# Patient Record
Sex: FEMALE | Race: WHITE | Employment: OTHER | URBAN - METROPOLITAN AREA
[De-identification: names, ages, dates, MRNs, and addresses within clinical notes are randomized per-mention and may not be internally consistent; named-entity substitution may affect disease eponyms.]

---

## 2021-12-06 ENCOUNTER — OFFICE VISIT (OUTPATIENT)
Dept: PRIMARY CARE CLINIC | Age: 61
End: 2021-12-06
Payer: MEDICARE

## 2021-12-06 VITALS
DIASTOLIC BLOOD PRESSURE: 60 MMHG | HEART RATE: 70 BPM | SYSTOLIC BLOOD PRESSURE: 110 MMHG | TEMPERATURE: 96.6 F | WEIGHT: 176.4 LBS | OXYGEN SATURATION: 97 % | RESPIRATION RATE: 18 BRPM | BODY MASS INDEX: 30.11 KG/M2 | HEIGHT: 64 IN

## 2021-12-06 DIAGNOSIS — R05.9 COUGH: ICD-10-CM

## 2021-12-06 DIAGNOSIS — J02.9 PHARYNGITIS, UNSPECIFIED ETIOLOGY: Primary | ICD-10-CM

## 2021-12-06 LAB
Lab: NORMAL
PERFORMING INSTRUMENT: NORMAL
QC PASS/FAIL: NORMAL
SARS-COV-2, POC: NORMAL

## 2021-12-06 PROCEDURE — 99213 OFFICE O/P EST LOW 20 MIN: CPT | Performed by: NURSE PRACTITIONER

## 2021-12-06 PROCEDURE — G8417 CALC BMI ABV UP PARAM F/U: HCPCS | Performed by: NURSE PRACTITIONER

## 2021-12-06 PROCEDURE — 3017F COLORECTAL CA SCREEN DOC REV: CPT | Performed by: NURSE PRACTITIONER

## 2021-12-06 PROCEDURE — G8484 FLU IMMUNIZE NO ADMIN: HCPCS | Performed by: NURSE PRACTITIONER

## 2021-12-06 PROCEDURE — G8427 DOCREV CUR MEDS BY ELIG CLIN: HCPCS | Performed by: NURSE PRACTITIONER

## 2021-12-06 PROCEDURE — 4004F PT TOBACCO SCREEN RCVD TLK: CPT | Performed by: NURSE PRACTITIONER

## 2021-12-06 PROCEDURE — 87426 SARSCOV CORONAVIRUS AG IA: CPT | Performed by: NURSE PRACTITIONER

## 2021-12-06 RX ORDER — METOPROLOL TARTRATE 50 MG/1
50 TABLET, FILM COATED ORAL DAILY
COMMUNITY
Start: 2021-11-03

## 2021-12-06 RX ORDER — ESTRADIOL 0.1 MG/G
1 CREAM VAGINAL
COMMUNITY
Start: 2021-11-11 | End: 2022-08-15

## 2021-12-06 RX ORDER — LIDOCAINE AND PRILOCAINE 25; 25 MG/G; MG/G
1 CREAM TOPICAL 2 TIMES DAILY
COMMUNITY
Start: 2021-11-03 | End: 2022-03-24 | Stop reason: ALTCHOICE

## 2021-12-06 RX ORDER — LISINOPRIL 10 MG/1
10 TABLET ORAL DAILY
COMMUNITY
Start: 2021-10-27

## 2021-12-06 RX ORDER — HYDROCODONE BITARTRATE AND ACETAMINOPHEN 5; 325 MG/1; MG/1
1 TABLET ORAL 3 TIMES DAILY PRN
COMMUNITY
Start: 2021-12-01

## 2021-12-06 RX ORDER — TRAZODONE HYDROCHLORIDE 50 MG/1
50 TABLET ORAL DAILY
COMMUNITY
Start: 2021-10-25 | End: 2022-10-04 | Stop reason: SDUPTHER

## 2021-12-06 RX ORDER — LIDOCAINE 50 MG/G
1 OINTMENT TOPICAL 3 TIMES DAILY
COMMUNITY
Start: 2021-10-28 | End: 2022-03-24 | Stop reason: ALTCHOICE

## 2021-12-06 RX ORDER — AMOXICILLIN 875 MG/1
875 TABLET, COATED ORAL 2 TIMES DAILY
Qty: 20 TABLET | Refills: 0 | Status: SHIPPED | OUTPATIENT
Start: 2021-12-06 | End: 2021-12-16

## 2021-12-06 RX ORDER — FUROSEMIDE 40 MG/1
40 TABLET ORAL DAILY
COMMUNITY
Start: 2021-11-23 | End: 2022-03-24 | Stop reason: SDUPTHER

## 2021-12-06 NOTE — PROGRESS NOTES
Chief Complaint   Other (c/o sinus drg yellow,headache,sore and swollen throat,cough,ears hurt) and Other (x 3 days)      History of Present Illness   Source of history provided by:  patient. Robert Floyd is a 57 y.o. old female who presents to the walk in clinic with complaints of Headache, Pharyngitis, Rhinorrhea, Nasal Congestion and non-productive Cough x 3 days. States symptoms have stayed the same since onset. Has been taking Acetaminophen, NSAID without symptomatic relief. Denies any Fever, Shortness of breath, Nausea, Vomiting, Chest Pain, LE Edema, Abdominal Pain, Rash, Lethargy or Close contact with a lab confirmed COVID-19 patient within 14 days of symptom onset . Denies any hx of asthma, COPD, emphysema or pneumonia. 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 ROS   Pertinent positives and negatives are stated within HPI, all other systems reviewed and are negative. Past Medical History:  has no past medical history on file. Past Surgical History:  has no past surgical history on file. Social History:  reports that she has been smoking. She has been smoking about 1.00 pack per day. She has never used smokeless tobacco. She reports current alcohol use. She reports that she does not use drugs. Family History: family history is not on file. Allergies: Sulfa antibiotics    Physical Exam   Vital Signs:  /60   Pulse 70   Temp 96.6 °F (35.9 °C)   Resp 18   Ht 5' 4\" (1.626 m)   Wt 176 lb 6.4 oz (80 kg)   SpO2 97%   BMI 30.28 kg/m²    Oxygen Saturation Interpretation: Normal.    Constitutional:  Alert, development consistent with age. NAD. Head:  NC/NT. Airway patent. Ears: Right TM scarred from past infections and \"blowing out of ear drum\". Left TM unremarkable. Canals without exudate or swelling bilaterally. She has a hearing aid to left ear  Mouth: Posterior pharynx with moderate erythema and clear postnasal drip.  No tonsillar hypertrophy or exudate. Neck:  Normal ROM. Supple. No anterior cervical adenopathy noted. Lungs: CTAB without wheezes, rales, or rhonchi. CV:  Regular rate and rhythm, normal heart sounds, without pathological murmurs, ectopy, gallops, or rubs. Skin:  Normal turgor. Warm, dry, without visible rash. Lymphatic: No lymphangitis or adenopathy noted. Neurological:  Oriented. Motor functions intact. Lab / Imaging Results   (All laboratory and radiology results have been personally reviewed by myself)  Labs:  Results for orders placed or performed in visit on 12/06/21   POCT COVID-19, Antigen   Result Value Ref Range    SARS-COV-2, POC Not-Detected Not Detected    Lot Number [id number]     QC Pass/Fail pass     Performing Instrument BD Veritor        Imaging: All Radiology results interpreted by Radiologist unless otherwise noted. No results found. Medical Decision Making   Pt non-toxic, in no apparent distress and stable at time of discharge. Assessment/Plan   Liat Solares was seen today for other and other. Diagnoses and all orders for this visit:    Pharyngitis, unspecified etiology  -     amoxicillin (AMOXIL) 875 MG tablet; Take 1 tablet by mouth 2 times daily for 10 days    Cough  -     POCT COVID-19, Antigen        Rapid COVID-19 swab obtained and noted to be negative. Increase fluids and rest. Symptomatic relief discussed including Tylenol prn pain/fever. Schedule f/u with PCP in 7-10 days if symptoms persist. ED sooner if symptoms worsen or change. ED immediately with high or refractory fever, progressive SOB, dyspnea, CP, calf pain/swelling, shaking chills, vomiting, abdominal pain, lethargy, flank pain, or decreased urinary output. Pt verbalizes understanding and is in agreement with plan of care. All questions answered. LUZ Sloan NP    This visit was provided as a focused evaluation during the [place] -19 pandemic/national emergency.   A comprehensive review of all previous patient history and testing was not conducted. Pertinent findings were elicited during the visit. *NOTE: This report was transcribed using voice recognition software. Every effort was made to ensure accuracy; however, inadvertent computerized transcription errors may be present.

## 2022-01-28 LAB — VITAMIN B-12: NORMAL

## 2022-02-22 ENCOUNTER — OFFICE VISIT (OUTPATIENT)
Dept: PRIMARY CARE CLINIC | Age: 62
End: 2022-02-22
Payer: MEDICARE

## 2022-02-22 VITALS
DIASTOLIC BLOOD PRESSURE: 74 MMHG | OXYGEN SATURATION: 98 % | HEART RATE: 67 BPM | WEIGHT: 174.2 LBS | BODY MASS INDEX: 29.9 KG/M2 | SYSTOLIC BLOOD PRESSURE: 110 MMHG | TEMPERATURE: 96.9 F

## 2022-02-22 DIAGNOSIS — B37.9 YEAST INFECTION: ICD-10-CM

## 2022-02-22 DIAGNOSIS — H10.31 ACUTE BACTERIAL CONJUNCTIVITIS OF RIGHT EYE: Primary | ICD-10-CM

## 2022-02-22 PROCEDURE — 3017F COLORECTAL CA SCREEN DOC REV: CPT | Performed by: NURSE PRACTITIONER

## 2022-02-22 PROCEDURE — 99213 OFFICE O/P EST LOW 20 MIN: CPT | Performed by: NURSE PRACTITIONER

## 2022-02-22 PROCEDURE — G8427 DOCREV CUR MEDS BY ELIG CLIN: HCPCS | Performed by: NURSE PRACTITIONER

## 2022-02-22 PROCEDURE — G8484 FLU IMMUNIZE NO ADMIN: HCPCS | Performed by: NURSE PRACTITIONER

## 2022-02-22 PROCEDURE — G8417 CALC BMI ABV UP PARAM F/U: HCPCS | Performed by: NURSE PRACTITIONER

## 2022-02-22 PROCEDURE — 4004F PT TOBACCO SCREEN RCVD TLK: CPT | Performed by: NURSE PRACTITIONER

## 2022-02-22 RX ORDER — NYSTATIN 100000 [USP'U]/G
POWDER TOPICAL
COMMUNITY

## 2022-02-22 RX ORDER — NITROGLYCERIN 0.3 MG/1
TABLET SUBLINGUAL
COMMUNITY

## 2022-02-22 RX ORDER — OFLOXACIN 3 MG/ML
2 SOLUTION/ DROPS OPHTHALMIC 4 TIMES DAILY
Qty: 1 EACH | Refills: 0 | Status: SHIPPED | OUTPATIENT
Start: 2022-02-22 | End: 2022-03-04

## 2022-02-22 RX ORDER — ASPIRIN 81 MG/1
TABLET ORAL
COMMUNITY

## 2022-02-22 RX ORDER — OFLOXACIN 3 MG/ML
SOLUTION AURICULAR (OTIC)
COMMUNITY

## 2022-02-22 RX ORDER — FLUCONAZOLE 100 MG/1
100 TABLET ORAL DAILY
Qty: 7 TABLET | Refills: 0 | Status: SHIPPED | OUTPATIENT
Start: 2022-02-22 | End: 2022-03-01

## 2022-02-22 ASSESSMENT — PATIENT HEALTH QUESTIONNAIRE - PHQ9
SUM OF ALL RESPONSES TO PHQ QUESTIONS 1-9: 0
2. FEELING DOWN, DEPRESSED OR HOPELESS: 0
SUM OF ALL RESPONSES TO PHQ QUESTIONS 1-9: 0

## 2022-02-22 NOTE — PROGRESS NOTES
Chief Complaint:   Eye Problem (right eye,   itching, discharge x 2 weeks, but getting worse this last week )      History of Present Illness   Source of history provided by:  patient. Sherry Vigil is a 60 y.o. old female presenting to express care with redness, itching, mild irritation, and abnormal drainage to the right eye for the past 2 weeks. She reports that the drainage is milky. States there was no obvious FB exposure. She has had a recent URI within the past week. Denies any visual changes, visual loss, HA, ear pain, fever, chills, N/V, or lethargy. She also reports that she had been treated for a yeast infection and does not believe that it was completely treated, as she is still having symptoms. Circumstances:    []  Contact Lens Use     [x]  Recent URI Sx's     []  Spontaneous Onset     []  Close Contact w/similar Sx's     []  Work Related     History of:     []   Glaucoma     []   Recent Eye Surgery     Review of Systems    Unless otherwise stated in this report or unable to obtain because of the patient's clinical or mental status as evidenced by the medical record, this patients's positive and negative responses for Review of Systems, constitutional, psych, eyes, ENT, cardiovascular, respiratory, gastrointestinal, neurological, genitourinary, musculoskeletal, integument systems and systems related to the presenting problem are either stated in the preceding or were not pertinent or were negative for the symptoms and/or complaints related to the medical problem. Past Medical History:  has no past medical history on file. Past Surgical History:  has no past surgical history on file. Social History:  reports that she has been smoking. She started smoking about 48 years ago. She has a 48.00 pack-year smoking history. She has never used smokeless tobacco. She reports current alcohol use. She reports that she does not use drugs.   Family History: family history is not on file.  Allergies: Sulfa antibiotics and Celecoxib    Physical Exam   Vital Signs:  /74   Pulse 67   Temp 96.9 °F (36.1 °C) (Temporal)   Wt 174 lb 3.2 oz (79 kg)   SpO2 98%   BMI 29.90 kg/m²    Oxygen Saturation Interpretation: Normal.    Constitutional:  Alert, development consistent with age. HENT:  NC/NT. Airway patent. Eyes:         Pupils: PERRL bilaterally. Eyelids:  Edema to the right upper and lower lids. Conjunctiva: Moderate erythema noted to the right conjunctiva. Sclera: Clear bilaterally. No obvious FB, rust ring, or hyphema. Cornea: No obvious corneal abrasion or ulceration bilaterally. EOM:  Intact bilaterally. Visual Acuity:  Grossly intact. Lungs: CTAB without wheezing, rales, or rhonchi  Heart: RRR, no murmurs, rubs, or gallops. Skin: Moist and warm without rashes or lesions. Lymphatics: No lymphangitis or adenopathy noted. Neurological:  Alert and oriented. Motor functions intact. Test Results Section   (All laboratory and radiology results have been personally reviewed by myself)  Labs:  No results found for this visit on 02/22/22. Imaging: All Radiology results interpreted by Radiologist unless otherwise noted. No results found. Assessment / Plan   Impression(s):  Atilio Garcia was seen today for eye problem. Diagnoses and all orders for this visit:    Acute bacterial conjunctivitis of right eye  -     ofloxacin (OCUFLOX) 0.3 % solution; Place 2 drops into the right eye 4 times daily for 10 days    Yeast infection  -     fluconazole (DIFLUCAN) 100 MG tablet; Take 1 tablet by mouth daily for 7 days        Script written for ofloxacin ophthalmic drops, side effects and application directions discussed. Advise good handwashing, avoiding rubbing eyes, and washing towels and pillowcase in hot water to prevent spread. F/u with ophthalmology in 48 hrs if not improved. ED sooner if symptoms worsen or change.  ED immediately with the development of fever, visual changes, ocular pain/swelling, body aches, or shaking chills. Patient verbalized understanding and is in agreement with this care plan. All questions answered. Return if symptoms worsen or fail to improve.     LUZ Serrano - NP

## 2022-03-24 ENCOUNTER — OFFICE VISIT (OUTPATIENT)
Dept: PRIMARY CARE CLINIC | Age: 62
End: 2022-03-24
Payer: MEDICARE

## 2022-03-24 VITALS
SYSTOLIC BLOOD PRESSURE: 116 MMHG | HEART RATE: 72 BPM | HEIGHT: 64 IN | BODY MASS INDEX: 29.3 KG/M2 | TEMPERATURE: 98.6 F | DIASTOLIC BLOOD PRESSURE: 70 MMHG | WEIGHT: 171.6 LBS | OXYGEN SATURATION: 96 %

## 2022-03-24 DIAGNOSIS — I10 ESSENTIAL HYPERTENSION: ICD-10-CM

## 2022-03-24 DIAGNOSIS — E11.42 TYPE 2 DIABETES MELLITUS WITH DIABETIC POLYNEUROPATHY, WITHOUT LONG-TERM CURRENT USE OF INSULIN (HCC): ICD-10-CM

## 2022-03-24 DIAGNOSIS — F17.200 TOBACCO USE DISORDER: ICD-10-CM

## 2022-03-24 DIAGNOSIS — Z11.59 NEED FOR HEPATITIS C SCREENING TEST: ICD-10-CM

## 2022-03-24 DIAGNOSIS — Z12.39 ENCOUNTER FOR BREAST CANCER SCREENING USING NON-MAMMOGRAM MODALITY: ICD-10-CM

## 2022-03-24 DIAGNOSIS — Z87.891 PERSONAL HISTORY OF TOBACCO USE: ICD-10-CM

## 2022-03-24 DIAGNOSIS — Z11.4 SCREENING FOR HIV (HUMAN IMMUNODEFICIENCY VIRUS): ICD-10-CM

## 2022-03-24 DIAGNOSIS — I25.10 ARTERIOSCLEROSIS OF CORONARY ARTERY: ICD-10-CM

## 2022-03-24 DIAGNOSIS — M15.9 PRIMARY OSTEOARTHRITIS INVOLVING MULTIPLE JOINTS: ICD-10-CM

## 2022-03-24 DIAGNOSIS — H66.91 RECURRENT OTITIS MEDIA, RIGHT: ICD-10-CM

## 2022-03-24 DIAGNOSIS — Z76.89 ENCOUNTER TO ESTABLISH CARE WITH NEW DOCTOR: Primary | ICD-10-CM

## 2022-03-24 DIAGNOSIS — M70.61 GREATER TROCHANTERIC BURSITIS OF RIGHT HIP: ICD-10-CM

## 2022-03-24 DIAGNOSIS — M62.838 MUSCLE SPASTICITY: ICD-10-CM

## 2022-03-24 DIAGNOSIS — A60.04: ICD-10-CM

## 2022-03-24 DIAGNOSIS — Z12.2 SCREENING FOR LUNG CANCER: ICD-10-CM

## 2022-03-24 PROBLEM — E11.9 DIABETES MELLITUS (HCC): Status: ACTIVE | Noted: 2022-03-24

## 2022-03-24 PROBLEM — H80.93 OTOSCLEROSIS OF BOTH EARS: Status: ACTIVE | Noted: 2022-03-24

## 2022-03-24 PROBLEM — M15.0 PRIMARY OSTEOARTHRITIS INVOLVING MULTIPLE JOINTS: Status: ACTIVE | Noted: 2022-03-24

## 2022-03-24 PROBLEM — N90.3 DYSPLASIA OF VULVA: Status: RESOLVED | Noted: 2022-03-24 | Resolved: 2022-03-24

## 2022-03-24 PROBLEM — N90.3 DYSPLASIA OF VULVA: Status: ACTIVE | Noted: 2022-03-24

## 2022-03-24 PROBLEM — E53.8 B12 DEFICIENCY: Status: ACTIVE | Noted: 2022-03-24

## 2022-03-24 LAB
ALBUMIN SERPL-MCNC: 4.6 G/DL (ref 3.5–5.2)
ALP BLD-CCNC: 87 U/L (ref 35–104)
ALT SERPL-CCNC: 25 U/L (ref 0–32)
ANION GAP SERPL CALCULATED.3IONS-SCNC: 14 MMOL/L (ref 7–16)
AST SERPL-CCNC: 25 U/L (ref 0–31)
BASOPHILS ABSOLUTE: 0.05 E9/L (ref 0–0.2)
BASOPHILS RELATIVE PERCENT: 0.5 % (ref 0–2)
BILIRUB SERPL-MCNC: 0.4 MG/DL (ref 0–1.2)
BUN BLDV-MCNC: 9 MG/DL (ref 6–23)
CALCIUM SERPL-MCNC: 9.2 MG/DL (ref 8.6–10.2)
CHLORIDE BLD-SCNC: 101 MMOL/L (ref 98–107)
CHOLESTEROL, TOTAL: 236 MG/DL (ref 0–199)
CO2: 25 MMOL/L (ref 22–29)
CREAT SERPL-MCNC: 0.7 MG/DL (ref 0.5–1)
CREATININE URINE: 19 MG/DL (ref 29–226)
EOSINOPHILS ABSOLUTE: 0.11 E9/L (ref 0.05–0.5)
EOSINOPHILS RELATIVE PERCENT: 1.1 % (ref 0–6)
GFR AFRICAN AMERICAN: >60
GFR NON-AFRICAN AMERICAN: >60 ML/MIN/1.73
GLUCOSE BLD-MCNC: 83 MG/DL (ref 74–99)
HBA1C MFR BLD: 5.6 % (ref 4–5.6)
HCT VFR BLD CALC: 48.1 % (ref 34–48)
HDLC SERPL-MCNC: 48 MG/DL
HEMOGLOBIN: 16.4 G/DL (ref 11.5–15.5)
IMMATURE GRANULOCYTES #: 0.02 E9/L
IMMATURE GRANULOCYTES %: 0.2 % (ref 0–5)
LDL CHOLESTEROL CALCULATED: 137 MG/DL (ref 0–99)
LYMPHOCYTES ABSOLUTE: 3.63 E9/L (ref 1.5–4)
LYMPHOCYTES RELATIVE PERCENT: 36.9 % (ref 20–42)
MCH RBC QN AUTO: 34.1 PG (ref 26–35)
MCHC RBC AUTO-ENTMCNC: 34.1 % (ref 32–34.5)
MCV RBC AUTO: 100 FL (ref 80–99.9)
MICROALBUMIN UR-MCNC: 18.4 MG/L
MICROALBUMIN/CREAT UR-RTO: 96.8 (ref 0–30)
MONOCYTES ABSOLUTE: 0.79 E9/L (ref 0.1–0.95)
MONOCYTES RELATIVE PERCENT: 8 % (ref 2–12)
NEUTROPHILS ABSOLUTE: 5.23 E9/L (ref 1.8–7.3)
NEUTROPHILS RELATIVE PERCENT: 53.3 % (ref 43–80)
PDW BLD-RTO: 13.1 FL (ref 11.5–15)
PLATELET # BLD: 308 E9/L (ref 130–450)
PMV BLD AUTO: 9.2 FL (ref 7–12)
POTASSIUM SERPL-SCNC: 3.9 MMOL/L (ref 3.5–5)
RBC # BLD: 4.81 E12/L (ref 3.5–5.5)
SODIUM BLD-SCNC: 140 MMOL/L (ref 132–146)
TOTAL PROTEIN: 7.3 G/DL (ref 6.4–8.3)
TRIGL SERPL-MCNC: 256 MG/DL (ref 0–149)
VLDLC SERPL CALC-MCNC: 51 MG/DL
WBC # BLD: 9.8 E9/L (ref 4.5–11.5)

## 2022-03-24 PROCEDURE — G8417 CALC BMI ABV UP PARAM F/U: HCPCS | Performed by: STUDENT IN AN ORGANIZED HEALTH CARE EDUCATION/TRAINING PROGRAM

## 2022-03-24 PROCEDURE — 99215 OFFICE O/P EST HI 40 MIN: CPT | Performed by: STUDENT IN AN ORGANIZED HEALTH CARE EDUCATION/TRAINING PROGRAM

## 2022-03-24 PROCEDURE — G8484 FLU IMMUNIZE NO ADMIN: HCPCS | Performed by: STUDENT IN AN ORGANIZED HEALTH CARE EDUCATION/TRAINING PROGRAM

## 2022-03-24 PROCEDURE — 3017F COLORECTAL CA SCREEN DOC REV: CPT | Performed by: STUDENT IN AN ORGANIZED HEALTH CARE EDUCATION/TRAINING PROGRAM

## 2022-03-24 PROCEDURE — 4004F PT TOBACCO SCREEN RCVD TLK: CPT | Performed by: STUDENT IN AN ORGANIZED HEALTH CARE EDUCATION/TRAINING PROGRAM

## 2022-03-24 PROCEDURE — 3046F HEMOGLOBIN A1C LEVEL >9.0%: CPT | Performed by: STUDENT IN AN ORGANIZED HEALTH CARE EDUCATION/TRAINING PROGRAM

## 2022-03-24 PROCEDURE — 2022F DILAT RTA XM EVC RTNOPTHY: CPT | Performed by: STUDENT IN AN ORGANIZED HEALTH CARE EDUCATION/TRAINING PROGRAM

## 2022-03-24 PROCEDURE — G0296 VISIT TO DETERM LDCT ELIG: HCPCS | Performed by: STUDENT IN AN ORGANIZED HEALTH CARE EDUCATION/TRAINING PROGRAM

## 2022-03-24 PROCEDURE — G8427 DOCREV CUR MEDS BY ELIG CLIN: HCPCS | Performed by: STUDENT IN AN ORGANIZED HEALTH CARE EDUCATION/TRAINING PROGRAM

## 2022-03-24 RX ORDER — FUROSEMIDE 40 MG/1
40 TABLET ORAL DAILY
Qty: 90 TABLET | Refills: 1 | Status: SHIPPED
Start: 2022-03-24 | End: 2022-09-23

## 2022-03-24 RX ORDER — ACYCLOVIR 400 MG/1
TABLET ORAL
COMMUNITY
Start: 2022-03-14 | End: 2022-05-17 | Stop reason: ALTCHOICE

## 2022-03-24 RX ORDER — ACETAMINOPHEN 500 MG
1000 TABLET ORAL
COMMUNITY
Start: 2021-07-28

## 2022-03-24 RX ORDER — FLUCONAZOLE 150 MG/1
TABLET ORAL
COMMUNITY
Start: 2022-03-09 | End: 2022-07-05 | Stop reason: ALTCHOICE

## 2022-03-24 SDOH — ECONOMIC STABILITY: FOOD INSECURITY: WITHIN THE PAST 12 MONTHS, THE FOOD YOU BOUGHT JUST DIDN'T LAST AND YOU DIDN'T HAVE MONEY TO GET MORE.: SOMETIMES TRUE

## 2022-03-24 SDOH — ECONOMIC STABILITY: FOOD INSECURITY: WITHIN THE PAST 12 MONTHS, YOU WORRIED THAT YOUR FOOD WOULD RUN OUT BEFORE YOU GOT MONEY TO BUY MORE.: SOMETIMES TRUE

## 2022-03-24 ASSESSMENT — SOCIAL DETERMINANTS OF HEALTH (SDOH): HOW HARD IS IT FOR YOU TO PAY FOR THE VERY BASICS LIKE FOOD, HOUSING, MEDICAL CARE, AND HEATING?: NOT VERY HARD

## 2022-03-24 ASSESSMENT — ENCOUNTER SYMPTOMS: SHORTNESS OF BREATH: 0

## 2022-03-24 NOTE — PROGRESS NOTES
Pain mngmt, Cardio, [address], and Neuro/Dr. James Calle records obtained and has been given to nurse to abstract.

## 2022-03-24 NOTE — PROGRESS NOTES
NEW PRIMARY CARE VISIT    3/24/22  Name: Enma Huddleston   : 1960   Age: 60 y.o.   Sex: female        Assessment & Plan:     Problem List Items Addressed This Visit        Circulatory    Arteriosclerosis of coronary artery     History MI with stents   Unable to tolerate statin  Continue aspirin, metoprolol, lisinopril  Follows with cardiology         Relevant Medications    furosemide (LASIX) 40 MG tablet    Other Relevant Orders    Comprehensive Metabolic Panel    LIPID PANEL    MICROALBUMIN / CREATININE URINE RATIO    CBC with Auto Differential    Essential hypertension     Controlled  Continue metoprolol, lisinopril         Relevant Medications    furosemide (LASIX) 40 MG tablet    Other Relevant Orders    Comprehensive Metabolic Panel    MICROALBUMIN / CREATININE URINE RATIO       Endocrine    Type 2 diabetes mellitus with diabetic polyneuropathy, without long-term current use of insulin (HCC)     Continue Metformin 500 mg once daily  A1c and annual labs ordered         Relevant Orders    Hemoglobin A1C    Comprehensive Metabolic Panel    LIPID PANEL    MICROALBUMIN / CREATININE URINE RATIO       Nervous and Auditory    Recurrent otitis media, right     Assessed today, no signs of infection  Advised to follow-up with ENT as needed         Relevant Medications    acyclovir (ZOVIRAX) 400 MG tablet    fluconazole (DIFLUCAN) 150 MG tablet       Musculoskeletal and Integument    Primary osteoarthritis involving multiple joints     Following with Ortho and pain management         Relevant Medications    acetaminophen (TYLENOL) 500 MG tablet    Greater trochanteric bursitis of right hip     Following with Ortho and pain management            Genitourinary    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)     GYN treating with acyclovir            Other    Muscle spasticity     Abnormal intermittent lower extremity spasticity  Following with neuro         Tobacco use disorder Low Dose CT (LDCT) Lung Screening criteria met:  · Age 50-77(Medicare) or 50-80 ([facility])  · Pack year smoking >20  · Still smoking or less than 15 year since quit  · No sign or symptoms of lung cancer  · > 11 months since last LDCT   Risks and benefits of lung cancer screening with LDCT scans discussed             Other Visit Diagnoses     Encounter to establish care with new doctor    -  Primary    History reviewed and updated    Screening for HIV (human immunodeficiency virus)        Relevant Orders    HIV Screen    Need for hepatitis C screening test        Relevant Orders    Hepatitis C Antibody    Encounter for breast cancer screening using non-mammogram modality        Patient unable to tolerate mammograms due to required positioning and chronic shoulder pain  Bilateral breast ultrasound ordered for screening    Relevant Orders    US BREAST COMPLETE LEFT    US BREAST COMPLETE RIGHT    Personal history of tobacco use        Relevant Orders    [place] VISIT TO DISCUSS LUNG CA SCREEN W LDCT (Completed)    CT Lung Screen (Annual)    Screening for lung cancer        Relevant Orders    [place] VISIT TO DISCUSS LUNG CA SCREEN W LDCT (Completed)    CT Lung Screen (Annual)          Counseled patient regarding above diagnosis, including possible risks and complications, especially if left uncontrolled. Counseled patient as appropriate and relevant regarding any possible side effects, risks, and alternatives to treatment; the patient verbalizes understanding, and is in agreement with the plan as detailed above. All educational materials and instructions were discussed and included on the After Visit Summary. All questions answered to the patient's satisfaction. The patient was advised to call for any concerns prior to next appointment. Return in about 6 months (around 9/24/2022) for medicare wellness. 69 minutes was spent precharting, face-to-face with patient, and documenting on day of encounter.     This provider and patient were wearing surgical masks and practiced social distancing when appropriate during visit due to COVID-19 pandemic. Subjective:     Chief Complaint   Patient presents with    New Patient       Patient needs new PCP. Patient reports chronic ear infections in right ear since child and ruptured right TM 2009. Reports recent purulent drainage from right ear 5 days ago. Has otosclerosis bilateral inner ears, diagnosed age 23, caused her hearing loss. Was diagnosed with HSV, prescribed acyclovir by GYN, may start prophylaxis. Has been having intermittent irritation since vulvar surgery for dysplasia. Started on vaginal estradiol which has been helpful. Follows with cardiology for history of MI and stents. Takes aspirin but cannot take statins. Follows with neurology for history of neuropathy, fibromyalgia, spasticity lower extremities. Gets results at visit later today. Follows with ortho and pain management for arthritis is knees, hips, and back. Review of Systems   Constitutional: Positive for fatigue and fever (subjective). Negative for chills. HENT: Positive for ear discharge, ear pain and hearing loss (chronic). Eyes: Negative for visual disturbance. Respiratory: Negative for shortness of breath. Cardiovascular: Negative for chest pain. Neurological: Positive for numbness (neuropathy). Medical History:   History reviewed and updated as needed.     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)    Essential hypertension    Primary osteoarthritis involving multiple joints    Greater trochanteric bursitis of right hip    Muscle spasticity    Tobacco use disorder        Past Medical History: Diagnosis Date    Dysplasia of vulva 2021    Gastric ulcer     MI [facility]) [facility] [address] 2007    Otosclerosis of both ears     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) 3/24/2022    Uterine fibroid 2003       Past Surgical History:   Procedure Laterality Date    CORONARY ANGIOPLASTY WITH STENT PLACEMENT  2007    x4 stents    HYSTERECTOMY, TOTAL ABDOMINAL  2003    for fibroids    TUBAL LIGATION  1981    VULVA SURGERY Right 2021    for dysplasia       Family History   Problem Relation Age of Onset    Stroke Mother 36    Heart Attack Father 61    Schizophrenia Father     Lung Cancer Sister     Cervical Cancer Sister     Hypothyroidism Half-Sister     Schizophrenia Son     Other Daughter         issues following meningitis    Endometriosis Daughter     Arthritis Daughter        Medications:     Current Outpatient Medications:     acyclovir (ZOVIRAX) 400 MG tablet, TAKE 1 TABLET BY MOUTH EVERY 8 HOURS, Disp: , Rfl:     acetaminophen (TYLENOL) 500 MG tablet, Take 1,000 mg by mouth, Disp: , Rfl:     fluconazole (DIFLUCAN) 150 MG tablet, TAKE 1 TABLET BY MOUTH 1 TIME, Disp: , Rfl:     furosemide (LASIX) 40 MG tablet, Take 1 tablet by mouth daily, Disp: 90 tablet, Rfl: 1    ofloxacin (FLOXIN) 0.3 % otic solution, ofloxacin 0.3 % ear drops, Disp: , Rfl:     nitroGLYCERIN (NITROSTAT) 0.3 MG SL tablet, Nitrostat 0.3 mg sublingual tablet  place 1 tablet by sublingual route at the first sign of an attack; no more than 3 tabs are recommended within a 15 minute period. , Disp: , Rfl:     aspirin 81 MG EC tablet, Aspir-81 mg tablet,delayed release  take 1 tablet by oral route  every day, Disp: , Rfl:     metoprolol tartrate (LOPRESSOR) 50 MG tablet, Take 50 mg by mouth daily, Disp: , Rfl:     traZODone (DESYREL) 50 MG tablet, Take 50 mg by mouth daily, Disp: , Rfl:     metFORMIN (GLUCOPHAGE) 500 MG tablet, Take 500 mg by mouth daily, Disp: , Rfl:     lisinopril (PRINIVIL;ZESTRIL) 10 MG tablet, Take 10 mg by mouth daily, Disp: , Rfl:     HYDROcodone-acetaminophen (NORCO) 5-325 MG per tablet, Take 1 tablet by mouth 3 times daily as needed. , Disp: , Rfl:     estradiol (ESTRACE) 0.1 MG/GM vaginal cream, Place 1 g vaginally three times a week, Disp: , Rfl:     nystatin (NYSTATIN) 612993 UNIT/GM powder, Nystop 100,000 unit/gram topical powder  APPLY TO THE AFFECTED AREA EVERY DAY (Patient not taking: Reported on 3/24/2022), Disp: , Rfl:     Allergies: Allergies   Allergen Reactions    Niacin Hives    Sulfa Antibiotics Hives    Sulfur Hives    Celecoxib     Statins      myalgias       Social History:     Social History     Socioeconomic History    Marital status:      Spouse name: Not on file    Number of children: Not on file    Years of education: Not on file    Highest education level: Not on file   Occupational History    Not on file   Tobacco Use    Smoking status: Current Every Day Smoker     Packs/day: 1.00     Years: 48.00     Pack years: 48.00     Start date: 1974    Smokeless tobacco: Never Used   Substance and Sexual Activity    Alcohol use: Not Currently     Comment: twice yearly    Drug use: Never    Sexual activity: Not Currently   Other Topics Concern    Not on file   Social History Narrative    Not on file     Social Determinants of Health     Financial Resource Strain: Low Risk     Difficulty of Paying Living Expenses: Not very hard   Food Insecurity: Food Insecurity Present    Worried About Running Out of Food in the Last Year: Sometimes true    Lin of Food in the Last Year: Sometimes true   Transportation Needs:     Lack of Transportation (Medical): Not on file    Lack of Transportation (Non-Medical):  Not on file   Physical Activity:     Days of Exercise per Week: Not on file    Minutes of Exercise per Session: Not on file   Stress:     Feeling of Stress : Not on file   Social Connections:     Frequency of Communication with Friends and Family: Not on file    Frequency of Social Gatherings with Friends and Family: Not on file    Attends Quaker Services: Not on file    Active Member of Clubs or Organizations: Not on file    Attends Club or Organization Meetings: Not on file    Marital Status: Not on file   Intimate Partner Violence:     Fear of Current or Ex-Partner: Not on file    Emotionally Abused: Not on file    Physically Abused: Not on file    Sexually Abused: Not on file   Housing Stability:     Unable to Pay for Housing in the Last Year: Not on file    Number of [place] in the Last Year: Not on file    Unstable Housing in the Last Year: Not on file       Physical Exam:     Vitals:    03/24/22 0929   BP: 116/70   Site: Left Upper Arm   Position: Sitting   Pulse: 72   Temp: 98.6 °F (37 °C)   SpO2: 96%   Weight: 171 lb 9.6 oz (77.8 kg)   Height: 5' 4\" (1.626 m)       BP Readings from Last 3 Encounters:   03/24/22 116/70   02/22/22 110/74   12/06/21 110/60       Wt Readings from Last 3 Encounters:   03/24/22 171 lb 9.6 oz (77.8 kg)   02/22/22 174 lb 3.2 oz (79 kg)   12/06/21 176 lb 6.4 oz (80 kg)        Physical Exam  Vitals and nursing note reviewed. Constitutional:       General: She is not in acute distress. Appearance: Normal appearance. She is not ill-appearing or diaphoretic. HENT:      Right Ear: Ear canal and external ear normal. There is no impacted cerumen. Left Ear: Tympanic membrane, ear canal and external ear normal.  No middle ear effusion. There is no impacted cerumen. Tympanic membrane is not erythematous. Ears:      Comments: Right tympanic membrane not present. Cardiovascular:      Rate and Rhythm: Normal rate and regular rhythm. Heart sounds: Normal heart sounds. Pulmonary:      Effort: Pulmonary effort is normal. No respiratory distress. Breath sounds: Normal breath sounds. Musculoskeletal:      Right lower leg: No edema.       Left lower leg: No edema. Skin:     General: Skin is warm and dry. Neurological:      Mental Status: She is alert and oriented to person, place, and time. Psychiatric:         Mood and Affect: Mood normal.         Behavior: Behavior normal.         Testing:     Orders Placed This Encounter   Procedures    CT Lung Screen (Annual)     Age: Patient is 60 y.o. Smoking History: Social History    Tobacco Use      Smoking status: Current Every Day Smoker        Packs/day: 1.00        Years: 48.00        Pack years: 50        Start date: 1974      Smokeless tobacco: Never Used    Alcohol use: Not Currently      Comment: twice yearly    Drug use: Never   Pack years: 50    Date of last lung cancer screening: No previous lung cancer screening exam     Standing Status:   Future     Standing Expiration Date:   9/24/2023     Order Specific Question:   Is there documentation of shared decision making? Answer:   Yes     Order Specific Question:   Is this a low dose CT or a routine CT? Answer:   Low Dose CT [1]     Order Specific Question:   Is this the first (baseline) CT or an annual exam?     Answer:   Baseline [1]     Order Specific Question:   Does the patient show any signs or symptoms of lung cancer? Answer:   No     Order Specific Question:   Smoking Status? Answer:   Current Every Day Smoker [1]     Order Specific Question:   Smoking packs per day? Answer:   1     Order Specific Question:   Years smoking?      Answer:   50    Hemoglobin A1C     Standing Status:   Future     Standing Expiration Date:   3/24/2023    Comprehensive Metabolic Panel     Standing Status:   Future     Standing Expiration Date:   3/24/2023    LIPID PANEL     Standing Status:   Future     Standing Expiration Date:   3/24/2023     Order Specific Question:   Is Patient Fasting?/# of Hours     Answer:   No    HIV Screen     Standing Status:   Future     Standing Expiration Date:   3/24/2023    Hepatitis C Antibody     Standing Status: Future     Standing Expiration Date:   3/24/2023    MICROALBUMIN / CREATININE URINE RATIO     Standing Status:   Future     Standing Expiration Date:   3/24/2023    CBC with Auto Differential     Standing Status:   Future     Standing Expiration Date:   3/24/2023    [place] VISIT TO DISCUSS LUNG CA SCREEN W LDCT        No results found for this or any previous visit (from the past 25 hour(s)).

## 2022-03-24 NOTE — Clinical Note
[facility] Dr. Estela Mayberry notes and labs/pathology  [facility] Dr. Myron Ridley surgery and pathology  [facility] Dr. Alexia Munoz PCP records  [facility] Dr. Nathanael Adler PCP records  [facility] Dr. Jamison Florian pain records  [facility] Dr. Leatha Engel ENT records

## 2022-03-24 NOTE — ASSESSMENT & PLAN NOTE
History MI with stents 2007  Unable to tolerate statin  Continue aspirin, metoprolol, lisinopril  Follows with cardiology

## 2022-03-24 NOTE — ASSESSMENT & PLAN NOTE
Low Dose CT (LDCT) Lung Screening criteria met:  · Age 50-77(Medicare) or 50-80 ([facility])  · Pack year smoking >20  · Still smoking or less than 15 year since quit  · No sign or symptoms of lung cancer  · > 11 months since last LDCT   Risks and benefits of lung cancer screening with LDCT scans discussed

## 2022-03-25 LAB
HEPATITIS C ANTIBODY INTERPRETATION: NORMAL
HIV-1 AND HIV-2 ANTIBODIES: NORMAL

## 2022-03-29 ENCOUNTER — OFFICE VISIT (OUTPATIENT)
Dept: PRIMARY CARE CLINIC | Age: 62
End: 2022-03-29
Payer: MEDICARE

## 2022-03-29 VITALS
HEART RATE: 69 BPM | OXYGEN SATURATION: 97 % | DIASTOLIC BLOOD PRESSURE: 60 MMHG | SYSTOLIC BLOOD PRESSURE: 100 MMHG | WEIGHT: 170.2 LBS | TEMPERATURE: 98 F | BODY MASS INDEX: 29.21 KG/M2

## 2022-03-29 DIAGNOSIS — R39.9 URINARY TRACT INFECTION SYMPTOMS: ICD-10-CM

## 2022-03-29 DIAGNOSIS — H91.91 DEAFNESS IN RIGHT EAR: ICD-10-CM

## 2022-03-29 DIAGNOSIS — J01.00 ACUTE NON-RECURRENT MAXILLARY SINUSITIS: Primary | ICD-10-CM

## 2022-03-29 DIAGNOSIS — H91.92 HEARING LOSS OF LEFT EAR, UNSPECIFIED HEARING LOSS TYPE: ICD-10-CM

## 2022-03-29 DIAGNOSIS — H80.93 OTOSCLEROSIS OF BOTH EARS: ICD-10-CM

## 2022-03-29 LAB
BILIRUBIN, POC: NORMAL
BLOOD URINE, POC: NORMAL
CLARITY, POC: NORMAL
COLOR, POC: YELLOW
GLUCOSE URINE, POC: NORMAL
KETONES, POC: NORMAL
LEUKOCYTE EST, POC: NORMAL
NITRITE, POC: NORMAL
PH, POC: 5.5
PROTEIN, POC: NORMAL
SPECIFIC GRAVITY, POC: 1.01
UROBILINOGEN, POC: 0.2

## 2022-03-29 PROCEDURE — G8484 FLU IMMUNIZE NO ADMIN: HCPCS | Performed by: NURSE PRACTITIONER

## 2022-03-29 PROCEDURE — 99213 OFFICE O/P EST LOW 20 MIN: CPT | Performed by: NURSE PRACTITIONER

## 2022-03-29 PROCEDURE — 3017F COLORECTAL CA SCREEN DOC REV: CPT | Performed by: NURSE PRACTITIONER

## 2022-03-29 PROCEDURE — 81002 URINALYSIS NONAUTO W/O SCOPE: CPT | Performed by: NURSE PRACTITIONER

## 2022-03-29 PROCEDURE — G8427 DOCREV CUR MEDS BY ELIG CLIN: HCPCS | Performed by: NURSE PRACTITIONER

## 2022-03-29 PROCEDURE — G8417 CALC BMI ABV UP PARAM F/U: HCPCS | Performed by: NURSE PRACTITIONER

## 2022-03-29 PROCEDURE — 4004F PT TOBACCO SCREEN RCVD TLK: CPT | Performed by: NURSE PRACTITIONER

## 2022-03-29 RX ORDER — AMOXICILLIN 875 MG/1
875 TABLET, COATED ORAL 2 TIMES DAILY
Qty: 20 TABLET | Refills: 0 | Status: SHIPPED | OUTPATIENT
Start: 2022-03-29 | End: 2022-04-08

## 2022-03-29 NOTE — PROGRESS NOTES
Chief Complaint   Urinary Tract Infection (low back pain, urinary hesitancy, was unable to go then had incontinence   ) and Sinus Problem (cough, sneezing, bilateral ear pain, fever off and on, slight ha x 5 days  )      History of Present Illness   Source of history provided by:  patient. Herminia Reaves is a 60 y.o. old female who presents to the walk in clinic with complaints of Generalized Body Aches, Headache, Pharyngitis, Rhinorrhea, Nasal Congestion, Post nasal drip, non-productive Cough, Loss of Appetite, Nausea, Fatigue and low back ache with urinary incontinence x 5 days. States symptoms have stayed the same since onset. Has been taking Mucinex, without symptomatic relief. Denies any Fever, Shortness of breath, Vomiting, Chest Pain, LE Edema, Abdominal Pain, Rash or Close contact with a lab confirmed COVID-19 patient within 14 days of symptom onset . Denies any hx of asthma, COPD, emphysema or pneumonia. States that she recently started gabapentin and acyclovir. ROS   Pertinent positives and negatives are stated within HPI, all other systems reviewed and are negative. 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 Past Surgical History:  has a past surgical history that includes Vulva surgery (Right, 2021); Coronary angioplasty with stent (2007); Tubal ligation (1981); and Hysterectomy, total abdominal (2003). Social History:  reports that she has been smoking. She started smoking about 48 years ago. She has a 48.00 pack-year smoking history. She has never used smokeless tobacco. She reports previous alcohol use. She reports that she does not use drugs. Family History: family history includes Arthritis in her daughter; Cervical Cancer in her sister;  Endometriosis in her daughter; Heart Attack (age of onset: 61) in her father; Hypothyroidism in her half-sister; Lung Cancer in her sister; Other in her daughter; Schizophrenia in her father and son; Stroke (age of onset: 36) in her mother. Allergies: Niacin, Sulfa antibiotics, Sulfur, Celecoxib, and Statins    Physical Exam   Vital Signs:  /60 (Site: Left Upper Arm, Position: Sitting, Cuff Size: Large Adult)   Pulse 69   Temp 98 °F (36.7 °C) (Temporal)   Wt 170 lb 3.2 oz (77.2 kg)   SpO2 97%   BMI 29.21 kg/m²    Oxygen Saturation Interpretation: Normal.    Constitutional:  Alert, development consistent with age. NAD. Moderate amount of swelling noted to bilateral nares. Head:  NC/NT. Airway patent. Ears: Right TM scarred from past infections and \"blowing out of ear drum\". Left TM unremarkable. Canals without exudate or swelling bilaterally. She has a hearing aid to left ear  Mouth: Posterior pharynx with mild erythema and clear postnasal drip. No tonsillar hypertrophy or exudate. Neck:  Normal ROM. Supple. No anterior cervical adenopathy noted. Lungs: CTAB without wheezes, rales, or rhonchi. CV:  Regular rate and rhythm, normal heart sounds, without pathological murmurs, ectopy, gallops, or rubs. Skin:  Normal turgor. Warm, dry, without visible rash. Lymphatic: No lymphangitis or adenopathy noted. Neurological:  Oriented. Motor functions intact. Lab / Imaging Results   (All laboratory and radiology results have been personally reviewed by myself)  Labs:  Results for orders placed or performed in visit on 03/29/22   POCT Urinalysis no Micro   Result Value Ref Range    Color, UA yellow     Clarity, UA cloudy     Glucose, UA POC neg     Bilirubin, UA neg     Ketones, UA neg     Spec Grav, UA 1.015     Blood, UA POC neg     pH, UA 5.5     Protein, UA POC neg     Urobilinogen, UA 0.2     Leukocytes, UA neg     Nitrite, UA neg        Imaging: All Radiology results interpreted by Radiologist unless otherwise noted.   No results found.    Medical Decision Making   Pt non-toxic, in no apparent distress and stable at time of discharge. Assessment/Plan   Kassy Grey was seen today for urinary tract infection and sinus problem. Diagnoses and all orders for this visit:    Acute non-recurrent maxillary sinusitis  -     amoxicillin (AMOXIL) 875 MG tablet; Take 1 tablet by mouth 2 times daily for 10 days    Otosclerosis of both ears    Deafness in right ear    Hearing loss of left ear, unspecified hearing loss type    Urinary tract infection symptoms  -     POCT Urinalysis no Micro        Increase fluids and rest. Symptomatic relief discussed including Tylenol prn pain/fever. Schedule f/u with PCP in 7-10 days if symptoms persist. ED sooner if symptoms worsen or change. ED immediately with high or refractory fever, progressive SOB, dyspnea, CP, calf pain/swelling, shaking chills, vomiting, abdominal pain, lethargy, flank pain, or decreased urinary output. Pt verbalizes understanding and is in agreement with plan of care. All questions answered. LUZ Gonzalez - NP    This visit was provided as a focused evaluation during the [place] -19 pandemic/national emergency. A comprehensive review of all previous patient history and testing was not conducted. Pertinent findings were elicited during the visit. *NOTE: This report was transcribed using voice recognition software. Every effort was made to ensure accuracy; however, inadvertent computerized transcription errors may be present.

## 2022-04-02 DIAGNOSIS — R80.9 GRADE A2 ALBUMINURIA: ICD-10-CM

## 2022-04-02 DIAGNOSIS — E78.2 MIXED HYPERLIPIDEMIA: Primary | ICD-10-CM

## 2022-04-02 DIAGNOSIS — D75.1 POLYCYTHEMIA: ICD-10-CM

## 2022-04-05 NOTE — PROGRESS NOTES
Per Dr. Prabhakar Lindsay (Dr. Bryan Contreras) confirmed she would send records from her office at her earliest convenience, no office staff to do so.

## 2022-04-05 NOTE — PROGRESS NOTES
Left voicemail at Dr. Renee Stanley office to check status on medical records DAMASO faxed on 3/24, will follow up on records.

## 2022-04-08 DIAGNOSIS — E78.2 MIXED HYPERLIPIDEMIA: ICD-10-CM

## 2022-04-08 LAB
CHOLESTEROL, FASTING: 213 MG/DL (ref 0–199)
HDLC SERPL-MCNC: 37 MG/DL
LDL CHOLESTEROL CALCULATED: 144 MG/DL (ref 0–99)
TRIGLYCERIDE, FASTING: 159 MG/DL (ref 0–149)
VLDLC SERPL CALC-MCNC: 32 MG/DL

## 2022-04-11 PROBLEM — G56.00 CARPAL TUNNEL SYNDROME: Status: ACTIVE | Noted: 2022-03-24

## 2022-05-16 LAB
BASOPHILS ABSOLUTE: NORMAL
BASOPHILS RELATIVE PERCENT: NORMAL
BUN BLDV-MCNC: 10 MG/DL
CALCIUM SERPL-MCNC: 9.1 MG/DL
CHLORIDE BLD-SCNC: 104 MMOL/L
CO2: 27 MMOL/L
CREAT SERPL-MCNC: 0.8 MG/DL
EOSINOPHILS ABSOLUTE: NORMAL
EOSINOPHILS RELATIVE PERCENT: NORMAL
GFR CALCULATED: NORMAL
GLUCOSE BLD-MCNC: 71 MG/DL
HCT VFR BLD CALC: NORMAL %
HEMOGLOBIN: NORMAL
LYMPHOCYTES ABSOLUTE: NORMAL
LYMPHOCYTES RELATIVE PERCENT: NORMAL
MCH RBC QN AUTO: NORMAL PG
MCHC RBC AUTO-ENTMCNC: NORMAL G/DL
MCV RBC AUTO: NORMAL FL
MONOCYTES ABSOLUTE: NORMAL
MONOCYTES RELATIVE PERCENT: NORMAL
NEUTROPHILS ABSOLUTE: NORMAL
NEUTROPHILS RELATIVE PERCENT: NORMAL
PDW BLD-RTO: NORMAL %
PLATELET # BLD: NORMAL 10*3/UL
PMV BLD AUTO: NORMAL FL
POTASSIUM SERPL-SCNC: 3.6 MMOL/L
RBC # BLD: NORMAL 10*6/UL
SODIUM BLD-SCNC: 138 MMOL/L
WBC # BLD: NORMAL 10*3/UL

## 2022-05-17 ENCOUNTER — OFFICE VISIT (OUTPATIENT)
Dept: PRIMARY CARE CLINIC | Age: 62
End: 2022-05-17
Payer: MEDICARE

## 2022-05-17 VITALS
OXYGEN SATURATION: 96 % | WEIGHT: 173.4 LBS | HEART RATE: 64 BPM | DIASTOLIC BLOOD PRESSURE: 64 MMHG | TEMPERATURE: 96.5 F | HEIGHT: 64 IN | BODY MASS INDEX: 29.6 KG/M2 | SYSTOLIC BLOOD PRESSURE: 116 MMHG

## 2022-05-17 DIAGNOSIS — N76.0 ACUTE VAGINITIS: ICD-10-CM

## 2022-05-17 DIAGNOSIS — E78.2 MIXED HYPERLIPIDEMIA: ICD-10-CM

## 2022-05-17 DIAGNOSIS — I25.10 ARTERIOSCLEROSIS OF CORONARY ARTERY: Primary | ICD-10-CM

## 2022-05-17 DIAGNOSIS — F17.200 TOBACCO USE DISORDER: ICD-10-CM

## 2022-05-17 PROCEDURE — G8417 CALC BMI ABV UP PARAM F/U: HCPCS | Performed by: STUDENT IN AN ORGANIZED HEALTH CARE EDUCATION/TRAINING PROGRAM

## 2022-05-17 PROCEDURE — 3017F COLORECTAL CA SCREEN DOC REV: CPT | Performed by: STUDENT IN AN ORGANIZED HEALTH CARE EDUCATION/TRAINING PROGRAM

## 2022-05-17 PROCEDURE — G8427 DOCREV CUR MEDS BY ELIG CLIN: HCPCS | Performed by: STUDENT IN AN ORGANIZED HEALTH CARE EDUCATION/TRAINING PROGRAM

## 2022-05-17 PROCEDURE — 99214 OFFICE O/P EST MOD 30 MIN: CPT | Performed by: STUDENT IN AN ORGANIZED HEALTH CARE EDUCATION/TRAINING PROGRAM

## 2022-05-17 PROCEDURE — 4004F PT TOBACCO SCREEN RCVD TLK: CPT | Performed by: STUDENT IN AN ORGANIZED HEALTH CARE EDUCATION/TRAINING PROGRAM

## 2022-05-17 RX ORDER — EZETIMIBE 10 MG/1
10 TABLET ORAL DAILY
Qty: 90 TABLET | Refills: 1 | Status: SHIPPED
Start: 2022-05-17 | End: 2022-08-15

## 2022-05-17 RX ORDER — NICOTINE 21 MG/24HR
1 PATCH, TRANSDERMAL 24 HOURS TRANSDERMAL DAILY
Qty: 84 PATCH | Refills: 1 | Status: SHIPPED
Start: 2022-05-17 | End: 2022-08-15

## 2022-05-17 ASSESSMENT — ENCOUNTER SYMPTOMS: SHORTNESS OF BREATH: 0

## 2022-05-17 NOTE — PROGRESS NOTES
ESTABLISHED PRIMARY CARE VISIT    22  Name: Piper Acosta   : 1960   Age: 60 y.o. Sex: female        Assessment & Plan:     Problem List Items Addressed This Visit        Circulatory    Arteriosclerosis of coronary artery - Primary     History MI with stents   Unable to tolerate statin  Start Zetia  Consider PCSK9I if unable to tolerate  Continue aspirin, metoprolol, lisinopril  Follows with cardiology  Repeat stress pending prior to right knee arthroscopy         Relevant Medications    ezetimibe (ZETIA) 10 MG tablet    nicotine (NICODERM CQ) 21 MG/24HR       Other    Tobacco use disorder     LDCT Lung Screening pending  Currently smoking 1ppd, planning stages of change, interested in cessation  Trial 21mg nicotine patch daily  Discussed risks of smoking while using patch         Relevant Medications    nicotine (NICODERM CQ) 21 MG/24HR    Mixed hyperlipidemia     History of CAD  Unable to tolerate statin  Start Zetia  Consider PCSK9I if unable to tolerate         Relevant Medications    ezetimibe (ZETIA) 10 MG tablet      Other Visit Diagnoses     Acute vaginitis        Previous unremarkable exam by GYN per patient  Check genital culture  Consider restarting etrace for atrophy if cx neg  Repeat GYN exam if sx persist    Relevant Orders    Culture, Genital (Completed)          Counseled patient regarding above diagnosis, including possible risks and complications, especially if left uncontrolled. Counseled patient as appropriate and relevant regarding any possible side effects, risks, and alternatives to treatment; the patient verbalizes understanding, and is in agreement with the plan as detailed above. All educational materials and instructions were discussed and included on the After Visit Summary. All questions answered to the patient's satisfaction. The patient was advised to call for any concerns prior to next appointment. Return in about 4 months (around 2022).     35 minutes was spent precharting, face-to-face with patient, and documenting on day of encounter. This provider and patient were wearing surgical masks and practiced social distancing when appropriate during visit due to COVID-19 pandemic. Subjective:     Chief Complaint   Patient presents with    Follow-up     discuss medications, pts having out patient surgery next week on right knee       Patient returns for follow-up of hyperlipidemia and heart disease  Has been unable to tolerate statins in the past, tried 4-5 different ones, caused severe upset stomach, last rosuvastatin 2-3 years ago  Niacin caused rash  Has not tried Zetia that she can remember    Having right knee arthroscopy next week  Stress test scheduled before this    Wants to quit smoking  Used patch in past  Quit for 3 months then  Wants to try again    Vaginal itching recently  Seen by GYN, had pelvic exam, was unremarkable  Was not treated for infection at that time  Itching has continued  Stopped Estrace which was initially for suture issues after vulvar dysplasia procedure      Review of Systems   Respiratory: Negative for shortness of breath. Cardiovascular: Negative for chest pain. Genitourinary: Positive for vaginal pain (itching). Negative for vaginal bleeding and vaginal discharge. Musculoskeletal: Positive for arthralgias.        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)    Essential hypertension    Primary osteoarthritis involving multiple joints    Greater trochanteric bursitis of right hip    Muscle spasticity    Tobacco use disorder    B12 deficiency    Grade A2 albuminuria    Polycythemia    Mixed hyperlipidemia    Carpal tunnel syndrome        Past Medical History:   Diagnosis Date    Dysplasia of vulva 2021    Gastric ulcer     MI (myocardial infarction) ([facility] [address] 2007    Otosclerosis of both ears     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) 3/24/2022    Uterine fibroid 2003       Past Surgical History:   Procedure Laterality Date    CORONARY ANGIOPLASTY WITH STENT PLACEMENT  2007    x4 stents    HYSTERECTOMY, TOTAL ABDOMINAL  2003    for fibroids    TUBAL LIGATION  1981    VULVA SURGERY Right 2021    for dysplasia       Family History   Problem Relation Age of Onset    Stroke Mother 36    Heart Attack Father 61    Schizophrenia Father     Lung Cancer Sister     Cervical Cancer Sister     Hypothyroidism Half-Sister     Schizophrenia Son     Other Daughter         issues following meningitis    Endometriosis Daughter     Arthritis Daughter        Medications:     Current Outpatient Medications:     acyclovir (ZOVIRAX) 400 MG tablet, TAKE 1 TABLET BY MOUTH EVERY 8 HOURS (Patient not taking: Reported on 3/29/2022), Disp: , Rfl:     acetaminophen (TYLENOL) 500 MG tablet, Take 1,000 mg by mouth, Disp: , Rfl:     fluconazole (DIFLUCAN) 150 MG tablet, TAKE 1 TABLET BY MOUTH 1 TIME (Patient not taking: Reported on 3/29/2022), Disp: , Rfl:     furosemide (LASIX) 40 MG tablet, Take 1 tablet by mouth daily, Disp: 90 tablet, Rfl: 1    ofloxacin (FLOXIN) 0.3 % otic solution, ofloxacin 0.3 % ear drops, Disp: , Rfl:     nystatin (NYSTATIN) 901659 UNIT/GM powder, Nystop 100,000 unit/gram topical powder  APPLY TO THE AFFECTED AREA EVERY DAY (Patient not taking: Reported on 3/24/2022), Disp: , Rfl:     nitroGLYCERIN (NITROSTAT) 0.3 MG SL tablet, Nitrostat 0.3 mg sublingual tablet  place 1 tablet by sublingual route at the first sign of an attack; no more than 3 tabs are recommended within a 15 minute period. , Disp: , Rfl:     aspirin 81 MG EC tablet, Aspir-81 mg tablet,delayed release  take 1 tablet by oral route  every day, Disp: , Rfl:     metoprolol tartrate (LOPRESSOR) 50 MG tablet, Take 50 mg by mouth daily, Disp: , Rfl:     traZODone (DESYREL) 50 MG tablet, Take 50 mg by mouth daily, Disp: , Rfl:     metFORMIN (GLUCOPHAGE) 500 MG tablet, Take 500 mg by mouth daily, Disp: , Rfl:     lisinopril (PRINIVIL;ZESTRIL) 10 MG tablet, Take 10 mg by mouth daily, Disp: , Rfl:     HYDROcodone-acetaminophen (NORCO) 5-325 MG per tablet, Take 1 tablet by mouth 3 times daily as needed. (Patient not taking: Reported on 3/29/2022), Disp: , Rfl:     estradiol (ESTRACE) 0.1 MG/GM vaginal cream, Place 1 g vaginally three times a week, Disp: , Rfl:     Allergies: Allergies   Allergen Reactions    Niacin Hives    Sulfa Antibiotics Hives    Sulfur Hives    Celecoxib     Statins      myalgias       Social History:     Social History     Socioeconomic History    Marital status:      Spouse name: Not on file    Number of children: Not on file    Years of education: Not on file    Highest education level: Not on file   Occupational History    Not on file   Tobacco Use    Smoking status: Current Every Day Smoker     Packs/day: 1.00     Years: 48.00     Pack years: 48.00     Start date: 1974    Smokeless tobacco: Never Used   Substance and Sexual Activity    Alcohol use: Not Currently     Comment: twice yearly    Drug use: Never    Sexual activity: Not Currently   Other Topics Concern    Not on file   Social History Narrative    Not on file     Social Determinants of Health     Financial Resource Strain: Low Risk     Difficulty of Paying Living Expenses: Not very hard   Food Insecurity: Food Insecurity Present    Worried About Running Out of Food in the Last Year: Sometimes true    Lin of Food in the Last Year: Sometimes true   Transportation Needs:     Lack of Transportation (Medical): Not on file    Lack of Transportation (Non-Medical):  Not on file   Physical Activity:     Days of Exercise per Week: Not on file    Minutes of Exercise per Session: Not on file   Stress:     Feeling of Stress : Not on file   Social Connections:     Frequency of Communication with Friends and Family: Not on file    Frequency of Social Gatherings with Friends and Family: Not on file    Attends Mosque Services: Not on file    Active Member of Clubs or Organizations: Not on file    Attends Club or Organization Meetings: Not on file    Marital Status: Not on file   Intimate Partner Violence:     Fear of Current or Ex-Partner: Not on file    Emotionally Abused: Not on file    Physically Abused: Not on file    Sexually Abused: Not on file   Housing Stability:     Unable to Pay for Housing in the Last Year: Not on file    Number of [place] in the Last Year: Not on file    Unstable Housing in the Last Year: Not on file       Physical Exam:   There were no vitals filed for this visit. BP Readings from Last 3 Encounters:   03/29/22 100/60   03/24/22 116/70   02/22/22 110/74       Wt Readings from Last 3 Encounters:   03/29/22 170 lb 3.2 oz (77.2 kg)   03/24/22 171 lb 9.6 oz (77.8 kg)   02/22/22 174 lb 3.2 oz (79 kg)       Physical Exam  Vitals and nursing note reviewed. Constitutional:       General: She is not in acute distress. Appearance: Normal appearance. She is not ill-appearing or diaphoretic. Cardiovascular:      Rate and Rhythm: Normal rate and regular rhythm. Heart sounds: Normal heart sounds. Pulmonary:      Effort: Pulmonary effort is normal. No respiratory distress. Breath sounds: Normal breath sounds. Genitourinary:     Comments: Declined. Prefers self swab today given recent exam with GYN. Musculoskeletal:      Right lower leg: No edema. Left lower leg: No edema. Skin:     General: Skin is warm and dry. Neurological:      Mental Status: She is alert and oriented to person, place, and time.    Psychiatric:         Mood and Affect: Mood normal.         Behavior: Behavior normal.         Testing:     Orders Placed This Encounter   Procedures    Culture, Genital     Standing Status:   Future     Number of Occurrences:   1     Standing Expiration Date:   5/17/2023        No results found for this or any previous visit (from the past 24 hour(s)).

## 2022-05-18 NOTE — PROGRESS NOTES
Labs and Ekg/Pre-Admin testing records received from [facility] as requested by provider.

## 2022-05-21 LAB
GENITAL CULTURE, ROUTINE: ABNORMAL
GENITAL CULTURE, ROUTINE: ABNORMAL
ORGANISM: ABNORMAL

## 2022-05-24 DIAGNOSIS — N76.0 ACUTE VAGINITIS: ICD-10-CM

## 2022-05-24 DIAGNOSIS — A49.1 GROUP B STREPTOCOCCAL INFECTION: Primary | ICD-10-CM

## 2022-05-24 RX ORDER — AMOXICILLIN 500 MG/1
500 CAPSULE ORAL 3 TIMES DAILY
Qty: 21 CAPSULE | Refills: 0 | Status: SHIPPED | OUTPATIENT
Start: 2022-05-24 | End: 2022-05-31

## 2022-06-19 NOTE — ASSESSMENT & PLAN NOTE
History MI with stents 2007  Unable to tolerate statin  Start Zetia  Consider PCSK9I if unable to tolerate  Continue aspirin, metoprolol, lisinopril  Follows with cardiology  Repeat stress pending prior to right knee arthroscopy

## 2022-06-19 NOTE — ASSESSMENT & PLAN NOTE
LDCT Lung Screening pending  Currently smoking 1ppd, planning stages of change, interested in cessation  Trial 21mg nicotine patch daily  Discussed risks of smoking while using patch

## 2022-06-21 ENCOUNTER — TELEPHONE (OUTPATIENT)
Dept: PRIMARY CARE CLINIC | Age: 62
End: 2022-06-21

## 2022-06-21 NOTE — TELEPHONE ENCOUNTER
Left voicemail for patient to call back to [facility], patient to have medicare and follow up per Dr. Dee Erm request. Azra Guerrero call back.

## 2022-07-01 ENCOUNTER — TELEPHONE (OUTPATIENT)
Dept: PRIMARY CARE CLINIC | Age: 62
End: 2022-07-01

## 2022-07-01 NOTE — TELEPHONE ENCOUNTER
Pt calling asking if you can prescribe something for anxiety. Pt states she is familiar with ativan but whatever you think would be best. Pts daughter is scheduled for surgery in abt a week and pt is stressing out. Pt informed she would most likely need to make an appointment. Please advise where I could put her or if you would just prescribe it.

## 2022-07-01 NOTE — TELEPHONE ENCOUNTER
We have not discussed anxiety before therefore cannot prescribe anything for anxiety and definitely cannot prescribe controlled substances over the phone. Patient needs appointment. Can double book on Tuesday 7/5 at [phone], [phone] or [phone].

## 2022-07-05 ENCOUNTER — OFFICE VISIT (OUTPATIENT)
Dept: PRIMARY CARE CLINIC | Age: 62
End: 2022-07-05
Payer: MEDICARE

## 2022-07-05 VITALS
TEMPERATURE: 97.5 F | HEART RATE: 72 BPM | SYSTOLIC BLOOD PRESSURE: 126 MMHG | WEIGHT: 174.6 LBS | DIASTOLIC BLOOD PRESSURE: 70 MMHG | OXYGEN SATURATION: 98 % | BODY MASS INDEX: 29.81 KG/M2 | HEIGHT: 64 IN

## 2022-07-05 DIAGNOSIS — N76.1 CHRONIC VAGINITIS: ICD-10-CM

## 2022-07-05 DIAGNOSIS — F41.1 GENERALIZED ANXIETY DISORDER WITH PANIC ATTACKS: Primary | ICD-10-CM

## 2022-07-05 DIAGNOSIS — R80.9 GRADE A2 ALBUMINURIA: ICD-10-CM

## 2022-07-05 DIAGNOSIS — F41.0 GENERALIZED ANXIETY DISORDER WITH PANIC ATTACKS: Primary | ICD-10-CM

## 2022-07-05 LAB
CREATININE URINE: 12 MG/DL (ref 29–226)
MICROALBUMIN UR-MCNC: <12 MG/L
MICROALBUMIN/CREAT UR-RTO: ABNORMAL (ref 0–30)

## 2022-07-05 PROCEDURE — G8427 DOCREV CUR MEDS BY ELIG CLIN: HCPCS | Performed by: STUDENT IN AN ORGANIZED HEALTH CARE EDUCATION/TRAINING PROGRAM

## 2022-07-05 PROCEDURE — G8417 CALC BMI ABV UP PARAM F/U: HCPCS | Performed by: STUDENT IN AN ORGANIZED HEALTH CARE EDUCATION/TRAINING PROGRAM

## 2022-07-05 PROCEDURE — 99214 OFFICE O/P EST MOD 30 MIN: CPT | Performed by: STUDENT IN AN ORGANIZED HEALTH CARE EDUCATION/TRAINING PROGRAM

## 2022-07-05 PROCEDURE — 3017F COLORECTAL CA SCREEN DOC REV: CPT | Performed by: STUDENT IN AN ORGANIZED HEALTH CARE EDUCATION/TRAINING PROGRAM

## 2022-07-05 PROCEDURE — 4004F PT TOBACCO SCREEN RCVD TLK: CPT | Performed by: STUDENT IN AN ORGANIZED HEALTH CARE EDUCATION/TRAINING PROGRAM

## 2022-07-05 RX ORDER — BUSPIRONE HYDROCHLORIDE 5 MG/1
5 TABLET ORAL 2 TIMES DAILY
Qty: 60 TABLET | Refills: 1 | Status: SHIPPED
Start: 2022-07-05 | End: 2022-08-15

## 2022-07-05 NOTE — PROGRESS NOTES
ESTABLISHED PRIMARY CARE VISIT    22  Name: Emmie Cullen   : 1960   Age: 60 y.o. Sex: female        Assessment & Plan:     Problem List Items Addressed This Visit        Other    Generalized anxiety disorder with panic attacks - Primary     Previously well controlled with coping skills  Now with acute stressor with daughter's upcoming surgery  Recommend to avoid Ativan given use of Norco and age  Patient declines SSRI/SNRI given side effects in the past  Trial BuSpar 5 mg twice daily  BuSpar not contraindicated in cardiovascular disease however did discuss potential risks, advised to stop if any side effects         Relevant Medications    busPIRone (BUSPAR) 5 MG tablet    Grade A2 albuminuria     Recheck         Relevant Orders    MICROALBUMIN / CREATININE URINE RATIO      Other Visit Diagnoses     Chronic vaginitis        Suspect atrophy given unremarkable culture, only mild improvement with GBS tx  Restart topical estrace          Counseled patient regarding above diagnosis, including possible risks and complications, especially if left uncontrolled. Counseled patient as appropriate and relevant regarding any possible side effects, risks, and alternatives to treatment; the patient verbalizes understanding, and is in agreement with the plan as detailed above. All educational materials and instructions were discussed and included on the After Visit Summary. All questions answered to the patient's satisfaction. The patient was advised to call for any concerns prior to next appointment. Return in about 3 months (around 10/5/2022) for as scheduled. This provider and patient were wearing surgical masks and practiced social distancing when appropriate during visit due to COVID-19 pandemic.     Subjective:     Chief Complaint   Patient presents with    Anxiety     would like a medication, has been under alot of stress        Patient returns for evaluation and treatment of anxiety  Reports increased stress recently  Notes daughter having spine fusion and hardware placed in 1 week  Daughter is her support person since her  , she is very worried  Has history of anxiety  Usually able to use her coping skills but has been more difficulty recently  Feels like it's over the edge and wants back on treatment  Has been Ativan in the past  Was on SSRIs in the past, had side effects with these    Notes her cardiologist also found she had evidence of a new MI on her stress test    Itching improved with treatment last visit but still has at times  Planning to restart estrace cream      Review of Systems   Genitourinary: Negative for vaginal bleeding, vaginal discharge and vaginal pain. Psychiatric/Behavioral: Positive for sleep disturbance. Negative for dysphoric mood and suicidal ideas. The patient is nervous/anxious.         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) 3/24/2022    Uterine fibroid        Past Surgical History:   Procedure Laterality Date    CORONARY ANGIOPLASTY WITH STENT PLACEMENT  2007    x4 stents    HYSTERECTOMY, TOTAL ABDOMINAL (CERVIX REMOVED)   for fibroids    TUBAL LIGATION  1981    VULVA SURGERY Right 2021    for dysplasia       Family History   Problem Relation Age of Onset    Stroke Mother 36    Heart Attack Father 61    Schizophrenia Father     Lung Cancer Sister     Cervical Cancer Sister     Hypothyroidism Half-Sister     Schizophrenia Son     Other Daughter         issues following meningitis    Endometriosis Daughter     Arthritis Daughter        Medications:     Current Outpatient Medications:     ezetimibe (ZETIA) 10 MG tablet, Take 1 tablet by mouth daily, Disp: 90 tablet, Rfl: 1    acetaminophen (TYLENOL) 500 MG tablet, Take 1,000 mg by mouth, Disp: , Rfl:     furosemide (LASIX) 40 MG tablet, Take 1 tablet by mouth daily, Disp: 90 tablet, Rfl: 1    ofloxacin (FLOXIN) 0.3 % otic solution, ofloxacin 0.3 % ear drops, Disp: , Rfl:     nystatin (NYSTATIN) 090860 UNIT/GM powder, Nystop 100,000 unit/gram topical powder  APPLY TO THE AFFECTED AREA EVERY DAY, Disp: , Rfl:     nitroGLYCERIN (NITROSTAT) 0.3 MG SL tablet, Nitrostat 0.3 mg sublingual tablet  place 1 tablet by sublingual route at the first sign of an attack; no more than 3 tabs are recommended within a 15 minute period. , Disp: , Rfl:     aspirin 81 MG EC tablet, Aspir-81 mg tablet,delayed release  take 1 tablet by oral route  every day, Disp: , Rfl:     metoprolol tartrate (LOPRESSOR) 50 MG tablet, Take 50 mg by mouth daily, Disp: , Rfl:     traZODone (DESYREL) 50 MG tablet, Take 50 mg by mouth daily, Disp: , Rfl:     metFORMIN (GLUCOPHAGE) 500 MG tablet, Take 500 mg by mouth daily, Disp: , Rfl:     lisinopril (PRINIVIL;ZESTRIL) 10 MG tablet, Take 10 mg by mouth daily, Disp: , Rfl:     HYDROcodone-acetaminophen (NORCO) 5-325 MG per tablet, Take 1 tablet by mouth 3 times daily as needed.  , Disp: , Rfl:     nicotine (NICODERM CQ) 21 MG/24HR, Place 1 patch onto the skin daily (Patient not taking: Reported on 7/5/2022), Disp: 84 patch, Rfl: 1    fluconazole (DIFLUCAN) 150 MG tablet, TAKE 1 TABLET BY MOUTH 1 TIME (Patient not taking: Reported on 3/29/2022), Disp: , Rfl:     estradiol (ESTRACE) 0.1 MG/GM vaginal cream, Place 1 g vaginally three times a week (Patient not taking: Reported on 5/17/2022), Disp: , Rfl:     Allergies: Allergies   Allergen Reactions    Niacin Hives    Sulfa Antibiotics Hives    Sulfur Hives    Celecoxib     Statins      myalgias       Social History:     Social History     Socioeconomic History    Marital status:      Spouse name: Not on file    Number of children: Not on file    Years of education: Not on file    Highest education level: Not on file   Occupational History    Not on file   Tobacco Use    Smoking status: Current Every Day Smoker     Packs/day: 1.00     Years: 48.00     Pack years: 48.00     Start date: 1974    Smokeless tobacco: Never Used   Substance and Sexual Activity    Alcohol use: Not Currently     Comment: twice yearly    Drug use: Never    Sexual activity: Not Currently   Other Topics Concern    Not on file   Social History Narrative    Not on file     Social Determinants of Health     Financial Resource Strain: Low Risk     Difficulty of Paying Living Expenses: Not very hard   Food Insecurity: Food Insecurity Present    Worried About Running Out of Food in the Last Year: Sometimes true    Lin of Food in the Last Year: Sometimes true   Transportation Needs:     Lack of Transportation (Medical): Not on file    Lack of Transportation (Non-Medical):  Not on file   Physical Activity:     Days of Exercise per Week: Not on file    Minutes of Exercise per Session: Not on file   Stress:     Feeling of Stress : Not on file   Social Connections:     Frequency of Communication with Friends and Family: Not on file    Frequency of Social Gatherings with Friends and Family: Not on file    Attends Druze Services: Not on file    Active Member of Clubs or Organizations: Not on file   Lei Attends Club or Organization Meetings: Not on file    Marital Status: Not on file   Intimate Partner Violence:     Fear of Current or Ex-Partner: Not on file    Emotionally Abused: Not on file    Physically Abused: Not on file    Sexually Abused: Not on file   Housing Stability:     Unable to Pay for Housing in the Last Year: Not on file    Number of [place] in the Last Year: Not on file    Unstable Housing in the Last Year: Not on file       Physical Exam:     Vitals:    07/05/22 1042   BP: 126/70   Site: Left Upper Arm   Position: Sitting   Pulse: 72   Temp: 97.5 °F (36.4 °C)   SpO2: 98%   Weight: 174 lb 9.6 oz (79.2 kg)   Height: 5' 4\" (1.626 m)       BP Readings from Last 3 Encounters:   07/05/22 126/70   05/17/22 116/64   03/29/22 100/60       Wt Readings from Last 3 Encounters:   07/05/22 174 lb 9.6 oz (79.2 kg)   05/17/22 173 lb 6.4 oz (78.7 kg)   03/29/22 170 lb 3.2 oz (77.2 kg)       Physical Exam  Vitals and nursing note reviewed. Constitutional:       General: She is not in acute distress. Appearance: Normal appearance. She is overweight. She is not ill-appearing or diaphoretic. Eyes:      Extraocular Movements: Extraocular movements intact. Conjunctiva/sclera: Conjunctivae normal.   Cardiovascular:      Rate and Rhythm: Normal rate and regular rhythm. Heart sounds: Normal heart sounds. Pulmonary:      Effort: Pulmonary effort is normal. No respiratory distress. Breath sounds: Normal breath sounds. Musculoskeletal:      Right lower leg: No edema. Left lower leg: No edema. Skin:     General: Skin is warm and dry. Neurological:      Mental Status: She is alert and oriented to person, place, and time. Psychiatric:         Attention and Perception: Attention and perception normal.         Mood and Affect: Mood is anxious. Affect is tearful. Speech: Speech normal.         Behavior: Behavior normal. Behavior is cooperative.          Thought Content: Thought content normal.         Testing:     Orders Placed This Encounter   Procedures    MICROALBUMIN / CREATININE URINE RATIO     Standing Status:   Future     Standing Expiration Date:   7/5/2023        No results found for this or any previous visit (from the past 24 hour(s)).

## 2022-07-05 NOTE — ASSESSMENT & PLAN NOTE
Previously well controlled with coping skills  Now with acute stressor with daughter's upcoming surgery  Recommend to avoid Ativan given use of Norco and age  Patient declines SSRI/SNRI given side effects in the past  Trial BuSpar 5 mg twice daily  BuSpar not contraindicated in cardiovascular disease however did discuss potential risks, advised to stop if any side effects

## 2022-07-08 ENCOUNTER — TELEPHONE (OUTPATIENT)
Dept: PRIMARY CARE CLINIC | Age: 62
End: 2022-07-08

## 2022-07-08 DIAGNOSIS — F41.0 GENERALIZED ANXIETY DISORDER WITH PANIC ATTACKS: Primary | ICD-10-CM

## 2022-07-08 DIAGNOSIS — F41.1 GENERALIZED ANXIETY DISORDER WITH PANIC ATTACKS: Primary | ICD-10-CM

## 2022-07-08 RX ORDER — LORAZEPAM 0.5 MG/1
0.5 TABLET ORAL 3 TIMES DAILY PRN
Qty: 21 TABLET | Refills: 0 | Status: SHIPPED
Start: 2022-07-08 | End: 2022-10-04 | Stop reason: SDUPTHER

## 2022-08-15 ENCOUNTER — OFFICE VISIT (OUTPATIENT)
Dept: PRIMARY CARE CLINIC | Age: 62
End: 2022-08-15
Payer: MEDICARE

## 2022-08-15 VITALS
TEMPERATURE: 97.3 F | HEIGHT: 64 IN | HEART RATE: 63 BPM | DIASTOLIC BLOOD PRESSURE: 74 MMHG | SYSTOLIC BLOOD PRESSURE: 126 MMHG | BODY MASS INDEX: 28.95 KG/M2 | OXYGEN SATURATION: 99 % | WEIGHT: 169.6 LBS

## 2022-08-15 DIAGNOSIS — R30.9 PAINFUL URINATION: ICD-10-CM

## 2022-08-15 DIAGNOSIS — R35.0 URINARY FREQUENCY: ICD-10-CM

## 2022-08-15 DIAGNOSIS — N39.0 URINARY TRACT INFECTION WITHOUT HEMATURIA, SITE UNSPECIFIED: Primary | ICD-10-CM

## 2022-08-15 LAB
BILIRUBIN, POC: NEGATIVE
BLOOD URINE, POC: NEGATIVE
CLARITY, POC: CLEAR
COLOR, POC: NORMAL
GLUCOSE URINE, POC: NEGATIVE
KETONES, POC: NEGATIVE
LEUKOCYTE EST, POC: NEGATIVE
NITRITE, POC: NEGATIVE
PH, POC: 5.5
PROTEIN, POC: NEGATIVE
SPECIFIC GRAVITY, POC: 1.01
UROBILINOGEN, POC: 0.2

## 2022-08-15 PROCEDURE — G8417 CALC BMI ABV UP PARAM F/U: HCPCS | Performed by: NURSE PRACTITIONER

## 2022-08-15 PROCEDURE — 81002 URINALYSIS NONAUTO W/O SCOPE: CPT | Performed by: NURSE PRACTITIONER

## 2022-08-15 PROCEDURE — G8427 DOCREV CUR MEDS BY ELIG CLIN: HCPCS | Performed by: NURSE PRACTITIONER

## 2022-08-15 PROCEDURE — 99213 OFFICE O/P EST LOW 20 MIN: CPT | Performed by: NURSE PRACTITIONER

## 2022-08-15 PROCEDURE — 4004F PT TOBACCO SCREEN RCVD TLK: CPT | Performed by: NURSE PRACTITIONER

## 2022-08-15 PROCEDURE — 3017F COLORECTAL CA SCREEN DOC REV: CPT | Performed by: NURSE PRACTITIONER

## 2022-08-15 RX ORDER — TOBRAMYCIN 3 MG/ML
SOLUTION/ DROPS OPHTHALMIC
COMMUNITY
Start: 2022-07-11 | End: 2022-08-15

## 2022-08-15 RX ORDER — NITROFURANTOIN 25; 75 MG/1; MG/1
100 CAPSULE ORAL 2 TIMES DAILY
Qty: 20 CAPSULE | Refills: 0 | Status: SHIPPED | OUTPATIENT
Start: 2022-08-15 | End: 2022-08-25

## 2022-08-15 RX ORDER — PREDNISOLONE ACETATE 10 MG/ML
SUSPENSION/ DROPS OPHTHALMIC
COMMUNITY
Start: 2022-07-11

## 2022-08-15 ASSESSMENT — ENCOUNTER SYMPTOMS
SHORTNESS OF BREATH: 0
EYE DISCHARGE: 0
BACK PAIN: 0
FACIAL SWELLING: 0
RHINORRHEA: 0
COUGH: 0
NAUSEA: 0
APNEA: 0
SORE THROAT: 0
VOMITING: 0
ANAL BLEEDING: 0
WHEEZING: 0
STRIDOR: 0
SINUS PAIN: 0
DIARRHEA: 0
RECTAL PAIN: 0
SINUS PRESSURE: 0
COLOR CHANGE: 0
VOICE CHANGE: 0
EYE PAIN: 0
PHOTOPHOBIA: 0
EYE REDNESS: 0
BLOOD IN STOOL: 0
ABDOMINAL DISTENTION: 0
ABDOMINAL PAIN: 1
EYE ITCHING: 0
CHEST TIGHTNESS: 0
TROUBLE SWALLOWING: 0
CONSTIPATION: 0
CHOKING: 0

## 2022-08-15 NOTE — PROGRESS NOTES
8/15/22  Farida Lozano : 1960 Sex: female  Age: 60 y.o. Chief Complaint   Patient presents with    Urinary Frequency    Other     Painful urination       Back Pain       Increasing urinary frequency and painful urination and flank pain over the last 1 1/2 weeks. Has put it off because she has been taking care of her daughter who had surgery. She has felt feverish and had lower abdominal pain. There is burning with urination. Urine got dark at times and odor is strong. Review of Systems   Constitutional:  Positive for fever. Negative for activity change, appetite change, chills, diaphoresis, fatigue and unexpected weight change. HENT:  Negative for congestion, dental problem, drooling, ear discharge, ear pain, facial swelling, hearing loss, mouth sores, nosebleeds, postnasal drip, rhinorrhea, sinus pressure, sinus pain, sneezing, sore throat, tinnitus, trouble swallowing and voice change. Eyes:  Negative for photophobia, pain, discharge, redness, itching and visual disturbance. Respiratory:  Negative for apnea, cough, choking, chest tightness, shortness of breath, wheezing and stridor. Cardiovascular:  Negative for chest pain, palpitations and leg swelling. Gastrointestinal:  Positive for abdominal pain. Negative for abdominal distention, anal bleeding, blood in stool, constipation, diarrhea, nausea, rectal pain and vomiting. Endocrine: Negative for cold intolerance, heat intolerance, polydipsia, polyphagia and polyuria. Genitourinary:  Positive for difficulty urinating, dysuria, flank pain, frequency and hematuria. Negative for decreased urine volume, enuresis, genital sores and urgency. Musculoskeletal:  Negative for arthralgias, back pain, gait problem, joint swelling, myalgias, neck pain and neck stiffness. Skin:  Negative for color change, pallor, rash and wound. Allergic/Immunologic: Negative for environmental allergies, food allergies and immunocompromised state. Neurological:  Negative for dizziness, tremors, seizures, syncope, facial asymmetry, speech difficulty, weakness, light-headedness, numbness and headaches. Hematological:  Negative for adenopathy. Does not bruise/bleed easily. Psychiatric/Behavioral:  Negative for agitation, behavioral problems, confusion, decreased concentration, hallucinations, self-injury, sleep disturbance and suicidal ideas. The patient is not nervous/anxious and is not hyperactive. Current Outpatient Medications:     prednisoLONE acetate (PRED FORTE) 1 % ophthalmic suspension, , Disp: , Rfl:     nitrofurantoin, macrocrystal-monohydrate, (MACROBID) 100 MG capsule, Take 1 capsule by mouth in the morning and 1 capsule before bedtime. Do all this for 10 days. , Disp: 20 capsule, Rfl: 0    acetaminophen (TYLENOL) 500 MG tablet, Take 1,000 mg by mouth, Disp: , Rfl:     furosemide (LASIX) 40 MG tablet, Take 1 tablet by mouth daily, Disp: 90 tablet, Rfl: 1    ofloxacin (FLOXIN) 0.3 % otic solution, ofloxacin 0.3 % ear drops, Disp: , Rfl:     nystatin (MYCOSTATIN) 942188 UNIT/GM powder, Nystop 100,000 unit/gram topical powder  APPLY TO THE AFFECTED AREA EVERY DAY, Disp: , Rfl:     nitroGLYCERIN (NITROSTAT) 0.3 MG SL tablet, Nitrostat 0.3 mg sublingual tablet  place 1 tablet by sublingual route at the first sign of an attack; no more than 3 tabs are recommended within a 15 minute period. , Disp: , Rfl:     aspirin 81 MG EC tablet, Aspir-81 mg tablet,delayed release  take 1 tablet by oral route  every day, Disp: , Rfl:     metoprolol tartrate (LOPRESSOR) 50 MG tablet, Take 50 mg by mouth daily, Disp: , Rfl:     traZODone (DESYREL) 50 MG tablet, Take 50 mg by mouth daily, Disp: , Rfl:     metFORMIN (GLUCOPHAGE) 500 MG tablet, Take 500 mg by mouth daily, Disp: , Rfl:     lisinopril (PRINIVIL;ZESTRIL) 10 MG tablet, Take 10 mg by mouth daily, Disp: , Rfl:     HYDROcodone-acetaminophen (NORCO) 5-325 MG per tablet, Take 1 tablet by mouth 3 times daily as needed. , Disp: , Rfl:   Allergies   Allergen Reactions    Niacin Hives    Sulfa Antibiotics Hives    Sulfur Hives    Celecoxib     Tobramycin     Statins      myalgias       Past Medical History:   Diagnosis Date    Dysplasia of vulva 2021    Gastric ulcer     MI ([facility]ocardial infarction) ([facility] [address] 2007    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) 03/24/2022    Uterine fibroid 2003     Past Surgical History:   Procedure Laterality Date    CORONARY ANGIOPLASTY WITH STENT PLACEMENT  2007    x4 stents    HYSTERECTOMY, TOTAL ABDOMINAL (CERVIX REMOVED)  2003    for fibroids    KNEE ARTHROSCOPY Right 06/2022    meniscal repair    TUBAL LIGATION  1981    VULVA SURGERY Right 2021    for dysplasia     Family History   Problem Relation Age of Onset    Stroke Mother 36    Heart Attack Father 61    Schizophrenia Father     Lung Cancer Sister     Cervical Cancer Sister     Hypothyroidism Half-Sister     Schizophrenia Son     Other Daughter         issues following meningitis    Endometriosis Daughter     Arthritis Daughter      Social History     Socioeconomic History    Marital status:       Spouse name: Not on file    Number of children: Not on file    Years of education: Not on file    Highest education level: Not on file   Occupational History    Not on file   Tobacco Use    Smoking status: Every Day     Packs/day: 1.00     Years: 48.00     Pack years: 48.00     Types: Cigarettes     Start date: 64    Smokeless tobacco: Never   Substance and Sexual Activity    Alcohol use: Not Currently     Comment: twice yearly    Drug use: Never    Sexual activity: Not Currently   Other Topics Concern    Not on file   Social History Narrative    Not on file     Social Determinants of Health     Financial Resource Strain: Low Risk     Difficulty of Paying Living Expenses: Not very hard   Food Insecurity: Food Insecurity Present    Worried About Running Out of Food in the Last Year: Sometimes true    Ran Out of Food in the Last Year: Sometimes true   Transportation Needs: Not on file   Physical Activity: Not on file   Stress: Not on file   Social Connections: Not on file   Intimate Partner Violence: Not on file   Housing Stability: Not on file     Patient Active Problem List   Diagnosis    Arteriosclerosis of coronary artery    Type 2 diabetes mellitus with diabetic polyneuropathy, without long-term current use of insulin (HCC)    Otosclerosis of both ears    Recurrent otitis media, right    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 Constitutional:       General: She is not in acute distress. Appearance: Normal appearance. She is normal weight. She is not ill-appearing, toxic-appearing or diaphoretic. HENT:      Head: Normocephalic and atraumatic. Right Ear: Tympanic membrane, ear canal and external ear normal. There is no impacted cerumen. Left Ear: Tympanic membrane, ear canal and external ear normal. There is no impacted cerumen. Nose: Nose normal. No congestion or rhinorrhea. Mouth/Throat:      Mouth: Mucous membranes are moist.      Pharynx: Oropharynx is clear. No oropharyngeal exudate or posterior oropharyngeal erythema. Eyes:      General: No scleral icterus. Right eye: No discharge. Left eye: No discharge. Extraocular Movements: Extraocular movements intact. Conjunctiva/sclera: Conjunctivae normal.      Pupils: Pupils are equal, round, and reactive to light. Neck:      Vascular: No carotid bruit. Cardiovascular:      Rate and Rhythm: Normal rate and regular rhythm. Pulses: Normal pulses. Heart sounds: Normal heart sounds. No murmur heard. No friction rub. No gallop. Pulmonary:      Effort: Pulmonary effort is normal. No respiratory distress. Breath sounds: No stridor. No wheezing, rhonchi or rales. Chest:      Chest wall: No tenderness. Abdominal:      General: Bowel sounds are normal. There is no distension. Palpations: Abdomen is soft. There is no mass. Tenderness: There is abdominal tenderness (lower quadrant and over bladder). There is right CVA tenderness. There is no left CVA tenderness, guarding or rebound. Hernia: No hernia is present. Musculoskeletal:         General: No swelling, tenderness, deformity or signs of injury. Normal range of motion. Cervical back: Normal range of motion and neck supple. No rigidity. No muscular tenderness. Right lower leg: No edema. Left lower leg: No edema. Lymphadenopathy:      Cervical: No cervical adenopathy. Skin:     General: Skin is warm and dry. Capillary Refill: Capillary refill takes less than 2 seconds. Coloration: Skin is not jaundiced or pale. Findings: No bruising, erythema, lesion or rash. Neurological:      General: No focal deficit present. Mental Status: She is alert and oriented to person, place, and time. Mental status is at baseline. Cranial Nerves: No cranial nerve deficit. Sensory: No sensory deficit. Motor: No weakness.       Coordination: Coordination normal.      Gait: Gait normal.      Deep Tendon Reflexes: Reflexes normal.   Psychiatric:         Mood and Affect: Mood normal.         Behavior: Behavior normal.

## 2022-08-18 LAB — URINE CULTURE, ROUTINE: NORMAL

## 2022-09-22 DIAGNOSIS — I10 ESSENTIAL HYPERTENSION: ICD-10-CM

## 2022-09-23 RX ORDER — FUROSEMIDE 40 MG/1
40 TABLET ORAL DAILY
Qty: 90 TABLET | Refills: 1 | Status: SHIPPED | OUTPATIENT
Start: 2022-09-23

## 2022-10-03 SDOH — HEALTH STABILITY: PHYSICAL HEALTH: ON AVERAGE, HOW MANY DAYS PER WEEK DO YOU ENGAGE IN MODERATE TO STRENUOUS EXERCISE (LIKE A BRISK WALK)?: 3 DAYS

## 2022-10-03 ASSESSMENT — PATIENT HEALTH QUESTIONNAIRE - PHQ9
SUM OF ALL RESPONSES TO PHQ QUESTIONS 1-9: 0
1. LITTLE INTEREST OR PLEASURE IN DOING THINGS: 0
2. FEELING DOWN, DEPRESSED OR HOPELESS: 0
SUM OF ALL RESPONSES TO PHQ QUESTIONS 1-9: 0
SUM OF ALL RESPONSES TO PHQ9 QUESTIONS 1 & 2: 0

## 2022-10-03 ASSESSMENT — LIFESTYLE VARIABLES
HOW MANY STANDARD DRINKS CONTAINING ALCOHOL DO YOU HAVE ON A TYPICAL DAY: 0
HOW OFTEN DO YOU HAVE A DRINK CONTAINING ALCOHOL: MONTHLY OR LESS
HOW OFTEN DO YOU HAVE A DRINK CONTAINING ALCOHOL: 2
HOW MANY STANDARD DRINKS CONTAINING ALCOHOL DO YOU HAVE ON A TYPICAL DAY: PATIENT DOES NOT DRINK
HOW OFTEN DO YOU HAVE SIX OR MORE DRINKS ON ONE OCCASION: 1

## 2022-10-04 ENCOUNTER — OFFICE VISIT (OUTPATIENT)
Dept: PRIMARY CARE CLINIC | Age: 62
End: 2022-10-04
Payer: MEDICARE

## 2022-10-04 VITALS
HEIGHT: 65 IN | WEIGHT: 167 LBS | DIASTOLIC BLOOD PRESSURE: 76 MMHG | BODY MASS INDEX: 27.82 KG/M2 | TEMPERATURE: 97.7 F | SYSTOLIC BLOOD PRESSURE: 134 MMHG | OXYGEN SATURATION: 97 % | HEART RATE: 68 BPM

## 2022-10-04 VITALS
HEIGHT: 65 IN | WEIGHT: 167.2 LBS | DIASTOLIC BLOOD PRESSURE: 76 MMHG | TEMPERATURE: 97.7 F | SYSTOLIC BLOOD PRESSURE: 134 MMHG | HEART RATE: 68 BPM | BODY MASS INDEX: 27.86 KG/M2 | OXYGEN SATURATION: 97 %

## 2022-10-04 DIAGNOSIS — F51.04 PSYCHOPHYSIOLOGICAL INSOMNIA: ICD-10-CM

## 2022-10-04 DIAGNOSIS — F41.1 GENERALIZED ANXIETY DISORDER WITH PANIC ATTACKS: ICD-10-CM

## 2022-10-04 DIAGNOSIS — R80.9 GRADE A2 ALBUMINURIA: ICD-10-CM

## 2022-10-04 DIAGNOSIS — Z71.89 COUNSELING REGARDING ADVANCED CARE PLANNING AND GOALS OF CARE: ICD-10-CM

## 2022-10-04 DIAGNOSIS — Z51.81 MEDICATION MONITORING ENCOUNTER: ICD-10-CM

## 2022-10-04 DIAGNOSIS — F41.1 GENERALIZED ANXIETY DISORDER WITH PANIC ATTACKS: Primary | ICD-10-CM

## 2022-10-04 DIAGNOSIS — F41.0 GENERALIZED ANXIETY DISORDER WITH PANIC ATTACKS: ICD-10-CM

## 2022-10-04 DIAGNOSIS — F41.0 GENERALIZED ANXIETY DISORDER WITH PANIC ATTACKS: Primary | ICD-10-CM

## 2022-10-04 DIAGNOSIS — Z00.00 MEDICARE ANNUAL WELLNESS VISIT, SUBSEQUENT: Primary | ICD-10-CM

## 2022-10-04 PROBLEM — M54.17 RADICULOPATHY, LUMBOSACRAL REGION: Status: ACTIVE | Noted: 2022-09-22

## 2022-10-04 LAB
CREATININE URINE: 50 MG/DL (ref 29–226)
MICROALBUMIN UR-MCNC: <12 MG/L
MICROALBUMIN/CREAT UR-RTO: ABNORMAL (ref 0–30)

## 2022-10-04 PROCEDURE — 3017F COLORECTAL CA SCREEN DOC REV: CPT | Performed by: STUDENT IN AN ORGANIZED HEALTH CARE EDUCATION/TRAINING PROGRAM

## 2022-10-04 PROCEDURE — 3074F SYST BP LT 130 MM HG: CPT | Performed by: STUDENT IN AN ORGANIZED HEALTH CARE EDUCATION/TRAINING PROGRAM

## 2022-10-04 PROCEDURE — 3078F DIAST BP <80 MM HG: CPT | Performed by: STUDENT IN AN ORGANIZED HEALTH CARE EDUCATION/TRAINING PROGRAM

## 2022-10-04 PROCEDURE — 4004F PT TOBACCO SCREEN RCVD TLK: CPT | Performed by: STUDENT IN AN ORGANIZED HEALTH CARE EDUCATION/TRAINING PROGRAM

## 2022-10-04 PROCEDURE — G8417 CALC BMI ABV UP PARAM F/U: HCPCS | Performed by: STUDENT IN AN ORGANIZED HEALTH CARE EDUCATION/TRAINING PROGRAM

## 2022-10-04 PROCEDURE — 99214 OFFICE O/P EST MOD 30 MIN: CPT | Performed by: STUDENT IN AN ORGANIZED HEALTH CARE EDUCATION/TRAINING PROGRAM

## 2022-10-04 PROCEDURE — G0439 PPPS, SUBSEQ VISIT: HCPCS | Performed by: STUDENT IN AN ORGANIZED HEALTH CARE EDUCATION/TRAINING PROGRAM

## 2022-10-04 PROCEDURE — G8484 FLU IMMUNIZE NO ADMIN: HCPCS | Performed by: STUDENT IN AN ORGANIZED HEALTH CARE EDUCATION/TRAINING PROGRAM

## 2022-10-04 PROCEDURE — G8427 DOCREV CUR MEDS BY ELIG CLIN: HCPCS | Performed by: STUDENT IN AN ORGANIZED HEALTH CARE EDUCATION/TRAINING PROGRAM

## 2022-10-04 RX ORDER — LORAZEPAM 0.5 MG/1
0.25 TABLET ORAL 2 TIMES DAILY PRN
Qty: 30 TABLET | Refills: 0 | Status: SHIPPED | OUTPATIENT
Start: 2022-10-04 | End: 2022-11-03

## 2022-10-04 RX ORDER — TRAZODONE HYDROCHLORIDE 50 MG/1
50 TABLET ORAL DAILY
Qty: 30 TABLET | Refills: 5 | Status: SHIPPED | OUTPATIENT
Start: 2022-10-04

## 2022-10-04 ASSESSMENT — PATIENT HEALTH QUESTIONNAIRE - PHQ9
SUM OF ALL RESPONSES TO PHQ QUESTIONS 1-9: 2
SUM OF ALL RESPONSES TO PHQ QUESTIONS 1-9: 2
SUM OF ALL RESPONSES TO PHQ9 QUESTIONS 1 & 2: 2
SUM OF ALL RESPONSES TO PHQ QUESTIONS 1-9: 2
SUM OF ALL RESPONSES TO PHQ QUESTIONS 1-9: 2
1. LITTLE INTEREST OR PLEASURE IN DOING THINGS: 1
2. FEELING DOWN, DEPRESSED OR HOPELESS: 1

## 2022-10-04 NOTE — PATIENT INSTRUCTIONS
Personalized Preventive Plan for Devin Biggs - 10/4/2022  Medicare offers a range of preventive health benefits. Some of the tests and screenings are paid in full while other may be subject to a deductible, co-insurance, and/or copay. Some of these benefits include a comprehensive review of your medical history including lifestyle, illnesses that may run in your family, and various assessments and screenings as appropriate. After reviewing your medical record and screening and assessments performed today your provider may have ordered immunizations, labs, imaging, and/or referrals for you. A list of these orders (if applicable) as well as your Preventive Care list are included within your After Visit Summary for your review. Other Preventive Recommendations:    A preventive eye exam performed by an eye specialist is recommended every 1-2 years to screen for glaucoma; cataracts, macular degeneration, and other eye disorders. A preventive dental visit is recommended every 6 months. Try to get at least 150 minutes of exercise per week or 10,000 steps per day on a pedometer . Order or download the FREE \"Exercise & Physical Activity: Your Everyday Guide\" from [employer]. Call 7-638.876.7331 or search [employer] online. You need 1769-0063 mg of calcium and 7466-9344 IU of vitamin D per day. It is possible to meet your calcium requirement with diet alone, but a vitamin D supplement is usually necessary to meet this goal.  When exposed to the sun, use a sunscreen that protects against both UVA and UVB radiation with an SPF of 30 or greater. Reapply every 2 to 3 hours or after sweating, drying off with a towel, or swimming. Always wear a seat belt when traveling in a car. Always wear a helmet when riding a bicycle or motorcycle.

## 2022-10-04 NOTE — PROGRESS NOTES
Medicare Annual Wellness Visit    Clara Albright is here for Medicare AWV    Assessment & Plan   Medicare annual wellness visit, subsequent  -     Full code  Counseling regarding advanced care planning and goals of care  -     Full code    Recommendations for Preventive Services Due: see orders and patient instructions/AVS.  Recommended screening schedule for the next 5-10 years is provided to the patient in written form: see Patient Instructions/AVS.     Return for Medicare Annual Wellness Visit in 1 year. Subjective   Patient returns for Medicare wellness visit. See other visit for follow-up. Patient's complete Health Risk Assessment and screening values have been reviewed and are found in Flowsheets. The following problems were reviewed today and where indicated follow up appointments were made and/or referrals ordered.     Positive Risk Factor Screenings with Interventions:       Tobacco Use:  Tobacco Use: High Risk    Smoking Tobacco Use: Every Day    Smokeless Tobacco Use: Never     E-cigarette/Vaping       Questions Responses    E-cigarette/Vaping Use     Start Date     Passive Exposure     Quit Date     Counseling Given     Comments           Substance Use - Tobacco Interventions:  Patient is not ready to work toward tobacco cessation at this time         General Health and ACP:  General  In general, how would you say your health is?: Fair  In the past 7 days, have you experienced any of the following: New or Increased Pain, New or Increased Fatigue, Loneliness, Social Isolation, Stress or Anger?: (!) Yes  Select all that apply: (!) Stress, New or Increased Fatigue  Do you get the social and emotional support that you need?: Yes  Do you have a Living Will?: (!) No    Advance Directives       Power of  Living Will ACP-Advance Directive ACP-Power of     Not on File Not on File Not on File Not on File          General Health Risk Interventions:  Fatigue: see other visit  Stress: see other visit  No Living Will: Advance Care Planning addressed with patient today     Hearing/Vision:  Do you or your family notice any trouble with your hearing that hasn't been managed with hearing aids?: (!) Yes  Do you have difficulty driving, watching TV, or doing any of your daily activities because of your eyesight?: No  Have you had an eye exam within the past year?: Appointment is scheduled  No results found. Hearing/Vision Interventions:  Hearing concerns:  patient declines any further evaluation/treatment for hearing issues            Objective   Vitals:    10/04/22 1030   BP: 134/76   Site: Left Upper Arm   Position: Sitting   Cuff Size: Large Adult   Pulse: 68   Temp: 97.7 °F (36.5 °C)   TempSrc: Temporal   SpO2: 97%   Weight: 167 lb (75.8 kg)   Height: 5' 5\" (1.651 m)      Body mass index is 27.79 kg/m². Allergies   Allergen Reactions    Niacin Hives    Sulfa Antibiotics Hives    Sulfur Hives    Celecoxib     Tobramycin     Statins      myalgias     Prior to Visit Medications    Medication Sig Taking? Authorizing Provider   furosemide (LASIX) 40 MG tablet TAKE 1 TABLET BY MOUTH DAILY Yes Lisa Cedeño MD   prednisoLONE acetate (PRED FORTE) 1 % ophthalmic suspension  Yes Historical Provider, MD   acetaminophen (TYLENOL) 500 MG tablet Take 1,000 mg by mouth Yes Historical Provider, MD   ofloxacin (FLOXIN) 0.3 % otic solution ofloxacin 0.3 % ear drops Yes Historical Provider, MD   nystatin (MYCOSTATIN) 952879 UNIT/GM powder Nystop 100,000 unit/gram topical powder   APPLY TO THE AFFECTED AREA EVERY DAY Yes Historical Provider, MD   nitroGLYCERIN (NITROSTAT) 0.3 MG SL tablet Nitrostat 0.3 mg sublingual tablet   place 1 tablet by sublingual route at the first sign of an attack; no more than 3 tabs are recommended within a 15 minute period.  Yes Historical Provider, MD   aspirin 81 MG EC tablet Aspir-81 mg tablet,delayed release   take 1 tablet by oral route  every day Yes Historical Provider,

## 2022-10-04 NOTE — LETTER
CONTROLLED SUBSTANCE MEDICATION AGREEMENT     Patient Name: Devin Biggs  Patient YOB: 1960   I understand, that controlled substance medications may be used to help better manage my symptoms and to improve my ability to function at home, work and in social settings. However, I also understand that these medications do have risks, which have been discussed with me, including possible development of physical or psychological dependence. I understand that successful treatment requires mutual trust and honesty between me and my provider. I understand and agree that following this Medication Agreement is necessary in continuing my provider-patient relationship and the success of my treatment plan. Explanation from my Provider: Benefits and Goals of Controlled Substance Medications: There are two potential goals for your treatment: (1) decreased pain and suffering (2) improved daily life functions. There are many possible treatments for your chronic condition(s). Alternatives such as physical therapy, yoga, massage, home daily exercise, meditation, relaxation techniques, injections, chiropractic manipulations, surgery, cognitive therapy, hypnosis and many medications that are not habit-forming may be used. Use of controlled substance medications may be helpful, but they are unlikely to resolve all symptoms or restore all function. Explanation from my Provider: Risks of Controlled Substance Medications:  Opioid pain medications: These medications can lead to problems such as addiction/dependence, sedation, lightheadedness/dizziness, memory issues, falls, constipation, nausea, or vomiting. They may also impair the ability to drive or operate machinery. Additionally, these medications may lower testosterone levels, leading to loss of bone strength, stamina and sex drive.   They may cause problems with breathing, sleep apnea and reduced coughing, which is especially dangerous for patients with lung disease. Overdose or dangerous interactions with alcohol and other medications may occur, leading to death. Hyperalgesia may develop, which means patients receiving opioids for the treatment of pain may become more sensitive to certain painful stimuli, and in some cases, experience pain from ordinarily non-painful stimuli. Women between the ages of 14-53 who could become pregnant should carefully weigh the risks and benefits of opioids with their physicians, as these medications increase the risk of pregnancy complications, including miscarriage,  delivery and stillbirth. It is also possible for babies to be born addicted to opioids. Opioid dependence withdrawal symptoms may include; feelings of uneasiness, increased pain, irritability, belly pain, diarrhea, sweats and goose-flesh. Benzodiazepines and non-benzodiazepine sleep medications: These medications can lead to problems such as addiction/dependence, sedation, fatigue, lightheadedness, dizziness, incoordination, falls, depression, hallucinations, and impaired judgment, memory and concentration. The ability to drive and operate machinery may also be affected. Abnormal sleep-related behaviors have been reported, including sleepwalking, driving, making telephone calls, eating, or having sex while not fully awake. These medications can suppress breathing and worsen sleep apnea, particularly when combined with alcohol or other sedating medications, potentially leading to death. Dependence withdrawal symptoms may include tremors, anxiety, hallucinations and seizures. Stimulants:  Common adverse effects include addiction/dependence, increased blood  pressure and heart rate, decreased appetite, nausea, involuntary weight loss, insomnia,                                                                                                                     Initials:_______   irritability, and headaches.   These risks may increase when these medications are combined with other stimulants, such as caffeine pills or energy drinks, certain weight loss supplements and oral decongestants. Dependence withdrawal symptoms may include depressed mood, loss of interest, suicidal thoughts, anxiety, fatigue, appetite changes and agitation. Testosterone replacement therapy:  Potential side effects include increased risk of stroke and heart attack, blood clots, increased blood pressure, increased cholesterol, enlarged prostate, sleep apnea, irritability/aggression and other mood disorders, and decreased fertility. I agree and understand that I and my prescriber have the following rights and responsibilities regarding my treatment plan:     1. MY RIGHTS:  To be informed of my treatment and medication plan. To be an active participant in my health and wellbeing. 2. MY RESPONSIBILITY AND UNDERSTANDING FOR USE OF MEDICATIONS   I will take medications at the dose and frequency as directed. For my safety, I will not increase or change how I take my medications without the recommendation of my healthcare provider.  I will actively participate in any program recommended by my provider which may improve function, including social, physical, psychological programs.  I will not take my medications with alcohol or other drugs not prescribed to me. I understand that drinking alcohol with my medications increases the chances of side effects, including reduced breathing rate and could lead to personal injury when operating machinery.  I understand that if I have a history of substance use disorders, including alcohol or other illicit drugs, that I may be at increased risk of addiction to my medications.  I agree to notify my provider immediately if I should become pregnant so that my treatment plan can be adjusted.    I agree and understand that I shall only receive controlled substance medications from the prescriber that signed this agreement unless there is written agreement among other prescribers of controlled substances outlining the responsibility of the medications being prescribed.  I understand that the if the controlled medication is not helping to achieve goals, the dosage may be tapered and no longer prescribed. 3. MY RESPONSIBILITY FOR COMMUNICATION / PRESCRIPTION RENEWALS   I agree that all controlled substance medications that I take will be prescribed only by my provider. If another healthcare provider prescribes me medication in an emergency, I will notify my provider within seventy-two (72) hours.  I will arrange for refills at the prescribed interval ONLY during regular office hours. I will not ask for refills earlier than agreed, after-hours, on holidays or weekends. Refills may take up to 72 hours for processing and prescriptions to reach the pharmacy.  I will inform my other health care providers that I am taking these medications and of the existence of this [id number]. In the event of an emergency, I will provide the same information to the emergency department prescribers.  I will keep my provider updated on the pharmacy I am using for controlled medication prescription filling. Initials:_______  4. MY RESPONSIBILITY FOR PROTECTING MEDICATIONS   I will protect my prescriptions and medications. I understand that lost or misplaced prescriptions will not be replaced.  I will keep medications only for my own use and will not share them with others. I will keep all medications away from children.  I agree that if my medications are adjusted or discontinued, I will properly dispose of any remaining medications. I understand that I will be required to dispose of any remaining controlled medications as, directed by my prescriber, prior to being provided with any prescriptions for other controlled medications.   Medication drop box locations can be found at: [email or web address]    5. MY RESPONSIBILITY WITH ILLEGAL DRUGS    I will not use illegal or street drugs or another person's prescription medications not prescribed to me.  If there are identified addiction type symptoms, then referral to a program may be provided by my provider and I agree to follow through with this recommendation. 6. MY RESPONSIBILITY FOR COOPERATION WITH INVESTIGATIONS   I understand that my provider will comply with any applicable law and may discuss my use and/or possible misuse/abuse of controlled substances and alcohol, as appropriate, with any health care provider involved in my care, pharmacist, or legal authority.  I authorize my provider and pharmacy to cooperate fully with law enforcement agencies (as permitted by law) in the investigation of any possible misuse, sale, or other diversion of my controlled substances.  I agree to waive any applicable privilege or right of privacy or confidentiality with respect to these authorizations. 7. PROVIDERS RIGHT TO MONITOR FOR SAFETY: PRESCRIPTION MONITORING / DRUG TESTING   I consent to drug/toxicology screening and will submit to a drug screen upon my providers request to assure I am only taking the prescribed drugs for my safety monitoring. I understand that a drug screen is a laboratory test in which a sample of my urine, blood or saliva is checked to see what drugs I have been taking. This may entail an observed urine specimen, which means that a nurse or other health care provider may watch me provide urine, and I will cooperate if I am asked to provide an observed specimen.  I understand that my provider will check a copy of my State Prescription Monitoring Program () Report in order to safely prescribe medications.  Pill Counts: I consent to pill counts when requested.   I may be asked to bring all my prescribed controlled substance medications, in their original bottles, to all of my scheduled appointments. In addition, my provider may ask me to come to the practice at any time for a random pill count. 8. TERMINATION OF THIS AGREEMENT  For my safety, my prescriber has the right to stop prescribing controlled substance medications and may end this agreement. Initials:_______   Conditions that may result in termination of this agreement:  a. I do not show any improvement in pain, or my activity has not improved. b. I develop rapid tolerance or loss of improvement, as described in my treatment plan.  c. I develop significant side effects from the medication. d. My behavior is not consistent with the responsibilities outlined above, thereby causing safety concerns to continue prescribing controlled substance medications. e. I fail to follow the terms of this agreement. f. Other:____________________________       UNDERSTANDING THIS MEDICATION AGREEMENT:    I have read the above and have had all my questions answered. For chronic disease management, I know that my symptoms can be managed with many types of treatments. A chronic medication trial may be part of my treatment, but I must be an active participant in my care. Medication therapy is only one part of my symptom management plan. In some cases, there may be limited scientific evidence to support the chronic use of certain medications to improve symptoms and daily function. Furthermore, in certain circumstances, there may be scientific information that suggests that the use of chronic controlled substances may worsen my symptoms and increase my risk of unintentional death directly related to this medication therapy. I know that if my provider feels my risk from controlled medications is greater than my benefit, I will have my controlled substance medication(s) compassionately lowered or removed altogether.      I further agree to allow this office to contact my HIPAA contact if there are concerns about my safety and use of the controlled medications. I have agreed to use the prescribed controlled substance medications to me as instructed by my provider and as stated in this Medication Agreement. My initial on each page and my signature below shows that I have read each page and I have had the opportunity to ask questions with answers provided by my provider.     Patient Name (Printed): _____________________________________  Patient Signature:  ______________________   Date: _____________    Prescriber Name (Printed): ___________________________________  Prescriber Signature: _____________________  Date: _____________

## 2022-10-04 NOTE — PROGRESS NOTES
ESTABLISHED PRIMARY CARE VISIT    10/4/22  Name: Devin Biggs   : 1960   Age: 60 y.o. Sex: female        Assessment & Plan:     Problem List Items Addressed This Visit          Other    Generalized anxiety disorder with panic attacks - Primary     Previously well controlled with coping skills  Now with acute stressors  Patient declines SSRI/SNRI given side effects in the past  Failed BuSpar  Reports side effects with Ativan 0.5 mg  Reduced to 0.25 mg as needed  Patient counseled again regarding risks of use of benzos with opioids         Relevant Medications    traZODone (DESYREL) 50 MG tablet    LORazepam (ATIVAN) 0.5 MG tablet    Other Relevant Orders    PAIN MANAGEMENT PROFILE 1 W/ CONFIRMATION, URINE (Completed)    Psychophysiological insomnia     Continue trazodone 50 mg nightly         Relevant Medications    traZODone (DESYREL) 50 MG tablet     Other Visit Diagnoses       Medication monitoring encounter        Relevant Orders    PAIN MANAGEMENT PROFILE 1 W/ CONFIRMATION, URINE (Completed)    Grade A2 albuminuria        Recheck to confirm resolution  Abnormal 1 out of 2 prior samples            PDMP Monitoring:  Last PDMP [place] as Reviewed:  Review User Review Instant Review Result   Galol Zapata 10/4/2022 11:07 AM Reviewed PDMP [1]       Counseled patient regarding above diagnosis, including possible risks and complications, especially if left uncontrolled. Counseled patient as appropriate and relevant regarding any possible side effects, risks, and alternatives to treatment; the patient verbalizes understanding, and is in agreement with the plan as detailed above. All educational materials and instructions were discussed and included on the After Visit Summary. All questions answered to the patient's satisfaction. The patient was advised to call or send [facility] message for any concerns prior to next appointment.     Return in about 10 weeks (around 2022) for follow-up. Subjective:     Chief Complaint   Patient presents with    [facility] kidney stones    wants a pelvic exam today due to  having cancer tissue removed near vagina         Patient returns for follow-up anxiety, vaginitis    Daughter had neck surgery, then had early stroke due to carotid artery stenosis and found incidental thyroid nodule which is thought to be cancer, has biopsy scheduled  Feels lorazepam helps but too strong, makes her sleepy    Needs recheck for vulvar dysplasia  Will schedule with GYN    Scheduled for cystoscopy with Dr. Jarad Long urology  Had kidney stones in urine      Review of Systems   Genitourinary:  Positive for difficulty urinating, dysuria and hematuria. Negative for frequency, vaginal bleeding, vaginal discharge and vaginal pain. Skin:  Negative for rash and wound. Psychiatric/Behavioral:  Positive for sleep disturbance. Negative for dysphoric mood and suicidal ideas. The patient is nervous/anxious.         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)    Essential hypertension    Primary osteoarthritis involving multiple joints    Greater trochanteric bursitis of right hip    Muscle spasticity    Tobacco use disorder    B12 deficiency    Grade A2 albuminuria    Polycythemia    Mixed hyperlipidemia    Carpal tunnel syndrome    Generalized anxiety disorder with panic attacks        Past Medical History:   Diagnosis Date    Dysplasia of vulva 2021    Gastric ulcer     MI (myocardial infarction) ([address] 2007    MI (myocardial infarction) ([address] 05/2022    seen on stress test    Otosclerosis of both ears     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) 03/24/2022    Uterine fibroid 2003       Past Surgical History:   Procedure Laterality Date    CORONARY ANGIOPLASTY WITH STENT PLACEMENT  2007    x4 stents    HYSTERECTOMY, TOTAL ABDOMINAL (CERVIX REMOVED)  2003    for fibroids    KNEE ARTHROSCOPY Right 06/2022    meniscal repair    TUBAL LIGATION  1981    VULVA SURGERY Right 2021    for dysplasia       Family History   Problem Relation Age of Onset    Stroke Mother 36    Heart Attack Father 61    Schizophrenia Father     Lung Cancer Sister     Cervical Cancer Sister     Hypothyroidism Half-Sister     Schizophrenia Son     Other Daughter         issues following meningitis    Endometriosis Daughter     Arthritis Daughter        Medications:     Current Outpatient Medications:     furosemide (LASIX) 40 MG tablet, TAKE 1 TABLET BY MOUTH DAILY, Disp: 90 tablet, Rfl: 1    acetaminophen (TYLENOL) 500 MG tablet, Take 1,000 mg by mouth, Disp: , Rfl:     ofloxacin (FLOXIN) 0.3 % otic solution, ofloxacin 0.3 % ear drops, Disp: , Rfl:     nystatin (MYCOSTATIN) 435243 UNIT/GM powder, Nystop 100,000 unit/gram topical powder  APPLY TO THE AFFECTED AREA EVERY DAY, Disp: , Rfl:     nitroGLYCERIN (NITROSTAT) 0.3 MG SL tablet, Nitrostat 0.3 mg sublingual tablet  place 1 tablet by sublingual route at the first sign of an attack; no more than 3 tabs are recommended within a 15 minute period. , Disp: , Rfl:     aspirin 81 MG EC tablet, Aspir-81 mg tablet,delayed release  take 1 tablet by oral route  every day, Disp: , Rfl:     metoprolol tartrate (LOPRESSOR) 50 MG tablet, Take 50 mg by mouth daily, Disp: , Rfl:     traZODone (DESYREL) 50 MG tablet, Take 50 mg by mouth daily, Disp: , Rfl:     metFORMIN (GLUCOPHAGE) 500 MG tablet, Take 500 mg by mouth daily, Disp: , Rfl:     lisinopril (PRINIVIL;ZESTRIL) 10 MG tablet, Take 10 mg by mouth daily, Disp: , Rfl:     HYDROcodone-acetaminophen (NORCO) 5-325 MG per tablet, Take 1 tablet by mouth 3 times daily as needed.  , Disp: , Rfl: prednisoLONE acetate (PRED FORTE) 1 % ophthalmic suspension, , Disp: , Rfl:     Allergies: Allergies   Allergen Reactions    Niacin Hives    Sulfa Antibiotics Hives    Sulfur Hives    Celecoxib     Tobramycin     Statins      myalgias       Social History:     Social History     Socioeconomic History    Marital status:       Spouse name: Not on file    Number of children: Not on file    Years of education: Not on file    Highest education level: Not on file   Occupational History    Not on file   Tobacco Use    Smoking status: Every Day     Packs/day: 1.00     Years: 48.00     Pack years: 48.00     Types: Cigarettes     Start date: 64    Smokeless tobacco: Never   Substance and Sexual Activity    Alcohol use: Not Currently     Comment: twice yearly    Drug use: Never    Sexual activity: Not Currently   Other Topics Concern    Not on file   Social History Narrative    Not on file     Social Determinants of Health     Financial Resource Strain: Low Risk     Difficulty of Paying Living Expenses: Not very hard   Food Insecurity: Food Insecurity Present    Worried About Running Out of Food in the Last Year: Sometimes true    Ran Out of Food in the Last Year: Sometimes true   Transportation Needs: Not on file   Physical Activity: Sufficiently Active    Days of Exercise per Week: 3 days    Minutes of Exercise per Session: 60 min   Stress: Not on file   Social Connections: Not on file   Intimate Partner Violence: Not on file   Housing Stability: Not on file       Physical Exam:     Vitals:    10/04/22 1019   BP: 134/76   Site: Left Upper Arm   Position: Sitting   Cuff Size: Large Adult   Pulse: 68   Temp: 97.7 °F (36.5 °C)   TempSrc: Temporal   SpO2: 97%   Weight: 167 lb 3.2 oz (75.8 kg)   Height: 5' 5\" (1.651 m)       BP Readings from Last 3 Encounters:   10/04/22 134/76   10/04/22 134/76   08/15/22 126/74       Wt Readings from Last 3 Encounters:   10/04/22 167 lb 3.2 oz (75.8 kg)   10/04/22 167 lb (75.8 kg) 08/15/22 169 lb 9.6 oz (76.9 kg)       Physical Exam  Vitals and nursing note reviewed. Constitutional:       General: She is not in acute distress. Appearance: Normal appearance. She is overweight. She is not ill-appearing or diaphoretic. Eyes:      Extraocular Movements: Extraocular movements intact. Conjunctiva/sclera: Conjunctivae normal.   Cardiovascular:      Rate and Rhythm: Normal rate and regular rhythm. Heart sounds: Normal heart sounds. Pulmonary:      Effort: Pulmonary effort is normal. No respiratory distress. Breath sounds: Normal breath sounds. Musculoskeletal:      Right lower leg: No edema. Left lower leg: No edema. Skin:     General: Skin is warm and dry. Neurological:      Mental Status: She is alert and oriented to person, place, and time. Psychiatric:         Attention and Perception: Attention and perception normal.         Mood and Affect: Mood is anxious. Affect is tearful. Speech: Speech normal.         Behavior: Behavior normal. Behavior is cooperative. Thought Content: Thought content normal.       Testing:     Orders Placed This Encounter   Procedures    MICROALBUMIN / CREATININE URINE RATIO     Standing Status:   Future     Number of Occurrences:   1     Standing Expiration Date:   10/4/2023    PAIN MANAGEMENT PROFILE 1 W/ CONFIRMATION, URINE     Standing Status:   Future     Number of Occurrences:   1     Standing Expiration Date:   10/4/2023        No results found for this or any previous visit (from the past 24 hour(s)).

## 2022-10-05 PROBLEM — R80.9 GRADE A2 ALBUMINURIA: Status: RESOLVED | Noted: 2022-04-02 | Resolved: 2022-10-05

## 2022-10-05 LAB
6-MONOACETYLMORPHINE, URINE: NOT DETECTED
ALCOHOL URINE: NOT DETECTED
AMPHETAMINE SCREEN, URINE: NOT DETECTED
BARBITURATE SCREEN URINE: NOT DETECTED
BENZODIAZEPINE SCREEN, URINE: NOT DETECTED
BUPRENORPHINE URINE: NOT DETECTED
CANNABINOID SCREEN URINE: NOT DETECTED
COCAINE METABOLITE SCREEN URINE: NOT DETECTED
FENTANYL SCREEN, URINE: NOT DETECTED
INTEGRITY CHECK, CREATININE, URINE: 49.1
INTEGRITY CHECK, OXIDANT, URINE: <40
INTEGRITY CHECK, PH, URINE: 6.3 (ref 4.5–9)
INTEGRITY CHECK, SPECIFIC GRAVITY, URINE: 1.01 (ref 1–1.03)
INTEGRITY CHECK, SPECIMEN INTEGRITY, URINE: ABNORMAL
Lab: ABNORMAL
METHADONE SCREEN, URINE: NOT DETECTED
OPIATE SCREEN URINE: POSITIVE
OXYCODONE URINE: NOT DETECTED
PHENCYCLIDINE SCREEN URINE: NOT DETECTED
TRAMADOL SCREEN URINE: NOT DETECTED

## 2022-10-06 LAB
6-MAM, QUANTITATIVE, URINE: <10
CODEINE, QUANTITATIVE, URINE: <50
COMMENT: NORMAL
HYDROCODONE, QUANTITATIVE, URINE: >1000
HYDROMORPHONE, QUANTITATIVE, URINE: <50
MORPHINE, QUANTITATIVE, URINE: <50
NORHYDROCODONE, QUANTITATIVE, URINE: 979.4
NOROXYCODONE, QUANTITATIVE, URINE: <50
OXYCODONE URINE, QUANTITATIVE: <50
OXYMORPHONE, QUANTITATIVE, URINE: <50

## 2022-10-20 LAB — DIABETIC RETINOPATHY: NEGATIVE

## 2022-10-28 ENCOUNTER — TELEPHONE (OUTPATIENT)
Dept: PRIMARY CARE CLINIC | Age: 62
End: 2022-10-28

## 2022-10-28 DIAGNOSIS — Q25.1 ABDOMINAL AORTIC STENOSIS: Primary | ICD-10-CM

## 2022-10-28 PROBLEM — F51.04 PSYCHOPHYSIOLOGICAL INSOMNIA: Status: ACTIVE | Noted: 2022-10-28

## 2022-10-28 NOTE — TELEPHONE ENCOUNTER
Would recommend patient see a cardiothoracic/vascular doctor. Will place referral to Dr. Derek Garcia.   Please send CT urogram from [facility] with referral.

## 2022-10-28 NOTE — ASSESSMENT & PLAN NOTE
Previously well controlled with coping skills  Now with acute stressors  Patient declines SSRI/SNRI given side effects in the past  Failed BuSpar  Reports side effects with Ativan 0.5 mg  Reduced to 0.25 mg as needed  Patient counseled again regarding risks of use of benzos with opioids

## 2022-10-28 NOTE — TELEPHONE ENCOUNTER
Pt states she was getting other testing done by Dr David Garza and it was discovered that she has a high grade aortic stenosis proximal to iliac bifurcation. She would  like to know If this is something you would like to see her about or should she get with her heart doctor.

## 2022-11-28 DIAGNOSIS — I25.10 ARTERIOSCLEROSIS OF CORONARY ARTERY: ICD-10-CM

## 2022-11-28 DIAGNOSIS — E78.2 MIXED HYPERLIPIDEMIA: ICD-10-CM

## 2022-11-29 NOTE — TELEPHONE ENCOUNTER
Please call patient to determine if she is still taking medication. Unclear why this was discontinued.

## 2022-12-13 RX ORDER — EZETIMIBE 10 MG/1
10 TABLET ORAL DAILY
Qty: 90 TABLET | Refills: 1 | Status: SHIPPED | OUTPATIENT
Start: 2022-12-13

## 2022-12-13 NOTE — TELEPHONE ENCOUNTER
Called patient to schedule to appointment. No answer. Left message to contact the office for scheduling.

## 2022-12-13 NOTE — TELEPHONE ENCOUNTER
Unclear why this would be discontinued. Does not appear per the note from this day that patient was having side effects. Has allergy to statins and needs treatment for hyperlipidemia and CAD. If patient has side effects with restarting, should call. Sent refill. Also needs follow-up. Does not appear she scheduled after last visit.

## 2022-12-14 NOTE — TELEPHONE ENCOUNTER
Pt states that she quit taking this that is why it was DC. Also patient scheduled for 2/23/22 however in regards to the aortic blockage (see telephone note from 10/28/22 for refresher)  she might will possible be having sx for this so might need sooner appointment for surgical clearance. She sees dr Matthews June on Monday and will give me more details as to what the plan is.

## 2022-12-17 PROBLEM — E27.8 RIGHT ADRENAL MASS (HCC): Status: ACTIVE | Noted: 2022-12-17

## 2022-12-17 PROBLEM — R31.9 HEMATURIA, UNSPECIFIED: Status: ACTIVE | Noted: 2022-10-12

## 2022-12-17 PROBLEM — D35.02 ADENOMA OF LEFT ADRENAL GLAND: Status: ACTIVE | Noted: 2022-12-17

## 2022-12-17 PROBLEM — N32.3 BLADDER DIVERTICULUM: Status: ACTIVE | Noted: 2022-12-17

## 2022-12-17 PROBLEM — K76.0 FATTY LIVER: Status: ACTIVE | Noted: 2022-12-17

## 2022-12-19 ENCOUNTER — TELEPHONE (OUTPATIENT)
Dept: PRIMARY CARE CLINIC | Age: 62
End: 2022-12-19

## 2022-12-19 NOTE — TELEPHONE ENCOUNTER
Dr Omari Hunter,   Pt scheduled for 12/22 at 2:30 for pre surge clearance. I asked patient if it was going to be within 30 days of sx date but patient stated that dr Aline Elizondo told her he was going to do it within the next couple weeks but told her he needs the pre surge clearance and she can get it done as soon as possible. Pt also stated that his nurse should be faxing over what they will need for clearance.

## 2022-12-22 ENCOUNTER — OFFICE VISIT (OUTPATIENT)
Dept: PRIMARY CARE CLINIC | Age: 62
End: 2022-12-22
Payer: MEDICARE

## 2022-12-22 VITALS
OXYGEN SATURATION: 99 % | BODY MASS INDEX: 31.25 KG/M2 | TEMPERATURE: 97.8 F | DIASTOLIC BLOOD PRESSURE: 82 MMHG | SYSTOLIC BLOOD PRESSURE: 128 MMHG | HEART RATE: 68 BPM | HEIGHT: 65 IN | WEIGHT: 187.6 LBS

## 2022-12-22 DIAGNOSIS — H69.82 ACUTE DYSFUNCTION OF LEFT EUSTACHIAN TUBE: ICD-10-CM

## 2022-12-22 DIAGNOSIS — E27.8 RIGHT ADRENAL MASS (HCC): ICD-10-CM

## 2022-12-22 DIAGNOSIS — F41.0 GENERALIZED ANXIETY DISORDER WITH PANIC ATTACKS: Primary | ICD-10-CM

## 2022-12-22 DIAGNOSIS — Q25.1 ABDOMINAL AORTIC STENOSIS: ICD-10-CM

## 2022-12-22 DIAGNOSIS — H66.001 NON-RECURRENT ACUTE SUPPURATIVE OTITIS MEDIA OF RIGHT EAR WITHOUT SPONTANEOUS RUPTURE OF TYMPANIC MEMBRANE: ICD-10-CM

## 2022-12-22 DIAGNOSIS — F41.1 GENERALIZED ANXIETY DISORDER WITH PANIC ATTACKS: Primary | ICD-10-CM

## 2022-12-22 DIAGNOSIS — N32.3 BLADDER DIVERTICULUM: ICD-10-CM

## 2022-12-22 PROCEDURE — G8417 CALC BMI ABV UP PARAM F/U: HCPCS | Performed by: STUDENT IN AN ORGANIZED HEALTH CARE EDUCATION/TRAINING PROGRAM

## 2022-12-22 PROCEDURE — 4004F PT TOBACCO SCREEN RCVD TLK: CPT | Performed by: STUDENT IN AN ORGANIZED HEALTH CARE EDUCATION/TRAINING PROGRAM

## 2022-12-22 PROCEDURE — 99214 OFFICE O/P EST MOD 30 MIN: CPT | Performed by: STUDENT IN AN ORGANIZED HEALTH CARE EDUCATION/TRAINING PROGRAM

## 2022-12-22 PROCEDURE — 3074F SYST BP LT 130 MM HG: CPT | Performed by: STUDENT IN AN ORGANIZED HEALTH CARE EDUCATION/TRAINING PROGRAM

## 2022-12-22 PROCEDURE — 3017F COLORECTAL CA SCREEN DOC REV: CPT | Performed by: STUDENT IN AN ORGANIZED HEALTH CARE EDUCATION/TRAINING PROGRAM

## 2022-12-22 PROCEDURE — 3078F DIAST BP <80 MM HG: CPT | Performed by: STUDENT IN AN ORGANIZED HEALTH CARE EDUCATION/TRAINING PROGRAM

## 2022-12-22 PROCEDURE — G8484 FLU IMMUNIZE NO ADMIN: HCPCS | Performed by: STUDENT IN AN ORGANIZED HEALTH CARE EDUCATION/TRAINING PROGRAM

## 2022-12-22 PROCEDURE — G8427 DOCREV CUR MEDS BY ELIG CLIN: HCPCS | Performed by: STUDENT IN AN ORGANIZED HEALTH CARE EDUCATION/TRAINING PROGRAM

## 2022-12-22 RX ORDER — LEVOCETIRIZINE DIHYDROCHLORIDE 5 MG/1
5 TABLET, FILM COATED ORAL NIGHTLY
Qty: 30 TABLET | Refills: 0 | Status: SHIPPED | OUTPATIENT
Start: 2022-12-22

## 2022-12-22 RX ORDER — LORAZEPAM 0.5 MG/1
TABLET ORAL
COMMUNITY
Start: 2022-07-08

## 2022-12-22 RX ORDER — ESTRADIOL 0.1 MG/G
1 CREAM VAGINAL
COMMUNITY
Start: 2021-11-11

## 2022-12-22 RX ORDER — AMOXICILLIN AND CLAVULANATE POTASSIUM 875; 125 MG/1; MG/1
1 TABLET, FILM COATED ORAL 2 TIMES DAILY
Qty: 14 TABLET | Refills: 0 | Status: SHIPPED | OUTPATIENT
Start: 2022-12-22 | End: 2022-12-29

## 2022-12-22 NOTE — TELEPHONE ENCOUNTER
Tried to Call Dr Li's office x 2 to see if patient has forms that need filled out and I left VM both times, no response yet.

## 2022-12-22 NOTE — PROGRESS NOTES
ESTABLISHED PRIMARY CARE VISIT    22  Name: Kevyn Cummings   : 1960   Age: 60 y.o. Sex: female        Assessment & Plan:     Problem List Items Addressed This Visit          Circulatory    Abdominal aortic stenosis     Following with vascular surgery  Planning surgical intervention  Will see cardiology for pre-surgical exam            Genitourinary    Bladder diverticulum     Following with urology  Side effects with oxybutynin            Other    Generalized anxiety disorder with panic attacks - Primary     Chronic, uncontrolled due to acute stressors  Patient declines SSRI/SNRI given side effects in the past  Failed BuSpar  Continue lorazepam 0.25 mg as needed  Patient counseled again regarding risks of use of benzos with opioids         Relevant Medications    LORazepam (ATIVAN) 0.5 MG tablet    Right adrenal mass ([address]     Incidental finding  Check MR per radiology recommendations         Relevant Orders    MRI ABDOMEN W CONTRAST     Other Visit Diagnoses       Non-recurrent acute suppurative otitis media of right ear without spontaneous rupture of tympanic membrane        Treat with Augmentin    Acute dysfunction of left eustachian tube        Treat with Xyzal    Relevant Medications    levocetirizine (XYZAL) 5 MG tablet          Counseled patient regarding above diagnosis, including possible risks and complications, especially if left uncontrolled. Counseled patient as appropriate and relevant regarding any possible side effects, risks, and alternatives to treatment; the patient verbalizes understanding, and is in agreement with the plan as detailed above. All educational materials and instructions were discussed and included on the After Visit Summary. All questions answered to the patient's satisfaction. The patient was advised to call or send [employer] message for any concerns prior to next appointment. Return in about 2 months (around 2023) for follow-up.     Subjective: Chief Complaint   Patient presents with    Follow-up     Patient returns for follow-up  Dr. Diane Vega found incidental aorta stenosis on workup for bladder  Was referred to vascular surgery Dr. Avinash Ramírez  Was also started on oxybutynin but caused her side effects    Has only used lorazepam a couple times since last visit  Daughter has to go up to [facility] to see another surgeon    Will be seeing GYN surgeon for follow-up    Zetia stopped due to side effects    Review of Systems   HENT:  Positive for ear pain (bilateral). Respiratory:  Negative for shortness of breath. Cardiovascular:  Negative for chest pain. Genitourinary:  Negative for vaginal bleeding, vaginal discharge and vaginal pain. Skin:  Negative for rash and wound. Psychiatric/Behavioral:  Positive for sleep disturbance. Negative for dysphoric mood and suicidal ideas. The patient is nervous/anxious. Medications:     Current Outpatient Medications:     estradiol (ESTRACE) 0.1 MG/GM vaginal cream, 1 g, Disp: , Rfl:     LORazepam (ATIVAN) 0.5 MG tablet, TAKE 1 TABLET BY MOUTH THREE TIMES DAILY FOR UP TO 7 DAYS AS NEEDED FOR ANXIETY, Disp: , Rfl:     traZODone (DESYREL) 50 MG tablet, Take 1 tablet by mouth daily, Disp: 30 tablet, Rfl: 5    furosemide (LASIX) 40 MG tablet, TAKE 1 TABLET BY MOUTH DAILY, Disp: 90 tablet, Rfl: 1    acetaminophen (TYLENOL) 500 MG tablet, Take 1,000 mg by mouth, Disp: , Rfl:     ofloxacin (FLOXIN) 0.3 % otic solution, ofloxacin 0.3 % ear drops, Disp: , Rfl:     nystatin (MYCOSTATIN) [id number] UNIT/GM powder, Nystop 100,000 unit/gram topical powder  APPLY TO THE AFFECTED AREA EVERY DAY, Disp: , Rfl:     nitroGLYCERIN (NITROSTAT) 0.3 MG SL tablet, Nitrostat 0.3 mg sublingual tablet  place 1 tablet by sublingual route at the first sign of an attack; no more than 3 tabs are recommended within a 15 minute period. , Disp: , Rfl:     aspirin 81 MG EC tablet, Aspir-81 mg tablet,delayed release  take 1 tablet by oral route  every day, Disp: , Rfl:     metoprolol tartrate (LOPRESSOR) 50 MG tablet, Take 50 mg by mouth daily, Disp: , Rfl:     metFORMIN (GLUCOPHAGE) 500 MG tablet, Take 500 mg by mouth daily, Disp: , Rfl:     lisinopril (PRINIVIL;ZESTRIL) 10 MG tablet, Take 10 mg by mouth daily, Disp: , Rfl:     HYDROcodone-acetaminophen (NORCO) 5-325 MG per tablet, Take 1 tablet by mouth 3 times daily as needed. , Disp: , Rfl:     ezetimibe (ZETIA) 10 MG tablet, TAKE 1 TABLET BY MOUTH DAILY (Patient not taking: Reported on 12/22/2022), Disp: 90 tablet, Rfl: 1    prednisoLONE acetate (PRED FORTE) 1 % ophthalmic suspension, , Disp: , Rfl:     Physical Exam:     Vitals:    12/22/22 1419   BP: 128/82   Site: Left Upper Arm   Position: Sitting   Cuff Size: Large Adult   Pulse: 68   Temp: 97.8 °F (36.6 °C)   SpO2: 99%   Weight: 187 lb 9.6 oz (85.1 kg)   Height: 5' 5\" (1.651 m)     BP Readings from Last 3 Encounters:   12/22/22 128/82   10/04/22 134/76   10/04/22 134/76     Wt Readings from Last 3 Encounters:   12/22/22 187 lb 9.6 oz (85.1 kg)   10/04/22 167 lb 3.2 oz (75.8 kg)   10/04/22 167 lb (75.8 kg)     Physical Exam  Vitals and nursing note reviewed. Constitutional:       General: She is not in acute distress. Appearance: Normal appearance. She is obese. She is not ill-appearing or diaphoretic. HENT:      Right Ear: Ear canal normal. A middle ear effusion (purulent) is present. Tympanic membrane is erythematous and bulging. Left Ear: Ear canal normal. A middle ear effusion (clear) is present. Tympanic membrane is bulging. Cardiovascular:      Rate and Rhythm: Normal rate and regular rhythm. Heart sounds: Normal heart sounds. Pulmonary:      Effort: Pulmonary effort is normal. No respiratory distress. Breath sounds: Normal breath sounds. Musculoskeletal:      Right lower leg: No edema. Left lower leg: No edema. Skin:     General: Skin is warm and dry.    Neurological:      Mental Status: She is alert and oriented to person, place, and time. Psychiatric:         Attention and Perception: Attention and perception normal.         Mood and Affect: Mood is anxious. Affect is tearful. Speech: Speech normal.         Behavior: Behavior normal. Behavior is cooperative. Thought Content: Thought content normal.     Testing:     Orders Placed This Encounter   Procedures    MRI ABDOMEN W CONTRAST     Standing Status:   Future     Standing Expiration Date:   12/22/2023     Scheduling Instructions:      [facility]     Order Specific Question:   STAT Creatinine as needed:     Answer:   Yes     Order Specific Question:   Reason for exam:     Answer:   nonspecific intermediate density 2.9 cm right adrenal mass     Order Specific Question:   Specify organ? Answer:   Adrenals      No results found for this or any previous visit (from the past 24 hour(s)).

## 2022-12-23 RX ORDER — LEVOCETIRIZINE DIHYDROCHLORIDE 5 MG/1
TABLET, FILM COATED ORAL
Qty: 90 TABLET | OUTPATIENT
Start: 2022-12-23

## 2022-12-28 NOTE — TELEPHONE ENCOUNTER
Called patient to inform her the we received forms for her surgical clearance she will need done before surgery. She states she has appointment with her cardiologist on Tuesday so she will swing by sometime this week to pick them up. I advised patient that we are closed on Monday so she will need to pick these up before weekend and they will be up at  ready for her, she verbalized understanding. Also advised patient to call dr li's office to clarify surgery date because on the form received it stated February but patient was under the impression it would be January. I told her to call and clarify because typically a surgical clearance has to be done within 30 days. Pt verbalized understanding and will call Dr Li's office to clarify.

## 2022-12-30 ENCOUNTER — COMMUNITY OUTREACH (OUTPATIENT)
Dept: PRIMARY CARE CLINIC | Age: 62
End: 2022-12-30

## 2023-01-04 ASSESSMENT — ENCOUNTER SYMPTOMS: SHORTNESS OF BREATH: 0

## 2023-01-04 NOTE — TELEPHONE ENCOUNTER
\[facility]\"" med was DC by vinicius dhaliwal Rifampin Counseling: I discussed with the patient the risks of rifampin including but not limited to liver damage, kidney damage, red-orange body fluids, nausea/vomiting and severe allergy.

## 2023-01-05 NOTE — ASSESSMENT & PLAN NOTE
Chronic, uncontrolled due to acute stressors  Patient declines SSRI/SNRI given side effects in the past  Failed BuSpar  Continue lorazepam 0.25 mg as needed  Patient counseled again regarding risks of use of benzos with opioids

## 2023-01-05 NOTE — ASSESSMENT & PLAN NOTE
Following with vascular surgery  Planning surgical intervention  Will see cardiology for pre-surgical exam

## 2023-01-13 DIAGNOSIS — H69.82 ACUTE DYSFUNCTION OF LEFT EUSTACHIAN TUBE: ICD-10-CM

## 2023-01-13 RX ORDER — LEVOCETIRIZINE DIHYDROCHLORIDE 5 MG/1
TABLET, FILM COATED ORAL
Qty: 30 TABLET | Refills: 5 | Status: SHIPPED | OUTPATIENT
Start: 2023-01-13

## 2023-01-27 RX ORDER — LISINOPRIL 10 MG/1
10 TABLET ORAL DAILY
Qty: 90 TABLET | OUTPATIENT
Start: 2023-01-27

## 2023-01-27 RX ORDER — LISINOPRIL 10 MG/1
10 TABLET ORAL DAILY
Qty: 30 TABLET | Refills: 5 | Status: SHIPPED | OUTPATIENT
Start: 2023-01-27

## 2023-01-27 NOTE — TELEPHONE ENCOUNTER
Last OV 12/22/22   Next OV 2/23/23    Patient requesting as soon as possible. She states she is out and the pharmacy told her they have been requesting from doctor but she didn't realize they were sending request to old doctor.

## 2023-02-01 RX ORDER — METOPROLOL TARTRATE 50 MG/1
50 TABLET, FILM COATED ORAL DAILY
Qty: 30 TABLET | Refills: 2 | Status: SHIPPED | OUTPATIENT
Start: 2023-02-01

## 2023-02-01 RX ORDER — METOPROLOL TARTRATE 50 MG/1
50 TABLET, FILM COATED ORAL DAILY
Qty: 90 TABLET | OUTPATIENT
Start: 2023-02-01

## 2023-02-17 ENCOUNTER — TELEPHONE (OUTPATIENT)
Dept: PRIMARY CARE CLINIC | Age: 63
End: 2023-02-17

## 2023-02-17 NOTE — TELEPHONE ENCOUNTER
Patient having surgery next week   2/23- cardiac stents being put in.   Wants to know if she should hold baby aspirin

## 2023-02-17 NOTE — TELEPHONE ENCOUNTER
This should have been discussed with her at presurgical appointment by cardiology. She can confirm with cardiology or cardiothoracic surgery.

## 2023-03-06 ENCOUNTER — OFFICE VISIT (OUTPATIENT)
Dept: PRIMARY CARE CLINIC | Age: 63
End: 2023-03-06
Payer: MEDICARE

## 2023-03-06 VITALS
SYSTOLIC BLOOD PRESSURE: 110 MMHG | OXYGEN SATURATION: 97 % | TEMPERATURE: 98.4 F | DIASTOLIC BLOOD PRESSURE: 70 MMHG | HEART RATE: 76 BPM | BODY MASS INDEX: 28.49 KG/M2 | RESPIRATION RATE: 19 BRPM | WEIGHT: 171 LBS | HEIGHT: 65 IN

## 2023-03-06 DIAGNOSIS — J40 SINOBRONCHITIS: Primary | ICD-10-CM

## 2023-03-06 DIAGNOSIS — J32.9 SINOBRONCHITIS: Primary | ICD-10-CM

## 2023-03-06 DIAGNOSIS — R05.3 PERSISTENT COUGH: ICD-10-CM

## 2023-03-06 PROCEDURE — G8484 FLU IMMUNIZE NO ADMIN: HCPCS | Performed by: EMERGENCY MEDICINE

## 2023-03-06 PROCEDURE — 99213 OFFICE O/P EST LOW 20 MIN: CPT | Performed by: EMERGENCY MEDICINE

## 2023-03-06 PROCEDURE — G8417 CALC BMI ABV UP PARAM F/U: HCPCS | Performed by: EMERGENCY MEDICINE

## 2023-03-06 PROCEDURE — 3017F COLORECTAL CA SCREEN DOC REV: CPT | Performed by: EMERGENCY MEDICINE

## 2023-03-06 PROCEDURE — 3074F SYST BP LT 130 MM HG: CPT | Performed by: EMERGENCY MEDICINE

## 2023-03-06 PROCEDURE — 4004F PT TOBACCO SCREEN RCVD TLK: CPT | Performed by: EMERGENCY MEDICINE

## 2023-03-06 PROCEDURE — G8427 DOCREV CUR MEDS BY ELIG CLIN: HCPCS | Performed by: EMERGENCY MEDICINE

## 2023-03-06 PROCEDURE — 3078F DIAST BP <80 MM HG: CPT | Performed by: EMERGENCY MEDICINE

## 2023-03-06 RX ORDER — GUAIFENESIN 600 MG/1
600 TABLET, EXTENDED RELEASE ORAL 2 TIMES DAILY
Qty: 20 TABLET | Refills: 0 | Status: SHIPPED | OUTPATIENT
Start: 2023-03-06 | End: 2023-03-16

## 2023-03-06 RX ORDER — BENZONATATE 200 MG/1
200 CAPSULE ORAL 3 TIMES DAILY PRN
Qty: 21 CAPSULE | Refills: 0 | Status: SHIPPED | OUTPATIENT
Start: 2023-03-06 | End: 2023-03-13

## 2023-03-06 RX ORDER — AMOXICILLIN AND CLAVULANATE POTASSIUM 875; 125 MG/1; MG/1
1 TABLET, FILM COATED ORAL 2 TIMES DAILY
Qty: 20 TABLET | Refills: 0 | Status: SHIPPED | OUTPATIENT
Start: 2023-03-06 | End: 2023-03-16

## 2023-03-06 SDOH — ECONOMIC STABILITY: HOUSING INSECURITY
IN THE LAST 12 MONTHS, WAS THERE A TIME WHEN YOU DID NOT HAVE A STEADY PLACE TO SLEEP OR SLEPT IN A SHELTER (INCLUDING NOW)?: NO

## 2023-03-06 SDOH — ECONOMIC STABILITY: INCOME INSECURITY: HOW HARD IS IT FOR YOU TO PAY FOR THE VERY BASICS LIKE FOOD, HOUSING, MEDICAL CARE, AND HEATING?: NOT HARD AT ALL

## 2023-03-06 SDOH — ECONOMIC STABILITY: FOOD INSECURITY: WITHIN THE PAST 12 MONTHS, THE FOOD YOU BOUGHT JUST DIDN'T LAST AND YOU DIDN'T HAVE MONEY TO GET MORE.: NEVER TRUE

## 2023-03-06 SDOH — ECONOMIC STABILITY: FOOD INSECURITY: WITHIN THE PAST 12 MONTHS, YOU WORRIED THAT YOUR FOOD WOULD RUN OUT BEFORE YOU GOT MONEY TO BUY MORE.: NEVER TRUE

## 2023-03-06 ASSESSMENT — PATIENT HEALTH QUESTIONNAIRE - PHQ9
SUM OF ALL RESPONSES TO PHQ QUESTIONS 1-9: 0
1. LITTLE INTEREST OR PLEASURE IN DOING THINGS: 0
SUM OF ALL RESPONSES TO PHQ QUESTIONS 1-9: 0
SUM OF ALL RESPONSES TO PHQ9 QUESTIONS 1 & 2: 0
2. FEELING DOWN, DEPRESSED OR HOPELESS: 0

## 2023-03-06 ASSESSMENT — ENCOUNTER SYMPTOMS
EYE DISCHARGE: 0
BACK PAIN: 0
EYE REDNESS: 0
VOMITING: 0
WHEEZING: 0
SHORTNESS OF BREATH: 0
SINUS PRESSURE: 0
ABDOMINAL DISTENTION: 0
SORE THROAT: 0
NAUSEA: 0
DIARRHEA: 0
COUGH: 1
EYE PAIN: 0

## 2023-03-06 NOTE — PROGRESS NOTES
Chief Complaint:   URI (Runny nose, sinus pressure, cough, chest congestion, started on 02/28/23)      History of Present Illness   HPI:  Emily Ferrari is a 58 y.o. female who presents to [facility] today for head and chest congestion for 7-10 days with productive sputum. Prior to Visit Medications    Medication Sig Taking?  Authorizing Provider   amoxicillin-clavulanate (AUGMENTIN) 875-125 MG per tablet Take 1 tablet by mouth 2 times daily for 10 days Yes Dread Rust DO   guaiFENesin (MUCINEX) 600 MG extended release tablet Take 1 tablet by mouth 2 times daily for 10 days Yes Dread Rust,    benzonatate (TESSALON) 200 MG capsule Take 1 capsule by mouth 3 times daily as needed for Cough Yes Dread Rust DO   metoprolol tartrate (LOPRESSOR) 50 MG tablet Take 1 tablet by mouth daily Yes LUZ Hays - NP   metFORMIN (GLUCOPHAGE) 500 MG tablet Take 1 tablet by mouth daily Yes LUZ Hays - NP   lisinopril (PRINIVIL;ZESTRIL) 10 MG tablet Take 1 tablet by mouth daily Yes Delores Quiros MD   levocetirizine (XYZAL) 5 MG tablet TAKE 1 TABLET BY MOUTH EVERY NIGHT Yes Delores Quiros MD   estradiol (ESTRACE) 0.1 MG/GM vaginal cream 1 g Yes Historical Provider, MD   LORazepam (ATIVAN) 0.5 MG tablet TAKE 1 TABLET BY MOUTH THREE TIMES DAILY FOR UP TO 7 DAYS AS NEEDED FOR ANXIETY Yes Historical Provider, MD   traZODone (DESYREL) 50 MG tablet Take 1 tablet by mouth daily Yes Delores Quiros MD   furosemide (LASIX) 40 MG tablet TAKE 1 TABLET BY MOUTH DAILY Yes Delores Quiros MD   acetaminophen (TYLENOL) 500 MG tablet Take 1,000 mg by mouth Yes Historical Provider, MD   ofloxacin (FLOXIN) 0.3 % otic solution ofloxacin 0.3 % ear drops Yes Historical Provider, MD   nystatin (MYCOSTATIN) 325058 UNIT/GM powder Nystop 100,000 unit/gram topical powder   APPLY TO THE AFFECTED AREA EVERY DAY Yes Historical Provider, MD   nitroGLYCERIN (NITROSTAT) 0.3 MG SL tablet Nitrostat 0.3 mg sublingual tablet   place 1 tablet by sublingual route at the first sign of an attack; no more than 3 tabs are recommended within a 15 minute period. Yes Historical Provider, MD   aspirin 81 MG EC tablet Aspir-81 mg tablet,delayed release   take 1 tablet by oral route  every day Yes Historical Provider, MD   HYDROcodone-acetaminophen (NORCO) 5-325 MG per tablet Take 1 tablet by mouth 3 times daily as needed. Yes Historical Provider, MD   prednisoLONE acetate (PRED FORTE) 1 % ophthalmic suspension   Historical Provider, MD       Review of Systems   Review of Systems   Constitutional:  Negative for chills and fever. HENT:  Positive for congestion. Negative for ear pain, sinus pressure and sore throat. Eyes:  Negative for pain, discharge and redness. Respiratory:  Positive for cough. Negative for shortness of breath and wheezing. Cardiovascular:  Negative for chest pain. Gastrointestinal:  Negative for abdominal distention, diarrhea, nausea and vomiting. Genitourinary:  Negative for dysuria and frequency. Musculoskeletal:  Negative for arthralgias and back pain. Skin:  Negative for rash and wound. Neurological:  Negative for weakness and headaches. Hematological:  Negative for adenopathy. Psychiatric/Behavioral: Negative. All other systems reviewed and are negative. Patient's medical, social, and family history reviewed    Past Medical History:  has a past medical history of Astigmatism, Dry eye syndrome of bilateral lacrimal glands, Dysplasia of vulva, Gastric ulcer, MI (myocardial infarction) ([address] MI (myocardial infarction) ([address] Otosclerosis of both ears, Presbyopia, 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), and Uterine fibroid. 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 Social History:  reports that she has been smoking cigarettes. She started smoking about 49 years ago. She has a 48.00 pack-year smoking history. She has never used smokeless tobacco. She reports that she does not currently use alcohol. She reports that she does not use drugs. Family History: family history includes Arthritis in her daughter; Cervical Cancer in her sister; Endometriosis in her daughter; Heart Attack (age of onset: 61) in her father; Hypothyroidism in her half-sister; Lung Cancer in her sister; Other in her daughter; Schizophrenia in her father and son; Stroke (age of onset: 36) in her mother. Allergies: Niacin, Sulfa antibiotics, Sulfur, Celecoxib, Tobramycin, and Statins    Physical Exam   Vital Signs:  /70 (Site: Left Upper Arm, Position: Sitting, Cuff Size: Medium Adult)   Pulse 76   Temp 98.4 °F (36.9 °C) (Temporal)   Resp 19   Ht 5' 5\" (1.651 m)   Wt 171 lb (77.6 kg)   SpO2 97%   BMI 28.46 kg/m²    Oxygen Saturation Interpretation: Normal.    Physical Exam  Vitals and nursing note reviewed. Constitutional:       Appearance: She is well-developed. HENT:      Head: Normocephalic and atraumatic. Right Ear: Hearing, tympanic membrane and external ear normal.      Left Ear: Hearing, tympanic membrane and external ear normal.      Nose: Nasal tenderness, mucosal edema, congestion and rhinorrhea present. Right Sinus: Frontal sinus tenderness present. Left Sinus: Maxillary sinus tenderness and frontal sinus tenderness present. Mouth/Throat:      Pharynx: Uvula midline. Eyes:      General: Lids are normal.      Conjunctiva/sclera: Conjunctivae normal.      Pupils: Pupils are equal, round, and reactive to light. Cardiovascular:      Rate and Rhythm: Normal rate and regular rhythm. Heart sounds: Normal heart sounds.  No murmur heard.  Pulmonary:      Effort: Pulmonary effort is normal.      Breath sounds: Rhonchi present. Abdominal:      General: Bowel sounds are normal.      Palpations: Abdomen is soft. Abdomen is not rigid. Tenderness: There is no abdominal tenderness. There is no guarding or rebound. Musculoskeletal:      Cervical back: Normal range of motion and neck supple. Skin:     General: Skin is warm and dry. Findings: No abrasion or rash. Neurological:      Mental Status: She is alert and oriented to person, place, and time. GCS: GCS eye subscore is 4. GCS verbal subscore is 5. GCS motor subscore is 6. Cranial Nerves: No cranial nerve deficit. Sensory: No sensory deficit. Coordination: Coordination normal.      Gait: Gait normal.       Test Results Section   (All laboratory and radiology results have been personally reviewed by myself)  Labs:  No results found for this visit on 03/06/23. Imaging: All Radiology results interpreted by Radiologist unless otherwise noted. No results found. Assessment / Plan   Impression(s):  Parker Griffin was seen today for uri. Diagnoses and all orders for this visit:    Sinobronchitis  -     amoxicillin-clavulanate (AUGMENTIN) 875-125 MG per tablet; Take 1 tablet by mouth 2 times daily for 10 days  -     guaiFENesin (MUCINEX) 600 MG extended release tablet; Take 1 tablet by mouth 2 times daily for 10 days    Persistent cough  -     benzonatate (TESSALON) 200 MG capsule; Take 1 capsule by mouth 3 times daily as needed for Cough       Discussed symptomatic treatments with the patient today. The patient is to schedule a follow-up with PCP in the next 2-3 days for reevaluation. Red flag symptoms were also discussed with the patient today. If symptoms worsen the patient is to go directly to the emergency department for reevaluation and treatment. Pt verbalizes understanding and is in agreement with plan of care. All questions answered.       New Medications     New Prescriptions    AMOXICILLIN-CLAVULANATE (AUGMENTIN) 875-125 MG PER TABLET    Take 1 tablet by mouth 2 times daily for 10 days    BENZONATATE (TESSALON) 200 MG CAPSULE    Take 1 capsule by mouth 3 times daily as needed for Cough    GUAIFENESIN (MUCINEX) 600 MG EXTENDED RELEASE TABLET    Take 1 tablet by mouth 2 times daily for 10 days       Electronically signed by Kelin Hand DO   DD: 3/6/23

## 2023-03-18 DIAGNOSIS — I10 ESSENTIAL HYPERTENSION: ICD-10-CM

## 2023-03-20 RX ORDER — FUROSEMIDE 40 MG/1
40 TABLET ORAL DAILY
Qty: 90 TABLET | Refills: 1 | Status: SHIPPED | OUTPATIENT
Start: 2023-03-20

## 2023-03-24 DIAGNOSIS — F51.04 PSYCHOPHYSIOLOGICAL INSOMNIA: ICD-10-CM

## 2023-03-31 RX ORDER — TRAZODONE HYDROCHLORIDE 50 MG/1
50 TABLET ORAL DAILY
Qty: 30 TABLET | Refills: 5 | Status: SHIPPED | OUTPATIENT
Start: 2023-03-31

## 2023-04-17 ENCOUNTER — OFFICE VISIT (OUTPATIENT)
Dept: PRIMARY CARE CLINIC | Age: 63
End: 2023-04-17

## 2023-04-17 VITALS
OXYGEN SATURATION: 96 % | BODY MASS INDEX: 28.26 KG/M2 | DIASTOLIC BLOOD PRESSURE: 60 MMHG | TEMPERATURE: 96.9 F | HEART RATE: 59 BPM | RESPIRATION RATE: 18 BRPM | SYSTOLIC BLOOD PRESSURE: 110 MMHG | HEIGHT: 65 IN | WEIGHT: 169.6 LBS

## 2023-04-17 DIAGNOSIS — R51.9 INTRACTABLE HEADACHE, UNSPECIFIED CHRONICITY PATTERN, UNSPECIFIED HEADACHE TYPE: ICD-10-CM

## 2023-04-17 DIAGNOSIS — E27.8 RIGHT ADRENAL MASS (HCC): ICD-10-CM

## 2023-04-17 DIAGNOSIS — U07.1 COVID-19 VIRUS INFECTION: ICD-10-CM

## 2023-04-17 DIAGNOSIS — J34.89 SINUS PRESSURE: ICD-10-CM

## 2023-04-17 DIAGNOSIS — R09.81 NASAL CONGESTION: ICD-10-CM

## 2023-04-17 DIAGNOSIS — I10 ESSENTIAL HYPERTENSION: Primary | ICD-10-CM

## 2023-04-17 DIAGNOSIS — E11.42 TYPE 2 DIABETES MELLITUS WITH DIABETIC POLYNEUROPATHY, WITHOUT LONG-TERM CURRENT USE OF INSULIN (HCC): ICD-10-CM

## 2023-04-17 LAB
INFLUENZA A ANTIGEN, POC: NEGATIVE
INFLUENZA B ANTIGEN, POC: NEGATIVE
LOT EXPIRE DATE: ABNORMAL
LOT KIT NUMBER: ABNORMAL
SARS-COV-2, POC: DETECTED
VALID INTERNAL CONTROL: YES
VENDOR AND KIT NAME POC: ABNORMAL

## 2023-04-17 RX ORDER — PREDNISONE 10 MG/1
10 TABLET ORAL 2 TIMES DAILY
Qty: 10 TABLET | Refills: 0 | Status: SHIPPED | OUTPATIENT
Start: 2023-04-17 | End: 2023-04-22

## 2023-04-17 RX ORDER — AZITHROMYCIN 250 MG/1
250 TABLET, FILM COATED ORAL SEE ADMIN INSTRUCTIONS
Qty: 6 TABLET | Refills: 0 | Status: SHIPPED | OUTPATIENT
Start: 2023-04-17 | End: 2023-04-22

## 2023-04-17 NOTE — PROGRESS NOTES
without pathological murmurs, ectopy, gallops, or rubs. Skin:  Normal turgor. Warm, dry, without visible rash. Neurological:  Alert and oriented. Motor functions intact. Responds to verbal commands. Test Results Section   (All laboratory and radiology results have been personally reviewed by myself)  Labs:  Results for orders placed or performed in visit on 23   POCT COVID-19 & Influenza A/B   Result Value Ref Range    VALID INTERNAL CONTROL YES     Lot/Kit Number [id number]     Lot/Kit  date: 2024     SARS-COV-2, POC Detected (A) Not Detected    Influenza A Antigen, POC Negative Negative    Influenza B Antigen, POC Negative Negative    Vendor and kit name [employer]        Assessment / Plan     Impression(s):  Mony Cobb was seen today for congestion. Diagnoses and all orders for this visit:    Essential hypertension  -     CBC with Auto Differential; Future  -     Comprehensive Metabolic Panel; Future    Type 2 diabetes mellitus with diabetic polyneuropathy, without long-term current use of insulin (HCC)    Right adrenal mass (HCC)    Sinus pressure  -     POCT COVID-19 & Influenza A/B    Intractable headache, unspecified chronicity pattern, unspecified headache type  -     POCT COVID-19 & Influenza A/B    Nasal congestion  -     POCT COVID-19 & Influenza A/B    COVID-19 virus infection    Other orders  -     azithromycin (ZITHROMAX) 250 MG tablet; Take 1 tablet by mouth See Admin Instructions for 5 days 500mg on day 1 followed by 250mg on days 2 - 5  -     predniSONE (DELTASONE) 10 MG tablet; Take 1 tablet by mouth 2 times daily for 5 days        Quarantine for 5 days is able to resume activities with a mask for the next 5 days following that. Drink lots of fluids and return if any new symptoms or worsening should occur  Return if symptoms worsen or fail to improve.        Pearl Vyas, APRN - CNP

## 2023-05-16 DIAGNOSIS — E11.42 TYPE 2 DIABETES MELLITUS WITH DIABETIC POLYNEUROPATHY, WITHOUT LONG-TERM CURRENT USE OF INSULIN (HCC): ICD-10-CM

## 2023-05-16 DIAGNOSIS — I10 ESSENTIAL HYPERTENSION: ICD-10-CM

## 2023-05-16 RX ORDER — METOPROLOL TARTRATE 50 MG/1
50 TABLET, FILM COATED ORAL DAILY
Qty: 30 TABLET | Refills: 0 | Status: SHIPPED
Start: 2023-05-16 | End: 2023-05-18

## 2023-05-18 RX ORDER — LISINOPRIL 10 MG/1
10 TABLET ORAL DAILY
Qty: 90 TABLET | Refills: 0 | Status: SHIPPED
Start: 2023-05-18 | End: 2023-06-08 | Stop reason: SDUPTHER

## 2023-05-18 RX ORDER — METOPROLOL TARTRATE 50 MG/1
50 TABLET, FILM COATED ORAL DAILY
Qty: 90 TABLET | Refills: 0 | Status: SHIPPED
Start: 2023-05-18 | End: 2023-06-08 | Stop reason: DRUGHIGH

## 2023-06-08 ENCOUNTER — OFFICE VISIT (OUTPATIENT)
Dept: PRIMARY CARE CLINIC | Age: 63
End: 2023-06-08
Payer: MEDICARE

## 2023-06-08 VITALS
OXYGEN SATURATION: 98 % | HEART RATE: 62 BPM | HEIGHT: 65 IN | BODY MASS INDEX: 27.82 KG/M2 | SYSTOLIC BLOOD PRESSURE: 100 MMHG | WEIGHT: 167 LBS | RESPIRATION RATE: 18 BRPM | DIASTOLIC BLOOD PRESSURE: 60 MMHG | TEMPERATURE: 98.5 F

## 2023-06-08 DIAGNOSIS — I70.0 AORTOILIAC STENOSIS (HCC): Primary | ICD-10-CM

## 2023-06-08 DIAGNOSIS — E27.8 RIGHT ADRENAL MASS (HCC): ICD-10-CM

## 2023-06-08 DIAGNOSIS — H66.91 RECURRENT OTITIS MEDIA, RIGHT: ICD-10-CM

## 2023-06-08 DIAGNOSIS — F51.04 PSYCHOPHYSIOLOGICAL INSOMNIA: ICD-10-CM

## 2023-06-08 DIAGNOSIS — I10 ESSENTIAL HYPERTENSION: ICD-10-CM

## 2023-06-08 DIAGNOSIS — N83.8 BILATERAL TUBO-OVARIAN MASS: ICD-10-CM

## 2023-06-08 DIAGNOSIS — E11.42 TYPE 2 DIABETES MELLITUS WITH DIABETIC POLYNEUROPATHY, WITHOUT LONG-TERM CURRENT USE OF INSULIN (HCC): ICD-10-CM

## 2023-06-08 DIAGNOSIS — H80.93 OTOSCLEROSIS OF BOTH EARS: ICD-10-CM

## 2023-06-08 DIAGNOSIS — H72.821: ICD-10-CM

## 2023-06-08 PROCEDURE — 3017F COLORECTAL CA SCREEN DOC REV: CPT | Performed by: STUDENT IN AN ORGANIZED HEALTH CARE EDUCATION/TRAINING PROGRAM

## 2023-06-08 PROCEDURE — 4004F PT TOBACCO SCREEN RCVD TLK: CPT | Performed by: STUDENT IN AN ORGANIZED HEALTH CARE EDUCATION/TRAINING PROGRAM

## 2023-06-08 PROCEDURE — 3078F DIAST BP <80 MM HG: CPT | Performed by: STUDENT IN AN ORGANIZED HEALTH CARE EDUCATION/TRAINING PROGRAM

## 2023-06-08 PROCEDURE — G8427 DOCREV CUR MEDS BY ELIG CLIN: HCPCS | Performed by: STUDENT IN AN ORGANIZED HEALTH CARE EDUCATION/TRAINING PROGRAM

## 2023-06-08 PROCEDURE — 2022F DILAT RTA XM EVC RTNOPTHY: CPT | Performed by: STUDENT IN AN ORGANIZED HEALTH CARE EDUCATION/TRAINING PROGRAM

## 2023-06-08 PROCEDURE — G8417 CALC BMI ABV UP PARAM F/U: HCPCS | Performed by: STUDENT IN AN ORGANIZED HEALTH CARE EDUCATION/TRAINING PROGRAM

## 2023-06-08 PROCEDURE — 3074F SYST BP LT 130 MM HG: CPT | Performed by: STUDENT IN AN ORGANIZED HEALTH CARE EDUCATION/TRAINING PROGRAM

## 2023-06-08 PROCEDURE — 99214 OFFICE O/P EST MOD 30 MIN: CPT | Performed by: STUDENT IN AN ORGANIZED HEALTH CARE EDUCATION/TRAINING PROGRAM

## 2023-06-08 PROCEDURE — 3046F HEMOGLOBIN A1C LEVEL >9.0%: CPT | Performed by: STUDENT IN AN ORGANIZED HEALTH CARE EDUCATION/TRAINING PROGRAM

## 2023-06-08 RX ORDER — CIPROFLOXACIN 500 MG/1
1 TABLET, FILM COATED ORAL 2 TIMES DAILY
COMMUNITY
Start: 2023-06-07

## 2023-06-08 RX ORDER — LISINOPRIL 10 MG/1
10 TABLET ORAL DAILY
Qty: 90 TABLET | Refills: 1 | Status: SHIPPED | OUTPATIENT
Start: 2023-06-08

## 2023-06-08 RX ORDER — DOCUSATE SODIUM 100 MG/1
100 CAPSULE, LIQUID FILLED ORAL DAILY
Qty: 90 CAPSULE | Refills: 1 | Status: SHIPPED | OUTPATIENT
Start: 2023-06-08

## 2023-06-08 RX ORDER — TRAZODONE HYDROCHLORIDE 50 MG/1
50 TABLET ORAL DAILY
Qty: 90 TABLET | Refills: 1 | Status: SHIPPED | OUTPATIENT
Start: 2023-06-08

## 2023-06-08 RX ORDER — FUROSEMIDE 40 MG/1
40 TABLET ORAL DAILY
Qty: 90 TABLET | Refills: 1 | Status: SHIPPED | OUTPATIENT
Start: 2023-06-08

## 2023-06-08 RX ORDER — OFLOXACIN 3 MG/ML
SOLUTION AURICULAR (OTIC)
Qty: 10 ML | Refills: 1 | Status: SHIPPED
Start: 2023-06-08 | End: 2023-06-12

## 2023-06-08 RX ORDER — DOCUSATE SODIUM 100 MG/1
100 CAPSULE, LIQUID FILLED ORAL DAILY
COMMUNITY
Start: 2023-05-23 | End: 2023-06-08 | Stop reason: SDUPTHER

## 2023-06-08 RX ORDER — METOPROLOL TARTRATE 50 MG/1
50 TABLET, FILM COATED ORAL DAILY
Qty: 90 TABLET | Refills: 1 | Status: CANCELLED | OUTPATIENT
Start: 2023-06-08

## 2023-06-08 ASSESSMENT — ENCOUNTER SYMPTOMS
COUGH: 0
SHORTNESS OF BREATH: 0

## 2023-06-12 RX ORDER — OFLOXACIN 3 MG/ML
10 SOLUTION AURICULAR (OTIC) DAILY
Qty: 10 ML | Refills: 0 | Status: SHIPPED | OUTPATIENT
Start: 2023-06-12 | End: 2023-06-26

## 2023-06-19 ENCOUNTER — TELEPHONE (OUTPATIENT)
Dept: PRIMARY CARE CLINIC | Age: 63
End: 2023-06-19

## 2023-06-19 PROBLEM — D35.02 ADENOMA OF LEFT ADRENAL GLAND: Status: RESOLVED | Noted: 2022-12-17 | Resolved: 2023-06-19

## 2023-06-19 PROBLEM — H72.821: Status: ACTIVE | Noted: 2023-06-19

## 2023-06-19 ASSESSMENT — ENCOUNTER SYMPTOMS
DIARRHEA: 0
CONSTIPATION: 0
ABDOMINAL PAIN: 0
VOMITING: 0
NAUSEA: 0

## 2023-06-19 NOTE — ASSESSMENT & PLAN NOTE
Chronic, blood sugars controlled With  Labs next visit given too late to draw today  Continue metformin 500 mg daily  Diabetic wellness when able

## 2023-06-19 NOTE — ASSESSMENT & PLAN NOTE
Incidental bilateral adrenal masses  Likely benign left adrenal adenoma  Nonspecific intermediate density 2.9 cm right adrenal mass  MR ordered per radiology recommendations, patient advised to schedule this now that she has completed her surgeries

## 2023-06-19 NOTE — TELEPHONE ENCOUNTER
Tonya Nascimento MA at 6/19/2023 10:26 AM    Status: Signed   Called patient to schedule. She states she is on Vacation this week and will call back next week when she returns to schedule.

## 2023-06-19 NOTE — TELEPHONE ENCOUNTER
----- Message from Suellen Myers MD sent at 6/19/2023  6:14 AM EDT -----  Needs to be scheduled for 3 month follow-up, left after closing of  for evening

## 2023-06-19 NOTE — ASSESSMENT & PLAN NOTE
Controlled  Continue furosemide 40 mg daily, metoprolol 25 mg daily (unable to tolerate BID dosing), lisinopril 10 mg daily

## 2023-06-19 NOTE — PROGRESS NOTES
Called patient to schedule. She states she is on Vacation this week and will call back next week when she returns to schedule.

## 2023-06-26 ENCOUNTER — TELEPHONE (OUTPATIENT)
Dept: PRIMARY CARE CLINIC | Age: 63
End: 2023-06-26

## 2023-06-26 DIAGNOSIS — T36.95XA ANTIBIOTIC-INDUCED YEAST INFECTION: Primary | ICD-10-CM

## 2023-06-26 DIAGNOSIS — B37.9 ANTIBIOTIC-INDUCED YEAST INFECTION: Primary | ICD-10-CM

## 2023-07-03 RX ORDER — FLUCONAZOLE 150 MG/1
150 TABLET ORAL
Qty: 2 TABLET | Refills: 0 | Status: SHIPPED | OUTPATIENT
Start: 2023-07-03 | End: 2023-07-09

## 2023-07-03 NOTE — TELEPHONE ENCOUNTER
Patient tried the 25mg metoprolol but it she didn't feel good while taking that so she went back up to the 50 and now she feels fine. She is going to make an appointment with heart doctor to discuss this too.

## 2023-07-03 NOTE — TELEPHONE ENCOUNTER
Patient states she spoke with you at her appointment on the 8th and she was taking an antibiotic and she believes she has a yeast infection and wondering if she can get prescribed Diflucan

## 2023-07-12 ENCOUNTER — TELEPHONE (OUTPATIENT)
Dept: PRIMARY CARE CLINIC | Age: 63
End: 2023-07-12

## 2023-07-12 DIAGNOSIS — E27.8 RIGHT ADRENAL MASS (HCC): Primary | ICD-10-CM

## 2023-07-12 NOTE — TELEPHONE ENCOUNTER
[facility] MRI department called and states they need patients MRI order to be changed to without contrast.    Fax number [phone]

## 2023-07-26 DIAGNOSIS — E27.8 RIGHT ADRENAL MASS (HCC): ICD-10-CM

## 2023-07-26 PROBLEM — D35.01 BILATERAL ADRENAL ADENOMAS: Status: ACTIVE | Noted: 2022-12-17

## 2023-07-26 PROBLEM — D35.02 BILATERAL ADRENAL ADENOMAS: Status: ACTIVE | Noted: 2022-12-17

## 2023-07-27 NOTE — TELEPHONE ENCOUNTER
Detail Level: Detailed
PT CALLED AND STATES THAT BUSPAR IS NOT HELPING AND WANTS SOMETHING DIFFERENT. PHARMACY IS [facility].
Patient advised of risks of Ativan at most recent visit given age and concurrent use of Norco. Tried Buspar. Advised to call if no relief. Patient reports severe headache with this. Has taken Ativan in past. Given recent face-to-face 7/5 and discussion at that visit, will provide short course of low-dose Ativan. Stop Buspar. Discussed again risks of use of Ativan and Norco together including risk of falls, sedation, respiratory suppression, accidental overdose. Patient endorsed understanding.
Wound Evaluated By: Lindsay

## 2023-08-15 DIAGNOSIS — H80.93 OTOSCLEROSIS OF BOTH EARS: ICD-10-CM

## 2023-08-15 NOTE — TELEPHONE ENCOUNTER
Patients last appointment 6/8/2023.   Patients next scheduled appointment   Future Appointments   Date Time Provider [address]   9/7/2023 11:00 AM [facility]      Patient states needs to make sure its labeled on there its for her ears

## 2023-08-18 RX ORDER — OFLOXACIN 3 MG/ML
10 SOLUTION AURICULAR (OTIC) DAILY
Qty: 10 ML | Refills: 0 | Status: SHIPPED | OUTPATIENT
Start: 2023-08-18 | End: 2023-09-01

## 2023-09-07 ENCOUNTER — OFFICE VISIT (OUTPATIENT)
Dept: PRIMARY CARE CLINIC | Age: 63
End: 2023-09-07
Payer: MEDICARE

## 2023-09-07 VITALS
TEMPERATURE: 97.4 F | BODY MASS INDEX: 28.22 KG/M2 | SYSTOLIC BLOOD PRESSURE: 118 MMHG | HEART RATE: 73 BPM | OXYGEN SATURATION: 98 % | WEIGHT: 169.4 LBS | HEIGHT: 65 IN | DIASTOLIC BLOOD PRESSURE: 70 MMHG

## 2023-09-07 DIAGNOSIS — Z12.12 SCREENING FOR COLORECTAL CANCER: ICD-10-CM

## 2023-09-07 DIAGNOSIS — N30.01 ACUTE CYSTITIS WITH HEMATURIA: ICD-10-CM

## 2023-09-07 DIAGNOSIS — H72.91 PERFORATED TYMPANIC MEMBRANE, RIGHT: ICD-10-CM

## 2023-09-07 DIAGNOSIS — Z12.11 SCREENING FOR COLORECTAL CANCER: ICD-10-CM

## 2023-09-07 DIAGNOSIS — H80.93 OTOSCLEROSIS OF BOTH EARS: ICD-10-CM

## 2023-09-07 DIAGNOSIS — N30.01 ACUTE CYSTITIS WITH HEMATURIA: Primary | ICD-10-CM

## 2023-09-07 DIAGNOSIS — M54.50 LOW BACK PAIN, UNSPECIFIED BACK PAIN LATERALITY, UNSPECIFIED CHRONICITY, UNSPECIFIED WHETHER SCIATICA PRESENT: ICD-10-CM

## 2023-09-07 DIAGNOSIS — H66.91 RECURRENT OTITIS MEDIA, RIGHT: ICD-10-CM

## 2023-09-07 DIAGNOSIS — E11.42 TYPE 2 DIABETES MELLITUS WITH DIABETIC POLYNEUROPATHY, WITHOUT LONG-TERM CURRENT USE OF INSULIN (HCC): ICD-10-CM

## 2023-09-07 PROBLEM — I70.0 AORTOILIAC STENOSIS (HCC): Status: RESOLVED | Noted: 2022-10-28 | Resolved: 2023-09-07

## 2023-09-07 LAB
BILIRUBIN URINE: NEGATIVE
BILIRUBIN, POC: NEGATIVE
BLOOD URINE, POC: NORMAL
CLARITY, POC: NORMAL
COLOR, POC: YELLOW
COLOR: YELLOW
COMMENT: NORMAL
GLUCOSE URINE, POC: NEGATIVE
GLUCOSE URINE: NEGATIVE MG/DL
HBA1C MFR BLD: 5.9 %
KETONES, POC: NEGATIVE
KETONES, URINE: NEGATIVE MG/DL
LEUKOCYTE EST, POC: NEGATIVE
LEUKOCYTE ESTERASE, URINE: NEGATIVE
NITRITE, POC: NEGATIVE
NITRITE, URINE: NEGATIVE
PH UA: 6 (ref 5–9)
PH, POC: 6.5
PROTEIN UA: NEGATIVE MG/DL
PROTEIN, POC: NEGATIVE
SPECIFIC GRAVITY UA: 1.01 (ref 1–1.03)
SPECIFIC GRAVITY, POC: 1.02
TURBIDITY: CLEAR
URINE HGB: NEGATIVE
UROBILINOGEN, POC: 0.2
UROBILINOGEN, URINE: 0.2 EU/DL (ref 0–1)

## 2023-09-07 PROCEDURE — G8427 DOCREV CUR MEDS BY ELIG CLIN: HCPCS | Performed by: STUDENT IN AN ORGANIZED HEALTH CARE EDUCATION/TRAINING PROGRAM

## 2023-09-07 PROCEDURE — G8417 CALC BMI ABV UP PARAM F/U: HCPCS | Performed by: STUDENT IN AN ORGANIZED HEALTH CARE EDUCATION/TRAINING PROGRAM

## 2023-09-07 PROCEDURE — 2022F DILAT RTA XM EVC RTNOPTHY: CPT | Performed by: STUDENT IN AN ORGANIZED HEALTH CARE EDUCATION/TRAINING PROGRAM

## 2023-09-07 PROCEDURE — 99214 OFFICE O/P EST MOD 30 MIN: CPT | Performed by: STUDENT IN AN ORGANIZED HEALTH CARE EDUCATION/TRAINING PROGRAM

## 2023-09-07 PROCEDURE — 81002 URINALYSIS NONAUTO W/O SCOPE: CPT | Performed by: STUDENT IN AN ORGANIZED HEALTH CARE EDUCATION/TRAINING PROGRAM

## 2023-09-07 PROCEDURE — 3078F DIAST BP <80 MM HG: CPT | Performed by: STUDENT IN AN ORGANIZED HEALTH CARE EDUCATION/TRAINING PROGRAM

## 2023-09-07 PROCEDURE — 3044F HG A1C LEVEL LT 7.0%: CPT | Performed by: STUDENT IN AN ORGANIZED HEALTH CARE EDUCATION/TRAINING PROGRAM

## 2023-09-07 PROCEDURE — 4004F PT TOBACCO SCREEN RCVD TLK: CPT | Performed by: STUDENT IN AN ORGANIZED HEALTH CARE EDUCATION/TRAINING PROGRAM

## 2023-09-07 PROCEDURE — 83036 HEMOGLOBIN GLYCOSYLATED A1C: CPT | Performed by: STUDENT IN AN ORGANIZED HEALTH CARE EDUCATION/TRAINING PROGRAM

## 2023-09-07 PROCEDURE — 3074F SYST BP LT 130 MM HG: CPT | Performed by: STUDENT IN AN ORGANIZED HEALTH CARE EDUCATION/TRAINING PROGRAM

## 2023-09-07 PROCEDURE — 3017F COLORECTAL CA SCREEN DOC REV: CPT | Performed by: STUDENT IN AN ORGANIZED HEALTH CARE EDUCATION/TRAINING PROGRAM

## 2023-09-07 RX ORDER — METOPROLOL TARTRATE 50 MG/1
50 TABLET, FILM COATED ORAL DAILY
COMMUNITY
Start: 2023-08-10

## 2023-09-07 RX ORDER — CEFDINIR 300 MG/1
300 CAPSULE ORAL 2 TIMES DAILY
Qty: 14 CAPSULE | Refills: 0 | Status: SHIPPED | OUTPATIENT
Start: 2023-09-07 | End: 2023-09-14

## 2023-09-07 ASSESSMENT — ENCOUNTER SYMPTOMS
DIARRHEA: 0
BLOOD IN STOOL: 0
CONSTIPATION: 0
BACK PAIN: 1
SHORTNESS OF BREATH: 0
ABDOMINAL PAIN: 0

## 2023-09-07 NOTE — PROGRESS NOTES
ESTABLISHED PRIMARY CARE VISIT    23  Name: Pool Rojas   : 1960   Age: 58 y.o. Sex: female        Assessment & Plan:     Problem List Items Addressed This Visit          Endocrine    Type 2 diabetes mellitus with diabetic polyneuropathy, without long-term current use of insulin (HCC)     Chronic, controlled with A1c 5.9  Continue metformin 500 mg daily  Diabetic wellness when able, declines today         Relevant Orders    POCT glycosylated hemoglobin (Hb A1C) (Completed)       Nervous and Auditory    Otosclerosis of both ears    Relevant Orders    External Referral To ENT    Recurrent otitis media, right    Relevant Medications    cefdinir (OMNICEF) 300 MG capsule    Other Relevant Orders    External Referral To ENT     Other Visit Diagnoses       Acute cystitis with hematuria    -  Primary    Treat empirically with cefdinir given concurrent right otitis media  Culture pending    Relevant Medications    cefdinir (OMNICEF) 300 MG capsule    Other Relevant Orders    POCT Urinalysis no Micro (Completed)    Culture, Urine    Urinalysis (Completed)    Low back pain, unspecified back pain laterality, unspecified chronicity, unspecified whether sciatica present        Suspect MSK pain vs cramping from lower urinary tract  No CVA tenderness or other signs of pyelonephritis    Relevant Orders    POCT Urinalysis no Micro (Completed)    Culture, Urine    Perforated tympanic membrane, right        Relevant Orders    External Referral To ENT    Screening for colorectal cancer        Relevant Orders    POCT Fecal Immunochemical Test (FIT)          Counseled patient regarding above diagnosis, including possible risks and complications, especially if left uncontrolled. Counseled patient as appropriate and relevant regarding any possible side effects, risks, and alternatives to treatment; the patient verbalizes understanding, and is in agreement with the plan as detailed above.    All educational

## 2023-09-09 LAB
CULTURE: NORMAL
SPECIMEN DESCRIPTION: NORMAL

## 2023-09-15 NOTE — ASSESSMENT & PLAN NOTE
Chronic, controlled with A1c 5.9  Continue metformin 500 mg daily  Diabetic wellness when able, declines today

## 2023-10-31 DIAGNOSIS — I10 ESSENTIAL HYPERTENSION: ICD-10-CM

## 2023-11-07 ENCOUNTER — OFFICE VISIT (OUTPATIENT)
Dept: PRIMARY CARE CLINIC | Age: 63
End: 2023-11-07
Payer: MEDICARE

## 2023-11-07 VITALS
DIASTOLIC BLOOD PRESSURE: 60 MMHG | HEART RATE: 65 BPM | SYSTOLIC BLOOD PRESSURE: 110 MMHG | WEIGHT: 170 LBS | TEMPERATURE: 97 F | RESPIRATION RATE: 18 BRPM | BODY MASS INDEX: 28.29 KG/M2 | OXYGEN SATURATION: 96 %

## 2023-11-07 DIAGNOSIS — I10 ESSENTIAL HYPERTENSION: ICD-10-CM

## 2023-11-07 DIAGNOSIS — H66.001 NON-RECURRENT ACUTE SUPPURATIVE OTITIS MEDIA OF RIGHT EAR WITHOUT SPONTANEOUS RUPTURE OF TYMPANIC MEMBRANE: Primary | ICD-10-CM

## 2023-11-07 DIAGNOSIS — H80.93 OTOSCLEROSIS OF BOTH EARS: ICD-10-CM

## 2023-11-07 PROCEDURE — 3078F DIAST BP <80 MM HG: CPT | Performed by: EMERGENCY MEDICINE

## 2023-11-07 PROCEDURE — G8417 CALC BMI ABV UP PARAM F/U: HCPCS | Performed by: EMERGENCY MEDICINE

## 2023-11-07 PROCEDURE — 99213 OFFICE O/P EST LOW 20 MIN: CPT | Performed by: EMERGENCY MEDICINE

## 2023-11-07 PROCEDURE — G8484 FLU IMMUNIZE NO ADMIN: HCPCS | Performed by: EMERGENCY MEDICINE

## 2023-11-07 PROCEDURE — 3017F COLORECTAL CA SCREEN DOC REV: CPT | Performed by: EMERGENCY MEDICINE

## 2023-11-07 PROCEDURE — G8427 DOCREV CUR MEDS BY ELIG CLIN: HCPCS | Performed by: EMERGENCY MEDICINE

## 2023-11-07 PROCEDURE — 4004F PT TOBACCO SCREEN RCVD TLK: CPT | Performed by: EMERGENCY MEDICINE

## 2023-11-07 PROCEDURE — 3074F SYST BP LT 130 MM HG: CPT | Performed by: EMERGENCY MEDICINE

## 2023-11-07 RX ORDER — AMOXICILLIN AND CLAVULANATE POTASSIUM 875; 125 MG/1; MG/1
1 TABLET, FILM COATED ORAL 2 TIMES DAILY
Qty: 20 TABLET | Refills: 0 | Status: SHIPPED | OUTPATIENT
Start: 2023-11-07 | End: 2023-11-17

## 2023-11-07 ASSESSMENT — ENCOUNTER SYMPTOMS
COUGH: 0
ABDOMINAL DISTENTION: 0
SHORTNESS OF BREATH: 0
EYE REDNESS: 0
EYE PAIN: 0
SINUS PRESSURE: 0
SORE THROAT: 0
WHEEZING: 0
BACK PAIN: 0
VOMITING: 0
EYE DISCHARGE: 0
DIARRHEA: 0
NAUSEA: 0

## 2023-11-07 NOTE — PROGRESS NOTES
Chief Complaint:   Otalgia (X 2weeks)      History of Present Illness   HPI:  Ute Bynum is a 58 y.o. female who presents to [address] [place] [address] today for history of recurrent OM; present with otalgia today    Prior to Visit Medications    Medication Sig Taking? Authorizing Provider   amoxicillin-clavulanate (AUGMENTIN) 875-125 MG per tablet Take 1 tablet by mouth 2 times daily for 10 days Yes Jennifer WALTERS DO   metoprolol tartrate (LOPRESSOR) 50 MG tablet Take 1 tablet by mouth daily Yes Venancio Parrish MD   metFORMIN (GLUCOPHAGE) 500 MG tablet Take 1 tablet by mouth daily Yes Dianne Ferrari MD   furosemide (LASIX) 40 MG tablet Take 1 tablet by mouth daily Yes Dianne Ferrari MD   docusate sodium (COLACE) 100 MG capsule Take 1 capsule by mouth daily Yes Dianne Ferrari MD   lisinopril (PRINIVIL;ZESTRIL) 10 MG tablet Take 1 tablet by mouth daily Yes Dianne Ferrari MD   traZODone (DESYREL) 50 MG tablet Take 1 tablet by mouth daily Yes Dianne Ferrari MD   LORazepam (ATIVAN) 0.5 MG tablet TAKE 1 TABLET BY MOUTH THREE TIMES DAILY FOR UP TO 7 DAYS AS NEEDED FOR ANXIETY Yes Venancio Parrish MD   acetaminophen (TYLENOL) 500 MG tablet Take 2 tablets by mouth Yes Venancio Parrish MD   nitroGLYCERIN (NITROSTAT) 0.3 MG SL tablet Nitrostat 0.3 mg sublingual tablet   place 1 tablet by sublingual route at the first sign of an attack; no more than 3 tabs are recommended within a 15 minute period. Yes Venancio Parrish MD   aspirin 81 MG EC tablet Aspir-81 mg tablet,delayed release   take 1 tablet by oral route  every day Yes Venancio Parrish MD   HYDROcodone-acetaminophen (NORCO) 5-325 MG per tablet Take 1 tablet by mouth 3 times daily as needed. Yes Venancio Parrish MD       Review of Systems   Review of Systems   Constitutional:  Negative for chills and fever. HENT:  Positive for ear pain and hearing loss. Negative for sinus pressure and sore throat.     Eyes:  Negative

## 2023-11-13 ENCOUNTER — TELEPHONE (OUTPATIENT)
Dept: PRIMARY CARE CLINIC | Age: 63
End: 2023-11-13

## 2023-11-13 NOTE — TELEPHONE ENCOUNTER
Patient called in crying and asking for an appointment. She is struggling with depression really bad and usually she can kick it and be ok but nothing is working. She has been up for 24 hrs and shes crying from sadness. She really would like to make an appointment to discuss with you. Please advise.

## 2023-11-15 NOTE — TELEPHONE ENCOUNTER
Tried to call patient to see if she could come next Tuesday at 2:45 but no answer left a vm to call back     Routing to  in case she calls back

## 2023-11-15 NOTE — TELEPHONE ENCOUNTER
Patients last appointment 9/7/2023.   Patients next scheduled appointment   Future Appointments   Date Time Provider [address]   11/21/2023  2:45 PM Leo Townsend MD [facility]   12/21/2023  2:30 PM Leo Townsend MD [address] [place] [address]

## 2023-11-18 RX ORDER — METOPROLOL TARTRATE 50 MG/1
50 TABLET, FILM COATED ORAL DAILY
Qty: 90 TABLET | Refills: 1 | Status: SHIPPED | OUTPATIENT
Start: 2023-11-18

## 2023-11-21 ENCOUNTER — OFFICE VISIT (OUTPATIENT)
Dept: PRIMARY CARE CLINIC | Age: 63
End: 2023-11-21
Payer: MEDICARE

## 2023-11-21 VITALS
BODY MASS INDEX: 28.99 KG/M2 | TEMPERATURE: 97.9 F | HEIGHT: 65 IN | DIASTOLIC BLOOD PRESSURE: 70 MMHG | OXYGEN SATURATION: 96 % | HEART RATE: 71 BPM | SYSTOLIC BLOOD PRESSURE: 110 MMHG | RESPIRATION RATE: 12 BRPM | WEIGHT: 174 LBS

## 2023-11-21 DIAGNOSIS — F33.1 MODERATE EPISODE OF RECURRENT MAJOR DEPRESSIVE DISORDER (HCC): ICD-10-CM

## 2023-11-21 DIAGNOSIS — F41.0 GENERALIZED ANXIETY DISORDER WITH PANIC ATTACKS: Primary | ICD-10-CM

## 2023-11-21 DIAGNOSIS — F41.1 GENERALIZED ANXIETY DISORDER WITH PANIC ATTACKS: Primary | ICD-10-CM

## 2023-11-21 PROCEDURE — 3074F SYST BP LT 130 MM HG: CPT | Performed by: STUDENT IN AN ORGANIZED HEALTH CARE EDUCATION/TRAINING PROGRAM

## 2023-11-21 PROCEDURE — 3078F DIAST BP <80 MM HG: CPT | Performed by: STUDENT IN AN ORGANIZED HEALTH CARE EDUCATION/TRAINING PROGRAM

## 2023-11-21 PROCEDURE — 99214 OFFICE O/P EST MOD 30 MIN: CPT | Performed by: STUDENT IN AN ORGANIZED HEALTH CARE EDUCATION/TRAINING PROGRAM

## 2023-11-21 PROCEDURE — G8484 FLU IMMUNIZE NO ADMIN: HCPCS | Performed by: STUDENT IN AN ORGANIZED HEALTH CARE EDUCATION/TRAINING PROGRAM

## 2023-11-21 PROCEDURE — G8417 CALC BMI ABV UP PARAM F/U: HCPCS | Performed by: STUDENT IN AN ORGANIZED HEALTH CARE EDUCATION/TRAINING PROGRAM

## 2023-11-21 PROCEDURE — 3017F COLORECTAL CA SCREEN DOC REV: CPT | Performed by: STUDENT IN AN ORGANIZED HEALTH CARE EDUCATION/TRAINING PROGRAM

## 2023-11-21 PROCEDURE — 4004F PT TOBACCO SCREEN RCVD TLK: CPT | Performed by: STUDENT IN AN ORGANIZED HEALTH CARE EDUCATION/TRAINING PROGRAM

## 2023-11-21 PROCEDURE — G8427 DOCREV CUR MEDS BY ELIG CLIN: HCPCS | Performed by: STUDENT IN AN ORGANIZED HEALTH CARE EDUCATION/TRAINING PROGRAM

## 2023-11-21 RX ORDER — PAROXETINE 10 MG/1
10 TABLET, FILM COATED ORAL DAILY
Qty: 30 TABLET | Refills: 2 | Status: SHIPPED | OUTPATIENT
Start: 2023-11-21

## 2023-11-21 RX ORDER — LORAZEPAM 0.5 MG/1
0.5 TABLET ORAL 3 TIMES DAILY PRN
Qty: 30 TABLET | Refills: 0 | Status: SHIPPED | OUTPATIENT
Start: 2023-11-21 | End: 2023-12-21

## 2023-11-21 NOTE — PROGRESS NOTES
ESTABLISHED PRIMARY CARE VISIT    23  Name: Marry Deluna   : 1960   Age: 58 y.o. Sex: female        Assessment & Plan:       ICD-10-CM    1. Generalized anxiety disorder with panic attacks  F41.1 PARoxetine (PAXIL) 10 MG tablet    F41.0 LORazepam (ATIVAN) 0.5 MG tablet      2. Moderate episode of recurrent major depressive disorder (HCC)  F33.1 PARoxetine (PAXIL) 10 MG tablet        Chronic anxiety and depression with recurrence, uncontrolled at this time. Restart Paxil 10 mg daily. Refilled lorazepam as needed. Consider repeat labs at next visit although appears to be due to external factors, unable to stay for labs today. Update CSA and UDS next visit. PDMP Monitoring:  Last PDMP Victoria Galvez as Reviewed:  Review User Review Instant Review Result   Ryland Jewel 2023  5:53 PM Reviewed PDMP [1]     Counseled patient regarding above diagnosis, including possible risks and complications, especially if left uncontrolled. Counseled patient as appropriate and relevant regarding any possible side effects, risks, and alternatives to treatment; the patient verbalizes understanding, and is in agreement with the plan as detailed above. All educational materials and instructions were discussed and included on the After Visit Summary. All questions answered to the patient's satisfaction. The patient was advised to call or send Dayak message for any concerns prior to next appointment. Return in about 4 weeks (around 2023). Subjective:     Chief Complaint   Patient presents with    Depression     She has started to feel really bad about a month ago     Patient returns for follow-up  Depression recurred  Still having anxiety  Lots of stressors right now  Dtr scheduled for thyroid mass removal  Also broke up with partner, living with dtr now  Was on medication for depression in past    Review of Systems   Psychiatric/Behavioral:  Positive for dysphoric mood and sleep disturbance.

## 2023-12-02 DIAGNOSIS — F51.04 PSYCHOPHYSIOLOGICAL INSOMNIA: ICD-10-CM

## 2023-12-04 RX ORDER — TRAZODONE HYDROCHLORIDE 50 MG/1
50 TABLET ORAL DAILY
Qty: 90 TABLET | Refills: 1 | Status: SHIPPED | OUTPATIENT
Start: 2023-12-04

## 2023-12-04 NOTE — TELEPHONE ENCOUNTER
Patients last appointment 11/21/2023.   Patients next scheduled appointment   Future Appointments   Date Time Provider [address]   12/21/2023  2:30 PM Edie Gallo MD [facility]

## 2023-12-21 ENCOUNTER — OFFICE VISIT (OUTPATIENT)
Dept: PRIMARY CARE CLINIC | Age: 63
End: 2023-12-21
Payer: MEDICARE

## 2023-12-21 VITALS
TEMPERATURE: 97.3 F | DIASTOLIC BLOOD PRESSURE: 68 MMHG | BODY MASS INDEX: 28.69 KG/M2 | HEIGHT: 65 IN | HEART RATE: 63 BPM | WEIGHT: 172.2 LBS | SYSTOLIC BLOOD PRESSURE: 110 MMHG | OXYGEN SATURATION: 96 %

## 2023-12-21 DIAGNOSIS — N30.00 ACUTE CYSTITIS WITHOUT HEMATURIA: ICD-10-CM

## 2023-12-21 DIAGNOSIS — E11.42 TYPE 2 DIABETES MELLITUS WITH DIABETIC POLYNEUROPATHY, WITHOUT LONG-TERM CURRENT USE OF INSULIN (HCC): ICD-10-CM

## 2023-12-21 DIAGNOSIS — H66.91 RECURRENT OTITIS MEDIA, RIGHT: ICD-10-CM

## 2023-12-21 DIAGNOSIS — F33.1 MODERATE EPISODE OF RECURRENT MAJOR DEPRESSIVE DISORDER (HCC): Primary | ICD-10-CM

## 2023-12-21 DIAGNOSIS — F41.0 GENERALIZED ANXIETY DISORDER WITH PANIC ATTACKS: ICD-10-CM

## 2023-12-21 DIAGNOSIS — I10 ESSENTIAL HYPERTENSION: ICD-10-CM

## 2023-12-21 DIAGNOSIS — F41.1 GENERALIZED ANXIETY DISORDER WITH PANIC ATTACKS: ICD-10-CM

## 2023-12-21 LAB
BILIRUBIN, POC: NEGATIVE
BLOOD URINE, POC: NORMAL
CLARITY, POC: NORMAL
COLOR, POC: YELLOW
GLUCOSE URINE, POC: NEGATIVE
KETONES, POC: NEGATIVE
LEUKOCYTE EST, POC: NEGATIVE
NITRITE, POC: NEGATIVE
PH, POC: 6
PROTEIN, POC: NEGATIVE
SPECIFIC GRAVITY, POC: 1.03
UROBILINOGEN, POC: 0.2

## 2023-12-21 PROCEDURE — 3044F HG A1C LEVEL LT 7.0%: CPT | Performed by: STUDENT IN AN ORGANIZED HEALTH CARE EDUCATION/TRAINING PROGRAM

## 2023-12-21 PROCEDURE — G8484 FLU IMMUNIZE NO ADMIN: HCPCS | Performed by: STUDENT IN AN ORGANIZED HEALTH CARE EDUCATION/TRAINING PROGRAM

## 2023-12-21 PROCEDURE — G8427 DOCREV CUR MEDS BY ELIG CLIN: HCPCS | Performed by: STUDENT IN AN ORGANIZED HEALTH CARE EDUCATION/TRAINING PROGRAM

## 2023-12-21 PROCEDURE — 3017F COLORECTAL CA SCREEN DOC REV: CPT | Performed by: STUDENT IN AN ORGANIZED HEALTH CARE EDUCATION/TRAINING PROGRAM

## 2023-12-21 PROCEDURE — 2022F DILAT RTA XM EVC RTNOPTHY: CPT | Performed by: STUDENT IN AN ORGANIZED HEALTH CARE EDUCATION/TRAINING PROGRAM

## 2023-12-21 PROCEDURE — 3078F DIAST BP <80 MM HG: CPT | Performed by: STUDENT IN AN ORGANIZED HEALTH CARE EDUCATION/TRAINING PROGRAM

## 2023-12-21 PROCEDURE — 3074F SYST BP LT 130 MM HG: CPT | Performed by: STUDENT IN AN ORGANIZED HEALTH CARE EDUCATION/TRAINING PROGRAM

## 2023-12-21 PROCEDURE — 81002 URINALYSIS NONAUTO W/O SCOPE: CPT | Performed by: STUDENT IN AN ORGANIZED HEALTH CARE EDUCATION/TRAINING PROGRAM

## 2023-12-21 PROCEDURE — 99214 OFFICE O/P EST MOD 30 MIN: CPT | Performed by: STUDENT IN AN ORGANIZED HEALTH CARE EDUCATION/TRAINING PROGRAM

## 2023-12-21 PROCEDURE — 4004F PT TOBACCO SCREEN RCVD TLK: CPT | Performed by: STUDENT IN AN ORGANIZED HEALTH CARE EDUCATION/TRAINING PROGRAM

## 2023-12-21 PROCEDURE — G8417 CALC BMI ABV UP PARAM F/U: HCPCS | Performed by: STUDENT IN AN ORGANIZED HEALTH CARE EDUCATION/TRAINING PROGRAM

## 2023-12-21 RX ORDER — FUROSEMIDE 40 MG/1
40 TABLET ORAL DAILY
Qty: 90 TABLET | Refills: 1 | Status: SHIPPED | OUTPATIENT
Start: 2023-12-21

## 2023-12-21 RX ORDER — CEFDINIR 300 MG/1
300 CAPSULE ORAL 2 TIMES DAILY
Qty: 14 CAPSULE | Refills: 0 | Status: SHIPPED | OUTPATIENT
Start: 2023-12-21 | End: 2023-12-28

## 2023-12-21 NOTE — PROGRESS NOTES
ESTABLISHED PRIMARY CARE VISIT    23  Name: Marleen Walker   : 1960   Age: 63 y.o.  Sex: female        Assessment & Plan:     Problem List Items Addressed This Visit          Circulatory    Essential hypertension     Chronic, controlled  Continue furosemide 40 mg daily, lisinopril 10 mg daily, metoprolol 25 mg daily (unable to tolerate BID dosing)         Relevant Medications    furosemide (LASIX) 40 MG tablet       Endocrine    Type 2 diabetes mellitus with diabetic polyneuropathy, without long-term current use of insulin (HCC)     Chronic, controlled with A1c 5.9  Continue metformin 500 mg daily  Diabetic wellness when able, declines today         Relevant Medications    metFORMIN (GLUCOPHAGE) 500 MG tablet       Nervous and Auditory    Recurrent otitis media, right     Treat with cefdinir  Encouraged to follow up with ENT            Other    Generalized anxiety disorder with panic attacks     Chronic, uncontrolled due to stressors  Diarrhea possibly related to Paxil vs antibiotic  Consider restarting Paxil at 5 mg daily           Moderate episode of recurrent major depressive disorder (HCC) - Primary     Chronic, partially controlled  Diarrhea possibly related to Paxil vs antibiotic  Consider restarting Paxil at 5 mg daily          Other Visit Diagnoses       Acute cystitis without hematuria        Recheck culture  Treat with cefdinir due to concurrent otitis    Relevant Orders    POCT Urinalysis no Micro (Completed)    Culture, Urine (Completed)          Counseled patient regarding above diagnosis, including possible risks and complications, especially if left uncontrolled.  Counseled patient as appropriate and relevant regarding any possible side effects, risks, and alternatives to treatment; the patient verbalizes understanding, and is in agreement with the plan as detailed above.   All educational materials and instructions were discussed and included on the After Visit Summary.  All

## 2023-12-24 LAB
CULTURE: NORMAL
CULTURE: NORMAL
SPECIMEN DESCRIPTION: NORMAL

## 2024-01-04 ENCOUNTER — TELEPHONE (OUTPATIENT)
Dept: PRIMARY CARE CLINIC | Age: 64
End: 2024-01-04

## 2024-01-04 ASSESSMENT — ENCOUNTER SYMPTOMS
CONSTIPATION: 0
BACK PAIN: 1
SHORTNESS OF BREATH: 0
DIARRHEA: 1
BLOOD IN STOOL: 0
ABDOMINAL PAIN: 0

## 2024-01-04 NOTE — ASSESSMENT & PLAN NOTE
Chronic, controlled  Continue furosemide 40 mg daily, lisinopril 10 mg daily, metoprolol 25 mg daily (unable to tolerate BID dosing)

## 2024-01-04 NOTE — TELEPHONE ENCOUNTER
Called patient twice to schedule but both times after about 30 seconds of speaking with her she could no longer hear me but I could hear her. Will try to call her again soon.

## 2024-01-04 NOTE — TELEPHONE ENCOUNTER
----- Message from Mic Lewis MD sent at 1/4/2024  3:39 AM EST -----  Please call patient to schedule follow-up. Can do 3 months or use held slot sooner (1-2 months) if patient needs to be seen depending on how her daughter's surgery went.

## 2024-01-04 NOTE — ASSESSMENT & PLAN NOTE
Chronic, uncontrolled due to stressors  Diarrhea possibly related to Paxil vs antibiotic  Consider restarting Paxil at 5 mg daily

## 2024-01-04 NOTE — ASSESSMENT & PLAN NOTE
Chronic, partially controlled  Diarrhea possibly related to Paxil vs antibiotic  Consider restarting Paxil at 5 mg daily

## 2024-01-04 NOTE — TELEPHONE ENCOUNTER
Patient called back to schedule. Appointment made for March 12th.     Patient states she has gotten new [employer] hearing aides that are suppose to connect to her phone but they have not been working properly and cause some issues with her phone.

## 2024-02-06 ENCOUNTER — OFFICE VISIT (OUTPATIENT)
Dept: PRIMARY CARE CLINIC | Age: 64
End: 2024-02-06
Payer: MEDICARE

## 2024-02-06 VITALS
TEMPERATURE: 97.9 F | WEIGHT: 171.4 LBS | HEART RATE: 71 BPM | HEIGHT: 65 IN | OXYGEN SATURATION: 97 % | DIASTOLIC BLOOD PRESSURE: 70 MMHG | SYSTOLIC BLOOD PRESSURE: 110 MMHG | BODY MASS INDEX: 28.56 KG/M2 | RESPIRATION RATE: 18 BRPM

## 2024-02-06 DIAGNOSIS — J02.9 SORE THROAT: Primary | ICD-10-CM

## 2024-02-06 DIAGNOSIS — J01.01 ACUTE RECURRENT MAXILLARY SINUSITIS: ICD-10-CM

## 2024-02-06 DIAGNOSIS — H66.91 RECURRENT OTITIS MEDIA, RIGHT: ICD-10-CM

## 2024-02-06 DIAGNOSIS — R05.9 COUGH, UNSPECIFIED TYPE: ICD-10-CM

## 2024-02-06 LAB — S PYO AG THROAT QL: NORMAL

## 2024-02-06 PROCEDURE — G8427 DOCREV CUR MEDS BY ELIG CLIN: HCPCS | Performed by: NURSE PRACTITIONER

## 2024-02-06 PROCEDURE — 4004F PT TOBACCO SCREEN RCVD TLK: CPT | Performed by: NURSE PRACTITIONER

## 2024-02-06 PROCEDURE — 99213 OFFICE O/P EST LOW 20 MIN: CPT | Performed by: NURSE PRACTITIONER

## 2024-02-06 PROCEDURE — G8400 PT W/DXA NO RESULTS DOC: HCPCS | Performed by: NURSE PRACTITIONER

## 2024-02-06 PROCEDURE — 3074F SYST BP LT 130 MM HG: CPT | Performed by: NURSE PRACTITIONER

## 2024-02-06 PROCEDURE — G8417 CALC BMI ABV UP PARAM F/U: HCPCS | Performed by: NURSE PRACTITIONER

## 2024-02-06 PROCEDURE — 3078F DIAST BP <80 MM HG: CPT | Performed by: NURSE PRACTITIONER

## 2024-02-06 PROCEDURE — 1123F ACP DISCUSS/DSCN MKR DOCD: CPT | Performed by: NURSE PRACTITIONER

## 2024-02-06 PROCEDURE — 1090F PRES/ABSN URINE INCON ASSESS: CPT | Performed by: NURSE PRACTITIONER

## 2024-02-06 PROCEDURE — 3017F COLORECTAL CA SCREEN DOC REV: CPT | Performed by: NURSE PRACTITIONER

## 2024-02-06 PROCEDURE — G8484 FLU IMMUNIZE NO ADMIN: HCPCS | Performed by: NURSE PRACTITIONER

## 2024-02-06 PROCEDURE — 87880 STREP A ASSAY W/OPTIC: CPT | Performed by: NURSE PRACTITIONER

## 2024-02-06 PROCEDURE — 96372 THER/PROPH/DIAG INJ SC/IM: CPT | Performed by: NURSE PRACTITIONER

## 2024-02-06 RX ORDER — CETIRIZINE HYDROCHLORIDE 10 MG/1
10 TABLET ORAL DAILY
Qty: 30 TABLET | Refills: 0 | Status: SHIPPED | OUTPATIENT
Start: 2024-02-06 | End: 2024-03-07

## 2024-02-06 RX ORDER — TRIAMCINOLONE ACETONIDE 40 MG/ML
40 INJECTION, SUSPENSION INTRA-ARTICULAR; INTRAMUSCULAR ONCE
Status: COMPLETED | OUTPATIENT
Start: 2024-02-06 | End: 2024-02-06

## 2024-02-06 RX ORDER — AZITHROMYCIN 250 MG/1
250 TABLET, FILM COATED ORAL SEE ADMIN INSTRUCTIONS
Qty: 6 TABLET | Refills: 0 | Status: SHIPPED | OUTPATIENT
Start: 2024-02-06 | End: 2024-02-11

## 2024-02-06 RX ORDER — PREDNISONE 10 MG/1
10 TABLET ORAL 2 TIMES DAILY
Qty: 10 TABLET | Refills: 0 | Status: SHIPPED | OUTPATIENT
Start: 2024-02-06 | End: 2024-02-11

## 2024-02-06 RX ADMIN — TRIAMCINOLONE ACETONIDE 40 MG: 40 INJECTION, SUSPENSION INTRA-ARTICULAR; INTRAMUSCULAR at 15:25

## 2024-02-06 SDOH — ECONOMIC STABILITY: INCOME INSECURITY: HOW HARD IS IT FOR YOU TO PAY FOR THE VERY BASICS LIKE FOOD, HOUSING, MEDICAL CARE, AND HEATING?: NOT HARD AT ALL

## 2024-02-06 SDOH — ECONOMIC STABILITY: FOOD INSECURITY: WITHIN THE PAST 12 MONTHS, YOU WORRIED THAT YOUR FOOD WOULD RUN OUT BEFORE YOU GOT MONEY TO BUY MORE.: NEVER TRUE

## 2024-02-06 SDOH — ECONOMIC STABILITY: FOOD INSECURITY: WITHIN THE PAST 12 MONTHS, THE FOOD YOU BOUGHT JUST DIDN'T LAST AND YOU DIDN'T HAVE MONEY TO GET MORE.: NEVER TRUE

## 2024-02-06 ASSESSMENT — PATIENT HEALTH QUESTIONNAIRE - PHQ9
8. MOVING OR SPEAKING SO SLOWLY THAT OTHER PEOPLE COULD HAVE NOTICED. OR THE OPPOSITE, BEING SO FIGETY OR RESTLESS THAT YOU HAVE BEEN MOVING AROUND A LOT MORE THAN USUAL: 0
1. LITTLE INTEREST OR PLEASURE IN DOING THINGS: 0
SUM OF ALL RESPONSES TO PHQ QUESTIONS 1-9: 0
2. FEELING DOWN, DEPRESSED OR HOPELESS: 0
10. IF YOU CHECKED OFF ANY PROBLEMS, HOW DIFFICULT HAVE THESE PROBLEMS MADE IT FOR YOU TO DO YOUR WORK, TAKE CARE OF THINGS AT HOME, OR GET ALONG WITH OTHER PEOPLE: 0
SUM OF ALL RESPONSES TO PHQ QUESTIONS 1-9: 0
6. FEELING BAD ABOUT YOURSELF - OR THAT YOU ARE A FAILURE OR HAVE LET YOURSELF OR YOUR FAMILY DOWN: 0
3. TROUBLE FALLING OR STAYING ASLEEP: 0
7. TROUBLE CONCENTRATING ON THINGS, SUCH AS READING THE NEWSPAPER OR WATCHING TELEVISION: 0
9. THOUGHTS THAT YOU WOULD BE BETTER OFF DEAD, OR OF HURTING YOURSELF: 0
SUM OF ALL RESPONSES TO PHQ QUESTIONS 1-9: 0
SUM OF ALL RESPONSES TO PHQ QUESTIONS 1-9: 0
SUM OF ALL RESPONSES TO PHQ9 QUESTIONS 1 & 2: 0
5. POOR APPETITE OR OVEREATING: 0
4. FEELING TIRED OR HAVING LITTLE ENERGY: 0

## 2024-02-06 NOTE — PROGRESS NOTES
Chief Complaint:   Congestion (Has had this for a few weeks getting worse ), Sore Throat, and Cough      History of Present Illness   Source of history provided by:  patient.     Marleen Walker is a 64 y.o. old female who presents to primary care for evaluation of sinus pressure, nasal congestion, discolored nasal drainage, bilateral ear pressure, mild non-productiveproductive cough and sore throat x 12 days. Has been taking  Augmentin and then Omnicef and not getting better also taking   OTC without relief. Denies any fever, chills, wheezing, CP, SOB, or GI symptoms. denies any hx of asthma, COPD, or tobacco use.    Review of Systems   Unless otherwise stated in this report or unable to obtain because of the patient's clinical or mental status as evidenced by the medical record, this patients's positive and negative responses for Review of Systems, constitutional, psych, eyes, ENT, cardiovascular, respiratory, gastrointestinal, neurological, genitourinary, musculoskeletal, integument systems and systems related to the presenting problem are either stated in the preceding or were negative for the symptoms and/or complaints related to the medical problem.    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   Past Surgical History:  has a past surgical history that includes Vulva surgery (Right, 2021); Coronary angioplasty with stent (2007); Tubal ligation (1981); Hysterectomy, total abdominal (2003); Knee arthroscopy (Right, 06/2022); Cholecystectomy; Descending aortic aneurysm repair w/ stent (02/24/2023); Iliac artery stent (Bilateral,

## 2024-02-09 ENCOUNTER — TELEPHONE (OUTPATIENT)
Dept: PRIMARY CARE CLINIC | Age: 64
End: 2024-02-09

## 2024-02-09 NOTE — TELEPHONE ENCOUNTER
Suspect the bleeding is from nasal/sinus inflammation or from nasal sprays if she is taking any rather than the Zyrtec. Would be OK to continue taking Zyrtec.

## 2024-02-09 NOTE — TELEPHONE ENCOUNTER
Dr Joshua floyd  Patient called and she was seen in walk in and was prescribed some medication but she states after she took the zyrtec her nose started to bleed but only last for few seconds. She wanted to let us know just cause she has never had that before and didn't know if she should be concerned. She states she ended talking with the pharmacist and was told nothing concerning.

## 2024-02-10 DIAGNOSIS — I10 ESSENTIAL HYPERTENSION: ICD-10-CM

## 2024-02-12 NOTE — TELEPHONE ENCOUNTER
Patients last appointment 12/21/2023.  Patients next scheduled appointment   Future Appointments   Date Time Provider Department Center   3/12/2024  2:30 PM Mic Lewis MD [facility]

## 2024-02-16 RX ORDER — FUROSEMIDE 40 MG/1
40 TABLET ORAL DAILY
Qty: 90 TABLET | Refills: 1 | Status: SHIPPED | OUTPATIENT
Start: 2024-02-16

## 2024-03-08 DIAGNOSIS — F33.1 MODERATE EPISODE OF RECURRENT MAJOR DEPRESSIVE DISORDER (HCC): ICD-10-CM

## 2024-03-08 DIAGNOSIS — F41.1 GENERALIZED ANXIETY DISORDER WITH PANIC ATTACKS: ICD-10-CM

## 2024-03-08 DIAGNOSIS — F41.0 GENERALIZED ANXIETY DISORDER WITH PANIC ATTACKS: ICD-10-CM

## 2024-03-08 NOTE — TELEPHONE ENCOUNTER
Patients last appointment 12/21/2023.  Patients next scheduled appointment   Future Appointments   Date Time Provider Department Center   3/12/2024  2:30 PM Mic Lewis MD [facility]

## 2024-03-10 RX ORDER — PAROXETINE 10 MG/1
10 TABLET, FILM COATED ORAL DAILY
Qty: 30 TABLET | Refills: 2 | Status: SHIPPED | OUTPATIENT
Start: 2024-03-10

## 2024-03-12 ENCOUNTER — OFFICE VISIT (OUTPATIENT)
Dept: PRIMARY CARE CLINIC | Age: 64
End: 2024-03-12

## 2024-03-12 VITALS
BODY MASS INDEX: 29.46 KG/M2 | WEIGHT: 176.8 LBS | HEART RATE: 63 BPM | DIASTOLIC BLOOD PRESSURE: 78 MMHG | RESPIRATION RATE: 18 BRPM | TEMPERATURE: 97.6 F | HEIGHT: 65 IN | SYSTOLIC BLOOD PRESSURE: 120 MMHG | OXYGEN SATURATION: 98 %

## 2024-03-12 DIAGNOSIS — E78.2 MIXED HYPERLIPIDEMIA: ICD-10-CM

## 2024-03-12 DIAGNOSIS — D35.01 BILATERAL ADRENAL ADENOMAS: ICD-10-CM

## 2024-03-12 DIAGNOSIS — D35.02 BILATERAL ADRENAL ADENOMAS: ICD-10-CM

## 2024-03-12 DIAGNOSIS — E53.8 B12 DEFICIENCY: ICD-10-CM

## 2024-03-12 DIAGNOSIS — E11.42 TYPE 2 DIABETES MELLITUS WITH DIABETIC POLYNEUROPATHY, WITHOUT LONG-TERM CURRENT USE OF INSULIN (HCC): Primary | ICD-10-CM

## 2024-03-12 DIAGNOSIS — F33.1 MODERATE EPISODE OF RECURRENT MAJOR DEPRESSIVE DISORDER (HCC): ICD-10-CM

## 2024-03-12 PROBLEM — E27.8 RIGHT ADRENAL MASS (HCC): Status: ACTIVE | Noted: 2024-03-12

## 2024-03-12 LAB — HBA1C MFR BLD: 5.9 %

## 2024-03-12 RX ORDER — CLOBETASOL PROPIONATE 0.5 MG/G
OINTMENT TOPICAL
COMMUNITY
Start: 2024-02-10

## 2024-03-12 RX ORDER — KETOCONAZOLE 20 MG/G
CREAM TOPICAL
COMMUNITY
Start: 2024-02-10

## 2024-03-22 ENCOUNTER — NURSE ONLY (OUTPATIENT)
Dept: PRIMARY CARE CLINIC | Age: 64
End: 2024-03-22

## 2024-03-22 DIAGNOSIS — F33.1 MODERATE EPISODE OF RECURRENT MAJOR DEPRESSIVE DISORDER (HCC): ICD-10-CM

## 2024-03-22 DIAGNOSIS — I10 ESSENTIAL HYPERTENSION: ICD-10-CM

## 2024-03-22 DIAGNOSIS — E53.8 B12 DEFICIENCY: ICD-10-CM

## 2024-03-22 DIAGNOSIS — E11.42 TYPE 2 DIABETES MELLITUS WITH DIABETIC POLYNEUROPATHY, WITHOUT LONG-TERM CURRENT USE OF INSULIN (HCC): ICD-10-CM

## 2024-03-22 DIAGNOSIS — D75.1 POLYCYTHEMIA: Primary | ICD-10-CM

## 2024-03-22 DIAGNOSIS — K76.0 FATTY LIVER: ICD-10-CM

## 2024-03-22 DIAGNOSIS — E78.2 MIXED HYPERLIPIDEMIA: ICD-10-CM

## 2024-03-22 LAB
ABSOLUTE IMMATURE GRANULOCYTE: <0.03 K/UL (ref 0–0.58)
BASOPHILS ABSOLUTE: 0.03 K/UL (ref 0–0.2)
BASOPHILS RELATIVE PERCENT: 0 % (ref 0–2)
CREATININE URINE: 50.4 MG/DL (ref 29–226)
EOSINOPHILS ABSOLUTE: 0.02 K/UL (ref 0.05–0.5)
EOSINOPHILS RELATIVE PERCENT: 0 % (ref 0–6)
HCT VFR BLD CALC: 49.3 % (ref 34–48)
HEMOGLOBIN: 16.7 G/DL (ref 11.5–15.5)
IMMATURE GRANULOCYTES: 0 % (ref 0–5)
LYMPHOCYTES ABSOLUTE: 2.2 K/UL (ref 1.5–4)
LYMPHOCYTES RELATIVE PERCENT: 28 % (ref 20–42)
MCH RBC QN AUTO: 34.1 PG (ref 26–35)
MCHC RBC AUTO-ENTMCNC: 33.9 G/DL (ref 32–34.5)
MCV RBC AUTO: 100.6 FL (ref 80–99.9)
MICROALBUMIN/CREAT 24H UR: <12 MG/L (ref 0–19)
MICROALBUMIN/CREAT UR-RTO: NORMAL MCG/MG CREAT (ref 0–30)
MONOCYTES ABSOLUTE: 0.6 K/UL (ref 0.1–0.95)
MONOCYTES RELATIVE PERCENT: 8 % (ref 2–12)
NEUTROPHILS ABSOLUTE: 5.05 K/UL (ref 1.8–7.3)
NEUTROPHILS RELATIVE PERCENT: 64 % (ref 43–80)
PDW BLD-RTO: 13.2 % (ref 11.5–15)
PLATELET # BLD: 280 K/UL (ref 130–450)
PMV BLD AUTO: 9.9 FL (ref 7–12)
RBC # BLD: 4.9 M/UL (ref 3.5–5.5)
WBC # BLD: 7.9 K/UL (ref 4.5–11.5)

## 2024-03-23 LAB
ALBUMIN SERPL-MCNC: 4.5 G/DL (ref 3.5–5.2)
ALP BLD-CCNC: 86 U/L (ref 35–104)
ALT SERPL-CCNC: 16 U/L (ref 0–32)
ANION GAP SERPL CALCULATED.3IONS-SCNC: 15 MMOL/L (ref 7–16)
AST SERPL-CCNC: 23 U/L (ref 0–31)
BILIRUB SERPL-MCNC: 0.5 MG/DL (ref 0–1.2)
BUN BLDV-MCNC: 11 MG/DL (ref 6–23)
CALCIUM SERPL-MCNC: 9.3 MG/DL (ref 8.6–10.2)
CHLORIDE BLD-SCNC: 100 MMOL/L (ref 98–107)
CHOLESTEROL, FASTING: 217 MG/DL
CO2: 25 MMOL/L (ref 22–29)
CREAT SERPL-MCNC: 0.8 MG/DL (ref 0.5–1)
FOLATE: 14.2 NG/ML (ref 4.8–24.2)
GFR SERPL CREATININE-BSD FRML MDRD: >60 ML/MIN/1.73M2
GLUCOSE FASTING: 90 MG/DL (ref 74–99)
HDLC SERPL-MCNC: 41 MG/DL
LDL CHOLESTEROL: 141 MG/DL
POTASSIUM SERPL-SCNC: 4.5 MMOL/L (ref 3.5–5)
SODIUM BLD-SCNC: 140 MMOL/L (ref 132–146)
TOTAL PROTEIN: 7.1 G/DL (ref 6.4–8.3)
TRIGLYCERIDE, FASTING: 176 MG/DL
TSH SERPL DL<=0.05 MIU/L-ACNC: 1.39 UIU/ML (ref 0.27–4.2)
VITAMIN B-12: 310 PG/ML (ref 211–946)
VLDLC SERPL CALC-MCNC: 35 MG/DL

## 2024-04-10 ENCOUNTER — TELEPHONE (OUTPATIENT)
Dept: PRIMARY CARE CLINIC | Age: 64
End: 2024-04-10

## 2024-04-10 DIAGNOSIS — E53.8 B12 DEFICIENCY: Primary | ICD-10-CM

## 2024-04-10 RX ORDER — CHOLECALCIFEROL (VITAMIN D3) 25 MCG
1 TABLET,CHEWABLE ORAL
Qty: 90 CAPSULE | Refills: 1 | Status: SHIPPED | OUTPATIENT
Start: 2024-04-10

## 2024-04-10 NOTE — TELEPHONE ENCOUNTER
Called inquiring about there blood work results.     Patient states her sugar is still dropping even though she is off the metformin so she doesn't know if there is something else going on.     She would like a referral to endocrinology does not matter if it is [facility] or [facility].

## 2024-04-11 NOTE — TELEPHONE ENCOUNTER
She states usually it gets down to 80 but she starts feeling sick when it even gets to the 90s. One day she took it and it was 56.

## 2024-04-11 NOTE — TELEPHONE ENCOUNTER
56 is low (not sure if was while on metformin or not) however 80 and 90 are normal. She may feel this way if she eats/drinks something that shoots her blood sugar up then it comes down fast. It sounds like she may need to eat smaller balanced meals with snacks in between throughout the day. This would not typically warrant an endocrinology referral but can send if she would prefer.

## 2024-05-21 DIAGNOSIS — I10 ESSENTIAL HYPERTENSION: ICD-10-CM

## 2024-05-21 RX ORDER — LISINOPRIL 10 MG/1
10 TABLET ORAL DAILY
Qty: 90 TABLET | Refills: 1 | Status: SHIPPED | OUTPATIENT
Start: 2024-05-21

## 2024-05-21 RX ORDER — METOPROLOL TARTRATE 50 MG/1
50 TABLET, FILM COATED ORAL DAILY
Qty: 90 TABLET | Refills: 1 | Status: SHIPPED | OUTPATIENT
Start: 2024-05-21

## 2024-05-21 NOTE — TELEPHONE ENCOUNTER
Patients last appointment 3/12/2024.  Patients next scheduled appointment   Future Appointments   Date Time Provider Department Center   7/23/2024  3:00 PM Mic Lewis MD [facility]

## 2024-05-21 NOTE — TELEPHONE ENCOUNTER
Patients last appointment 3/12/2024.  Patients next scheduled appointment   Future Appointments   Date Time Provider Department Center   7/23/2024  3:00 PM Mic Lewis MD [facility]

## 2024-06-13 ENCOUNTER — TELEPHONE (OUTPATIENT)
Dept: PRIMARY CARE CLINIC | Age: 64
End: 2024-06-13

## 2024-06-13 RX ORDER — FLUCONAZOLE 150 MG/1
150 TABLET ORAL ONCE
Qty: 1 TABLET | Refills: 0 | Status: SHIPPED | OUTPATIENT
Start: 2024-06-13 | End: 2024-06-13

## 2024-06-13 NOTE — TELEPHONE ENCOUNTER
Patient called and left vm stating she thinks she has a yeast infection. She is having the back, pelvic pain and itching. She is wondering if you can send diflucan in for.     I called and left  for patient advising express care but that I will still send message to provider.

## 2024-06-24 NOTE — TELEPHONE ENCOUNTER
Patients last appointment 3/12/2024.  Patients next scheduled appointment   Future Appointments   Date Time Provider Department Center   7/23/2024  3:00 PM Mic Lewis MD [facility]

## 2024-06-24 NOTE — TELEPHONE ENCOUNTER
Marleen stated that her sugar has been erratic in the middle of the night and she is afraid to sleep. She is going to try to take her Metformin again to see if it regulates it better. She would like to be seen sooner that 7/23/24 if possible. Please advise.    [phone]

## 2024-06-27 ENCOUNTER — OFFICE VISIT (OUTPATIENT)
Dept: PRIMARY CARE CLINIC | Age: 64
End: 2024-06-27

## 2024-06-27 VITALS
OXYGEN SATURATION: 96 % | RESPIRATION RATE: 16 BRPM | BODY MASS INDEX: 31.02 KG/M2 | SYSTOLIC BLOOD PRESSURE: 110 MMHG | TEMPERATURE: 97.4 F | DIASTOLIC BLOOD PRESSURE: 60 MMHG | WEIGHT: 186.4 LBS | HEART RATE: 74 BPM

## 2024-06-27 DIAGNOSIS — E11.42 TYPE 2 DIABETES MELLITUS WITH DIABETIC POLYNEUROPATHY, WITHOUT LONG-TERM CURRENT USE OF INSULIN (HCC): Primary | ICD-10-CM

## 2024-06-27 DIAGNOSIS — H92.03 OTALGIA, BILATERAL: ICD-10-CM

## 2024-06-27 LAB
CHP ED QC CHECK: NORMAL
GLUCOSE BLD-MCNC: 150 MG/DL
HBA1C MFR BLD: 6.1 %

## 2024-06-27 NOTE — PROGRESS NOTES
EOMI, no discharge or conjunctival injection.  Ears: Right TM peroration noted (chronic). No erythema or drainage. Left TM translucent on the left without erythema, bulging, or perforation bilaterally. External auditory canals clear without erythema, swelling, or exudate bilaterally. No mastoid tenderness or swelling bilaterally.   Neck:  Normal ROM.  Supple.  Lungs:  Clear to auscultation and breath sounds equal. No wheezes, rales, or rhonchi.   Heart:  Regular rate and rhythm, normal heart sounds, without pathological murmurs, ectopy, gallops, or rubs. LEs without clubbing, edema, or cyanosis.  Abdomen:  General Appearance: No obvious trauma or bruising.        Bowel sounds: BS+x4       Distension:  Soft and no distension.          Tenderness: Non-tender in all four quadrants, non-distended without guarding, rebound, or rigidity. No tenderness at McBurney's point.        Liver/Spleen: Non-tender and no hepatosplenomegaly.        Pulsatile Mass: None noted.   Back: CVA Tenderness: No bilateral tenderness or bruising.   Skin:  Normal turgor.  Warm, dry, without visible rash, unless noted elsewhere.  Neurological:  Alert and oriented. Motor functions intact. No nystagmus noted. Ambulates without ataxia. UE/LE/ strength 5/5 bilaterally.    Lab / Imaging Results   (All laboratory and radiology results have been personally reviewed by myself)  Labs:  Results for orders placed or performed in visit on 06/27/24   POCT Glucose   Result Value Ref Range    POC Glucose 150     QC OK? pass    POCT glycosylated hemoglobin (Hb A1C)   Result Value Ref Range    Hemoglobin A1C 6.1 %       Imaging:  All Radiology results interpreted by Radiologist unless otherwise noted.      Assessment / Plan     Impression(s):  Marleen was seen today for blood sugar problem and otalgia.    Diagnoses and all orders for this visit:    Type 2 diabetes mellitus with diabetic polyneuropathy, without long-term current use of insulin (HCC)  -

## 2024-07-23 ENCOUNTER — OFFICE VISIT (OUTPATIENT)
Dept: PRIMARY CARE CLINIC | Age: 64
End: 2024-07-23

## 2024-07-23 VITALS
HEIGHT: 65 IN | SYSTOLIC BLOOD PRESSURE: 112 MMHG | OXYGEN SATURATION: 97 % | TEMPERATURE: 97.2 F | WEIGHT: 184.2 LBS | BODY MASS INDEX: 30.69 KG/M2 | DIASTOLIC BLOOD PRESSURE: 76 MMHG | HEART RATE: 66 BPM

## 2024-07-23 DIAGNOSIS — E11.42 TYPE 2 DIABETES MELLITUS WITH DIABETIC POLYNEUROPATHY, WITHOUT LONG-TERM CURRENT USE OF INSULIN (HCC): Primary | ICD-10-CM

## 2024-07-23 DIAGNOSIS — F41.0 GENERALIZED ANXIETY DISORDER WITH PANIC ATTACKS: ICD-10-CM

## 2024-07-23 DIAGNOSIS — F33.1 MODERATE EPISODE OF RECURRENT MAJOR DEPRESSIVE DISORDER (HCC): ICD-10-CM

## 2024-07-23 DIAGNOSIS — F51.04 PSYCHOPHYSIOLOGICAL INSOMNIA: ICD-10-CM

## 2024-07-23 DIAGNOSIS — G47.33 OBSTRUCTIVE SLEEP APNEA: ICD-10-CM

## 2024-07-23 DIAGNOSIS — E53.8 B12 DEFICIENCY: ICD-10-CM

## 2024-07-23 DIAGNOSIS — F41.1 GENERALIZED ANXIETY DISORDER WITH PANIC ATTACKS: ICD-10-CM

## 2024-07-23 DIAGNOSIS — I10 ESSENTIAL HYPERTENSION: ICD-10-CM

## 2024-07-23 RX ORDER — TRAZODONE HYDROCHLORIDE 50 MG/1
50 TABLET ORAL DAILY
Qty: 90 TABLET | Refills: 1 | Status: SHIPPED | OUTPATIENT
Start: 2024-07-23

## 2024-07-23 RX ORDER — FUROSEMIDE 40 MG/1
40 TABLET ORAL DAILY
Qty: 90 TABLET | Refills: 1 | Status: SHIPPED | OUTPATIENT
Start: 2024-07-23

## 2024-07-23 RX ORDER — METOPROLOL TARTRATE 50 MG/1
50 TABLET, FILM COATED ORAL DAILY
Qty: 90 TABLET | Refills: 1 | Status: SHIPPED | OUTPATIENT
Start: 2024-07-23

## 2024-07-23 RX ORDER — LISINOPRIL 10 MG/1
10 TABLET ORAL DAILY
Qty: 90 TABLET | Refills: 1 | Status: SHIPPED | OUTPATIENT
Start: 2024-07-23

## 2024-07-23 ASSESSMENT — ENCOUNTER SYMPTOMS
ABDOMINAL PAIN: 0
SHORTNESS OF BREATH: 0
DIARRHEA: 0
CONSTIPATION: 0
BLOOD IN STOOL: 0

## 2024-07-23 NOTE — ASSESSMENT & PLAN NOTE
Chronic, controlled with A1c 6.1  Home blood sugars normalized  Remain off metformin  Diabetic wellness when able  Recheck A1c next visit

## 2024-08-22 ENCOUNTER — LAB (OUTPATIENT)
Dept: PRIMARY CARE CLINIC | Age: 64
End: 2024-08-22
Payer: MEDICARE

## 2024-08-22 DIAGNOSIS — E11.42 TYPE 2 DIABETES MELLITUS WITH DIABETIC POLYNEUROPATHY, WITHOUT LONG-TERM CURRENT USE OF INSULIN (HCC): ICD-10-CM

## 2024-08-22 DIAGNOSIS — E53.8 B12 DEFICIENCY: ICD-10-CM

## 2024-08-22 LAB
FOLATE: 16 NG/ML (ref 4.8–24.2)
HBA1C MFR BLD: 6.2 % (ref 4–5.6)
HCT VFR BLD CALC: 47.8 % (ref 34–48)
HEMOGLOBIN: 16.3 G/DL (ref 11.5–15.5)
MCH RBC QN AUTO: 35 PG (ref 26–35)
MCHC RBC AUTO-ENTMCNC: 34.1 G/DL (ref 32–34.5)
MCV RBC AUTO: 102.6 FL (ref 80–99.9)
PDW BLD-RTO: 13.2 % (ref 11.5–15)
PLATELET # BLD: 256 K/UL (ref 130–450)
PMV BLD AUTO: 10 FL (ref 7–12)
RBC # BLD: 4.66 M/UL (ref 3.5–5.5)
VITAMIN B-12: 403 PG/ML (ref 211–946)
WBC # BLD: 7.4 K/UL (ref 4.5–11.5)

## 2024-08-22 PROCEDURE — 36415 COLL VENOUS BLD VENIPUNCTURE: CPT | Performed by: STUDENT IN AN ORGANIZED HEALTH CARE EDUCATION/TRAINING PROGRAM

## 2024-09-13 ENCOUNTER — OFFICE VISIT (OUTPATIENT)
Dept: PRIMARY CARE CLINIC | Age: 64
End: 2024-09-13

## 2024-09-13 VITALS
SYSTOLIC BLOOD PRESSURE: 112 MMHG | RESPIRATION RATE: 18 BRPM | OXYGEN SATURATION: 96 % | BODY MASS INDEX: 31.06 KG/M2 | HEART RATE: 72 BPM | HEIGHT: 65 IN | DIASTOLIC BLOOD PRESSURE: 70 MMHG | WEIGHT: 186.4 LBS | TEMPERATURE: 97 F

## 2024-09-13 DIAGNOSIS — J01.01 ACUTE RECURRENT MAXILLARY SINUSITIS: Primary | ICD-10-CM

## 2024-09-13 DIAGNOSIS — J02.9 PHARYNGITIS, UNSPECIFIED ETIOLOGY: ICD-10-CM

## 2024-09-13 DIAGNOSIS — I10 ESSENTIAL HYPERTENSION: ICD-10-CM

## 2024-09-13 DIAGNOSIS — R06.2 WHEEZING: ICD-10-CM

## 2024-09-13 DIAGNOSIS — R05.9 COUGH, UNSPECIFIED TYPE: ICD-10-CM

## 2024-09-13 DIAGNOSIS — H92.03 OTALGIA, BILATERAL: ICD-10-CM

## 2024-09-13 DIAGNOSIS — H66.91 RECURRENT OTITIS MEDIA, RIGHT: ICD-10-CM

## 2024-09-13 RX ORDER — CEPHALEXIN 500 MG/1
500 CAPSULE ORAL 4 TIMES DAILY
COMMUNITY
Start: 2024-09-03

## 2024-09-13 RX ORDER — PREDNISONE 10 MG/1
10 TABLET ORAL 2 TIMES DAILY
Qty: 10 TABLET | Refills: 0 | Status: SHIPPED | OUTPATIENT
Start: 2024-09-13 | End: 2024-09-18

## 2024-09-13 RX ORDER — ALBUTEROL SULFATE 90 UG/1
2 AEROSOL, METERED RESPIRATORY (INHALATION) 4 TIMES DAILY PRN
Qty: 18 G | Refills: 0 | Status: SHIPPED | OUTPATIENT
Start: 2024-09-13

## 2024-09-13 RX ORDER — GUAIFENESIN 600 MG/1
1200 TABLET, EXTENDED RELEASE ORAL 2 TIMES DAILY
Qty: 40 TABLET | Refills: 0 | Status: SHIPPED | OUTPATIENT
Start: 2024-09-13 | End: 2024-09-23

## 2024-09-13 RX ORDER — AMOXICILLIN 875 MG
875 TABLET ORAL 2 TIMES DAILY
Qty: 20 TABLET | Refills: 0 | Status: SHIPPED | OUTPATIENT
Start: 2024-09-13 | End: 2024-09-23

## 2024-10-03 ENCOUNTER — OFFICE VISIT (OUTPATIENT)
Dept: PRIMARY CARE CLINIC | Age: 64
End: 2024-10-03

## 2024-10-03 VITALS
SYSTOLIC BLOOD PRESSURE: 118 MMHG | OXYGEN SATURATION: 96 % | WEIGHT: 187 LBS | HEIGHT: 65 IN | BODY MASS INDEX: 31.16 KG/M2 | DIASTOLIC BLOOD PRESSURE: 80 MMHG | RESPIRATION RATE: 17 BRPM | HEART RATE: 79 BPM | TEMPERATURE: 97 F

## 2024-10-03 DIAGNOSIS — R09.82 ALLERGIC RHINITIS WITH POSTNASAL DRIP: Primary | ICD-10-CM

## 2024-10-03 DIAGNOSIS — J30.9 ALLERGIC RHINITIS WITH POSTNASAL DRIP: ICD-10-CM

## 2024-10-03 DIAGNOSIS — J30.9 ALLERGIC RHINITIS WITH POSTNASAL DRIP: Primary | ICD-10-CM

## 2024-10-03 DIAGNOSIS — R09.82 ALLERGIC RHINITIS WITH POSTNASAL DRIP: ICD-10-CM

## 2024-10-03 RX ORDER — NYSTATIN 100000 [USP'U]/G
POWDER TOPICAL
COMMUNITY
Start: 2023-01-11

## 2024-10-03 RX ORDER — LEVOCETIRIZINE DIHYDROCHLORIDE 5 MG/1
5 TABLET, FILM COATED ORAL NIGHTLY
Qty: 90 TABLET | Refills: 0 | Status: SHIPPED | OUTPATIENT
Start: 2024-10-03

## 2024-10-03 RX ORDER — FLUTICASONE PROPIONATE 50 MCG
2 SPRAY, SUSPENSION (ML) NASAL DAILY
Qty: 16 G | Refills: 0 | Status: SHIPPED | OUTPATIENT
Start: 2024-10-03

## 2024-10-03 ASSESSMENT — ENCOUNTER SYMPTOMS
SHORTNESS OF BREATH: 0
COUGH: 1
WHEEZING: 0

## 2024-10-03 NOTE — PROGRESS NOTES
ESTABLISHED PRIMARY CARE VISIT    10/3/24  Name: Marleen Walker   : 1960   Age: 64 y.o.  Sex: female        Assessment & Plan:     Problem List Items Addressed This Visit       Allergic rhinitis with postnasal drip - Primary     Post-infectious, possible allergic  Appears to be limited to upper respiratory system  No lower respiratory symptoms or abnormal exam findings  Trial Xyzal, Flonase         Relevant Medications    levocetirizine (XYZAL) 5 MG tablet    fluticasone (FLONASE) 50 MCG/ACT nasal spray     Counseled patient regarding above diagnosis, including possible risks and complications, especially if left uncontrolled.  Counseled patient as appropriate and relevant regarding any possible side effects, risks, and alternatives to treatment; the patient verbalizes understanding, and is in agreement with the plan as detailed above.   All educational materials and instructions were discussed and included on the After Visit Summary.  All questions answered to the patient's satisfaction.  The patient was advised to call or send Tasqe message for any concerns prior to next appointment.    Return in 6 weeks (on 2024) for follow-up.    This visit is inherently complex due to evaluation and management associated with medical care services that serve as the continuing focal point for health/medical care services that are part of ongoing care related to a patient's single, serious condition or a complex condition.     Subjective:     Chief Complaint   Patient presents with    Pneumonia     Pt states she is feeling better but illness is still lingering in upper chest area and throat      Patient returns for follow-up  Saw GYN, had UTI last month  Had allergic reaction to Macrobid  Went to ED  Changed to Keflex  Passed kidney stone at home  Saw urology, will go back for follow-up in 3 months    Has been having cough and congestion also for past month  Was treated with amoxicillin, prednisone,

## 2024-10-03 NOTE — ASSESSMENT & PLAN NOTE
Post-infectious, possible allergic  Appears to be limited to upper respiratory system  No lower respiratory symptoms or abnormal exam findings  Trial Xyzal, Flonase

## 2024-10-04 RX ORDER — FLUTICASONE PROPIONATE 50 MCG
2 SPRAY, SUSPENSION (ML) NASAL DAILY
Qty: 48 G | OUTPATIENT
Start: 2024-10-04

## 2024-12-05 ENCOUNTER — OFFICE VISIT (OUTPATIENT)
Dept: PRIMARY CARE CLINIC | Age: 64
End: 2024-12-05

## 2024-12-05 VITALS
HEART RATE: 68 BPM | WEIGHT: 193 LBS | DIASTOLIC BLOOD PRESSURE: 74 MMHG | OXYGEN SATURATION: 97 % | TEMPERATURE: 98.3 F | SYSTOLIC BLOOD PRESSURE: 120 MMHG | HEIGHT: 65 IN | BODY MASS INDEX: 32.15 KG/M2

## 2024-12-05 DIAGNOSIS — R05.9 COUGH, UNSPECIFIED TYPE: ICD-10-CM

## 2024-12-05 DIAGNOSIS — E11.42 TYPE 2 DIABETES MELLITUS WITH DIABETIC POLYNEUROPATHY, WITHOUT LONG-TERM CURRENT USE OF INSULIN (HCC): ICD-10-CM

## 2024-12-05 DIAGNOSIS — E78.2 MIXED HYPERLIPIDEMIA: ICD-10-CM

## 2024-12-05 DIAGNOSIS — I10 ESSENTIAL HYPERTENSION: Primary | ICD-10-CM

## 2024-12-05 DIAGNOSIS — Z83.49 FAMILY HISTORY OF HYPOTHYROIDISM: ICD-10-CM

## 2024-12-05 DIAGNOSIS — M81.0 AGE-RELATED OSTEOPOROSIS WITHOUT CURRENT PATHOLOGICAL FRACTURE: ICD-10-CM

## 2024-12-05 DIAGNOSIS — E53.8 B12 DEFICIENCY: ICD-10-CM

## 2024-12-05 RX ORDER — ALBUTEROL SULFATE 90 UG/1
2 INHALANT RESPIRATORY (INHALATION) 4 TIMES DAILY PRN
Qty: 18 G | Refills: 0 | Status: SHIPPED | OUTPATIENT
Start: 2024-12-05

## 2024-12-05 RX ORDER — METOPROLOL SUCCINATE 50 MG/1
TABLET, EXTENDED RELEASE ORAL
COMMUNITY
Start: 2024-12-04

## 2024-12-05 RX ORDER — PAROXETINE 10 MG/1
10 TABLET, FILM COATED ORAL EVERY MORNING
COMMUNITY

## 2024-12-05 ASSESSMENT — ENCOUNTER SYMPTOMS
SHORTNESS OF BREATH: 1
CHEST TIGHTNESS: 0
STRIDOR: 0
SINUS PRESSURE: 0
VOICE CHANGE: 0
FACIAL SWELLING: 0
GASTROINTESTINAL NEGATIVE: 1
WHEEZING: 0
EYES NEGATIVE: 1
COUGH: 0
TROUBLE SWALLOWING: 0
CHOKING: 0
APNEA: 0
RHINORRHEA: 0
SORE THROAT: 0
SINUS PAIN: 0
ALLERGIC/IMMUNOLOGIC NEGATIVE: 1

## 2024-12-05 NOTE — PROGRESS NOTES
24  Marleen Walker : 1960 Sex: female  Age: 64 y.o.    Chief Complaint   Patient presents with    4 month follow up     Go over blood work from September has a heart cath scheduled for Wednesday  She has been taking her paxil 5 mg once a day   Refusing mammogram at ct lung screening at this time        Presents today for a 4 month follow-up.  She is scheduled for a heart catheterization next Wednesday.  She had a heart attack about 6 months ago.  She has a history of 2 heart attacks in the same day in .  Cardio changed her Metoprolol from tartrate to succinate.  She monitors her BS occasionally.  She states her BS bounces.         Review of Systems   Constitutional:  Positive for appetite change (feels like she is eating more.  She doesnt' feel full) and fatigue (Chronic). Negative for activity change, chills, diaphoresis, fever and unexpected weight change.   HENT:  Positive for congestion (upper airways since she was sick), ear pain (otosclerosis.  \"No eardrum in the left ear\") and hearing loss (otosclerosis since  and has been progressive). Negative for dental problem, drooling, ear discharge, facial swelling, mouth sores, nosebleeds, postnasal drip, rhinorrhea, sinus pressure, sinus pain, sneezing, sore throat, tinnitus, trouble swallowing and voice change.    Eyes: Negative.    Respiratory:  Positive for shortness of breath (with exertion). Negative for apnea, cough, choking, chest tightness, wheezing and stridor.    Cardiovascular:  Positive for chest pain (describes as chest pressure but had recent stress test and is scheduled for cardiac cath next week) and leg swelling (occasional). Negative for palpitations.   Gastrointestinal: Negative.    Endocrine: Negative.    Genitourinary:  Negative for decreased urine volume, difficulty urinating, dyspareunia, dysuria, enuresis, flank pain, frequency, genital sores, hematuria, menstrual problem, pelvic pain, urgency, vaginal bleeding and

## 2024-12-05 NOTE — ASSESSMENT & PLAN NOTE
Chronic, at goal (stable), continue current treatment plan    Orders:    Comprehensive Metabolic Panel, Fasting; Future    Microalbumin, Ur; Future    Urinalysis; Future    Hemoglobin A1C; Future

## 2024-12-05 NOTE — ASSESSMENT & PLAN NOTE
Chronic, at goal (stable), continue current treatment plan    Orders:    CBC with Auto Differential; Future

## 2024-12-13 ENCOUNTER — TELEPHONE (OUTPATIENT)
Dept: PRIMARY CARE CLINIC | Age: 64
End: 2024-12-13

## 2024-12-13 ENCOUNTER — HOSPITAL ENCOUNTER (OUTPATIENT)
Dept: CARDIOLOGY | Facility: HOSPITAL | Age: 64
Discharge: HOME | End: 2024-12-13
Payer: COMMERCIAL

## 2024-12-13 NOTE — TELEPHONE ENCOUNTER
Pt wanted to let us know that she is taking her metoprolol succinate and she is getting a quadruple bypass.

## 2024-12-19 DIAGNOSIS — I25.118 CORONARY ARTERY DISEASE INVOLVING NATIVE CORONARY ARTERY OF NATIVE HEART WITH OTHER FORM OF ANGINA PECTORIS: Primary | ICD-10-CM

## 2024-12-20 PROBLEM — I25.10 CORONARY ARTERY DISEASE: Status: ACTIVE | Noted: 2024-12-19

## 2024-12-31 ENCOUNTER — TELEPHONE (OUTPATIENT)
Dept: PRIMARY CARE CLINIC | Age: 64
End: 2024-12-31

## 2024-12-31 NOTE — TELEPHONE ENCOUNTER
----- Message from Efrem BELL sent at 12/30/2024  1:30 PM EST -----  Regarding: E[facility] Appointment Request  ECC Appointment Request    Patient needs appointment for ECC Appointment Type: Existing Condition Follow Up.    Patient Requested Dates(s): as soon as possible  Patient Requested Time: mid morning or afternoon  Provider Name: Amber Valentin APRN - CNP     Reason for Appointment Request: Established Patient - No appointments available during search. Ms. Walker wants to have an appointment for her quitting smoking to follow up also and needs for prescription.  --------------------------------------------------------------------------------------------------------------------------    Relationship to Patient: Self     Call Back Information: OK to leave message on voicemail  Preferred Call Back Number: Phone [phone]

## 2025-01-13 PROBLEM — N90.3 DYSPLASIA OF VULVA: Status: RESOLVED | Noted: 2025-01-13 | Resolved: 2025-01-13

## 2025-01-13 PROBLEM — N32.3 BLADDER DIVERTICULUM: Status: RESOLVED | Noted: 2022-12-17 | Resolved: 2025-01-13

## 2025-01-13 PROBLEM — M54.17 RADICULOPATHY, LUMBOSACRAL REGION: Status: RESOLVED | Noted: 2022-09-22 | Resolved: 2025-01-13

## 2025-01-13 PROBLEM — R47.9 DISTURBANCE IN SPEECH: Status: RESOLVED | Noted: 2021-04-19 | Resolved: 2025-01-13

## 2025-01-13 PROBLEM — M70.61 GREATER TROCHANTERIC BURSITIS OF RIGHT HIP: Status: RESOLVED | Noted: 2022-03-24 | Resolved: 2025-01-13

## 2025-01-13 PROBLEM — M54.50 LOW BACK PAIN: Status: RESOLVED | Noted: 2025-01-13 | Resolved: 2025-01-13

## 2025-01-13 PROBLEM — H66.90 CHRONIC OTITIS MEDIA: Status: RESOLVED | Noted: 2025-01-13 | Resolved: 2025-01-13

## 2025-01-13 PROBLEM — J30.9 ALLERGIC RHINITIS WITH POSTNASAL DRIP: Status: RESOLVED | Noted: 2024-10-03 | Resolved: 2025-01-13

## 2025-01-13 PROBLEM — K76.0 FATTY LIVER: Status: RESOLVED | Noted: 2022-12-17 | Resolved: 2025-01-13

## 2025-01-13 PROBLEM — R09.82 ALLERGIC RHINITIS WITH POSTNASAL DRIP: Status: RESOLVED | Noted: 2024-10-03 | Resolved: 2025-01-13

## 2025-01-13 PROBLEM — N32.81 OVERACTIVE BLADDER: Status: RESOLVED | Noted: 2024-09-10 | Resolved: 2025-01-13

## 2025-01-13 PROBLEM — E11.9 TYPE 2 DIABETES MELLITUS: Status: ACTIVE | Noted: 2020-08-31

## 2025-01-13 PROBLEM — D35.02 BILATERAL ADRENAL ADENOMAS: Status: RESOLVED | Noted: 2022-12-17 | Resolved: 2025-01-13

## 2025-01-13 PROBLEM — M79.7 FIBROMYALGIA: Status: RESOLVED | Noted: 2025-01-13 | Resolved: 2025-01-13

## 2025-01-13 PROBLEM — D75.1 POLYCYTHEMIA: Status: RESOLVED | Noted: 2022-04-02 | Resolved: 2025-01-13

## 2025-01-13 PROBLEM — I21.9 MYOCARDIAL INFARCTION (MULTI): Status: RESOLVED | Noted: 2021-04-19 | Resolved: 2025-01-13

## 2025-01-13 PROBLEM — G56.00 CARPAL TUNNEL SYNDROME: Status: RESOLVED | Noted: 2021-04-19 | Resolved: 2025-01-13

## 2025-01-13 PROBLEM — F17.210 LIGHT CIGARETTE SMOKER: Status: ACTIVE | Noted: 2025-01-13

## 2025-01-13 PROBLEM — A60.04: Status: RESOLVED | Noted: 2022-03-24 | Resolved: 2025-01-13

## 2025-01-13 PROBLEM — F33.1 MODERATE EPISODE OF RECURRENT MAJOR DEPRESSIVE DISORDER: Status: RESOLVED | Noted: 2023-11-21 | Resolved: 2025-01-13

## 2025-01-13 PROBLEM — E11.42 TYPE 2 DIABETES MELLITUS WITH DIABETIC POLYNEUROPATHY, WITHOUT LONG-TERM CURRENT USE OF INSULIN: Status: ACTIVE | Noted: 2022-03-24

## 2025-01-13 PROBLEM — U07.1 COVID-19: Status: RESOLVED | Noted: 2025-01-13 | Resolved: 2025-01-13

## 2025-01-13 PROBLEM — N83.209 CYST OF OVARY, UNSPECIFIED LATERALITY: Status: RESOLVED | Noted: 2023-06-08 | Resolved: 2025-01-13

## 2025-01-13 PROBLEM — M25.561 ARTHRALGIA OF RIGHT KNEE: Status: RESOLVED | Noted: 2021-10-06 | Resolved: 2025-01-13

## 2025-01-13 PROBLEM — M79.7 PRIMARY FIBROMYALGIA SYNDROME: Status: RESOLVED | Noted: 2017-06-29 | Resolved: 2025-01-13

## 2025-01-13 PROBLEM — M47.817 LUMBOSACRAL SPONDYLOSIS WITHOUT MYELOPATHY: Status: RESOLVED | Noted: 2025-01-13 | Resolved: 2025-01-13

## 2025-01-13 PROBLEM — H72.821: Status: RESOLVED | Noted: 2023-06-19 | Resolved: 2025-01-13

## 2025-01-13 PROBLEM — I73.9 PERIPHERAL VASCULAR DISEASE (CMS-HCC): Status: RESOLVED | Noted: 2025-01-13 | Resolved: 2025-01-13

## 2025-01-13 PROBLEM — M54.30 SCIATICA: Status: RESOLVED | Noted: 2021-04-19 | Resolved: 2025-01-13

## 2025-01-13 PROBLEM — M51.379 DEGENERATION OF LUMBOSACRAL INTERVERTEBRAL DISC: Status: RESOLVED | Noted: 2025-01-13 | Resolved: 2025-01-13

## 2025-01-13 PROBLEM — S83.289A TEAR OF LATERAL MENISCUS OF KNEE: Status: RESOLVED | Noted: 2022-06-08 | Resolved: 2025-01-13

## 2025-01-13 PROBLEM — F17.200 SMOKER: Status: ACTIVE | Noted: 2022-03-24

## 2025-01-13 PROBLEM — J02.9 ACUTE PHARYNGITIS: Status: RESOLVED | Noted: 2025-01-13 | Resolved: 2025-01-13

## 2025-01-13 PROBLEM — R26.9 ABNORMAL GAIT: Status: RESOLVED | Noted: 2021-04-19 | Resolved: 2025-01-13

## 2025-01-13 PROBLEM — I51.9 HEART DISEASE: Status: RESOLVED | Noted: 2021-04-19 | Resolved: 2025-01-13

## 2025-01-13 PROBLEM — H92.09 OTALGIA: Status: RESOLVED | Noted: 2025-01-13 | Resolved: 2025-01-13

## 2025-01-13 PROBLEM — H65.199 ACUTE TRANSUDATIVE OTITIS MEDIA: Status: RESOLVED | Noted: 2025-01-13 | Resolved: 2025-01-13

## 2025-01-13 PROBLEM — M15.0 PRIMARY OSTEOARTHRITIS INVOLVING MULTIPLE JOINTS: Status: ACTIVE | Noted: 2022-03-24

## 2025-01-13 PROBLEM — E78.5 HYPERLIPIDEMIA: Status: ACTIVE | Noted: 2022-04-02

## 2025-01-13 PROBLEM — D35.01 BILATERAL ADRENAL ADENOMAS: Status: RESOLVED | Noted: 2022-12-17 | Resolved: 2025-01-13

## 2025-01-13 PROBLEM — S83.90XA SPRAIN OF KNEE: Status: RESOLVED | Noted: 2025-01-13 | Resolved: 2025-01-13

## 2025-01-13 PROBLEM — J20.9 ACUTE BRONCHITIS: Status: RESOLVED | Noted: 2025-01-13 | Resolved: 2025-01-13

## 2025-01-13 PROBLEM — M48.061 STENOSIS OF LATERAL RECESS OF LUMBAR SPINE: Status: RESOLVED | Noted: 2018-07-05 | Resolved: 2025-01-13

## 2025-01-13 PROBLEM — I10 ESSENTIAL HYPERTENSION: Status: ACTIVE | Noted: 2020-08-31

## 2025-01-13 PROBLEM — H80.93 OTOSCLEROSIS OF BOTH EARS: Status: RESOLVED | Noted: 2022-03-24 | Resolved: 2025-01-13

## 2025-01-13 PROBLEM — H66.91 RECURRENT OTITIS MEDIA, RIGHT: Status: RESOLVED | Noted: 2022-03-24 | Resolved: 2025-01-13

## 2025-01-13 PROBLEM — J06.9 ACUTE UPPER RESPIRATORY INFECTION: Status: RESOLVED | Noted: 2025-01-13 | Resolved: 2025-01-13

## 2025-01-13 PROBLEM — F41.0 GENERALIZED ANXIETY DISORDER WITH PANIC ATTACKS: Status: RESOLVED | Noted: 2022-07-05 | Resolved: 2025-01-13

## 2025-01-13 PROBLEM — N39.0 URINARY TRACT INFECTION WITH HEMATURIA: Status: RESOLVED | Noted: 2024-09-10 | Resolved: 2025-01-13

## 2025-01-13 PROBLEM — M17.11 OSTEOARTHRITIS OF RIGHT KNEE: Status: ACTIVE | Noted: 2022-09-21

## 2025-01-13 PROBLEM — F41.1 GENERALIZED ANXIETY DISORDER WITH PANIC ATTACKS: Status: RESOLVED | Noted: 2022-07-05 | Resolved: 2025-01-13

## 2025-01-13 PROBLEM — R06.83 SNORING: Status: ACTIVE | Noted: 2021-04-19

## 2025-01-13 PROBLEM — F51.04 PSYCHOPHYSIOLOGICAL INSOMNIA: Status: RESOLVED | Noted: 2022-10-28 | Resolved: 2025-01-13

## 2025-01-13 PROBLEM — U07.1 DISEASE DUE TO SEVERE ACUTE RESPIRATORY SYNDROME CORONAVIRUS 2 (SARS-COV-2): Status: RESOLVED | Noted: 2025-01-13 | Resolved: 2025-01-13

## 2025-01-13 PROBLEM — R31.1 BENIGN ESSENTIAL MICROSCOPIC HEMATURIA: Status: RESOLVED | Noted: 2024-09-24 | Resolved: 2025-01-13

## 2025-01-13 PROBLEM — J32.9 CHRONIC SINUSITIS: Status: RESOLVED | Noted: 2025-01-13 | Resolved: 2025-01-13

## 2025-01-13 PROBLEM — E53.8 B12 DEFICIENCY: Status: RESOLVED | Noted: 2022-03-24 | Resolved: 2025-01-13

## 2025-01-13 PROBLEM — R31.9 URINARY TRACT INFECTION WITH HEMATURIA: Status: RESOLVED | Noted: 2024-09-10 | Resolved: 2025-01-13

## 2025-01-13 PROBLEM — N30.90 CYSTITIS: Status: RESOLVED | Noted: 2024-09-10 | Resolved: 2025-01-13

## 2025-01-13 PROBLEM — Z97.3 WEARS GLASSES: Status: RESOLVED | Noted: 2021-04-19 | Resolved: 2025-01-13

## 2025-01-13 PROBLEM — M48.061 SPINAL STENOSIS OF LUMBAR REGION: Status: RESOLVED | Noted: 2018-08-29 | Resolved: 2025-01-13

## 2025-01-13 PROBLEM — M62.838 MUSCLE SPASTICITY: Status: RESOLVED | Noted: 2022-03-24 | Resolved: 2025-01-13

## 2025-01-13 PROBLEM — R42 DIZZINESS AND GIDDINESS: Status: RESOLVED | Noted: 2025-01-13 | Resolved: 2025-01-13

## 2025-01-13 PROBLEM — H72.90 PERFORATION OF TYMPANIC MEMBRANE: Status: RESOLVED | Noted: 2025-01-13 | Resolved: 2025-01-13

## 2025-01-13 RX ORDER — ROSUVASTATIN CALCIUM 20 MG/1
1 TABLET, COATED ORAL DAILY
COMMUNITY

## 2025-01-13 RX ORDER — METOPROLOL TARTRATE 50 MG/1
1 TABLET ORAL DAILY
COMMUNITY
Start: 2024-07-23

## 2025-01-13 RX ORDER — DOCUSATE SODIUM 100 MG/1
1 CAPSULE, LIQUID FILLED ORAL DAILY
COMMUNITY
Start: 2023-06-08

## 2025-01-13 RX ORDER — GABAPENTIN 300 MG/1
1 CAPSULE ORAL 2 TIMES DAILY
COMMUNITY

## 2025-01-13 RX ORDER — NITROFURANTOIN MONOHYDRATE/MACROCRYSTALLINE 25; 75 MG/1; MG/1
1 CAPSULE ORAL EVERY 12 HOURS
COMMUNITY
Start: 2024-08-30

## 2025-01-13 RX ORDER — TRAZODONE HYDROCHLORIDE 50 MG/1
1 TABLET ORAL NIGHTLY
COMMUNITY
Start: 2024-07-23

## 2025-01-13 RX ORDER — ACYCLOVIR 400 MG/1
1 TABLET ORAL EVERY 8 HOURS
COMMUNITY

## 2025-01-13 RX ORDER — METFORMIN HYDROCHLORIDE 500 MG/1
1 TABLET ORAL DAILY
COMMUNITY

## 2025-01-13 RX ORDER — ALBUTEROL SULFATE 90 UG/1
2 INHALANT RESPIRATORY (INHALATION) 4 TIMES DAILY PRN
COMMUNITY
Start: 2024-12-05

## 2025-01-13 RX ORDER — NITROGLYCERIN 0.4 MG/1
0.4 TABLET SUBLINGUAL EVERY 5 MIN PRN
COMMUNITY
Start: 2023-01-03

## 2025-01-13 RX ORDER — ESTRADIOL 0.1 MG/G
2 CREAM VAGINAL DAILY
COMMUNITY

## 2025-01-13 RX ORDER — SERTRALINE HYDROCHLORIDE 100 MG/1
1 TABLET, FILM COATED ORAL DAILY
COMMUNITY

## 2025-01-13 RX ORDER — PAROXETINE 10 MG/1
10 TABLET, FILM COATED ORAL EVERY MORNING
COMMUNITY

## 2025-01-13 RX ORDER — FUROSEMIDE 40 MG/1
1 TABLET ORAL DAILY
COMMUNITY
Start: 2024-07-23

## 2025-01-13 RX ORDER — CLOBETASOL PROPIONATE 0.5 MG/G
0.05 OINTMENT TOPICAL 2 TIMES DAILY
COMMUNITY
Start: 2023-03-16

## 2025-01-13 RX ORDER — HYDROCODONE BITARTRATE AND ACETAMINOPHEN 5; 325 MG/1; MG/1
1 TABLET ORAL 3 TIMES DAILY
COMMUNITY

## 2025-01-13 RX ORDER — GLUCOSAMINE/CHONDR SU A SOD 167-133 MG
1 CAPSULE ORAL DAILY
COMMUNITY

## 2025-01-13 RX ORDER — LISINOPRIL 10 MG/1
1 TABLET ORAL DAILY
COMMUNITY
Start: 2024-07-23

## 2025-01-13 NOTE — PROGRESS NOTES
Referral from Dr. Sidhu. Barbie comes to discuss treatment recommendations for coronary artery stenosis. Additional history of htn, dld. Florinda Jay RN

## 2025-01-17 ENCOUNTER — APPOINTMENT (OUTPATIENT)
Dept: CARDIAC SURGERY | Facility: HOSPITAL | Age: 65
End: 2025-01-17
Payer: COMMERCIAL

## 2025-01-17 DIAGNOSIS — I25.10 CORONARY ARTERY STENOSIS: ICD-10-CM

## 2025-01-27 DIAGNOSIS — F41.9 ANXIETY: Primary | ICD-10-CM

## 2025-01-27 RX ORDER — PAROXETINE 10 MG/1
10 TABLET, FILM COATED ORAL EVERY MORNING
Qty: 30 TABLET | Refills: 3 | Status: SHIPPED | OUTPATIENT
Start: 2025-01-27

## 2025-01-27 NOTE — TELEPHONE ENCOUNTER
Name of Medication(s) Requested:  Requested Prescriptions     Pending Prescriptions Disp Refills    PARoxetine (PAXIL) 10 MG tablet 30 tablet 3     Sig: Take 1 tablet by mouth every morning Takes 1/2 tablet       Medication is on current medication list Yes    Dosage and directions were verified? Yes    Quantity verified: 90 day supply     Pharmacy Verified?  Yes    Last Appointment:  10/3/2024    Future appts:  Future Appointments   Date Time Provider Department Center   4/1/2025  9:00 AM SCHEDULE, [facility]   4/8/2025 11:00 AM Amber Valentin APRN - CNP [facility]        (If no appt send self scheduling link. .REFILLAPPT)  Scheduling request sent?     [] Yes  [x] No    Does patient need updated?  [] Yes  [x] No

## 2025-01-28 ENCOUNTER — TELEPHONE (OUTPATIENT)
Dept: PRIMARY CARE CLINIC | Age: 65
End: 2025-01-28

## 2025-01-28 DIAGNOSIS — F41.9 ANXIETY: ICD-10-CM

## 2025-01-28 RX ORDER — PAROXETINE 10 MG/1
10 TABLET, FILM COATED ORAL EVERY MORNING
Qty: 30 TABLET | Refills: 3 | Status: CANCELLED | OUTPATIENT
Start: 2025-01-28

## 2025-01-28 NOTE — TELEPHONE ENCOUNTER
Sent a refill to [facility] with new directions of one tablet per day. Contacted pt and she stated she is now taking one tablet.

## 2025-02-07 ENCOUNTER — CLINICAL SUPPORT (OUTPATIENT)
Dept: PREADMISSION TESTING | Facility: HOSPITAL | Age: 65
End: 2025-02-07
Payer: COMMERCIAL

## 2025-02-07 RX ORDER — ASPIRIN 81 MG/1
81 TABLET ORAL DAILY
Status: ON HOLD | COMMUNITY

## 2025-02-07 RX ORDER — METOPROLOL SUCCINATE 50 MG/1
50 TABLET, EXTENDED RELEASE ORAL DAILY
Status: ON HOLD | COMMUNITY

## 2025-02-07 RX ORDER — IBUPROFEN 200 MG
200 TABLET ORAL AS NEEDED
Status: ON HOLD | COMMUNITY

## 2025-02-07 NOTE — CPM/PAT H&P
CPM/PAT Evaluation       Name: Barbie Milan (Barbie Milan)  /Age: 1960/65 y.o.     {[facility] Visit Type:50735}      Chief Complaint: ***    HPI  Mikaela Milan is scheduled for CABG, 3-4 Vessels on 25 with Dr. Canchola.  Past Medical History:   Diagnosis Date    Abnormal gait 2021    Acute bronchitis 2025    Acute pharyngitis 2025    Acute transudative otitis media 2025    Acute upper respiratory infection 2025    Allergic rhinitis with postnasal drip 10/03/2024    Anxiety     Arthralgia of right knee 10/06/2021    Benign essential microscopic hematuria 2024    Bilateral adrenal adenomas 2022    Bladder diverticulum 2022    Carpal tunnel syndrome 2021    Chronic otitis media 2025    Chronic sinusitis 2025    Coronary artery disease     COVID-19 2025    Cyst of ovary, unspecified laterality 2023    Degeneration of lumbosacral intervertebral disc 2025    Disease due to severe acute respiratory syndrome coronavirus 2 (SARS-CoV-2) 2025    Disturbance in speech 2021    Dizziness and giddiness 2025    Fatigue     Fatty liver 2022    Fibromyalgia 2025    Hearing aid worn     Heart disease 2021    HL (hearing loss)     20% in Left ear, none in right ear- reads lips    Hyperlipidemia     Hypertension     Low back pain 2025    Lumbosacral spondylosis without myelopathy 2025    Moderate episode of recurrent major depressive disorder 2023    Muscle spasticity 2022    Myocardial infarction (Multi)     2007 x7, 6 months ago    Obesity with body mass index 30 or greater 12/15/2015    Body mass index 30+ - obesity      Otosclerosis of both ears 2022    Overactive bladder 09/10/2024    Perforation of tympanic membrane 2025    Peripheral vascular disease (CMS-HCC) 2025    Polycythemia 2022    Primary fibromyalgia syndrome 2017     Psychophysiological insomnia 10/28/2022    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) 03/24/2022    Recurrent otitis media, right 03/24/2022    Sciatica 04/19/2021    Shortness of breath     FLETCHER    Spinal stenosis of lumbar region 08/29/2018    Sprain of knee 01/13/2025    Tear of lateral meniscus of knee 06/08/2022    Tobacco use     Total perforation of tympanic membrane, right 06/19/2023    Type 2 diabetes mellitus     Urinary tract infection with hematuria 09/10/2024    Uterine fibroid 2003    Vertigo     Wears glasses 04/19/2021       Past Surgical History:   Procedure Laterality Date    CARDIAC CATHETERIZATION  10/29/2007    CHOLECYSTECTOMY      CORONARY ANGIOPLASTY WITH STENT PLACEMENT  2007    x4 stents    DESCENDING AORTIC ANEURYSM REPAIR W/ STENT      HYSTERECTOMY  05/23/2023    ILIAC ARTERY STENT Bilateral 02/24/2023    KNEE SURGERY      KNEE ARTHROSCOPY- meniscal repair    OOPHORECTOMY      TUBAL LIGATION  1981    VULVA SURGERY Right 2021    for dysplasia       Patient  has no history on file for sexual activity.    No family history on file.    Allergies   Allergen Reactions    Celecoxib Hives, Itching and Unknown    Niacin Hives and Other    Sulfa (Sulfonamide Antibiotics) Hives    Nitrofurantoin Headache and Rash     Weakness       Prior to Admission medications    Medication Sig Start Date End Date Taking? Authorizing Provider   aspirin 81 mg EC tablet Take 1 tablet (81 mg) by mouth once daily.   Yes Historical Provider, MD   furosemide (Lasix) 40 mg tablet Take 1 tablet (40 mg) by mouth once daily. 7/23/24  Yes Historical Provider, MD   HYDROcodone-acetaminophen (Norco) 5-325 mg tablet Take 1 tablet by mouth 3 times a day as needed.   Yes Historical Provider, MD   lisinopril 10 mg tablet Take 1 tablet (10 mg) by mouth once daily. 7/23/24  Yes Historical Provider, MD   metoprolol succinate XL (Toprol-XL) 50 mg 24 hr tablet Take 1 tablet (50 mg) by mouth once daily. Do not crush  or chew.   Yes Historical Provider, MD   PARoxetine (Paxil) 10 mg tablet Take 1 tablet (10 mg) by mouth once daily in the morning.   Yes Historical Provider, MD   acyclovir (Zovirax) 400 mg tablet Take 1 tablet (400 mg) by mouth every 8 hours.  Patient not taking: Reported on 2/7/2025    Historical Provider, MD   albuterol 90 mcg/actuation inhaler Inhale 2 puffs 4 times a day as needed.  Patient not taking: Reported on 2/7/2025 12/5/24   Historical Provider, MD   clobetasol (Temovate) 0.05 % ointment Apply 0.05 Applications topically 2 times a day.  Patient not taking: Reported on 2/7/2025 3/16/23   Historical Provider, MD   docusate sodium (Colace) 100 mg capsule Take 1 capsule (100 mg) by mouth once daily.  Patient not taking: Reported on 2/7/2025 6/8/23   Historical Provider, MD   estradiol (Estrace) 0.01 % (0.1 mg/gram) vaginal cream Insert 0.5 Applicatorfuls (2 g) into the vagina once daily.  Patient not taking: Reported on 2/7/2025    Historical Provider, MD   gabapentin (Neurontin) 300 mg capsule Take 1 capsule (300 mg) by mouth 2 times a day.    Historical Provider, MD   ibuprofen 200 mg tablet Take 1 tablet (200 mg) by mouth if needed for mild pain (1 - 3).    Historical Provider, MD   ibuprofen-glycerin 600 mg kit Take 1 Dose by mouth 2 times a day as needed.  Patient not taking: Reported on 2/7/2025    Historical Provider, MD   metFORMIN (Glucophage) 500 mg tablet Take 1 tablet (500 mg) by mouth once daily.  Patient not taking: Reported on 2/7/2025    Historical Provider, MD   nitroglycerin (Nitrostat) 0.4 mg SL tablet Place 1 tablet (0.4 mg) under the tongue every 5 minutes if needed. 1/3/23   Historical Provider, MD   rosuvastatin (Crestor) 20 mg tablet Take 1 tablet (20 mg) by mouth once daily.  Patient not taking: Reported on 2/7/2025    Historical Provider, MD   sertraline (Zoloft) 100 mg tablet Take 1 tablet (100 mg) by mouth once daily.  Patient not taking: Reported on 2/7/2025    Historical  Provider, MD   traZODone (Desyrel) 50 mg tablet Take 1 tablet (50 mg) by mouth once daily at bedtime. 7/23/24   Historical Provider, MD   Macrobid 100 mg capsule Take 1 capsule (100 mg) by mouth every 12 hours. 8/30/24 2/7/25  Historical Provider, MD   metoprolol tartrate (Lopressor) 50 mg tablet Take 1 tablet by mouth once daily. 7/23/24 2/7/25  Historical Provider, MD   niacin 500 mg tablet Take 1 tablet (500 mg) by mouth once daily.  2/7/25  Historical Provider, MD DEL MATHIS Physical Exam     Airway    Testing/Diagnostic:   Cardiac Cath; 12/11/24      Patient Specialist/PCP:   PCP:  Teja Van Md at [facility] [place]    Cardiology: Dr. Lerma at [facility] 12/4/24    Urology: Sebastian Velazquez MD at [facility] presents with history of UTI's and urinary urgency   There were no vitals taken for this visit.    DASI Risk Score    No data to display       Caprini DVT Assessment    No data to display       Modified Frailty Index    No data to display       CHADS2 Stroke Risk  Current as of 15 minutes ago        N/A 3 to 100%: High Risk   2 to < 3%: Medium Risk   0 to < 2%: Low Risk     Last Change: N/A          This score determines the patient's risk of having a stroke if the patient has atrial fibrillation.        This score is not applicable to this patient. Components are not calculated.          Revised Cardiac Risk Index    No data to display       Apfel Simplified Score    No data to display       Risk Analysis Index Results This Encounter    No data found in the last 10 encounters.       Prodigy: High Risk  Total Score: 11              Prodigy Age Score      Prodigy Previous Opioid Use Score           ARISCAT Score for Postoperative Pulmonary Complications    No data to display       Prince Perioperative Risk for Myocardial Infarction or Cardiac Arrest (MICHELE)    No data to display         Assessment and Plan:   Medication Instructions:  Instructed to hold vitamins, supplements, and NSAIDs 7 days prior to  surgery.      Malika Velasco RN  Pre Admission Testing   {[facility] EMBEDDED ASSESSMENT AND PLAN:98812}

## 2025-02-11 PROBLEM — M51.372: Status: ACTIVE | Noted: 2025-01-13

## 2025-02-11 PROBLEM — M79.7 FIBROMYALGIA: Status: ACTIVE | Noted: 2025-01-13

## 2025-02-11 NOTE — PROGRESS NOTES
Referral from Dr. Sidhu. Barbie comes to discuss treatment recommendations for coronary artery stenosis. Additional history of htn, dld. Planning cabg tomorrow morning with Dr. Canchola. Answered questions. Walked her to admitting desk for reference and back to the [facility] lab for type and screen and Ha1c, Coag panel. Florinda Jay RN

## 2025-02-14 ENCOUNTER — APPOINTMENT (OUTPATIENT)
Dept: CARDIAC SURGERY | Facility: HOSPITAL | Age: 65
End: 2025-02-14
Payer: COMMERCIAL

## 2025-02-14 ENCOUNTER — PRE-ADMISSION TESTING (OUTPATIENT)
Dept: PREADMISSION TESTING | Facility: HOSPITAL | Age: 65
End: 2025-02-14
Payer: COMMERCIAL

## 2025-02-14 ENCOUNTER — HOSPITAL ENCOUNTER (OUTPATIENT)
Dept: RADIOLOGY | Facility: HOSPITAL | Age: 65
Discharge: HOME | End: 2025-02-14
Payer: COMMERCIAL

## 2025-02-14 VITALS
SYSTOLIC BLOOD PRESSURE: 107 MMHG | TEMPERATURE: 98.2 F | WEIGHT: 198 LBS | DIASTOLIC BLOOD PRESSURE: 51 MMHG | HEIGHT: 65 IN | HEART RATE: 70 BPM | OXYGEN SATURATION: 94 % | BODY MASS INDEX: 32.99 KG/M2

## 2025-02-14 DIAGNOSIS — R91.1 LUNG NODULE: ICD-10-CM

## 2025-02-14 DIAGNOSIS — G89.29 CHRONIC CHEST PAIN WITH HIGH RISK FOR CAD: ICD-10-CM

## 2025-02-14 DIAGNOSIS — F17.200 TOBACCO USE DISORDER: ICD-10-CM

## 2025-02-14 DIAGNOSIS — I10 ESSENTIAL HYPERTENSION: ICD-10-CM

## 2025-02-14 DIAGNOSIS — R91.1 LUNG NODULE SEEN ON IMAGING STUDY: ICD-10-CM

## 2025-02-14 DIAGNOSIS — Z01.818 PREOPERATIVE EXAMINATION: Primary | ICD-10-CM

## 2025-02-14 DIAGNOSIS — I25.118 CORONARY ARTERY DISEASE INVOLVING NATIVE CORONARY ARTERY OF NATIVE HEART WITH OTHER FORM OF ANGINA PECTORIS: ICD-10-CM

## 2025-02-14 DIAGNOSIS — R79.9 ABNORMAL FINDING OF BLOOD CHEMISTRY, UNSPECIFIED: ICD-10-CM

## 2025-02-14 DIAGNOSIS — R07.9 CHRONIC CHEST PAIN WITH HIGH RISK FOR CAD: ICD-10-CM

## 2025-02-14 DIAGNOSIS — Z91.89 CHRONIC CHEST PAIN WITH HIGH RISK FOR CAD: ICD-10-CM

## 2025-02-14 LAB
ALBUMIN SERPL BCP-MCNC: 4.2 G/DL (ref 3.4–5)
ALP SERPL-CCNC: 82 U/L (ref 33–136)
ALT SERPL W P-5'-P-CCNC: 28 U/L (ref 7–45)
ANION GAP SERPL CALC-SCNC: 16 MMOL/L (ref 10–20)
AST SERPL W P-5'-P-CCNC: 38 U/L (ref 9–39)
BASOPHILS # BLD AUTO: 0.04 X10*3/UL (ref 0–0.1)
BASOPHILS NFR BLD AUTO: 0.4 %
BILIRUB SERPL-MCNC: 0.8 MG/DL (ref 0–1.2)
BUN SERPL-MCNC: 14 MG/DL (ref 6–23)
CALCIUM SERPL-MCNC: 9.3 MG/DL (ref 8.6–10.6)
CHLORIDE SERPL-SCNC: 100 MMOL/L (ref 98–107)
CO2 SERPL-SCNC: 26 MMOL/L (ref 21–32)
CREAT SERPL-MCNC: 0.74 MG/DL (ref 0.5–1.05)
EGFRCR SERPLBLD CKD-EPI 2021: 89 ML/MIN/1.73M*2
EOSINOPHIL # BLD AUTO: 0.07 X10*3/UL (ref 0–0.7)
EOSINOPHIL NFR BLD AUTO: 0.7 %
ERYTHROCYTE [DISTWIDTH] IN BLOOD BY AUTOMATED COUNT: 12.5 % (ref 11.5–14.5)
GLUCOSE SERPL-MCNC: 120 MG/DL (ref 74–99)
HCT VFR BLD AUTO: 43.8 % (ref 36–46)
HGB BLD-MCNC: 15 G/DL (ref 12–16)
IMM GRANULOCYTES # BLD AUTO: 0.05 X10*3/UL (ref 0–0.7)
IMM GRANULOCYTES NFR BLD AUTO: 0.5 % (ref 0–0.9)
LYMPHOCYTES # BLD AUTO: 2.52 X10*3/UL (ref 1.2–4.8)
LYMPHOCYTES NFR BLD AUTO: 26.8 %
MCH RBC QN AUTO: 32.8 PG (ref 26–34)
MCHC RBC AUTO-ENTMCNC: 34.2 G/DL (ref 32–36)
MCV RBC AUTO: 96 FL (ref 80–100)
MONOCYTES # BLD AUTO: 0.55 X10*3/UL (ref 0.1–1)
MONOCYTES NFR BLD AUTO: 5.8 %
NEUTROPHILS # BLD AUTO: 6.19 X10*3/UL (ref 1.2–7.7)
NEUTROPHILS NFR BLD AUTO: 65.8 %
NRBC BLD-RTO: 0 /100 WBCS (ref 0–0)
PLATELET # BLD AUTO: 250 X10*3/UL (ref 150–450)
POTASSIUM SERPL-SCNC: 3.9 MMOL/L (ref 3.5–5.3)
PROT SERPL-MCNC: 6.4 G/DL (ref 6.4–8.2)
RBC # BLD AUTO: 4.57 X10*6/UL (ref 4–5.2)
SODIUM SERPL-SCNC: 138 MMOL/L (ref 136–145)
WBC # BLD AUTO: 9.4 X10*3/UL (ref 4.4–11.3)

## 2025-02-14 PROCEDURE — 87081 CULTURE SCREEN ONLY: CPT

## 2025-02-14 PROCEDURE — 85025 COMPLETE CBC W/AUTO DIFF WBC: CPT

## 2025-02-14 PROCEDURE — 99406 BEHAV CHNG SMOKING 3-10 MIN: CPT

## 2025-02-14 PROCEDURE — 99204 OFFICE O/P NEW MOD 45 MIN: CPT

## 2025-02-14 PROCEDURE — 93005 ELECTROCARDIOGRAM TRACING: CPT

## 2025-02-14 PROCEDURE — 36415 COLL VENOUS BLD VENIPUNCTURE: CPT

## 2025-02-14 PROCEDURE — 80053 COMPREHEN METABOLIC PANEL: CPT

## 2025-02-14 PROCEDURE — 93010 ELECTROCARDIOGRAM REPORT: CPT | Performed by: INTERNAL MEDICINE

## 2025-02-14 PROCEDURE — 71046 X-RAY EXAM CHEST 2 VIEWS: CPT

## 2025-02-14 RX ORDER — CHLORHEXIDINE GLUCONATE ORAL RINSE 1.2 MG/ML
15 SOLUTION DENTAL AS NEEDED
Qty: 120 ML | Refills: 0 | Status: SHIPPED | OUTPATIENT
Start: 2025-02-14 | End: 2025-02-26 | Stop reason: HOSPADM

## 2025-02-14 RX ORDER — CHLORHEXIDINE GLUCONATE 40 MG/ML
SOLUTION TOPICAL DAILY PRN
Qty: 473 ML | Refills: 0 | Status: SHIPPED | OUTPATIENT
Start: 2025-02-14 | End: 2025-02-26 | Stop reason: HOSPADM

## 2025-02-14 ASSESSMENT — ENCOUNTER SYMPTOMS
WHEEZING: 0
NAUSEA: 0
NUMBNESS: 0
NERVOUS/ANXIOUS: 0
SKIN CHANGES: 0
VOMITING: 0
DYSPNEA WITH EXERTION: 0
CHILLS: 0
HEMOPTYSIS: 0
TREMORS: 0
CONSTIPATION: 0
NECK SWELLING: 0
SINUS CONGESTION: 0
DIFFICULTY URINATING: 0
VISUAL CHANGE: 0
FEVER: 0
DOUBLE VISION: 0
SHORTNESS OF BREATH: 0
EYE DISCHARGE: 0
PALPITATIONS: 0
PND: 0
ARTHRALGIAS: 0
WEAKNESS: 0
LIGHT-HEADEDNESS: 0
VERTIGO: 0
NECK MASS: 0
LIMITED RANGE OF MOTION: 0
EXCESSIVE BLEEDING: 0
UNEXPECTED WEIGHT CHANGE: 0
VOICE CHANGE: 0
DIARRHEA: 0
RHINORRHEA: 0
BLOOD IN STOOL: 0
DYSPNEA AT REST: 0
CONFUSION: 0
POLYDIPSIA: 0
NECK PAIN: 0
WOUND: 0
BRUISES/BLEEDS EASILY: 0
NECK STIFFNESS: 0
ABDOMINAL DISTENTION: 0
ABDOMINAL PAIN: 0
DYSURIA: 0
JOINT SWELLING: 0
COUGH: 0
TROUBLE SWALLOWING: 0
MYALGIAS: 0

## 2025-02-14 ASSESSMENT — DUKE ACTIVITY SCORE INDEX (DASI)
CAN YOU DO HEAVY WORK AROUND THE HOUSE LIKE SCRUBBING FLOORS OR LIFTING AND MOVING HEAVY FURNITURE: NO
CAN YOU DO YARD WORK LIKE RAKING LEAVES, WEEDING OR PUSHING A MOWER: NO
CAN YOU TAKE CARE OF YOURSELF (EAT, DRESS, BATHE, OR USE TOILET): YES
CAN YOU DO LIGHT WORK AROUND THE HOUSE LIKE DUSTING OR WASHING DISHES: YES
CAN YOU PARTICIPATE IN MODERATE RECREATIONAL ACTIVITIES LIKE GOLF, BOWLING, DANCING, DOUBLES TENNIS OR THROWING A BASEBALL OR FOOTBALL: YES
CAN YOU DO MODERATE WORK AROUND THE HOUSE LIKE VACUUMING, SWEEPING FLOORS OR CARRYING GROCERIES: YES
CAN YOU WALK A BLOCK OR TWO ON LEVEL GROUND: YES
CAN YOU CLIMB A FLIGHT OF STAIRS OR WALK UP A HILL: NO
TOTAL_SCORE: 19.45
CAN YOU WALK INDOORS, SUCH AS AROUND YOUR HOUSE: YES
DASI METS SCORE: 5.1
CAN YOU RUN A SHORT DISTANCE: NO
CAN YOU PARTICIPATE IN STRENOUS SPORTS LIKE SWIMMING, SINGLES TENNIS, FOOTBALL, BASKETBALL, OR SKIING: NO
CAN YOU HAVE SEXUAL RELATIONS: NO

## 2025-02-14 ASSESSMENT — LIFESTYLE VARIABLES: SMOKING_STATUS: SMOKER

## 2025-02-14 NOTE — CPM/PAT H&P
CPM/PAT Evaluation       Name: Barbie Milan (Barbie Milan)  /Age: 1960/65 y.o.     Visit Type:   In-Person       Chief Complaint: Perioperative Evaluation    HPI HPI: 66 y/o female scheduled for CABG, 3-4 VESSELS  on 2025  with Dr. Gallo Canchola secondary to Coronary artery disease involving native coronary artery of native heart with other form of angina pectoris .   Presents to CPM today for perioperative risk stratification and optimization. PMHX includes Depression, CAD (MI  stentx4 RCA), HLD, HTN, PVD (4stents), DM2, Recurrent UTI, Overactive Bladder, Polycythemia, Fibromyalgia, OA, gout.    PCP: Teja Van MD PCP  Specialists:   TREY Cason - CNP   Cardiology - Dr. Lerma at [facility]   Urology - TREY Sun.CNP           Past Medical History:   Diagnosis Date    Abnormal gait 2021    Acute upper respiratory infection 2025    Allergic rhinitis with postnasal drip 10/03/2024    Anxiety     Arthralgia of right knee 10/06/2021    Bilateral adrenal adenomas 2022    Bladder diverticulum 2022    CAD (coronary artery disease)     (MI  stentx4 RCA),    Carpal tunnel syndrome 2021    Chronic otitis media 2025    Chronic sinusitis 2025    Coronary artery disease     Cyst of ovary, unspecified laterality 2023    Degeneration of lumbosacral intervertebral disc 2025    Disease due to severe acute respiratory syndrome coronavirus 2 (SARS-CoV-2) 2025    Disturbance in speech 2021    Dizziness and giddiness 2025    DM (diabetes mellitus) (Multi)     Fatigue     Fatty liver 2022    Fibromyalgia 2025    Hearing aid worn     HL (hearing loss)     20% in Left ear, none in right ear- reads lips    Hyperlipidemia     Hypertension     Low back pain 2025    Lumbosacral spondylosis without myelopathy 2025    Muscle spasticity 2022    Myocardial infarction (Multi)      2007 x7, 6 months ago    Obesity with body mass index 30 or greater 12/15/2015    Body mass index 30+ - obesity      Otosclerosis of both ears 03/24/2022    Overactive bladder 09/10/2024    Perforation of tympanic membrane 01/13/2025    Peripheral vascular disease (CMS-HCC) 01/13/2025    4 stents in abdomin, patient reports vasculature in abdomin    Polycythemia 04/02/2022    Primary fibromyalgia syndrome 06/29/2017    Psychophysiological insomnia 10/28/2022    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) 03/24/2022    Recurrent otitis media, right 03/24/2022    Sciatica 04/19/2021    Shortness of breath     FLETCHER    Spinal stenosis of lumbar region 08/29/2018    Sprain of knee 01/13/2025    Tear of lateral meniscus of knee 06/08/2022    Tobacco use     Total perforation of tympanic membrane, right 06/19/2023    Type 2 diabetes mellitus     Urinary tract infection with hematuria 09/10/2024    Uterine fibroid 2003    Vertigo     Wears glasses 04/19/2021       Past Surgical History:   Procedure Laterality Date    CARDIAC CATHETERIZATION  10/29/2007    CHOLECYSTECTOMY      CORONARY ANGIOPLASTY WITH STENT PLACEMENT  2007    x4 stents    DESCENDING AORTIC ANEURYSM REPAIR W/ STENT      HYSTERECTOMY  05/23/2023    ILIAC ARTERY STENT Bilateral 02/24/2023    KNEE SURGERY      KNEE ARTHROSCOPY- meniscal repair    OOPHORECTOMY      TUBAL LIGATION  1981    VULVA SURGERY Right 2021    for dysplasia       Patient Sexual activity questions deferred to the physician.    Family History   Problem Relation Name Age of Onset    Stroke Mother      No Known Problems Father      Stroke Daughter         Allergies   Allergen Reactions    Celecoxib Hives, Itching and Unknown    Macrobid [Nitrofurantoin Monohyd/M-Cryst] GI Upset     Bad headacxhe very ill    Niacin Hives and Other    Sulfa (Sulfonamide Antibiotics) Hives, Unknown and Rash    Inositol Hives    Metoprolol Unknown     pt took metoprolol succ and had a reaction  that she feels was hives, switched to tartrate and no problems    Itchiness with certain brands    Sulfur Unknown    Tobramycin Swelling and Unknown    Topiramate Hives    Nitrofurantoin Headache and Rash     Weakness    Statins-Hmg-Coa Reductase Inhibitors Unknown     myalgias       Prior to Admission medications    Medication Sig Start Date End Date Taking? Authorizing Provider   aspirin 81 mg EC tablet Take 1 tablet (81 mg) by mouth once daily.    Historical Provider, MD   furosemide (Lasix) 40 mg tablet Take 1 tablet (40 mg) by mouth once daily. 7/23/24   Historical Provider, MD   gabapentin (Neurontin) 300 mg capsule Take 1 capsule (300 mg) by mouth 2 times a day.    Historical Provider, MD   HYDROcodone-acetaminophen (Norco) 5-325 mg tablet Take 1 tablet by mouth 3 times a day as needed.    Historical Provider, MD   ibuprofen 200 mg tablet Take 1 tablet (200 mg) by mouth if needed for mild pain (1 - 3).    Historical Provider, MD   lisinopril 10 mg tablet Take 1 tablet (10 mg) by mouth once daily. 7/23/24   Historical Provider, MD   metoprolol succinate XL (Toprol-XL) 50 mg 24 hr tablet Take 1 tablet (50 mg) by mouth once daily. Do not crush or chew.    Historical Provider, MD   nitroglycerin (Nitrostat) 0.4 mg SL tablet Place 1 tablet (0.4 mg) under the tongue every 5 minutes if needed. 1/3/23   Historical Provider, MD   PARoxetine (Paxil) 10 mg tablet Take 1 tablet (10 mg) by mouth once daily in the morning.    Historical Provider, MD   rosuvastatin (Crestor) 20 mg tablet Take 1 tablet (20 mg) by mouth once daily.  Patient not taking: Reported on 2/7/2025    Historical Provider, MD   traZODone (Desyrel) 50 mg tablet Take 1 tablet (50 mg) by mouth once daily at bedtime. 7/23/24   Historical Provider, MD   acyclovir (Zovirax) 400 mg tablet Take 1 tablet (400 mg) by mouth every 8 hours.  Patient not taking: Reported on 2/7/2025 2/11/25  Historical Provider, MD   albuterol 90 mcg/actuation inhaler Inhale 2  puffs 4 times a day as needed.  Patient not taking: Reported on 2/7/2025 12/5/24 2/11/25  Historical Provider, MD   clobetasol (Temovate) 0.05 % ointment Apply 0.05 Applications topically 2 times a day.  Patient not taking: Reported on 2/7/2025 3/16/23 2/11/25  Historical Provider, MD   docusate sodium (Colace) 100 mg capsule Take 1 capsule (100 mg) by mouth once daily.  Patient not taking: Reported on 2/7/2025 6/8/23 2/11/25  Historical Provider, MD   estradiol (Estrace) 0.01 % (0.1 mg/gram) vaginal cream Insert 0.5 Applicatorfuls (2 g) into the vagina once daily.  Patient not taking: Reported on 2/7/2025 2/11/25  Historical Provider, MD   ibuprofen-glycerin 600 mg kit Take 1 Dose by mouth 2 times a day as needed.  Patient not taking: Reported on 2/7/2025 2/11/25  Historical Provider, MD   metFORMIN (Glucophage) 500 mg tablet Take 1 tablet (500 mg) by mouth once daily.  Patient not taking: Reported on 2/7/2025 2/11/25  Historical Provider, MD   sertraline (Zoloft) 100 mg tablet Take 1 tablet (100 mg) by mouth once daily.  Patient not taking: Reported on 2/7/2025 2/11/25  Historical Provider, MD MATHIS ROS:   Constitutional:    no fever   no chills   no unexpected weight change  Neuro/Psych:    no numbness   no weakness   no light-headedness   no tremors   no confusion   not nervous/anxious   no self-injury   no suicidal ideation  Eyes:    no discharge   no vision loss   no diplopia   no visual disturbance   use of corrective lenses  Ears:    no ear pain   hearing loss (reads lips)   no vertigo   no tinnitus   hearing aides  Nose:    no nasal discharge   no sinus congestion   no epistaxis  Mouth:    no dental issues   no mouth pain   no oral bleeding   no mouth lesions  Throat:    no throat pain   no dysphagia   no voice change  Neck:    no neck pain   neck swelling   no neck stiffness   no neck masses  Cardio:    no chest pain   no palpitations   no peripheral edema   no dyspnea   no FLETCHER   no paroxysmal  nocturnal dyspnea  Respiratory:    no cough   no wheezing   no hemoptysis   no shortness of breath  Endocrine:    no cold intolerance   no heat intolerance   no polydipsia  GI:    no abdominal distention   no abdominal pain   no constipation   no diarrhea   no nausea   no vomiting   no blood in stool  :    no difficulty urinating   no dysuria   no oliguria   no polyuria  Musculoskeletal:    no arthralgias   no myalgias   no decreased ROM   no swelling  Hematologic:    does not bruise/bleed easily   no excessive bleeding   no history of blood transfusion   no blood clots  Skin:   no skin changes   no sores/wound   no rash      Physical Exam  Vitals reviewed. Physical exam within normal limits.   Constitutional:       General: She is not in acute distress.     Appearance: Normal appearance. She is normal weight. She is not ill-appearing, toxic-appearing or diaphoretic.      Comments: Pleasant and cooperative, must read lips to communicate, hearing loss   HENT:      Head: Normocephalic.      Nose: Nose normal. No congestion or rhinorrhea.      Mouth/Throat:      Mouth: Mucous membranes are moist.      Pharynx: Oropharynx is clear. No oropharyngeal exudate or posterior oropharyngeal erythema.   Eyes:      General: No scleral icterus.        Right eye: No discharge.         Left eye: No discharge.      Conjunctiva/sclera: Conjunctivae normal.   Neck:      Vascular: No carotid bruit.   Cardiovascular:      Rate and Rhythm: Normal rate and regular rhythm.      Pulses: Normal pulses.      Heart sounds: Normal heart sounds. No murmur heard.     No friction rub. No gallop.   Pulmonary:      Effort: Pulmonary effort is normal. No respiratory distress.      Breath sounds: Normal breath sounds. No stridor. No wheezing, rhonchi or rales.   Chest:      Chest wall: No tenderness.   Musculoskeletal:         General: No swelling or tenderness. Normal range of motion.      Cervical back: Normal range of motion and neck supple. No  "rigidity or tenderness.   Skin:     General: Skin is warm and dry.   Neurological:      General: No focal deficit present.      Mental Status: She is alert.      Motor: No weakness.   Psychiatric:         Mood and Affect: Mood normal.         Behavior: Behavior normal.         Thought Content: Thought content normal.         Judgment: Judgment normal.          PAT AIRWAY:   Airway:     Mallampati::  I    TM distance::  >3 FB    Neck ROM::  Full   upper dentures and lower dentures        Visit Vitals  /51   Pulse 70   Temp 36.8 °C (98.2 °F)   Ht 1.651 m (5' 5\")   Wt 89.8 kg (198 lb)   SpO2 94%   BMI 32.95 kg/m²   Smoking Status Every Day   BSA 2.03 m²       DASI Risk Score      Flowsheet Row Pre-Admission Testing from 2/14/2025 in [facility]   Can you take care of yourself (eat, dress, bathe, or use toilet)?  2.75 filed at 02/14/2025 0946   Can you walk indoors, such as around your house? 1.75 filed at 02/14/2025 0946   Can you walk a block or two on level ground?  2.75 filed at 02/14/2025 0946   Can you climb a flight of stairs or walk up a hill? 0 filed at 02/14/2025 0946   Can you run a short distance? 0 filed at 02/14/2025 0946   Can you do light work around the house like dusting or washing dishes? 2.7 filed at 02/14/2025 0946   Can you do moderate work around the house like vacuuming, sweeping floors or carrying groceries? 3.5 filed at 02/14/2025 0946   Can you do heavy work around the house like scrubbing floors or lifting and moving heavy furniture?  0 filed at 02/14/2025 0946   Can you do yard work like raking leaves, weeding or pushing a mower? 0 filed at 02/14/2025 0946   Can you have sexual relations? 0 filed at 02/14/2025 0946   Can you participate in moderate recreational activities like golf, bowling, dancing, doubles tennis or throwing a baseball or football? 6 filed at 02/14/2025 0946   Can you participate in strenous sports like swimming, singles tennis, football, basketball, " or skiing? 0 filed at 02/14/2025 0946   DASI SCORE 19.45 filed at 02/14/2025 0946   METS Score (Will be calculated only when all the questions are answered) 5.1 filed at 02/14/2025 0946          Capmadhavi DVT Assessment      Flowsheet Row Pre-Admission Testing from 2/14/2025 in [facility]   DVT Score (IF A SCORE IS NOT CALCULATING, MUST SELECT A BMI TO COMPLETE) 14 filed at 02/14/2025 1048   Medical Factors Acute myocardial infarction, Family history of DVT/PE filed at 02/14/2025 1048   Surgical Factors Major surgery planned, lasting over 3 hours filed at 02/14/2025 1048   BMI (BMI MUST BE CHOSEN) 31-40 (Obesity) filed at 02/14/2025 1048          Modified Frailty Index      Flowsheet Row Pre-Admission Testing from 2/14/2025 in [facility]   Non-independent functional status (problems with dressing, bathing, personal grooming, or cooking) 0 filed at 02/14/2025 1048   History of diabetes mellitus  0.0909 filed at 02/14/2025 1048   History of COPD 0 filed at 02/14/2025 1048   History of CHF No filed at 02/14/2025 1048   History of MI 0.0909 filed at 02/14/2025 1048   History of Percutaneous Coronary Intervention, Cardiac Surgery, or Angina 0.0909 filed at 02/14/2025 1048   Hypertension requiring the use of medication  0.0909 filed at 02/14/2025 1048   Peripheral vascular disease 0.0909 filed at 02/14/2025 1048   Impaired sensorium (cognitive impairement or loss, clouding, or delirium) 0 filed at 02/14/2025 1048   TIA or CVA withouy residual deficit 0 filed at 02/14/2025 1048   Cerebrovascular accident with deficit 0 filed at 02/14/2025 1048   Modified Frailty Index Calculator .4545 filed at 02/14/2025 1048          CHADS2 Stroke Risk  Current as of 11 minutes ago        N/A 3 to 100%: High Risk   2 to < 3%: Medium Risk   0 to < 2%: Low Risk     Last Change: N/A          This score determines the patient's risk of having a stroke if the patient has atrial fibrillation.        This score is  not applicable to this patient. Components are not calculated.          Revised Cardiac Risk Index      Flowsheet Row Pre-Admission Testing from 2/14/2025 in [facility]   High-Risk Surgery (Intraperitoneal, Intrathoracic,Suprainguinal vascular) 1 filed at 02/14/2025 1046   History of ischemic heart disease (History of MI, History of positive exercuse test, Current chest paint considered due to myocardial ischemia, Use of nitrate therapy, ECG with pathological Q Waves) 1 filed at 02/14/2025 1046   History of congestive heart failure (pulmonary edemia, bilateral rales or S3 gallop, Paroxysmal nocturnal dyspnea, CXR showing pulmonary vascular redistribution) 0 filed at 02/14/2025 1046   History of cerebrovascular disease (Prior TIA or stroke) 0 filed at 02/14/2025 1046   Pre-operative insulin treatment 0 filed at 02/14/2025 1046   Pre-operative creatinine>2 mg/dl 0 filed at 02/14/2025 1046   Revised Cardiac Risk Calculator 2 filed at 02/14/2025 1046          Apfel Simplified Score      Flowsheet Row Pre-Admission Testing from 2/14/2025 in [facility]   Smoking status 0 filed at 02/14/2025 1048   History of motion sickness or PONV  0 filed at 02/14/2025 1048   Use of postoperative opioids 1 filed at 02/14/2025 1048   Gender - Female 1=Yes filed at 02/14/2025 1048   Apfel Simplified Score Calculator 2 filed at 02/14/2025 1048          Risk Analysis Index Results This Encounter    No data found in the last 10 encounters.       Stop Bang Score      Flowsheet Row Pre-Admission Testing from 2/14/2025 in [facility]   Do you snore loudly? 1 filed at 02/14/2025 0947   Do you often feel tired or fatigued after your sleep? 1 filed at 02/14/2025 0947   Has anyone ever observed you stop breathing in your sleep? 0 filed at 02/14/2025 0947   Do you have or are you being treated for high blood pressure? 1 filed at 02/14/2025 0947   Recent BMI (Calculated) 33 filed at 02/14/2025 0947    Is BMI greater than 35 kg/m2? 0=No filed at 02/14/2025 0947   Age older than 50 years old? 1=Yes filed at 02/14/2025 0947   Is your neck circumference greater than 17 inches (Male) or 16 inches (Female)? 0 filed at 02/14/2025 0947   Gender - Male 0=No filed at 02/14/2025 0947   STOP-BANG Total Score 4 filed at 02/14/2025 0947          Prodigy: High Risk  Total Score: 11              Prodigy Age Score      Prodigy Previous Opioid Use Score           ARISCAT Score for Postoperative Pulmonary Complications      Flowsheet Row Pre-Admission Testing from 2/14/2025 in [facility]   Age Calculated Score 3 filed at 02/14/2025 1047   Preoperative SpO2 8 filed at 02/14/2025 1047   Respiratory infection in the last month Either upper or lower (i.e., URI, bronchitis, pneumonia), with fever and antibiotic treatment 0 filed at 02/14/2025 1047   Preoperative anemia (Hgb less than 10 g/dl) 0 filed at 02/14/2025 1047   Surgical incision  24 filed at 02/14/2025 1047   Duration of surgery  23 filed at 02/14/2025 1047   Emergency Procedure  0 filed at 02/14/2025 1047   ARISCAT Total Score  58 filed at 02/14/2025 1047          Suresh Perioperative Risk for Myocardial Infarction or Cardiac Arrest (MICHELE)    No data to display         Assessment and Plan:   Anesthesia/Airway:  No anesthesia complications      Neuro:    #Depression, - trazodone PRN (continue), paxil (continue), follows with PCP no further perioperative interventions.     Patient is at an increased risk for post operative delirium secondary to age >/= 65, depression, and type and duration of surgery.  Preoperative brain exercise educational handout provided to patient.    The patient is at an increased risk for perioperative stroke secondary to cardiac disease, increased age, HTN, HLD, DM , female sex , cardiac surgery, general anesthesia, and op time >2.5 hours.       HEENT/Airway:    No HEENT diagnosis or significant findings on chart review or clinical  presentation and evaluation. No further preoperative testing/intervention indicated at this time.      Cardiovascular:    #CAD (MI  stentx7, MI ), HLD, HTN,-  medicated with aspirin (continue), lasix (hold day of surgery), lisinopril (last dose ), metoprolol succinate (continue), nitroglycerin (continue),  and follows with PCP. BP today is 107/51 and denies cardiovascular symptoms. Physical exam is benign. Functional capacity is >4. No additional preoperative testing is currently indicated. Patient reports she has upcoming appointment with Surgeon on Monday. Patient has CXR order to complete after CPM for perioperative work up.       METS are 5.1    RCRI  2 which is 10.1% 30 day risk of MACE (risk for cardiac death, nonfatal myocardial infarction, and nonfactal cardiac arrest    MICHELE score which indicates a   0.8 % risk of intraoperative or 30-day postoperative MACE    EKG 25  Pending cardiology review    Cardiac Cath 24          Pulmonary:    #Tobacco Use - Discussed smoking cessation and perioperative risks.  Preoperative deep breathing educational handout provided to patient.    No pulmonary diagnosis, however patient is at increased risk of perioperative complications secondary to  age > 60, Tobacco abuse, obesity, site of surgery, duration of surgery > 2 hours, types of anesthetic    ARISCAT:    58  points which is a high (42.1%) risk of in-hospital post-op pulmonary complications     PRODIGY:  8  points which is a intermediate risk of post op opioid induced respiratory depression episodes    STOP BAN   points which is a intermediate risk for moderate to severe BRYN    Smoking cessation discussed with patient, patient also provided cessation education/hotline handout, [facility] toilet education discussed, patient also provided deep breathing exercises and incentive spirometry educational handout      Renal:   No renal diagnosis, however patient is at increase risk for perioperative  renal complications secondary to  Age equal to or greater than 56, BMI equal to or greater than 30, HTN, diabetes, cardiac surgery, use of an ace, arb, or NSAID     DPS if cardiac surgery  Pt at Low risk for perioperative ALEXSANDER based on Dynamic Predictive Scoring Tool for Perioperative ALEXSANDER      Endocrine:   #DM2- no current medication use, juma reports she used to take metformin although no longer does after successful weight losss. Currently lifestyle/diet modifications and PCP monitors. A1c updated.      Hematologic:      #Polycythemia-  RBC wnl last CBC on 8/22/24, however Hgb noted elevated slightly at 16.3 repeat today.     Preoperative DVT educational handout provided to patient.  Caprini Score:  14  points which is a highest risk of perioperative VTE      Gastrointestinal:   #Recurrent UTI, Overactive Bladder - Denies any urinary symptoms today, Follows with Urology. No further perioperative interventions    EAT-10 score of    0  : self-perceived oropharyngeal dysphagia scale (0-40)     Apfel: 2 points 39% risk for post operative N/V      Infectious disease:     No infectious diagnosis or significant findings on chart review or clinical presentation and evaluation.   Prescription provided for CHG body wash and dental rinse. CHG use instructions reviewed and provided to patient.  Staph screen collected      Musculoskeletal:    #Fibromyalgia, OA, gout - medicated with PRN medications noted below as well as norco (continue). No further perioperative interventions. No current gout flares.       Other:     Hold all vitamins and supplements 7 days prior to surgery  Tylenol okay to continue, please hold Aleve/naproxen/ibuprofen (NSAIDs) for 7 days prior to surgery        Labs ordered  cbc, comp, coag, t/s, A1c, and MSSA/MRSA culture      Medication instructions and NPO guidelines reviewed with the patient.  All questions or concerns discussed and addressed.      Vicky Vazquez PA-C       gout - medicated with PRN medications noted below as well as norco (continue). No further perioperative interventions. No current gout flares.       Other:     Hold all vitamins and supplements 7 days prior to surgery  Tylenol okay to continue, please hold Aleve/naproxen/ibuprofen (NSAIDs) for 7 days prior to surgery        Labs ordered  Recent Results (from the past 3 weeks)   Staphylococcus aureus/MRSA colonization, Culture    Collection Time: 02/14/25 10:14 AM    Specimen: Nares/Axilla/Groin; Swab   Result Value Ref Range    Staph/MRSA Screen Culture No Staphylococcus aureus isolated    Comprehensive Metabolic Panel    Collection Time: 02/14/25 10:14 AM   Result Value Ref Range    Glucose 120 (H) 74 - 99 mg/dL    Sodium 138 136 - 145 mmol/L    Potassium 3.9 3.5 - 5.3 mmol/L    Chloride 100 98 - 107 mmol/L    Bicarbonate 26 21 - 32 mmol/L    Anion Gap 16 10 - 20 mmol/L    Urea Nitrogen 14 6 - 23 mg/dL    Creatinine 0.74 0.50 - 1.05 mg/dL    eGFR 89 >60 mL/min/1.73m*2    Calcium 9.3 8.6 - 10.6 mg/dL    Albumin 4.2 3.4 - 5.0 g/dL    Alkaline Phosphatase 82 33 - 136 U/L    Total Protein 6.4 6.4 - 8.2 g/dL    AST 38 9 - 39 U/L    Bilirubin, Total 0.8 0.0 - 1.2 mg/dL    ALT 28 7 - 45 U/L   CBC and Auto Differential    Collection Time: 02/14/25 10:14 AM   Result Value Ref Range    WBC 9.4 4.4 - 11.3 x10*3/uL    nRBC 0.0 0.0 - 0.0 /100 WBCs    RBC 4.57 4.00 - 5.20 x10*6/uL    Hemoglobin 15.0 12.0 - 16.0 g/dL    Hematocrit 43.8 36.0 - 46.0 %    MCV 96 80 - 100 fL    MCH 32.8 26.0 - 34.0 pg    MCHC 34.2 32.0 - 36.0 g/dL    RDW 12.5 11.5 - 14.5 %    Platelets 250 150 - 450 x10*3/uL    Neutrophils % 65.8 40.0 - 80.0 %    Immature Granulocytes %, Automated 0.5 0.0 - 0.9 %    Lymphocytes % 26.8 13.0 - 44.0 %    Monocytes % 5.8 2.0 - 10.0 %    Eosinophils % 0.7 0.0 - 6.0 %    Basophils % 0.4 0.0 - 2.0 %    Neutrophils Absolute 6.19 1.20 - 7.70 x10*3/uL    Immature Granulocytes Absolute, Automated 0.05 0.00 - 0.70 x10*3/uL     Lymphocytes Absolute 2.52 1.20 - 4.80 x10*3/uL    Monocytes Absolute 0.55 0.10 - 1.00 x10*3/uL    Eosinophils Absolute 0.07 0.00 - 0.70 x10*3/uL    Basophils Absolute 0.04 0.00 - 0.10 x10*3/uL           Medication instructions and NPO guidelines reviewed with the patient.  All questions or concerns discussed and addressed.      Vicky Vazquez PA-C

## 2025-02-14 NOTE — PREPROCEDURE INSTRUCTIONS
Thank you for visiting [facility] ([facility]) today for your pre-procedure evaluation, you were seen by     Vicky Vazquez PA-C   Department of Anesthesiology and Perioperative Medicine  Main phone [phone]  Fax [phone]      This summary includes instructions and information to aid you during your perioperative period.  Please read carefully. If you have any questions about your visit today, please call the number listed above.  If you become ill or have any changes to your health before your surgery, please contact your primary care provider and alert your surgeon.    Preparing for Surgery       Preoperative Fasting Guidelines    Why must I stop eating and drinking near surgery time?  With sedation, food or liquid in your stomach can enter your lungs causing serious complications  Food can increase nausea and vomiting  When do I need to stop eating and drinking before my surgery?  Do not eat any food after midnight the night before your surgery/procedure.  You may have up to 13.5 ounces of clear liquid until TWO hours before your instructed arrival time to the hospital.  This includes water, black tea/coffee, (no milk or cream) apple juice, and electrolyte drinks (Gatorade)  You may chew gum until TWO hours before your surgery/procedure    Tobacco and Alcohol;  Do not drink alcohol or smoke within 24 hours of surgery.  It is best to quit smoking for as long as possible before any surgery or procedure.    Quitting Smoking; Recognizing Dangerous Situations:  1-Alcohol use during the first month after quitting, 2-Being around smoke or someone who smokes 3-Times situation routinely smoked  4-Triggers-car, breaks, coffee, when awakening, social events  Coping Skills: 1-Learning new ways to manage stress 2-Exercising 3-Relaxation breathing   4-Change routines 5-Distraction techniques    Websites:  Smoke-Free - offers free text messages and an camilo to help you quit. Info includes eating  "and mood issues that may come with quitting. There is a Live Helpline to talk to an expert. Go to smokefree.gov; Become an Ex-Smoker - the free EX Plan is based on scientific research and useful advice from ex-smokers. It isn't just about quitting smoking.  It's about re-learning life without cigarettes using a 3-step program.  Go to becomeanex[employer]   Centers for Disease Control offer many suggestions for helping you quit. Includes a Quit Guide and real-life stories. There are sections for specific groups such as LGBT, , different ethnic groups, and pregnant women.  Go to cdc.gov/tobacco/campaign/tips    Other Resources:  [place] Tobacco Quit Line - call 1-800-QUIT-NOW or 1-568.450.1568.  [facility] 2-1-1 - to find local programs and resources. Call 211 or go to [employer].  [facility] Tobacco Cessation Program - call [phone].  American Lung Association offers classes for quitting smoking. Some places may charge a fee. For a list of classes, go to lung.org or call 7-665-LUNGFanminder.     Some things to think about:; The health benefits of quitting smoking can help most of the major parts of your body. There is no safe amount of cigarette smoke. Quitting smoking can add years to your life. When you quit, you'll also protect your loved ones from dangerous secondhand smoke. Make a plan, join a support group, and talk to your physician to assist in quitting smoking.                                                                                                                   Other Instructions  Why did I have my nose, under my arms, and groin swabbed? The purpose of the swab is to identify Staphylococcus aureus inside your nose or on your skin.  The swab was sent to the laboratory for culture.  A positive swab/culture for Staphylococcus aureus is called colonization or carriage.   What is Staphylococcus aureus? Staphylococcus aureus, also known as \"staph\", is a germ found on the skin or in the nose of " healthy people.  Sometimes Staphylococcus aureus can get into the body and cause an infection.  This can be minor (such as pimples, boils, or other skin problems).  It might also be serious (such as a blood infection, pneumonia, or a surgical site infection). What is Staphylococcus aureus colonization or carriage? Colonization or carriage means that a person has the germ but is not sick from it.  These bacteria can be spread on the hands or when breathing or sneezing. How is Staphylococcus aureus spread? It is most often spread by close contact with a person or item that carries it. What happens if my culture is positive for Staphylococcus aureus? Your doctor/medical team will use this information to guide any antibiotic treatment which may be necessary.  Regardless of the culture results, we will clean the inside of your nose with a betadine swab just before you have your surgery. Will I get an infection if I have Staphylococcus aureus in my nose or on my skin? Anyone can get an infection with Staphylococcus aureus.  However, the best way to reduce your risk of infection is to follow the instructions provided to you for the use of your CHG soap and dental rinse.  , Body Wash; What is a home preoperative antibacterial shower? This shower is a way of cleaning the skin with a germ-killing solution before surgery.  The solution contains chlorhexidine, commonly known as CHG.  CHG is a skin cleanser with germ-killing ability.  Let your doctor know if you are allergic to chlorhexidine. Why do I need to take a preoperative antibacterial shower? Skin is not sterile.  It is best to try to make your skin as free of germs as possible before surgery.  Proper cleansing with a germ-killing soap before surgery can lower the number of germs on your skin.  This helps to reduce the risk of infection at the surgical site.  Following the instructions listed below will help you prepare your skin for surgery.   How do I use the solution?  Steps:  Begin using your CHG soap 5 days before your scheduled surgery on ___________.   First, wash and rinse your hair using the CHG soap. Keep CHG soap away from ear canals and eyes.  Rinse completely, do not condition.  Hair extensions should be removed. , Oral/Dental Rinse: What is oral/dental rinse?  It is a mouthwash. It is a way of cleaning the mouth with a germ-killing solution before your surgery.  The solution contains chlorhexidine, commonly known as CHG. It is used inside the mouth to kill a bacteria known as Staphylococcus aureus.  Let your doctor know if you are allergic to Chlorhexidine. Why do I need to use CHG oral/dental rinse? The CHG oral/dental rinse helps to kill a bacteria in your mouth known as Staphylococcus aureus.  This reduces the risk of infection at the surgical site.  Using your CHG oral/dental rinse STEPS: Use your CHG oral/dental rinse after you brush your teeth the night before (at bedtime) and the morning of your surgery.  Follow all directions on your prescription label.  Use the cap on the container to measure 15 ml.  Swish (gargle if you can) the mouthwash in your mouth for at least 30 seconds, (do not swallow) and spit out.  After you use your CHG rinse, do not rinse your mouth with water, drink or eat.  Please refer to the prescription label for the appropriate time to resume oral intake What side effects might I have using the CHG oral/dental rinse? CHG rinse will stick to plaque on the teeth.  Brush and floss just before use.  Teeth brushing will help avoid staining of plaque during use.       The Week before Surgery        Seven days before Surgery  Check your CPM medication instructions  Do the exercises provided to you by CPM   Arrange for a responsible, adult licensed  to take you home after surgery and stay with you for 24 hours.  You will not be permitted to drive yourself home if you have received any anesthetic/sedation  Five days before surgery  Check your  CPM medication instructions  Do the exercises provided to you by [facility]   Start using Chlorhexidene (CHG) body wash if prescribed (Continue till day of surgery)      The Day before Surgery       Check your [facility]PM medication and all other CPM instructions including when to stop eating and drinking  You will be called with your arrival time for surgery in the late afternoon.  If you do not receive a call please reach out to your surgeon's office.  Do not smoke or drink 24 hours before surgery  Prepare items to bring with you to the hospital  Shower with your chlorhexidine wash if prescribed  Brush your teeth and use your chlorhexidine dental rinse if prescribed    The Day of Surgery       Check your [facility] medication instructions  Ensure you follow the instructions for when to stop eating and drinking  Shower, if prescribed use CHG.  Do not apply any lotions, creams, moisturizers, perfume or deodorant  Brush your teeth and use your CHG dental rinse if prescribed  Wear loose comfortable clothing  Avoid make-up  Remove  jewelry and piercings, consider professional piercing removal with a plastic spacer if needed  Bring photo ID and Insurance card  Bring an accurate medication list that includes medication dose, frequency and allergies  Bring a copy of your advanced directives (will, health care power of )  Bring any devices and controllers as well as medical devices you have been provided with for surgery (CPAP, slings, braces, etc.)  Dentures, eyeglasses, and contacts will be removed before surgery, please bring cases for contacts or glasses    Preoperative Deep Breathing Exercises    Why it is important to do deep breathing exercises before my surgery?  Deep breathing exercises strengthen your breathing muscles.  This helps you to recover after your surgery and decreases the chance of breathing complications.    How are the deep breathing exercises done?  Sit straight with your back supported.  Breathe in deeply and  slowly through your nose. Your lower rib cage should expand and your abdomen may move forward.  Hold that breath for 3 to 5 seconds.  Breathe out through pursed lips, slowly and completely.  Rest and repeat 10 times every hour while awake.  Rest longer if you become dizzy or lightheaded.      Preoperative Brain Exercises    What are brain exercises?  A brain exercise is any activity that engages your thinking (cognitive) skills.    What types of activities are considered brain exercises?  Jigsaw puzzles, crossword puzzles, word jumble, memory games, word search, and many more.  Many can be found free online or on your phone via a mobile camilo.    Why should I do brain exercises before my surgery?  More recent research has shown brain exercise before surgery can lower the risk of postoperative delirium (confusion) which can be especially important for older adults.  Patients who did brain exercises for 5 to 10 hours the days before surgery, cut their risk of postoperative delirium in half up to 1 week after surgery.    Sit-to-Stand Exercise    What is the sit-to-stand exercise?  The sit-to-stand exercise strengthens the muscles of your lower body and muscles in the center of your body (core muscles for stability) helping to maintain and improve your strength and mobility.  How do I do the sit-to-stand exercise?  The goal is to do this exercise without using your arms or hands.  If this is too difficult, use your arms and hands or a chair with armrests to help slowly push yourself to the standing position and lower yourself back to the sitting position. As the movement becomes easier use your arms and hands less.    Steps to the sit-to-stand exercise  Sit up tall in a sturdy chair, knees bent, feet flat on the floor shoulder-width apart.  Shift your hips/pelvis forward in the chair to correctly position yourself for the next movement.  Lean forward at your hips.  Stand up straight putting equal weight on both  feet.  Check to be sure you are properly aligned with the chair, in a slow controlled movement sit back down.  Repeat this exercise 10-15 times.  If needed you can do it fewer times until your strength improves.  Rest for 1 minute.  Do another 10-15 sit-to-stand exercises.  Try to do this in the morning and evening.       Simple things you can do to help prevent blood clots     Blood clots are blockages that can form in the body's veins. When a blood clot forms in your deep veins, it may be called a deep vein thrombosis, or DVT for short. Blood clots can happen in any part of the body where blood flows, but they are most common in the arms and legs. If a piece of a blood clot breaks free and travels to the lungs, it is called a pulmonary embolus (PE). A PE can be a very serious problem.      Being in the hospital or having surgery can raise your chances of getting a blood clot because you may not be well enough to move around as much as you normally do.         Ways you can help prevent blood clots in the hospital       Wearing SCDs  SCDs stands for Sequential Compression Devices.   SCDs are special sleeves that wrap around your legs. They attach to a pump that fills them with air to gently squeeze your legs every few minutes.  This helps return the blood in your legs to your heart.   SCDs should only be taken off when walking or bathing. SCDs may not be comfortable, but they can help save your life.              Pump SCD leg sleeves  Wearing compression stockings - if your doctor orders them. These special snug-fitting stockings gently squeeze your legs to help blood flow.       Walking. Walking helps move the blood in your legs.   If your doctor says it is ok, try walking the halls at least   5 times a day. Ask us to help you get up, so you don't fall.      Taking any blood-thinning medicines your doctor orders.              Ways you can help prevent blood clots at home         Wearing compression stockings - if  your doctor orders them.   Walking - to help move the blood in your legs.    Taking any blood-thinning medicines your doctor orders.      Signs of a blood clot or PE    Tell your doctor or nurse right away if you have any of the problems listed below.         If you are at home, seek medical care right away. Call 911 for chest pain or problems breathing.            Signs of a blood clot (DVT) - such as pain, swelling, redness, or warmth in your arm or legs.  Signs of a pulmonary embolism (PE) - such as chest pain or feeling short of breath

## 2025-02-14 NOTE — H&P (VIEW-ONLY)
CPM/PAT Evaluation       Name: Barbie Milan (Barbie Milan)  /Age: 1960/65 y.o.     Visit Type:   In-Person       Chief Complaint: Perioperative Evaluation    HPI HPI: 66 y/o female scheduled for CABG, 3-4 VESSELS  on 2025  with Dr. Gallo Canchola secondary to Coronary artery disease involving native coronary artery of native heart with other form of angina pectoris .   Presents to CPM today for perioperative risk stratification and optimization. PMHX includes Depression, CAD (MI  stentx4 RCA), HLD, HTN, PVD (4stents), DM2, Recurrent UTI, Overactive Bladder, Polycythemia, Fibromyalgia, OA, gout.    PCP: Teja Van MD PCP  Specialists:   TREY Cason - CNP   Cardiology - Dr. Lerma at [facility]   Urology - TREY Sun.CNP           Past Medical History:   Diagnosis Date    Abnormal gait 2021    Acute upper respiratory infection 2025    Allergic rhinitis with postnasal drip 10/03/2024    Anxiety     Arthralgia of right knee 10/06/2021    Bilateral adrenal adenomas 2022    Bladder diverticulum 2022    CAD (coronary artery disease)     (MI  stentx4 RCA),    Carpal tunnel syndrome 2021    Chronic otitis media 2025    Chronic sinusitis 2025    Coronary artery disease     Cyst of ovary, unspecified laterality 2023    Degeneration of lumbosacral intervertebral disc 2025    Disease due to severe acute respiratory syndrome coronavirus 2 (SARS-CoV-2) 2025    Disturbance in speech 2021    Dizziness and giddiness 2025    DM (diabetes mellitus) (Multi)     Fatigue     Fatty liver 2022    Fibromyalgia 2025    Hearing aid worn     HL (hearing loss)     20% in Left ear, none in right ear- reads lips    Hyperlipidemia     Hypertension     Low back pain 2025    Lumbosacral spondylosis without myelopathy 2025    Muscle spasticity 2022    Myocardial infarction (Multi)      2007 x7, 6 months ago    Obesity with body mass index 30 or greater 12/15/2015    Body mass index 30+ - obesity      Otosclerosis of both ears 03/24/2022    Overactive bladder 09/10/2024    Perforation of tympanic membrane 01/13/2025    Peripheral vascular disease (CMS-HCC) 01/13/2025    4 stents in abdomin, patient reports vasculature in abdomin    Polycythemia 04/02/2022    Primary fibromyalgia syndrome 06/29/2017    Psychophysiological insomnia 10/28/2022    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) 03/24/2022    Recurrent otitis media, right 03/24/2022    Sciatica 04/19/2021    Shortness of breath     FLETCHER    Spinal stenosis of lumbar region 08/29/2018    Sprain of knee 01/13/2025    Tear of lateral meniscus of knee 06/08/2022    Tobacco use     Total perforation of tympanic membrane, right 06/19/2023    Type 2 diabetes mellitus     Urinary tract infection with hematuria 09/10/2024    Uterine fibroid 2003    Vertigo     Wears glasses 04/19/2021       Past Surgical History:   Procedure Laterality Date    CARDIAC CATHETERIZATION  10/29/2007    CHOLECYSTECTOMY      CORONARY ANGIOPLASTY WITH STENT PLACEMENT  2007    x4 stents    DESCENDING AORTIC ANEURYSM REPAIR W/ STENT      HYSTERECTOMY  05/23/2023    ILIAC ARTERY STENT Bilateral 02/24/2023    KNEE SURGERY      KNEE ARTHROSCOPY- meniscal repair    OOPHORECTOMY      TUBAL LIGATION  1981    VULVA SURGERY Right 2021    for dysplasia       Patient Sexual activity questions deferred to the physician.    Family History   Problem Relation Name Age of Onset    Stroke Mother      No Known Problems Father      Stroke Daughter         Allergies   Allergen Reactions    Celecoxib Hives, Itching and Unknown    Macrobid [Nitrofurantoin Monohyd/M-Cryst] GI Upset     Bad headacxhe very ill    Niacin Hives and Other    Sulfa (Sulfonamide Antibiotics) Hives, Unknown and Rash    Inositol Hives    Metoprolol Unknown     pt took metoprolol succ and had a reaction  that she feels was hives, switched to tartrate and no problems    Itchiness with certain brands    Sulfur Unknown    Tobramycin Swelling and Unknown    Topiramate Hives    Nitrofurantoin Headache and Rash     Weakness    Statins-Hmg-Coa Reductase Inhibitors Unknown     myalgias       Prior to Admission medications    Medication Sig Start Date End Date Taking? Authorizing Provider   aspirin 81 mg EC tablet Take 1 tablet (81 mg) by mouth once daily.    Historical Provider, MD   furosemide (Lasix) 40 mg tablet Take 1 tablet (40 mg) by mouth once daily. 7/23/24   Historical Provider, MD   gabapentin (Neurontin) 300 mg capsule Take 1 capsule (300 mg) by mouth 2 times a day.    Historical Provider, MD   HYDROcodone-acetaminophen (Norco) 5-325 mg tablet Take 1 tablet by mouth 3 times a day as needed.    Historical Provider, MD   ibuprofen 200 mg tablet Take 1 tablet (200 mg) by mouth if needed for mild pain (1 - 3).    Historical Provider, MD   lisinopril 10 mg tablet Take 1 tablet (10 mg) by mouth once daily. 7/23/24   Historical Provider, MD   metoprolol succinate XL (Toprol-XL) 50 mg 24 hr tablet Take 1 tablet (50 mg) by mouth once daily. Do not crush or chew.    Historical Provider, MD   nitroglycerin (Nitrostat) 0.4 mg SL tablet Place 1 tablet (0.4 mg) under the tongue every 5 minutes if needed. 1/3/23   Historical Provider, MD   PARoxetine (Paxil) 10 mg tablet Take 1 tablet (10 mg) by mouth once daily in the morning.    Historical Provider, MD   rosuvastatin (Crestor) 20 mg tablet Take 1 tablet (20 mg) by mouth once daily.  Patient not taking: Reported on 2/7/2025    Historical Provider, MD   traZODone (Desyrel) 50 mg tablet Take 1 tablet (50 mg) by mouth once daily at bedtime. 7/23/24   Historical Provider, MD   acyclovir (Zovirax) 400 mg tablet Take 1 tablet (400 mg) by mouth every 8 hours.  Patient not taking: Reported on 2/7/2025 2/11/25  Historical Provider, MD   albuterol 90 mcg/actuation inhaler Inhale 2  puffs 4 times a day as needed.  Patient not taking: Reported on 2/7/2025 12/5/24 2/11/25  Historical Provider, MD   clobetasol (Temovate) 0.05 % ointment Apply 0.05 Applications topically 2 times a day.  Patient not taking: Reported on 2/7/2025 3/16/23 2/11/25  Historical Provider, MD   docusate sodium (Colace) 100 mg capsule Take 1 capsule (100 mg) by mouth once daily.  Patient not taking: Reported on 2/7/2025 6/8/23 2/11/25  Historical Provider, MD   estradiol (Estrace) 0.01 % (0.1 mg/gram) vaginal cream Insert 0.5 Applicatorfuls (2 g) into the vagina once daily.  Patient not taking: Reported on 2/7/2025 2/11/25  Historical Provider, MD   ibuprofen-glycerin 600 mg kit Take 1 Dose by mouth 2 times a day as needed.  Patient not taking: Reported on 2/7/2025 2/11/25  Historical Provider, MD   metFORMIN (Glucophage) 500 mg tablet Take 1 tablet (500 mg) by mouth once daily.  Patient not taking: Reported on 2/7/2025 2/11/25  Historical Provider, MD   sertraline (Zoloft) 100 mg tablet Take 1 tablet (100 mg) by mouth once daily.  Patient not taking: Reported on 2/7/2025 2/11/25  Historical Provider, MD MATHIS ROS:   Constitutional:    no fever   no chills   no unexpected weight change  Neuro/Psych:    no numbness   no weakness   no light-headedness   no tremors   no confusion   not nervous/anxious   no self-injury   no suicidal ideation  Eyes:    no discharge   no vision loss   no diplopia   no visual disturbance   use of corrective lenses  Ears:    no ear pain   hearing loss (reads lips)   no vertigo   no tinnitus   hearing aides  Nose:    no nasal discharge   no sinus congestion   no epistaxis  Mouth:    no dental issues   no mouth pain   no oral bleeding   no mouth lesions  Throat:    no throat pain   no dysphagia   no voice change  Neck:    no neck pain   neck swelling   no neck stiffness   no neck masses  Cardio:    no chest pain   no palpitations   no peripheral edema   no dyspnea   no FLETCHER   no paroxysmal  nocturnal dyspnea  Respiratory:    no cough   no wheezing   no hemoptysis   no shortness of breath  Endocrine:    no cold intolerance   no heat intolerance   no polydipsia  GI:    no abdominal distention   no abdominal pain   no constipation   no diarrhea   no nausea   no vomiting   no blood in stool  :    no difficulty urinating   no dysuria   no oliguria   no polyuria  Musculoskeletal:    no arthralgias   no myalgias   no decreased ROM   no swelling  Hematologic:    does not bruise/bleed easily   no excessive bleeding   no history of blood transfusion   no blood clots  Skin:   no skin changes   no sores/wound   no rash      Physical Exam  Vitals reviewed. Physical exam within normal limits.   Constitutional:       General: She is not in acute distress.     Appearance: Normal appearance. She is normal weight. She is not ill-appearing, toxic-appearing or diaphoretic.      Comments: Pleasant and cooperative, must read lips to communicate, hearing loss   HENT:      Head: Normocephalic.      Nose: Nose normal. No congestion or rhinorrhea.      Mouth/Throat:      Mouth: Mucous membranes are moist.      Pharynx: Oropharynx is clear. No oropharyngeal exudate or posterior oropharyngeal erythema.   Eyes:      General: No scleral icterus.        Right eye: No discharge.         Left eye: No discharge.      Conjunctiva/sclera: Conjunctivae normal.   Neck:      Vascular: No carotid bruit.   Cardiovascular:      Rate and Rhythm: Normal rate and regular rhythm.      Pulses: Normal pulses.      Heart sounds: Normal heart sounds. No murmur heard.     No friction rub. No gallop.   Pulmonary:      Effort: Pulmonary effort is normal. No respiratory distress.      Breath sounds: Normal breath sounds. No stridor. No wheezing, rhonchi or rales.   Chest:      Chest wall: No tenderness.   Musculoskeletal:         General: No swelling or tenderness. Normal range of motion.      Cervical back: Normal range of motion and neck supple. No  "rigidity or tenderness.   Skin:     General: Skin is warm and dry.   Neurological:      General: No focal deficit present.      Mental Status: She is alert.      Motor: No weakness.   Psychiatric:         Mood and Affect: Mood normal.         Behavior: Behavior normal.         Thought Content: Thought content normal.         Judgment: Judgment normal.          PAT AIRWAY:   Airway:     Mallampati::  I    TM distance::  >3 FB    Neck ROM::  Full   upper dentures and lower dentures        Visit Vitals  /51   Pulse 70   Temp 36.8 °C (98.2 °F)   Ht 1.651 m (5' 5\")   Wt 89.8 kg (198 lb)   SpO2 94%   BMI 32.95 kg/m²   Smoking Status Every Day   BSA 2.03 m²       DASI Risk Score      Flowsheet Row Pre-Admission Testing from 2/14/2025 in [facility]   Can you take care of yourself (eat, dress, bathe, or use toilet)?  2.75 filed at 02/14/2025 0946   Can you walk indoors, such as around your house? 1.75 filed at 02/14/2025 0946   Can you walk a block or two on level ground?  2.75 filed at 02/14/2025 0946   Can you climb a flight of stairs or walk up a hill? 0 filed at 02/14/2025 0946   Can you run a short distance? 0 filed at 02/14/2025 0946   Can you do light work around the house like dusting or washing dishes? 2.7 filed at 02/14/2025 0946   Can you do moderate work around the house like vacuuming, sweeping floors or carrying groceries? 3.5 filed at 02/14/2025 0946   Can you do heavy work around the house like scrubbing floors or lifting and moving heavy furniture?  0 filed at 02/14/2025 0946   Can you do yard work like raking leaves, weeding or pushing a mower? 0 filed at 02/14/2025 0946   Can you have sexual relations? 0 filed at 02/14/2025 0946   Can you participate in moderate recreational activities like golf, bowling, dancing, doubles tennis or throwing a baseball or football? 6 filed at 02/14/2025 0946   Can you participate in strenous sports like swimming, singles tennis, football, basketball, " or skiing? 0 filed at 02/14/2025 0946   DASI SCORE 19.45 filed at 02/14/2025 0946   METS Score (Will be calculated only when all the questions are answered) 5.1 filed at 02/14/2025 0946          Capmadhavi DVT Assessment      Flowsheet Row Pre-Admission Testing from 2/14/2025 in [facility]   DVT Score (IF A SCORE IS NOT CALCULATING, MUST SELECT A BMI TO COMPLETE) 14 filed at 02/14/2025 1048   Medical Factors Acute myocardial infarction, Family history of DVT/PE filed at 02/14/2025 1048   Surgical Factors Major surgery planned, lasting over 3 hours filed at 02/14/2025 1048   BMI (BMI MUST BE CHOSEN) 31-40 (Obesity) filed at 02/14/2025 1048          Modified Frailty Index      Flowsheet Row Pre-Admission Testing from 2/14/2025 in [facility]   Non-independent functional status (problems with dressing, bathing, personal grooming, or cooking) 0 filed at 02/14/2025 1048   History of diabetes mellitus  0.0909 filed at 02/14/2025 1048   History of COPD 0 filed at 02/14/2025 1048   History of CHF No filed at 02/14/2025 1048   History of MI 0.0909 filed at 02/14/2025 1048   History of Percutaneous Coronary Intervention, Cardiac Surgery, or Angina 0.0909 filed at 02/14/2025 1048   Hypertension requiring the use of medication  0.0909 filed at 02/14/2025 1048   Peripheral vascular disease 0.0909 filed at 02/14/2025 1048   Impaired sensorium (cognitive impairement or loss, clouding, or delirium) 0 filed at 02/14/2025 1048   TIA or CVA withouy residual deficit 0 filed at 02/14/2025 1048   Cerebrovascular accident with deficit 0 filed at 02/14/2025 1048   Modified Frailty Index Calculator .4545 filed at 02/14/2025 1048          CHADS2 Stroke Risk  Current as of 11 minutes ago        N/A 3 to 100%: High Risk   2 to < 3%: Medium Risk   0 to < 2%: Low Risk     Last Change: N/A          This score determines the patient's risk of having a stroke if the patient has atrial fibrillation.        This score is  not applicable to this patient. Components are not calculated.          Revised Cardiac Risk Index      Flowsheet Row Pre-Admission Testing from 2/14/2025 in [facility]   High-Risk Surgery (Intraperitoneal, Intrathoracic,Suprainguinal vascular) 1 filed at 02/14/2025 1046   History of ischemic heart disease (History of MI, History of positive exercuse test, Current chest paint considered due to myocardial ischemia, Use of nitrate therapy, ECG with pathological Q Waves) 1 filed at 02/14/2025 1046   History of congestive heart failure (pulmonary edemia, bilateral rales or S3 gallop, Paroxysmal nocturnal dyspnea, CXR showing pulmonary vascular redistribution) 0 filed at 02/14/2025 1046   History of cerebrovascular disease (Prior TIA or stroke) 0 filed at 02/14/2025 1046   Pre-operative insulin treatment 0 filed at 02/14/2025 1046   Pre-operative creatinine>2 mg/dl 0 filed at 02/14/2025 1046   Revised Cardiac Risk Calculator 2 filed at 02/14/2025 1046          Apfel Simplified Score      Flowsheet Row Pre-Admission Testing from 2/14/2025 in [facility]   Smoking status 0 filed at 02/14/2025 1048   History of motion sickness or PONV  0 filed at 02/14/2025 1048   Use of postoperative opioids 1 filed at 02/14/2025 1048   Gender - Female 1=Yes filed at 02/14/2025 1048   Apfel Simplified Score Calculator 2 filed at 02/14/2025 1048          Risk Analysis Index Results This Encounter    No data found in the last 10 encounters.       Stop Bang Score      Flowsheet Row Pre-Admission Testing from 2/14/2025 in [facility]   Do you snore loudly? 1 filed at 02/14/2025 0947   Do you often feel tired or fatigued after your sleep? 1 filed at 02/14/2025 0947   Has anyone ever observed you stop breathing in your sleep? 0 filed at 02/14/2025 0947   Do you have or are you being treated for high blood pressure? 1 filed at 02/14/2025 0947   Recent BMI (Calculated) 33 filed at 02/14/2025 0947    Is BMI greater than 35 kg/m2? 0=No filed at 02/14/2025 0947   Age older than 50 years old? 1=Yes filed at 02/14/2025 0947   Is your neck circumference greater than 17 inches (Male) or 16 inches (Female)? 0 filed at 02/14/2025 0947   Gender - Male 0=No filed at 02/14/2025 0947   STOP-BANG Total Score 4 filed at 02/14/2025 0947          Prodigy: High Risk  Total Score: 11              Prodigy Age Score      Prodigy Previous Opioid Use Score           ARISCAT Score for Postoperative Pulmonary Complications      Flowsheet Row Pre-Admission Testing from 2/14/2025 in [facility]   Age Calculated Score 3 filed at 02/14/2025 1047   Preoperative SpO2 8 filed at 02/14/2025 1047   Respiratory infection in the last month Either upper or lower (i.e., URI, bronchitis, pneumonia), with fever and antibiotic treatment 0 filed at 02/14/2025 1047   Preoperative anemia (Hgb less than 10 g/dl) 0 filed at 02/14/2025 1047   Surgical incision  24 filed at 02/14/2025 1047   Duration of surgery  23 filed at 02/14/2025 1047   Emergency Procedure  0 filed at 02/14/2025 1047   ARISCAT Total Score  58 filed at 02/14/2025 1047          Suresh Perioperative Risk for Myocardial Infarction or Cardiac Arrest (MICHELE)    No data to display         Assessment and Plan:   Anesthesia/Airway:  No anesthesia complications      Neuro:    #Depression, - trazodone PRN (continue), paxil (continue), follows with PCP no further perioperative interventions.     Patient is at an increased risk for post operative delirium secondary to age >/= 65, depression, and type and duration of surgery.  Preoperative brain exercise educational handout provided to patient.    The patient is at an increased risk for perioperative stroke secondary to cardiac disease, increased age, HTN, HLD, DM , female sex , cardiac surgery, general anesthesia, and op time >2.5 hours.       HEENT/Airway:    No HEENT diagnosis or significant findings on chart review or clinical  presentation and evaluation. No further preoperative testing/intervention indicated at this time.      Cardiovascular:    #CAD (MI 2007 stentx7, MI 2024), HLD, HTN,-  medicated with aspirin (continue), lasix (hold day of surgery), lisinopril (last dose 2/17), metoprolol succinate (continue), nitroglycerin (continue),  and follows with PCP. BP today is 107/51 and denies cardiovascular symptoms. Physical exam is benign. Functional capacity is >4. No additional preoperative testing is currently indicated. Patient reports she has upcoming appointment with Surgeon on Monday. Patient has CXR order to complete after CPM for perioperative work up.       METS are 5.1    RCRI  2 which is 10.1% 30 day risk of MACE (risk for cardiac death, nonfatal myocardial infarction, and nonfactal cardiac arrest    MICHELE score which indicates a   0.8 % risk of intraoperative or 30-day postoperative MACE    EKG 2/14/25  Pending cardiology review    Cardiac Cath 12/11/24          Pulmonary:    #Tobacco Use - Discussed smoking cessation and perioperative risks.  Preoperative deep breathing educational handout provided to patient.    CXR 2/14/25 -Yellow Alert  IMPRESSION:  1.  There is an indeterminate irregular left upper lobe airspace  opacity. Consider comparison to more remote exam or low-dose chest CT  to evaluate underlying left upper lobe nodule or developing  infiltrate.  2. Coronary artery calcifications and coronary artery stents.  Addendum 2/17/25: Called patient and recommended follow up for nodule noted on CXR. Patient can follow with either PCP or use referral sent with  Pulm.  Patient states she will likely follow with PCP at home due to [address]H drive. Surgeon notified of CXR results.     No pulmonary diagnosis, however patient is at increased risk of perioperative complications secondary to  age > 60, Tobacco abuse, obesity, site of surgery, duration of surgery > 2 hours, types of anesthetic    ARISCAT:    58  points which is a high  (42.1%) risk of in-hospital post-op pulmonary complications     PRODIGY:  8  points which is a intermediate risk of post op opioid induced respiratory depression episodes    STOP BAN   points which is a intermediate risk for moderate to severe BRYN    Smoking cessation discussed with patient, patient also provided cessation education/hotline handout, Pumonary toilet education discussed, patient also provided deep breathing exercises and incentive spirometry educational handout      Renal:   No renal diagnosis, however patient is at increase risk for perioperative renal complications secondary to  Age equal to or greater than 56, BMI equal to or greater than 30, HTN, diabetes, cardiac surgery, use of an ace, arb, or NSAID     DPS if cardiac surgery  Pt at Low risk for perioperative ALEXSANDER based on Dynamic Predictive Scoring Tool for Perioperative ALEXSANDER      Endocrine:   #DM2- no current medication use, juma reports she used to take metformin although no longer does after successful weight losss. Currently lifestyle/diet modifications and PCP monitors. A1c updated.      Hematologic:      #Polycythemia-  RBC wnl last CBC on 24, however Hgb noted elevated slightly at 16.3 repeat today.     Preoperative DVT educational handout provided to patient.  Caprini Score:  14  points which is a highest risk of perioperative VTE      Gastrointestinal:   #Recurrent UTI, Overactive Bladder - Denies any urinary symptoms today, Follows with Urology. No further perioperative interventions    EAT-10 score of    0  : self-perceived oropharyngeal dysphagia scale (0-40)     Apfel: 2 points 39% risk for post operative N/V      Infectious disease:     No infectious diagnosis or significant findings on chart review or clinical presentation and evaluation.   Prescription provided for CHG body wash and dental rinse. CHG use instructions reviewed and provided to patient.  Staph screen collected      Musculoskeletal:    #Fibromyalgia, OA,  gout - medicated with PRN medications noted below as well as norco (continue). No further perioperative interventions. No current gout flares.       Other:     Hold all vitamins and supplements 7 days prior to surgery  Tylenol okay to continue, please hold Aleve/naproxen/ibuprofen (NSAIDs) for 7 days prior to surgery        Labs ordered  Recent Results (from the past 3 weeks)   Staphylococcus aureus/MRSA colonization, Culture    Collection Time: 02/14/25 10:14 AM    Specimen: Nares/Axilla/Groin; Swab   Result Value Ref Range    Staph/MRSA Screen Culture No Staphylococcus aureus isolated    Comprehensive Metabolic Panel    Collection Time: 02/14/25 10:14 AM   Result Value Ref Range    Glucose 120 (H) 74 - 99 mg/dL    Sodium 138 136 - 145 mmol/L    Potassium 3.9 3.5 - 5.3 mmol/L    Chloride 100 98 - 107 mmol/L    Bicarbonate 26 21 - 32 mmol/L    Anion Gap 16 10 - 20 mmol/L    Urea Nitrogen 14 6 - 23 mg/dL    Creatinine 0.74 0.50 - 1.05 mg/dL    eGFR 89 >60 mL/min/1.73m*2    Calcium 9.3 8.6 - 10.6 mg/dL    Albumin 4.2 3.4 - 5.0 g/dL    Alkaline Phosphatase 82 33 - 136 U/L    Total Protein 6.4 6.4 - 8.2 g/dL    AST 38 9 - 39 U/L    Bilirubin, Total 0.8 0.0 - 1.2 mg/dL    ALT 28 7 - 45 U/L   CBC and Auto Differential    Collection Time: 02/14/25 10:14 AM   Result Value Ref Range    WBC 9.4 4.4 - 11.3 x10*3/uL    nRBC 0.0 0.0 - 0.0 /100 WBCs    RBC 4.57 4.00 - 5.20 x10*6/uL    Hemoglobin 15.0 12.0 - 16.0 g/dL    Hematocrit 43.8 36.0 - 46.0 %    MCV 96 80 - 100 fL    MCH 32.8 26.0 - 34.0 pg    MCHC 34.2 32.0 - 36.0 g/dL    RDW 12.5 11.5 - 14.5 %    Platelets 250 150 - 450 x10*3/uL    Neutrophils % 65.8 40.0 - 80.0 %    Immature Granulocytes %, Automated 0.5 0.0 - 0.9 %    Lymphocytes % 26.8 13.0 - 44.0 %    Monocytes % 5.8 2.0 - 10.0 %    Eosinophils % 0.7 0.0 - 6.0 %    Basophils % 0.4 0.0 - 2.0 %    Neutrophils Absolute 6.19 1.20 - 7.70 x10*3/uL    Immature Granulocytes Absolute, Automated 0.05 0.00 - 0.70 x10*3/uL     Lymphocytes Absolute 2.52 1.20 - 4.80 x10*3/uL    Monocytes Absolute 0.55 0.10 - 1.00 x10*3/uL    Eosinophils Absolute 0.07 0.00 - 0.70 x10*3/uL    Basophils Absolute 0.04 0.00 - 0.10 x10*3/uL           Medication instructions and NPO guidelines reviewed with the patient.  All questions or concerns discussed and addressed.      Vicky Vazquez PA-C

## 2025-02-15 LAB — STAPHYLOCOCCUS SPEC CULT: NORMAL

## 2025-02-17 ENCOUNTER — ANESTHESIA EVENT (OUTPATIENT)
Dept: OPERATING ROOM | Facility: HOSPITAL | Age: 65
End: 2025-02-17
Payer: MEDICARE

## 2025-02-17 ENCOUNTER — CLINICAL SUPPORT (OUTPATIENT)
Dept: CARDIAC SURGERY | Facility: HOSPITAL | Age: 65
End: 2025-02-17
Payer: MEDICARE

## 2025-02-17 ENCOUNTER — PREP FOR PROCEDURE (OUTPATIENT)
Dept: CARDIOLOGY | Facility: HOSPITAL | Age: 65
End: 2025-02-17

## 2025-02-17 VITALS
DIASTOLIC BLOOD PRESSURE: 78 MMHG | SYSTOLIC BLOOD PRESSURE: 151 MMHG | OXYGEN SATURATION: 98 % | HEIGHT: 65 IN | HEART RATE: 89 BPM | BODY MASS INDEX: 32.99 KG/M2 | WEIGHT: 198 LBS

## 2025-02-17 DIAGNOSIS — I25.10 CORONARY ARTERY DISEASE INVOLVING NATIVE CORONARY ARTERY OF NATIVE HEART, UNSPECIFIED WHETHER ANGINA PRESENT: ICD-10-CM

## 2025-02-17 PROCEDURE — 83036 HEMOGLOBIN GLYCOSYLATED A1C: CPT

## 2025-02-17 PROCEDURE — 86850 RBC ANTIBODY SCREEN: CPT

## 2025-02-17 PROCEDURE — 85610 PROTHROMBIN TIME: CPT

## 2025-02-17 PROCEDURE — 86900 BLOOD TYPING SEROLOGIC ABO: CPT

## 2025-02-17 PROCEDURE — 86923 COMPATIBILITY TEST ELECTRIC: CPT

## 2025-02-17 PROCEDURE — 86901 BLOOD TYPING SEROLOGIC RH(D): CPT

## 2025-02-17 PROCEDURE — 85730 THROMBOPLASTIN TIME PARTIAL: CPT

## 2025-02-17 ASSESSMENT — PAIN SCALES - GENERAL: PAINLEVEL_OUTOF10: 0-NO PAIN

## 2025-02-17 NOTE — ANESTHESIA PREPROCEDURE EVALUATION
Patient: Barbie Milan    Procedure Information       Date/Time: 02/18/25 0650    Procedure: CABG, 3-4 VESSELS    Location: [facility] OR 21 / [facility] OR    Surgeons: Gallo Canchola MD            Relevant Problems   Cardiac   (+) Atherosclerosis of native coronary artery of native heart without angina pectoris   (+) Atherosclerotic heart disease of native coronary artery without angina pectoris   (+) Essential hypertension   (+) Hyperlipidemia      Neuro   (+) Type 2 diabetes mellitus with diabetic polyneuropathy, without long-term current use of insulin      Endocrine   (+) Type 2 diabetes mellitus   (+) Type 2 diabetes mellitus with diabetic polyneuropathy, without long-term current use of insulin      Musculoskeletal   (+) Fibromyalgia   (+) Osteoarthritis of right knee   (+) Other intervertebral disc degeneration, lumbosacral region with discogenic back pain and lower extremity pain   (+) Primary osteoarthritis involving multiple joints       Clinical information reviewed:     Meds               NPO Detail:  No data recorded     Physical Exam    Airway  Mallampati: II  TM distance: >3 FB  Neck ROM: full     Cardiovascular   Rhythm: regular  Rate: normal  (-) carotid bruits     Dental   Comments: Edentulous    Pulmonary   Breath sounds clear to auscultation     Abdominal          Anesthesia Plan    History of general anesthesia?: yes  History of complications of general anesthesia?: no    ASA 4     general   (Plan GA by OETT, art line, CVP, RUBINA.)  intravenous induction   Anesthetic plan and risks discussed with patient.  Use of blood products discussed with patient who consented to blood products.    Plan discussed with resident.

## 2025-02-17 NOTE — PROGRESS NOTES
Pharmacy Medication History Review    Barbie Milan is a 65 y.o. female who is planned to be admitted for Atherosclerotic heart disease of native coronary artery without angina pectoris. Pharmacy called the patient prior to their scheduled procedure and reviewed the patient's elith-bp-[id number] medications for accuracy.    Medications ADDED:  none  Medications CHANGED:  none  Medications REMOVED:   none    Please review updated prior to admission medication list and comments regarding how patient may be taking medications differently by going to Admission tab --> Admission Orders --> Admit Orders / Review prior to admission medications.     Preferred pharmacy, last doses of medications, and allergies to be confirmed with patient by nursing the day of procedure.     Sources used to complete the med history include:  [facility]  Pharmacy dispense history  Patient interview  Chart Review  Care Everywhere     Below are additional concerns with the patient's PTA list.  Patient confirmed they are taking #1 talet of metoprolol succinate 50mg daily. L.F. 12/04/24 #90/90d. (Tartrate was filled 02/10/25, but patient say that was filled in error and they ARE taking succinate daily)    Mere C[place]  Please reach out via Secure Chat for questions

## 2025-02-18 ENCOUNTER — ANESTHESIA (OUTPATIENT)
Dept: OPERATING ROOM | Facility: HOSPITAL | Age: 65
End: 2025-02-18
Payer: MEDICARE

## 2025-02-18 ENCOUNTER — APPOINTMENT (OUTPATIENT)
Dept: CARDIOLOGY | Facility: HOSPITAL | Age: 65
DRG: 235 | End: 2025-02-18
Payer: MEDICARE

## 2025-02-18 ENCOUNTER — LAB (OUTPATIENT)
Dept: LAB | Facility: HOSPITAL | Age: 65
DRG: 235 | End: 2025-02-18
Payer: MEDICARE

## 2025-02-18 ENCOUNTER — HOSPITAL ENCOUNTER (OUTPATIENT)
Dept: OPERATING ROOM | Facility: HOSPITAL | Age: 65
Discharge: HOME | End: 2025-02-18
Payer: MEDICARE

## 2025-02-18 ENCOUNTER — APPOINTMENT (OUTPATIENT)
Dept: RADIOLOGY | Facility: HOSPITAL | Age: 65
DRG: 235 | End: 2025-02-18
Payer: MEDICARE

## 2025-02-18 ENCOUNTER — HOSPITAL ENCOUNTER (INPATIENT)
Facility: HOSPITAL | Age: 65
DRG: 235 | End: 2025-02-18
Attending: THORACIC SURGERY (CARDIOTHORACIC VASCULAR SURGERY) | Admitting: THORACIC SURGERY (CARDIOTHORACIC VASCULAR SURGERY)
Payer: MEDICARE

## 2025-02-18 DIAGNOSIS — I25.119 ATHEROSCLEROTIC HEART DISEASE OF NATIVE CORONARY ARTERY WITH UNSPECIFIED ANGINA PECTORIS: ICD-10-CM

## 2025-02-18 DIAGNOSIS — Z91.89 CHRONIC CHEST PAIN WITH HIGH RISK FOR CAD: ICD-10-CM

## 2025-02-18 DIAGNOSIS — R57.0 CARDIOGENIC SHOCK (MULTI): ICD-10-CM

## 2025-02-18 DIAGNOSIS — I25.118 CORONARY ARTERY DISEASE INVOLVING NATIVE CORONARY ARTERY OF NATIVE HEART WITH OTHER FORM OF ANGINA PECTORIS: ICD-10-CM

## 2025-02-18 DIAGNOSIS — I25.10 ATHEROSCLEROSIS OF NATIVE CORONARY ARTERY OF NATIVE HEART WITHOUT ANGINA PECTORIS: Primary | ICD-10-CM

## 2025-02-18 DIAGNOSIS — R79.9 ABNORMAL FINDING OF BLOOD CHEMISTRY, UNSPECIFIED: ICD-10-CM

## 2025-02-18 DIAGNOSIS — G89.29 CHRONIC CHEST PAIN WITH HIGH RISK FOR CAD: ICD-10-CM

## 2025-02-18 DIAGNOSIS — R07.9 CHRONIC CHEST PAIN WITH HIGH RISK FOR CAD: ICD-10-CM

## 2025-02-18 DIAGNOSIS — I50.23 ACUTE ON CHRONIC SYSTOLIC HEART FAILURE: ICD-10-CM

## 2025-02-18 DIAGNOSIS — D64.9 POSTOPERATIVE ANEMIA: ICD-10-CM

## 2025-02-18 DIAGNOSIS — Z95.1 S/P CABG X 3: ICD-10-CM

## 2025-02-18 DIAGNOSIS — Z01.818 PREOPERATIVE EXAMINATION: ICD-10-CM

## 2025-02-18 LAB
ABO GROUP (TYPE) IN BLOOD: NORMAL
ABO GROUP (TYPE) IN BLOOD: NORMAL
ACT BLD: 101 SEC (ref 82–174)
ACT BLD: 109 SEC (ref 82–174)
ACT BLD: 544 SEC (ref 82–174)
ACT BLD: 701 SEC (ref 82–174)
ACT BLD: 802 SEC (ref 82–174)
ACT BLD: NORMAL S
ALBUMIN SERPL BCP-MCNC: 3.9 G/DL (ref 3.4–5)
ANION GAP BLDA CALCULATED.4IONS-SCNC: 10 MMO/L (ref 10–25)
ANION GAP BLDA CALCULATED.4IONS-SCNC: 11 MMO/L (ref 10–25)
ANION GAP BLDA CALCULATED.4IONS-SCNC: 16 MMO/L (ref 10–25)
ANION GAP BLDA CALCULATED.4IONS-SCNC: 17 MMO/L (ref 10–25)
ANION GAP BLDA CALCULATED.4IONS-SCNC: 18 MMO/L (ref 10–25)
ANION GAP BLDA CALCULATED.4IONS-SCNC: 8 MMO/L (ref 10–25)
ANION GAP BLDMV CALCULATED.4IONS-SCNC: 17 MMO/L (ref 10–25)
ANION GAP BLDMV CALCULATED.4IONS-SCNC: 21 MMO/L (ref 10–25)
ANION GAP BLDV CALCULATED.4IONS-SCNC: 13 MMOL/L (ref 10–25)
ANION GAP SERPL CALC-SCNC: 19 MMOL/L (ref 10–20)
ANTIBODY SCREEN: NORMAL
APTT PPP: 32 SECONDS (ref 27–38)
APTT PPP: 33 SECONDS (ref 27–38)
ATRIAL RATE: 63 BPM
BASE EXCESS BLDA CALC-SCNC: -0.3 MMOL/L (ref -2–3)
BASE EXCESS BLDA CALC-SCNC: -1.4 MMOL/L (ref -2–3)
BASE EXCESS BLDA CALC-SCNC: -3.9 MMOL/L (ref -2–3)
BASE EXCESS BLDA CALC-SCNC: -4.1 MMOL/L (ref -2–3)
BASE EXCESS BLDA CALC-SCNC: -4.7 MMOL/L (ref -2–3)
BASE EXCESS BLDA CALC-SCNC: 0 MMOL/L (ref -2–3)
BASE EXCESS BLDA CALC-SCNC: 0.2 MMOL/L (ref -2–3)
BASE EXCESS BLDA CALC-SCNC: 1.1 MMOL/L (ref -2–3)
BASE EXCESS BLDA CALC-SCNC: 1.2 MMOL/L (ref -2–3)
BASE EXCESS BLDA CALC-SCNC: 3 MMOL/L (ref -2–3)
BASE EXCESS BLDMV CALC-SCNC: -5.4 MMOL/L (ref -2–3)
BASE EXCESS BLDMV CALC-SCNC: -7.9 MMOL/L (ref -2–3)
BASE EXCESS BLDV CALC-SCNC: -0.8 MMOL/L (ref -2–3)
BLOOD EXPIRATION DATE: NORMAL
BLOOD EXPIRATION DATE: NORMAL
BODY TEMPERATURE: 37 DEGREES CELSIUS
BUN SERPL-MCNC: 9 MG/DL (ref 6–23)
CA-I BLD-SCNC: 0.97 MMOL/L (ref 1.1–1.33)
CA-I BLDA-SCNC: 0.94 MMOL/L (ref 1.1–1.33)
CA-I BLDA-SCNC: 0.97 MMOL/L (ref 1.1–1.33)
CA-I BLDA-SCNC: 0.97 MMOL/L (ref 1.1–1.33)
CA-I BLDA-SCNC: 1.02 MMOL/L (ref 1.1–1.33)
CA-I BLDA-SCNC: 1.02 MMOL/L (ref 1.1–1.33)
CA-I BLDA-SCNC: 1.03 MMOL/L (ref 1.1–1.33)
CA-I BLDA-SCNC: 1.04 MMOL/L (ref 1.1–1.33)
CA-I BLDA-SCNC: 1.07 MMOL/L (ref 1.1–1.33)
CA-I BLDA-SCNC: 1.08 MMOL/L (ref 1.1–1.33)
CA-I BLDA-SCNC: 1.21 MMOL/L (ref 1.1–1.33)
CA-I BLDMV-SCNC: 0.91 MMOL/L (ref 1.1–1.33)
CA-I BLDMV-SCNC: 1.06 MMOL/L (ref 1.1–1.33)
CA-I BLDV-SCNC: 0.95 MMOL/L (ref 1.1–1.33)
CALCIUM SERPL-MCNC: 7.6 MG/DL (ref 8.6–10.6)
CFT FORM KAOLIN IND BLD RES TEG: 1.3 MIN (ref 0.8–2.1)
CFT FORM KAOLIN IND BLD RES TEG: 1.5 MIN (ref 0.8–2.1)
CHLORIDE BLD-SCNC: 104 MMOL/L (ref 98–107)
CHLORIDE BLD-SCNC: 111 MMOL/L (ref 98–107)
CHLORIDE BLDA-SCNC: 103 MMOL/L (ref 98–107)
CHLORIDE BLDA-SCNC: 104 MMOL/L (ref 98–107)
CHLORIDE BLDA-SCNC: 104 MMOL/L (ref 98–107)
CHLORIDE BLDA-SCNC: 105 MMOL/L (ref 98–107)
CHLORIDE BLDA-SCNC: 106 MMOL/L (ref 98–107)
CHLORIDE BLDA-SCNC: 106 MMOL/L (ref 98–107)
CHLORIDE BLDA-SCNC: 107 MMOL/L (ref 98–107)
CHLORIDE BLDV-SCNC: 104 MMOL/L (ref 98–107)
CHLORIDE SERPL-SCNC: 107 MMOL/L (ref 98–107)
CLOT ANGLE.KAOLIN INDUCED BLD RES TEG: 72 DEG (ref 63–78)
CLOT ANGLE.KAOLIN INDUCED BLD RES TEG: 73 DEG (ref 63–78)
CLOT INIT KAO IND P HEP NEUT BLD RES TEG: 11.3 MIN (ref 4.3–8.3)
CLOT INIT KAO IND P HEP NEUT BLD RES TEG: 6.7 MIN (ref 4.3–8.3)
CLOT INIT KAO IND P HEP NEUT BLD RES TEG: 6.7 MIN (ref 4.6–9.1)
CLOT INIT KAO IND P HEP NEUT BLD RES TEG: 7 MIN (ref 4.6–9.1)
CO2 SERPL-SCNC: 22 MMOL/L (ref 21–32)
COHGB MFR BLDA: 1.3 %
COHGB MFR BLDA: 1.4 %
COHGB MFR BLDA: 1.5 %
COHGB MFR BLDA: 1.8 %
COHGB MFR BLDV: 1.7 %
CREAT SERPL-MCNC: 0.75 MG/DL (ref 0.5–1.05)
DISPENSE STATUS: NORMAL
DISPENSE STATUS: NORMAL
DO-HGB MFR BLDA: 0 % (ref 0–5)
DO-HGB MFR BLDA: 0 % (ref 0–5)
DO-HGB MFR BLDA: 0.2 % (ref 0–5)
DO-HGB MFR BLDA: 0.6 % (ref 0–5)
DO-HGB MFR BLDA: 0.8 % (ref 0–5)
DO-HGB MFR BLDA: 1 % (ref 0–5)
DO-HGB MFR BLDA: 2.6 % (ref 0–5)
EGFRCR SERPLBLD CKD-EPI 2021: 88 ML/MIN/1.73M*2
EJECTION FRACTION: 38 %
ERYTHROCYTE [DISTWIDTH] IN BLOOD BY AUTOMATED COUNT: 12.7 % (ref 11.5–14.5)
EST. AVERAGE GLUCOSE BLD GHB EST-MCNC: 137 MG/DL
FIBRINOGEN BLD CALC-MCNC: 303 MG/DL (ref 278–581)
FIBRINOGEN BLD CALC-MCNC: 338 MG/DL (ref 278–581)
GLUCOSE BLD-MCNC: 154 MG/DL (ref 74–99)
GLUCOSE BLD-MCNC: 177 MG/DL (ref 74–99)
GLUCOSE BLDA-MCNC: 126 MG/DL (ref 74–99)
GLUCOSE BLDA-MCNC: 130 MG/DL (ref 74–99)
GLUCOSE BLDA-MCNC: 135 MG/DL (ref 74–99)
GLUCOSE BLDA-MCNC: 136 MG/DL (ref 74–99)
GLUCOSE BLDA-MCNC: 136 MG/DL (ref 74–99)
GLUCOSE BLDA-MCNC: 147 MG/DL (ref 74–99)
GLUCOSE BLDA-MCNC: 148 MG/DL (ref 74–99)
GLUCOSE BLDA-MCNC: 176 MG/DL (ref 74–99)
GLUCOSE BLDA-MCNC: 177 MG/DL (ref 74–99)
GLUCOSE BLDA-MCNC: 177 MG/DL (ref 74–99)
GLUCOSE BLDV-MCNC: 124 MG/DL (ref 74–99)
GLUCOSE SERPL-MCNC: 165 MG/DL (ref 74–99)
HBA1C MFR BLD: 6.4 %
HCO3 BLDA-SCNC: 21.6 MMOL/L (ref 22–26)
HCO3 BLDA-SCNC: 24.3 MMOL/L (ref 22–26)
HCO3 BLDA-SCNC: 24.4 MMOL/L (ref 22–26)
HCO3 BLDA-SCNC: 24.6 MMOL/L (ref 22–26)
HCO3 BLDA-SCNC: 25.4 MMOL/L (ref 22–26)
HCO3 BLDA-SCNC: 25.4 MMOL/L (ref 22–26)
HCO3 BLDA-SCNC: 25.9 MMOL/L (ref 22–26)
HCO3 BLDA-SCNC: 27.1 MMOL/L (ref 22–26)
HCO3 BLDMV-SCNC: 18.3 MMOL/L (ref 22–26)
HCO3 BLDMV-SCNC: 22 MMOL/L (ref 22–26)
HCO3 BLDV-SCNC: 25.8 MMOL/L (ref 22–26)
HCT VFR BLD AUTO: 41.8 % (ref 36–46)
HCT VFR BLD EST: 32 % (ref 36–46)
HCT VFR BLD EST: 33 % (ref 36–46)
HCT VFR BLD EST: 34 % (ref 36–46)
HCT VFR BLD EST: 34 % (ref 36–46)
HCT VFR BLD EST: 35 % (ref 36–46)
HCT VFR BLD EST: 35 % (ref 36–46)
HCT VFR BLD EST: 41 % (ref 36–46)
HCT VFR BLD EST: 42 % (ref 36–46)
HCT VFR BLD EST: 42 % (ref 36–46)
HCT VFR BLD EST: 44 % (ref 36–46)
HCT VFR BLD EST: 45 % (ref 36–46)
HGB BLD-MCNC: 14.3 G/DL (ref 12–16)
HGB BLDA-MCNC: 10.9 G/DL (ref 12–16)
HGB BLDA-MCNC: 10.9 G/DL (ref 12–16)
HGB BLDA-MCNC: 11.2 G/DL (ref 12–16)
HGB BLDA-MCNC: 11.6 G/DL (ref 12–16)
HGB BLDA-MCNC: 11.6 G/DL (ref 12–16)
HGB BLDA-MCNC: 14 G/DL (ref 12–16)
HGB BLDA-MCNC: 14.1 G/DL (ref 12–16)
HGB BLDA-MCNC: 14.6 G/DL (ref 12–16)
HGB BLDA-MCNC: 14.6 G/DL (ref 12–16)
HGB BLDA-MCNC: 14.7 G/DL (ref 12–16)
HGB BLDA-MCNC: 15 G/DL (ref 12–16)
HGB BLDA-MCNC: 15 G/DL (ref 12–16)
HGB BLDMV-MCNC: 10.6 G/DL (ref 12–16)
HGB BLDMV-MCNC: 13.7 G/DL (ref 12–16)
HGB BLDV-MCNC: 11.5 G/DL (ref 12–16)
INHALED O2 CONCENTRATION: 100 %
INHALED O2 CONCENTRATION: 40 %
INHALED O2 CONCENTRATION: 50 %
INHALED O2 CONCENTRATION: 50 %
INHALED O2 CONCENTRATION: 60 %
INHALED O2 CONCENTRATION: 75 %
INR PPP: 0.9 (ref 0.9–1.1)
INR PPP: 1.3 (ref 0.9–1.1)
LACTATE BLDA-SCNC: 2.2 MMOL/L (ref 0.4–2)
LACTATE BLDA-SCNC: 2.7 MMOL/L (ref 0.4–2)
LACTATE BLDA-SCNC: 2.7 MMOL/L (ref 0.4–2)
LACTATE BLDA-SCNC: 3 MMOL/L (ref 0.4–2)
LACTATE BLDA-SCNC: 3.1 MMOL/L (ref 0.4–2)
LACTATE BLDA-SCNC: 3.2 MMOL/L (ref 0.4–2)
LACTATE BLDA-SCNC: 4.3 MMOL/L (ref 0.4–2)
LACTATE BLDA-SCNC: 4.4 MMOL/L (ref 0.4–2)
LACTATE BLDA-SCNC: 5.4 MMOL/L (ref 0.4–2)
LACTATE BLDA-SCNC: 5.6 MMOL/L (ref 0.4–2)
LACTATE BLDMV-SCNC: 5.4 MMOL/L (ref 0.4–2)
LACTATE BLDMV-SCNC: 5.7 MMOL/L (ref 0.4–2)
LACTATE BLDV-SCNC: 2.6 MMOL/L (ref 0.4–2)
MA KAOLIN BLD RES TEG: 58 MM (ref 52–69)
MA KAOLIN BLD RES TEG: 59 MM (ref 52–69)
MA KAOLIN+TF BLD RES TEG: 57 MM (ref 52–70)
MA KAOLIN+TF BLD RES TEG: 60 MM (ref 52–70)
MA TF IND+IIB-IIIA INH BLD RES TEG: 17 MM (ref 15–32)
MA TF IND+IIB-IIIA INH BLD RES TEG: 18 MM (ref 15–32)
MAGNESIUM SERPL-MCNC: 2.96 MG/DL (ref 1.6–2.4)
MCH RBC QN AUTO: 33.4 PG (ref 26–34)
MCHC RBC AUTO-ENTMCNC: 34.2 G/DL (ref 32–36)
MCV RBC AUTO: 98 FL (ref 80–100)
METHGB MFR BLDA: 0.6 % (ref 0–1.5)
METHGB MFR BLDA: 0.8 % (ref 0–1.5)
METHGB MFR BLDA: 0.9 % (ref 0–1.5)
METHGB MFR BLDA: 1.1 % (ref 0–1.5)
METHGB MFR BLDA: 1.2 % (ref 0–1.5)
METHGB MFR BLDV: 0.9 % (ref 0–1.5)
NRBC BLD-RTO: 0 /100 WBCS (ref 0–0)
OXYHGB MFR BLDA: 93 % (ref 94–98)
OXYHGB MFR BLDA: 93.7 % (ref 94–98)
OXYHGB MFR BLDA: 94.9 % (ref 94–98)
OXYHGB MFR BLDA: 94.9 % (ref 94–98)
OXYHGB MFR BLDA: 95 % (ref 94–98)
OXYHGB MFR BLDA: 96.4 % (ref 94–98)
OXYHGB MFR BLDA: 96.4 % (ref 94–98)
OXYHGB MFR BLDA: 96.7 % (ref 94–98)
OXYHGB MFR BLDA: 96.7 % (ref 94–98)
OXYHGB MFR BLDA: 97 % (ref 94–98)
OXYHGB MFR BLDA: 97 % (ref 94–98)
OXYHGB MFR BLDA: 97.5 % (ref 94–98)
OXYHGB MFR BLDA: 97.5 % (ref 94–98)
OXYHGB MFR BLDA: 97.7 % (ref 94–98)
OXYHGB MFR BLDA: 97.7 % (ref 94–98)
OXYHGB MFR BLDA: 98 % (ref 94–98)
OXYHGB MFR BLDA: 98 % (ref 94–98)
OXYHGB MFR BLDMV: 68 % (ref 45–75)
OXYHGB MFR BLDMV: 71.6 % (ref 45–75)
OXYHGB MFR BLDV: 78.7 % (ref 45–75)
P AXIS: 55 DEGREES
P OFFSET: 194 MS
P ONSET: 138 MS
PCO2 BLDA: 39 MM HG (ref 38–42)
PCO2 BLDA: 41 MM HG (ref 38–42)
PCO2 BLDA: 42 MM HG (ref 38–42)
PCO2 BLDA: 43 MM HG (ref 38–42)
PCO2 BLDA: 44 MM HG (ref 38–42)
PCO2 BLDA: 48 MM HG (ref 38–42)
PCO2 BLDA: 57 MM HG (ref 38–42)
PCO2 BLDA: 63 MM HG (ref 38–42)
PCO2 BLDMV: 39 MM HG (ref 41–51)
PCO2 BLDMV: 49 MM HG (ref 41–51)
PCO2 BLDV: 50 MM HG (ref 41–51)
PH BLDA: 7.2 PH (ref 7.38–7.42)
PH BLDA: 7.24 PH (ref 7.38–7.42)
PH BLDA: 7.33 PH (ref 7.38–7.42)
PH BLDA: 7.34 PH (ref 7.38–7.42)
PH BLDA: 7.35 PH (ref 7.38–7.42)
PH BLDA: 7.36 PH (ref 7.38–7.42)
PH BLDA: 7.36 PH (ref 7.38–7.42)
PH BLDA: 7.38 PH (ref 7.38–7.42)
PH BLDA: 7.39 PH (ref 7.38–7.42)
PH BLDA: 7.45 PH (ref 7.38–7.42)
PH BLDMV: 7.26 PH (ref 7.33–7.43)
PH BLDMV: 7.28 PH (ref 7.33–7.43)
PH BLDV: 7.32 PH (ref 7.33–7.43)
PHOSPHATE SERPL-MCNC: 4.3 MG/DL (ref 2.5–4.9)
PLATELET # BLD AUTO: 222 X10*3/UL (ref 150–450)
PO2 BLDA: 110 MM HG (ref 85–95)
PO2 BLDA: 123 MM HG (ref 85–95)
PO2 BLDA: 136 MM HG (ref 85–95)
PO2 BLDA: 273 MM HG (ref 85–95)
PO2 BLDA: 285 MM HG (ref 85–95)
PO2 BLDA: 337 MM HG (ref 85–95)
PO2 BLDA: 80 MM HG (ref 85–95)
PO2 BLDA: 81 MM HG (ref 85–95)
PO2 BLDA: 81 MM HG (ref 85–95)
PO2 BLDA: 82 MM HG (ref 85–95)
PO2 BLDMV: 42 MM HG (ref 35–45)
PO2 BLDMV: 43 MM HG (ref 35–45)
PO2 BLDV: 50 MM HG (ref 35–45)
POTASSIUM BLDA-SCNC: 3.4 MMOL/L (ref 3.5–5.3)
POTASSIUM BLDA-SCNC: 3.6 MMOL/L (ref 3.5–5.3)
POTASSIUM BLDA-SCNC: 3.9 MMOL/L (ref 3.5–5.3)
POTASSIUM BLDA-SCNC: 4 MMOL/L (ref 3.5–5.3)
POTASSIUM BLDA-SCNC: 4.6 MMOL/L (ref 3.5–5.3)
POTASSIUM BLDA-SCNC: 5 MMOL/L (ref 3.5–5.3)
POTASSIUM BLDA-SCNC: 5.2 MMOL/L (ref 3.5–5.3)
POTASSIUM BLDA-SCNC: 5.3 MMOL/L (ref 3.5–5.3)
POTASSIUM BLDA-SCNC: 5.4 MMOL/L (ref 3.5–5.3)
POTASSIUM BLDA-SCNC: 5.5 MMOL/L (ref 3.5–5.3)
POTASSIUM BLDMV-SCNC: 3.3 MMOL/L (ref 3.5–5.3)
POTASSIUM BLDMV-SCNC: 4.3 MMOL/L (ref 3.5–5.3)
POTASSIUM BLDV-SCNC: 5.5 MMOL/L (ref 3.5–5.3)
POTASSIUM SERPL-SCNC: 4.9 MMOL/L (ref 3.5–5.3)
PR INTERVAL: 150 MS
PRODUCT BLOOD TYPE: 5100
PRODUCT BLOOD TYPE: 5100
PRODUCT CODE: NORMAL
PRODUCT CODE: NORMAL
PROTHROMBIN TIME: 10.5 SECONDS (ref 9.8–12.8)
PROTHROMBIN TIME: 14.1 SECONDS (ref 9.8–12.8)
Q ONSET: 213 MS
QRS COUNT: 10 BEATS
QRS DURATION: 114 MS
QT INTERVAL: 408 MS
QTC CALCULATION(BAZETT): 417 MS
QTC FREDERICIA: 414 MS
R AXIS: 0 DEGREES
RBC # BLD AUTO: 4.28 X10*6/UL (ref 4–5.2)
RH FACTOR (ANTIGEN D): NORMAL
RH FACTOR (ANTIGEN D): NORMAL
SAO2 % BLDA: 100 % (ref 94–100)
SAO2 % BLDA: 96 % (ref 94–100)
SAO2 % BLDA: 96 % (ref 94–100)
SAO2 % BLDA: 97 % (ref 94–100)
SAO2 % BLDA: 97 % (ref 94–100)
SAO2 % BLDA: 99 % (ref 94–100)
SAO2 % BLDMV: 70 % (ref 45–75)
SAO2 % BLDMV: 74 % (ref 45–75)
SAO2 % BLDV: 81 % (ref 45–75)
SODIUM BLDA-SCNC: 135 MMOL/L (ref 136–145)
SODIUM BLDA-SCNC: 135 MMOL/L (ref 136–145)
SODIUM BLDA-SCNC: 136 MMOL/L (ref 136–145)
SODIUM BLDA-SCNC: 136 MMOL/L (ref 136–145)
SODIUM BLDA-SCNC: 137 MMOL/L (ref 136–145)
SODIUM BLDA-SCNC: 138 MMOL/L (ref 136–145)
SODIUM BLDA-SCNC: 139 MMOL/L (ref 136–145)
SODIUM BLDA-SCNC: 140 MMOL/L (ref 136–145)
SODIUM BLDA-SCNC: 142 MMOL/L (ref 136–145)
SODIUM BLDA-SCNC: 142 MMOL/L (ref 136–145)
SODIUM BLDMV-SCNC: 143 MMOL/L (ref 136–145)
SODIUM BLDMV-SCNC: 143 MMOL/L (ref 136–145)
SODIUM BLDV-SCNC: 137 MMOL/L (ref 136–145)
SODIUM SERPL-SCNC: 143 MMOL/L (ref 136–145)
T AXIS: -36 DEGREES
T OFFSET: 417 MS
TEST COMMENT: ABNORMAL
TEST COMMENT: NORMAL
UNIT ABO: NORMAL
UNIT ABO: NORMAL
UNIT NUMBER: NORMAL
UNIT NUMBER: NORMAL
UNIT RH: NORMAL
UNIT RH: NORMAL
UNIT VOLUME: 313
UNIT VOLUME: 350
VENTRICULAR RATE: 63 BPM
WBC # BLD AUTO: 23.9 X10*3/UL (ref 4.4–11.3)
XM INTEP: NORMAL
XM INTEP: NORMAL

## 2025-02-18 PROCEDURE — 84132 ASSAY OF SERUM POTASSIUM: CPT

## 2025-02-18 PROCEDURE — 36415 COLL VENOUS BLD VENIPUNCTURE: CPT | Performed by: ANESTHESIOLOGY

## 2025-02-18 PROCEDURE — 3600000011 HC PERFUSION TIME - INITIAL BASE CHARGE: Performed by: THORACIC SURGERY (CARDIOTHORACIC VASCULAR SURGERY)

## 2025-02-18 PROCEDURE — A4649 SURGICAL SUPPLIES: HCPCS | Performed by: THORACIC SURGERY (CARDIOTHORACIC VASCULAR SURGERY)

## 2025-02-18 PROCEDURE — 85384 FIBRINOGEN ACTIVITY: CPT

## 2025-02-18 PROCEDURE — P9045 ALBUMIN (HUMAN), 5%, 250 ML: HCPCS | Mod: JZ,TB

## 2025-02-18 PROCEDURE — 2500000004 HC RX 250 GENERAL PHARMACY W/ HCPCS (ALT 636 FOR OP/ED): Performed by: THORACIC SURGERY (CARDIOTHORACIC VASCULAR SURGERY)

## 2025-02-18 PROCEDURE — 36556 INSERT NON-TUNNEL CV CATH: CPT

## 2025-02-18 PROCEDURE — 85347 COAGULATION TIME ACTIVATED: CPT

## 2025-02-18 PROCEDURE — 02100Z9 BYPASS CORONARY ARTERY, ONE ARTERY FROM LEFT INTERNAL MAMMARY, OPEN APPROACH: ICD-10-PCS | Performed by: THORACIC SURGERY (CARDIOTHORACIC VASCULAR SURGERY)

## 2025-02-18 PROCEDURE — 37799 UNLISTED PX VASCULAR SURGERY: CPT

## 2025-02-18 PROCEDURE — 06BP4ZZ EXCISION OF RIGHT SAPHENOUS VEIN, PERCUTANEOUS ENDOSCOPIC APPROACH: ICD-10-PCS | Performed by: THORACIC SURGERY (CARDIOTHORACIC VASCULAR SURGERY)

## 2025-02-18 PROCEDURE — 2500000004 HC RX 250 GENERAL PHARMACY W/ HCPCS (ALT 636 FOR OP/ED): Performed by: NURSE PRACTITIONER

## 2025-02-18 PROCEDURE — 2500000001 HC RX 250 WO HCPCS SELF ADMINISTERED DRUGS (ALT 637 FOR MEDICARE OP)

## 2025-02-18 PROCEDURE — 2500000005 HC RX 250 GENERAL PHARMACY W/O HCPCS

## 2025-02-18 PROCEDURE — 2500000002 HC RX 250 W HCPCS SELF ADMINISTERED DRUGS (ALT 637 FOR MEDICARE OP, ALT 636 FOR OP/ED)

## 2025-02-18 PROCEDURE — 83735 ASSAY OF MAGNESIUM: CPT

## 2025-02-18 PROCEDURE — 82375 ASSAY CARBOXYHB QUANT: CPT

## 2025-02-18 PROCEDURE — 96373 THER/PROPH/DIAG INJ IA: CPT | Performed by: THORACIC SURGERY (CARDIOTHORACIC VASCULAR SURGERY)

## 2025-02-18 PROCEDURE — 93005 ELECTROCARDIOGRAM TRACING: CPT

## 2025-02-18 PROCEDURE — 33508 ENDOSCOPIC VEIN HARVEST: CPT | Performed by: THORACIC SURGERY (CARDIOTHORACIC VASCULAR SURGERY)

## 2025-02-18 PROCEDURE — 94002 VENT MGMT INPAT INIT DAY: CPT

## 2025-02-18 PROCEDURE — 2720000007 HC OR 272 NO HCPCS: Performed by: THORACIC SURGERY (CARDIOTHORACIC VASCULAR SURGERY)

## 2025-02-18 PROCEDURE — 82330 ASSAY OF CALCIUM: CPT

## 2025-02-18 PROCEDURE — 2500000004 HC RX 250 GENERAL PHARMACY W/ HCPCS (ALT 636 FOR OP/ED)

## 2025-02-18 PROCEDURE — 3600000012 HC PERFUSION TIME - EACH INCREMENTAL 1 MINUTE: Performed by: THORACIC SURGERY (CARDIOTHORACIC VASCULAR SURGERY)

## 2025-02-18 PROCEDURE — 3700000002 HC GENERAL ANESTHESIA TIME - EACH INCREMENTAL 1 MINUTE: Performed by: THORACIC SURGERY (CARDIOTHORACIC VASCULAR SURGERY)

## 2025-02-18 PROCEDURE — 021109W BYPASS CORONARY ARTERY, TWO ARTERIES FROM AORTA WITH AUTOLOGOUS VENOUS TISSUE, OPEN APPROACH: ICD-10-PCS | Performed by: THORACIC SURGERY (CARDIOTHORACIC VASCULAR SURGERY)

## 2025-02-18 PROCEDURE — 5A1221Z PERFORMANCE OF CARDIAC OUTPUT, CONTINUOUS: ICD-10-PCS | Performed by: THORACIC SURGERY (CARDIOTHORACIC VASCULAR SURGERY)

## 2025-02-18 PROCEDURE — 94003 VENT MGMT INPAT SUBQ DAY: CPT

## 2025-02-18 PROCEDURE — 2500000004 HC RX 250 GENERAL PHARMACY W/ HCPCS (ALT 636 FOR OP/ED): Performed by: STUDENT IN AN ORGANIZED HEALTH CARE EDUCATION/TRAINING PROGRAM

## 2025-02-18 PROCEDURE — 2500000004 HC RX 250 GENERAL PHARMACY W/ HCPCS (ALT 636 FOR OP/ED): Mod: JZ,TB

## 2025-02-18 PROCEDURE — 3600000017 HC OR TIME - EACH INCREMENTAL 1 MINUTE - PROCEDURE LEVEL SIX: Performed by: THORACIC SURGERY (CARDIOTHORACIC VASCULAR SURGERY)

## 2025-02-18 PROCEDURE — A4312 CATH W/O BAG 2-WAY SILICONE: HCPCS | Performed by: THORACIC SURGERY (CARDIOTHORACIC VASCULAR SURGERY)

## 2025-02-18 PROCEDURE — 71045 X-RAY EXAM CHEST 1 VIEW: CPT | Performed by: RADIOLOGY

## 2025-02-18 PROCEDURE — 85730 THROMBOPLASTIN TIME PARTIAL: CPT

## 2025-02-18 PROCEDURE — 94762 N-INVAS EAR/PLS OXIMTRY CONT: CPT

## 2025-02-18 PROCEDURE — 3700000001 HC GENERAL ANESTHESIA TIME - INITIAL BASE CHARGE: Performed by: THORACIC SURGERY (CARDIOTHORACIC VASCULAR SURGERY)

## 2025-02-18 PROCEDURE — 2500000005 HC RX 250 GENERAL PHARMACY W/O HCPCS: Performed by: STUDENT IN AN ORGANIZED HEALTH CARE EDUCATION/TRAINING PROGRAM

## 2025-02-18 PROCEDURE — 33533 CABG ARTERIAL SINGLE: CPT | Performed by: THORACIC SURGERY (CARDIOTHORACIC VASCULAR SURGERY)

## 2025-02-18 PROCEDURE — 71045 X-RAY EXAM CHEST 1 VIEW: CPT

## 2025-02-18 PROCEDURE — 3600000018 HC OR TIME - INITIAL BASE CHARGE - PROCEDURE LEVEL SIX: Performed by: THORACIC SURGERY (CARDIOTHORACIC VASCULAR SURGERY)

## 2025-02-18 PROCEDURE — 99291 CRITICAL CARE FIRST HOUR: CPT

## 2025-02-18 PROCEDURE — 33518 CABG ARTERY-VEIN TWO: CPT | Performed by: THORACIC SURGERY (CARDIOTHORACIC VASCULAR SURGERY)

## 2025-02-18 PROCEDURE — 36620 INSERTION CATHETER ARTERY: CPT

## 2025-02-18 PROCEDURE — 2500000005 HC RX 250 GENERAL PHARMACY W/O HCPCS: Performed by: THORACIC SURGERY (CARDIOTHORACIC VASCULAR SURGERY)

## 2025-02-18 PROCEDURE — 2020000001 HC ICU ROOM DAILY

## 2025-02-18 PROCEDURE — 85027 COMPLETE CBC AUTOMATED: CPT

## 2025-02-18 RX ORDER — POLYETHYLENE GLYCOL 3350 17 G/17G
17 POWDER, FOR SOLUTION ORAL 2 TIMES DAILY
Status: DISCONTINUED | OUTPATIENT
Start: 2025-02-18 | End: 2025-02-26

## 2025-02-18 RX ORDER — NALOXONE HYDROCHLORIDE 0.4 MG/ML
0.2 INJECTION, SOLUTION INTRAMUSCULAR; INTRAVENOUS; SUBCUTANEOUS EVERY 5 MIN PRN
Status: DISCONTINUED | OUTPATIENT
Start: 2025-02-18 | End: 2025-02-18

## 2025-02-18 RX ORDER — OXYCODONE HCL 5 MG/5 ML
10 SOLUTION, ORAL ORAL EVERY 4 HOURS PRN
Status: DISCONTINUED | OUTPATIENT
Start: 2025-02-18 | End: 2025-02-20

## 2025-02-18 RX ORDER — ONDANSETRON 4 MG/1
4 TABLET, FILM COATED ORAL EVERY 8 HOURS PRN
Status: DISCONTINUED | OUTPATIENT
Start: 2025-02-18 | End: 2025-02-26 | Stop reason: HOSPADM

## 2025-02-18 RX ORDER — OXYCODONE HYDROCHLORIDE 10 MG/1
10 TABLET ORAL EVERY 4 HOURS PRN
Status: DISCONTINUED | OUTPATIENT
Start: 2025-02-18 | End: 2025-02-18

## 2025-02-18 RX ORDER — TRANEXAMIC ACID 10 MG/ML
INJECTION, SOLUTION INTRAVENOUS AS NEEDED
Status: DISCONTINUED | OUTPATIENT
Start: 2025-02-18 | End: 2025-02-18

## 2025-02-18 RX ORDER — EPINEPHRINE IN 0.9 % SOD CHLOR 4MG/250ML
0-1 PLASTIC BAG, INJECTION (ML) INTRAVENOUS CONTINUOUS
Status: DISCONTINUED | OUTPATIENT
Start: 2025-02-18 | End: 2025-02-18

## 2025-02-18 RX ORDER — SODIUM CHLORIDE, SODIUM LACTATE, POTASSIUM CHLORIDE, CALCIUM CHLORIDE 600; 310; 30; 20 MG/100ML; MG/100ML; MG/100ML; MG/100ML
30 INJECTION, SOLUTION INTRAVENOUS CONTINUOUS
Status: DISCONTINUED | OUTPATIENT
Start: 2025-02-18 | End: 2025-02-19

## 2025-02-18 RX ORDER — CEFAZOLIN 1 G/1
INJECTION, POWDER, FOR SOLUTION INTRAVENOUS AS NEEDED
Status: DISCONTINUED | OUTPATIENT
Start: 2025-02-18 | End: 2025-02-18

## 2025-02-18 RX ORDER — ETOMIDATE 2 MG/ML
INJECTION INTRAVENOUS AS NEEDED
Status: DISCONTINUED | OUTPATIENT
Start: 2025-02-18 | End: 2025-02-18

## 2025-02-18 RX ORDER — LIDOCAINE HYDROCHLORIDE 10 MG/ML
INJECTION, SOLUTION INFILTRATION; PERINEURAL AS NEEDED
Status: DISCONTINUED | OUTPATIENT
Start: 2025-02-18 | End: 2025-02-18

## 2025-02-18 RX ORDER — ROSUVASTATIN CALCIUM 40 MG/1
40 TABLET, COATED ORAL NIGHTLY
Status: DISCONTINUED | OUTPATIENT
Start: 2025-02-19 | End: 2025-02-19

## 2025-02-18 RX ORDER — POTASSIUM CHLORIDE 20 MEQ/1
40 TABLET, EXTENDED RELEASE ORAL EVERY 6 HOURS PRN
Status: DISCONTINUED | OUTPATIENT
Start: 2025-02-18 | End: 2025-02-18

## 2025-02-18 RX ORDER — POTASSIUM CHLORIDE 1.5 G/1.58G
20 POWDER, FOR SOLUTION ORAL EVERY 6 HOURS PRN
Status: DISCONTINUED | OUTPATIENT
Start: 2025-02-18 | End: 2025-02-18

## 2025-02-18 RX ORDER — PANTOPRAZOLE SODIUM 40 MG/10ML
40 INJECTION, POWDER, LYOPHILIZED, FOR SOLUTION INTRAVENOUS
Status: DISCONTINUED | OUTPATIENT
Start: 2025-02-19 | End: 2025-02-19

## 2025-02-18 RX ORDER — ALBUTEROL SULFATE 0.83 MG/ML
2.5 SOLUTION RESPIRATORY (INHALATION) EVERY 6 HOURS PRN
Status: DISCONTINUED | OUTPATIENT
Start: 2025-02-18 | End: 2025-02-26 | Stop reason: HOSPADM

## 2025-02-18 RX ORDER — METHADONE HYDROCHLORIDE 10 MG/ML
INJECTION, SOLUTION INTRAMUSCULAR; INTRAVENOUS; SUBCUTANEOUS AS NEEDED
Status: DISCONTINUED | OUTPATIENT
Start: 2025-02-18 | End: 2025-02-18

## 2025-02-18 RX ORDER — NAPROXEN SODIUM 220 MG/1
81 TABLET, FILM COATED ORAL DAILY
Status: DISCONTINUED | OUTPATIENT
Start: 2025-02-18 | End: 2025-02-26

## 2025-02-18 RX ORDER — LIDOCAINE HYDROCHLORIDE 20 MG/ML
INJECTION, SOLUTION INFILTRATION; PERINEURAL AS NEEDED
Status: DISCONTINUED | OUTPATIENT
Start: 2025-02-18 | End: 2025-02-18

## 2025-02-18 RX ORDER — CALCIUM GLUCONATE 20 MG/ML
2 INJECTION, SOLUTION INTRAVENOUS EVERY 6 HOURS PRN
Status: DISCONTINUED | OUTPATIENT
Start: 2025-02-18 | End: 2025-02-18

## 2025-02-18 RX ORDER — POTASSIUM CHLORIDE 29.8 MG/ML
40 INJECTION INTRAVENOUS EVERY 6 HOURS PRN
Status: DISCONTINUED | OUTPATIENT
Start: 2025-02-18 | End: 2025-02-23

## 2025-02-18 RX ORDER — POTASSIUM CHLORIDE 1.5 G/1.58G
40 POWDER, FOR SOLUTION ORAL EVERY 6 HOURS PRN
Status: DISCONTINUED | OUTPATIENT
Start: 2025-02-18 | End: 2025-02-23

## 2025-02-18 RX ORDER — CALCIUM GLUCONATE 20 MG/ML
1 INJECTION, SOLUTION INTRAVENOUS EVERY 6 HOURS PRN
Status: DISCONTINUED | OUTPATIENT
Start: 2025-02-18 | End: 2025-02-18

## 2025-02-18 RX ORDER — PROPOFOL 10 MG/ML
0-20 INJECTION, EMULSION INTRAVENOUS CONTINUOUS
Status: DISCONTINUED | OUTPATIENT
Start: 2025-02-18 | End: 2025-02-19

## 2025-02-18 RX ORDER — POTASSIUM CHLORIDE 1.5 G/1.58G
40 POWDER, FOR SOLUTION ORAL EVERY 6 HOURS PRN
Status: DISCONTINUED | OUTPATIENT
Start: 2025-02-18 | End: 2025-02-18

## 2025-02-18 RX ORDER — FAMOTIDINE 10 MG/ML
INJECTION INTRAVENOUS AS NEEDED
Status: DISCONTINUED | OUTPATIENT
Start: 2025-02-18 | End: 2025-02-18

## 2025-02-18 RX ORDER — PAROXETINE 10 MG/1
10 TABLET, FILM COATED ORAL EVERY MORNING
Status: DISCONTINUED | OUTPATIENT
Start: 2025-02-19 | End: 2025-02-26 | Stop reason: HOSPADM

## 2025-02-18 RX ORDER — PANTOPRAZOLE SODIUM 40 MG/1
40 TABLET, DELAYED RELEASE ORAL
Status: DISCONTINUED | OUTPATIENT
Start: 2025-02-19 | End: 2025-02-19

## 2025-02-18 RX ORDER — HEPARIN SODIUM 1000 [USP'U]/ML
INJECTION, SOLUTION INTRAVENOUS; SUBCUTANEOUS AS NEEDED
Status: DISCONTINUED | OUTPATIENT
Start: 2025-02-18 | End: 2025-02-18 | Stop reason: HOSPADM

## 2025-02-18 RX ORDER — HYDROMORPHONE HYDROCHLORIDE 0.2 MG/ML
0.2 INJECTION INTRAMUSCULAR; INTRAVENOUS; SUBCUTANEOUS
Status: DISCONTINUED | OUTPATIENT
Start: 2025-02-18 | End: 2025-02-18

## 2025-02-18 RX ORDER — POLYETHYLENE GLYCOL 3350 17 G/17G
17 POWDER, FOR SOLUTION ORAL DAILY
Status: DISCONTINUED | OUTPATIENT
Start: 2025-02-18 | End: 2025-02-18

## 2025-02-18 RX ORDER — POTASSIUM CHLORIDE 20 MEQ/1
40 TABLET, EXTENDED RELEASE ORAL EVERY 6 HOURS PRN
Status: DISCONTINUED | OUTPATIENT
Start: 2025-02-18 | End: 2025-02-23

## 2025-02-18 RX ORDER — INDOMETHACIN 25 MG/1
CAPSULE ORAL AS NEEDED
Status: DISCONTINUED | OUTPATIENT
Start: 2025-02-18 | End: 2025-02-18

## 2025-02-18 RX ORDER — SODIUM CHLORIDE 0.9 G/100ML
INJECTION, SOLUTION IRRIGATION AS NEEDED
Status: DISCONTINUED | OUTPATIENT
Start: 2025-02-18 | End: 2025-02-18 | Stop reason: HOSPADM

## 2025-02-18 RX ORDER — MAGNESIUM SULFATE HEPTAHYDRATE 40 MG/ML
2 INJECTION, SOLUTION INTRAVENOUS EVERY 6 HOURS PRN
Status: DISCONTINUED | OUTPATIENT
Start: 2025-02-18 | End: 2025-02-18

## 2025-02-18 RX ORDER — PHENYLEPHRINE HYDROCHLORIDE 10 MG/ML
INJECTION INTRAVENOUS AS NEEDED
Status: DISCONTINUED | OUTPATIENT
Start: 2025-02-18 | End: 2025-02-18

## 2025-02-18 RX ORDER — NOREPINEPHRINE BITARTRATE 0.03 MG/ML
INJECTION, SOLUTION INTRAVENOUS CONTINUOUS PRN
Status: DISCONTINUED | OUTPATIENT
Start: 2025-02-18 | End: 2025-02-18

## 2025-02-18 RX ORDER — ACETAMINOPHEN 160 MG/5ML
650 SOLUTION ORAL EVERY 6 HOURS
Status: DISCONTINUED | OUTPATIENT
Start: 2025-02-18 | End: 2025-02-20

## 2025-02-18 RX ORDER — POTASSIUM CHLORIDE 20 MEQ/1
20 TABLET, EXTENDED RELEASE ORAL EVERY 6 HOURS PRN
Status: DISCONTINUED | OUTPATIENT
Start: 2025-02-18 | End: 2025-02-18

## 2025-02-18 RX ORDER — PAPAVERINE HYDROCHLORIDE 30 MG/ML
INJECTION INTRAMUSCULAR; INTRAVENOUS AS NEEDED
Status: DISCONTINUED | OUTPATIENT
Start: 2025-02-18 | End: 2025-02-18 | Stop reason: HOSPADM

## 2025-02-18 RX ORDER — ALBUMIN HUMAN 50 G/1000ML
12.5 SOLUTION INTRAVENOUS ONCE
Status: COMPLETED | OUTPATIENT
Start: 2025-02-18 | End: 2025-02-18

## 2025-02-18 RX ORDER — CEFAZOLIN SODIUM 2 G/100ML
2 INJECTION, SOLUTION INTRAVENOUS EVERY 8 HOURS
Status: COMPLETED | OUTPATIENT
Start: 2025-02-18 | End: 2025-02-20

## 2025-02-18 RX ORDER — OXYCODONE HYDROCHLORIDE 5 MG/1
5 TABLET ORAL EVERY 4 HOURS PRN
Status: DISCONTINUED | OUTPATIENT
Start: 2025-02-18 | End: 2025-02-18

## 2025-02-18 RX ORDER — CALCIUM CHLORIDE INJECTION 100 MG/ML
INJECTION, SOLUTION INTRAVENOUS AS NEEDED
Status: DISCONTINUED | OUTPATIENT
Start: 2025-02-18 | End: 2025-02-18

## 2025-02-18 RX ORDER — PANTOPRAZOLE SODIUM 40 MG/10ML
40 INJECTION, POWDER, LYOPHILIZED, FOR SOLUTION INTRAVENOUS
Status: DISCONTINUED | OUTPATIENT
Start: 2025-02-19 | End: 2025-02-18

## 2025-02-18 RX ORDER — MILRINONE LACTATE 0.2 MG/ML
0.06 INJECTION, SOLUTION INTRAVENOUS CONTINUOUS
Status: DISCONTINUED | OUTPATIENT
Start: 2025-02-18 | End: 2025-02-19

## 2025-02-18 RX ORDER — HEPARIN SODIUM 1000 [USP'U]/ML
INJECTION, SOLUTION INTRAVENOUS; SUBCUTANEOUS AS NEEDED
Status: DISCONTINUED | OUTPATIENT
Start: 2025-02-18 | End: 2025-02-18

## 2025-02-18 RX ORDER — NALOXONE HYDROCHLORIDE 0.4 MG/ML
0.2 INJECTION, SOLUTION INTRAMUSCULAR; INTRAVENOUS; SUBCUTANEOUS EVERY 5 MIN PRN
Status: DISCONTINUED | OUTPATIENT
Start: 2025-02-18 | End: 2025-02-26 | Stop reason: HOSPADM

## 2025-02-18 RX ORDER — DEXTROSE 50 % IN WATER (D50W) INTRAVENOUS SYRINGE
25
Status: DISCONTINUED | OUTPATIENT
Start: 2025-02-18 | End: 2025-02-18

## 2025-02-18 RX ORDER — VANCOMYCIN HYDROCHLORIDE 1 G/20ML
INJECTION, POWDER, LYOPHILIZED, FOR SOLUTION INTRAVENOUS AS NEEDED
Status: DISCONTINUED | OUTPATIENT
Start: 2025-02-18 | End: 2025-02-18 | Stop reason: HOSPADM

## 2025-02-18 RX ORDER — EPINEPHRINE IN 0.9 % SOD CHLOR 4MG/250ML
0.01 PLASTIC BAG, INJECTION (ML) INTRAVENOUS CONTINUOUS
Status: DISCONTINUED | OUTPATIENT
Start: 2025-02-18 | End: 2025-02-19

## 2025-02-18 RX ORDER — POTASSIUM CHLORIDE 20 MEQ/1
20 TABLET, EXTENDED RELEASE ORAL EVERY 6 HOURS PRN
Status: DISCONTINUED | OUTPATIENT
Start: 2025-02-18 | End: 2025-02-23

## 2025-02-18 RX ORDER — DEXMEDETOMIDINE HYDROCHLORIDE 4 UG/ML
0-1.5 INJECTION, SOLUTION INTRAVENOUS CONTINUOUS
Status: DISCONTINUED | OUTPATIENT
Start: 2025-02-18 | End: 2025-02-18

## 2025-02-18 RX ORDER — MAGNESIUM SULFATE HEPTAHYDRATE 40 MG/ML
2 INJECTION, SOLUTION INTRAVENOUS EVERY 6 HOURS PRN
Status: DISCONTINUED | OUTPATIENT
Start: 2025-02-18 | End: 2025-02-23

## 2025-02-18 RX ORDER — DEXTROSE 50 % IN WATER (D50W) INTRAVENOUS SYRINGE
12.5
Status: DISCONTINUED | OUTPATIENT
Start: 2025-02-18 | End: 2025-02-18

## 2025-02-18 RX ORDER — ONDANSETRON HYDROCHLORIDE 2 MG/ML
4 INJECTION, SOLUTION INTRAVENOUS EVERY 8 HOURS PRN
Status: DISCONTINUED | OUTPATIENT
Start: 2025-02-18 | End: 2025-02-26 | Stop reason: HOSPADM

## 2025-02-18 RX ORDER — ALBUMIN HUMAN 50 G/1000ML
25 SOLUTION INTRAVENOUS ONCE
Status: COMPLETED | OUTPATIENT
Start: 2025-02-18 | End: 2025-02-18

## 2025-02-18 RX ORDER — SODIUM CHLORIDE, SODIUM LACTATE, POTASSIUM CHLORIDE, CALCIUM CHLORIDE 600; 310; 30; 20 MG/100ML; MG/100ML; MG/100ML; MG/100ML
5 INJECTION, SOLUTION INTRAVENOUS CONTINUOUS
Status: DISCONTINUED | OUTPATIENT
Start: 2025-02-18 | End: 2025-02-19

## 2025-02-18 RX ORDER — ALBUMIN HUMAN 50 G/1000ML
SOLUTION INTRAVENOUS AS NEEDED
Status: DISCONTINUED | OUTPATIENT
Start: 2025-02-18 | End: 2025-02-18

## 2025-02-18 RX ORDER — INSULIN LISPRO 100 [IU]/ML
0-15 INJECTION, SOLUTION INTRAVENOUS; SUBCUTANEOUS
Status: DISCONTINUED | OUTPATIENT
Start: 2025-02-18 | End: 2025-02-18

## 2025-02-18 RX ORDER — AMOXICILLIN 250 MG
2 CAPSULE ORAL 2 TIMES DAILY
Status: DISCONTINUED | OUTPATIENT
Start: 2025-02-18 | End: 2025-02-21

## 2025-02-18 RX ORDER — EPINEPHRINE IN 0.9 % SOD CHLOR 4MG/250ML
PLASTIC BAG, INJECTION (ML) INTRAVENOUS CONTINUOUS PRN
Status: DISCONTINUED | OUTPATIENT
Start: 2025-02-18 | End: 2025-02-18

## 2025-02-18 RX ORDER — ACETAMINOPHEN 325 MG/1
650 TABLET ORAL EVERY 6 HOURS
Status: DISCONTINUED | OUTPATIENT
Start: 2025-02-18 | End: 2025-02-18

## 2025-02-18 RX ORDER — OXYCODONE HCL 5 MG/5 ML
5 SOLUTION, ORAL ORAL EVERY 4 HOURS PRN
Status: DISCONTINUED | OUTPATIENT
Start: 2025-02-18 | End: 2025-02-20

## 2025-02-18 RX ORDER — MIDAZOLAM HYDROCHLORIDE 1 MG/ML
INJECTION INTRAMUSCULAR; INTRAVENOUS AS NEEDED
Status: DISCONTINUED | OUTPATIENT
Start: 2025-02-18 | End: 2025-02-18

## 2025-02-18 RX ORDER — MILRINONE LACTATE 0.2 MG/ML
0-.75 INJECTION, SOLUTION INTRAVENOUS CONTINUOUS
Status: DISCONTINUED | OUTPATIENT
Start: 2025-02-18 | End: 2025-02-18

## 2025-02-18 RX ORDER — PROPOFOL 10 MG/ML
INJECTION, EMULSION INTRAVENOUS AS NEEDED
Status: DISCONTINUED | OUTPATIENT
Start: 2025-02-18 | End: 2025-02-18

## 2025-02-18 RX ORDER — INSULIN LISPRO 100 [IU]/ML
0-15 INJECTION, SOLUTION INTRAVENOUS; SUBCUTANEOUS EVERY 4 HOURS
Status: DISCONTINUED | OUTPATIENT
Start: 2025-02-18 | End: 2025-02-21

## 2025-02-18 RX ORDER — ESOMEPRAZOLE MAGNESIUM 40 MG/1
40 GRANULE, DELAYED RELEASE ORAL
Status: DISCONTINUED | OUTPATIENT
Start: 2025-02-19 | End: 2025-02-18

## 2025-02-18 RX ORDER — MAGNESIUM SULFATE HEPTAHYDRATE 40 MG/ML
4 INJECTION, SOLUTION INTRAVENOUS EVERY 6 HOURS PRN
Status: DISCONTINUED | OUTPATIENT
Start: 2025-02-18 | End: 2025-02-18

## 2025-02-18 RX ORDER — SODIUM CHLORIDE, SODIUM GLUCONATE, SODIUM ACETATE, POTASSIUM CHLORIDE AND MAGNESIUM CHLORIDE 30; 37; 368; 526; 502 MG/100ML; MG/100ML; MG/100ML; MG/100ML; MG/100ML
INJECTION, SOLUTION INTRAVENOUS CONTINUOUS PRN
Status: DISCONTINUED | OUTPATIENT
Start: 2025-02-18 | End: 2025-02-18

## 2025-02-18 RX ORDER — FENTANYL CITRATE 50 UG/ML
INJECTION, SOLUTION INTRAMUSCULAR; INTRAVENOUS AS NEEDED
Status: DISCONTINUED | OUTPATIENT
Start: 2025-02-18 | End: 2025-02-18

## 2025-02-18 RX ORDER — DIPHENHYDRAMINE HYDROCHLORIDE 50 MG/ML
INJECTION INTRAMUSCULAR; INTRAVENOUS AS NEEDED
Status: DISCONTINUED | OUTPATIENT
Start: 2025-02-18 | End: 2025-02-18

## 2025-02-18 RX ORDER — PHENYLEPHRINE 10 MG/250 ML(40 MCG/ML)IN 0.9 % SOD.CHLORIDE INTRAVENOUS
CONTINUOUS PRN
Status: DISCONTINUED | OUTPATIENT
Start: 2025-02-18 | End: 2025-02-18

## 2025-02-18 RX ORDER — PROTAMINE SULFATE 10 MG/ML
INJECTION, SOLUTION INTRAVENOUS AS NEEDED
Status: DISCONTINUED | OUTPATIENT
Start: 2025-02-18 | End: 2025-02-18

## 2025-02-18 RX ORDER — POTASSIUM CHLORIDE 14.9 MG/ML
20 INJECTION INTRAVENOUS EVERY 6 HOURS PRN
Status: DISCONTINUED | OUTPATIENT
Start: 2025-02-18 | End: 2025-02-23

## 2025-02-18 RX ORDER — MAGNESIUM SULFATE HEPTAHYDRATE 500 MG/ML
INJECTION, SOLUTION INTRAMUSCULAR; INTRAVENOUS AS NEEDED
Status: DISCONTINUED | OUTPATIENT
Start: 2025-02-18 | End: 2025-02-18

## 2025-02-18 RX ORDER — IPRATROPIUM BROMIDE AND ALBUTEROL SULFATE 2.5; .5 MG/3ML; MG/3ML
3 SOLUTION RESPIRATORY (INHALATION) EVERY 6 HOURS PRN
Status: DISCONTINUED | OUTPATIENT
Start: 2025-02-18 | End: 2025-02-26 | Stop reason: HOSPADM

## 2025-02-18 RX ORDER — CALCIUM GLUCONATE 20 MG/ML
1 INJECTION, SOLUTION INTRAVENOUS EVERY 6 HOURS PRN
Status: DISCONTINUED | OUTPATIENT
Start: 2025-02-18 | End: 2025-02-23

## 2025-02-18 RX ORDER — PANTOPRAZOLE SODIUM 40 MG/1
40 TABLET, DELAYED RELEASE ORAL
Status: DISCONTINUED | OUTPATIENT
Start: 2025-02-19 | End: 2025-02-18

## 2025-02-18 RX ORDER — EPINEPHRINE IN 0.9 % SOD CHLOR 4MG/250ML
0.01 PLASTIC BAG, INJECTION (ML) INTRAVENOUS CONTINUOUS
Status: DISCONTINUED | OUTPATIENT
Start: 2025-02-18 | End: 2025-02-18

## 2025-02-18 RX ORDER — ALBUMIN HUMAN 50 G/1000ML
SOLUTION INTRAVENOUS
Status: COMPLETED
Start: 2025-02-18 | End: 2025-02-18

## 2025-02-18 RX ORDER — MAGNESIUM SULFATE HEPTAHYDRATE 40 MG/ML
4 INJECTION, SOLUTION INTRAVENOUS EVERY 6 HOURS PRN
Status: DISCONTINUED | OUTPATIENT
Start: 2025-02-18 | End: 2025-02-23

## 2025-02-18 RX ORDER — MILRINONE LACTATE 0.2 MG/ML
INJECTION, SOLUTION INTRAVENOUS CONTINUOUS PRN
Status: DISCONTINUED | OUTPATIENT
Start: 2025-02-18 | End: 2025-02-18

## 2025-02-18 RX ORDER — ROCURONIUM BROMIDE 10 MG/ML
INJECTION, SOLUTION INTRAVENOUS AS NEEDED
Status: DISCONTINUED | OUTPATIENT
Start: 2025-02-18 | End: 2025-02-18

## 2025-02-18 RX ORDER — CALCIUM GLUCONATE 20 MG/ML
2 INJECTION, SOLUTION INTRAVENOUS EVERY 6 HOURS PRN
Status: DISCONTINUED | OUTPATIENT
Start: 2025-02-18 | End: 2025-02-23

## 2025-02-18 RX ORDER — POTASSIUM CHLORIDE 1.5 G/1.58G
20 POWDER, FOR SOLUTION ORAL EVERY 6 HOURS PRN
Status: DISCONTINUED | OUTPATIENT
Start: 2025-02-18 | End: 2025-02-23

## 2025-02-18 RX ORDER — HYDROMORPHONE HYDROCHLORIDE 0.2 MG/ML
0.2 INJECTION INTRAMUSCULAR; INTRAVENOUS; SUBCUTANEOUS
Status: DISCONTINUED | OUTPATIENT
Start: 2025-02-18 | End: 2025-02-19

## 2025-02-18 RX ORDER — MAGNESIUM SULFATE HEPTAHYDRATE 40 MG/ML
INJECTION, SOLUTION INTRAVENOUS AS NEEDED
Status: DISCONTINUED | OUTPATIENT
Start: 2025-02-18 | End: 2025-02-18

## 2025-02-18 RX ORDER — NOREPINEPHRINE BITARTRATE/D5W 8 MG/250ML
0-.5 PLASTIC BAG, INJECTION (ML) INTRAVENOUS CONTINUOUS
Status: DISCONTINUED | OUTPATIENT
Start: 2025-02-18 | End: 2025-02-19

## 2025-02-18 RX ADMIN — PROPOFOL 30 MG: 10 INJECTION, EMULSION INTRAVENOUS at 12:17

## 2025-02-18 RX ADMIN — SODIUM CHLORIDE, SODIUM GLUCONATE, SODIUM ACETATE, POTASSIUM CHLORIDE AND MAGNESIUM CHLORIDE: 526; 502; 368; 37; 30 INJECTION, SOLUTION INTRAVENOUS at 09:17

## 2025-02-18 RX ADMIN — METHADONE HYDROCHLORIDE 5 MG: 10 INJECTION, SOLUTION INTRAMUSCULAR; INTRAVENOUS; SUBCUTANEOUS at 10:50

## 2025-02-18 RX ADMIN — PROTAMINE SULFATE 400 MG: 10 INJECTION, SOLUTION INTRAVENOUS at 10:50

## 2025-02-18 RX ADMIN — CALCIUM CHLORIDE 1 G: 100 INJECTION, SOLUTION INTRAVENOUS at 10:40

## 2025-02-18 RX ADMIN — Medication 50 PERCENT: at 12:35

## 2025-02-18 RX ADMIN — FENTANYL CITRATE 150 MCG: 50 INJECTION, SOLUTION INTRAMUSCULAR; INTRAVENOUS at 08:18

## 2025-02-18 RX ADMIN — PHENYLEPHRINE HYDROCHLORIDE 80 MCG: 10 INJECTION INTRAVENOUS at 08:37

## 2025-02-18 RX ADMIN — PROPOFOL 50 MG: 10 INJECTION, EMULSION INTRAVENOUS at 07:43

## 2025-02-18 RX ADMIN — MAGNESIUM SULFATE HEPTAHYDRATE 2 G: 2 INJECTION, SOLUTION INTRAVENOUS at 08:43

## 2025-02-18 RX ADMIN — METHADONE HYDROCHLORIDE 10 MG: 10 INJECTION, SOLUTION INTRAMUSCULAR; INTRAVENOUS; SUBCUTANEOUS at 08:36

## 2025-02-18 RX ADMIN — PHENYLEPHRINE HYDROCHLORIDE 80 MCG: 10 INJECTION INTRAVENOUS at 07:43

## 2025-02-18 RX ADMIN — FENTANYL CITRATE 250 MCG: 50 INJECTION, SOLUTION INTRAMUSCULAR; INTRAVENOUS at 07:43

## 2025-02-18 RX ADMIN — SODIUM CHLORIDE, POTASSIUM CHLORIDE, SODIUM LACTATE AND CALCIUM CHLORIDE 250 ML: 600; 310; 30; 20 INJECTION, SOLUTION INTRAVENOUS at 14:30

## 2025-02-18 RX ADMIN — CEFAZOLIN SODIUM 2 G: 2 INJECTION, SOLUTION INTRAVENOUS at 20:41

## 2025-02-18 RX ADMIN — PHENYLEPHRINE HYDROCHLORIDE 80 MCG: 10 INJECTION INTRAVENOUS at 08:30

## 2025-02-18 RX ADMIN — SODIUM CHLORIDE, POTASSIUM CHLORIDE, SODIUM LACTATE AND CALCIUM CHLORIDE 250 ML: 600; 310; 30; 20 INJECTION, SOLUTION INTRAVENOUS at 13:20

## 2025-02-18 RX ADMIN — TRANEXAMIC ACID 3 MG/KG/HR: 100 INJECTION INTRAVENOUS at 08:15

## 2025-02-18 RX ADMIN — METHADONE HYDROCHLORIDE 5 MG: 10 INJECTION, SOLUTION INTRAMUSCULAR; INTRAVENOUS; SUBCUTANEOUS at 11:09

## 2025-02-18 RX ADMIN — SODIUM BICARBONATE 50 MEQ: 84 INJECTION, SOLUTION INTRAVENOUS at 10:36

## 2025-02-18 RX ADMIN — ALBUMIN HUMAN 25 G: 0.05 INJECTION, SOLUTION INTRAVENOUS at 23:15

## 2025-02-18 RX ADMIN — NOREPINEPHRINE BITARTRATE 8 MCG: 1 INJECTION, SOLUTION, CONCENTRATE INTRAVENOUS at 12:33

## 2025-02-18 RX ADMIN — ROCURONIUM 20 MG: 50 INJECTION, SOLUTION INTRAVENOUS at 08:47

## 2025-02-18 RX ADMIN — SODIUM CHLORIDE, POTASSIUM CHLORIDE, SODIUM LACTATE AND CALCIUM CHLORIDE 5 ML/HR: 600; 310; 30; 20 INJECTION, SOLUTION INTRAVENOUS at 12:50

## 2025-02-18 RX ADMIN — PHENYLEPHRINE HYDROCHLORIDE 4000 MCG: 10 INJECTION INTRAVENOUS at 10:59

## 2025-02-18 RX ADMIN — TRANEXAMIC ACID 1347 MG: 10 INJECTION, SOLUTION INTRAVENOUS at 07:55

## 2025-02-18 RX ADMIN — FENTANYL CITRATE 100 MCG: 50 INJECTION, SOLUTION INTRAMUSCULAR; INTRAVENOUS at 08:39

## 2025-02-18 RX ADMIN — LIDOCAINE HYDROCHLORIDE 100 MG: 10 INJECTION, SOLUTION INFILTRATION; PERINEURAL at 10:37

## 2025-02-18 RX ADMIN — EPINEPHRINE IN SODIUM CHLORIDE 0.02 MCG/KG/MIN: 16 INJECTION INTRAVENOUS at 10:41

## 2025-02-18 RX ADMIN — ASPIRIN 81 MG CHEWABLE TABLET 81 MG: 81 TABLET CHEWABLE at 14:50

## 2025-02-18 RX ADMIN — ACETAMINOPHEN 650 MG: 160 SOLUTION ORAL at 20:41

## 2025-02-18 RX ADMIN — SUGAMMADEX 200 MG: 100 INJECTION, SOLUTION INTRAVENOUS at 13:04

## 2025-02-18 RX ADMIN — FAMOTIDINE 20 MG: 10 INJECTION, SOLUTION INTRAVENOUS at 08:47

## 2025-02-18 RX ADMIN — ALBUMIN HUMAN 250 ML: 0.05 INJECTION, SOLUTION INTRAVENOUS at 12:11

## 2025-02-18 RX ADMIN — ACETAMINOPHEN 650 MG: 160 SOLUTION ORAL at 14:50

## 2025-02-18 RX ADMIN — ALBUMIN HUMAN 25 G: 50 SOLUTION INTRAVENOUS at 23:15

## 2025-02-18 RX ADMIN — MILRINONE LACTATE 0.2 MCG/KG/MIN: 200 INJECTION, SOLUTION INTRAVENOUS at 10:54

## 2025-02-18 RX ADMIN — HEPARIN SODIUM 35000 UNITS: 1000 INJECTION, SOLUTION INTRAVENOUS; SUBCUTANEOUS at 09:17

## 2025-02-18 RX ADMIN — CALCIUM GLUCONATE 1 G: 20 INJECTION, SOLUTION INTRAVENOUS at 14:50

## 2025-02-18 RX ADMIN — Medication 40 PERCENT: at 20:08

## 2025-02-18 RX ADMIN — LIDOCAINE HYDROCHLORIDE 100 MG: 20 INJECTION, SOLUTION INFILTRATION; PERINEURAL at 07:43

## 2025-02-18 RX ADMIN — SENNOSIDES AND DOCUSATE SODIUM 2 TABLET: 50; 8.6 TABLET ORAL at 20:41

## 2025-02-18 RX ADMIN — INSULIN LISPRO 3 UNITS: 100 INJECTION, SOLUTION INTRAVENOUS; SUBCUTANEOUS at 15:06

## 2025-02-18 RX ADMIN — PROTAMINE SULFATE 30 MG: 10 INJECTION, SOLUTION INTRAVENOUS at 11:21

## 2025-02-18 RX ADMIN — SODIUM CHLORIDE, POTASSIUM CHLORIDE, SODIUM LACTATE AND CALCIUM CHLORIDE 30 ML/HR: 600; 310; 30; 20 INJECTION, SOLUTION INTRAVENOUS at 12:50

## 2025-02-18 RX ADMIN — ROCURONIUM 80 MG: 50 INJECTION, SOLUTION INTRAVENOUS at 07:43

## 2025-02-18 RX ADMIN — PHENYLEPHRINE HYDROCHLORIDE 80 MCG: 10 INJECTION INTRAVENOUS at 08:25

## 2025-02-18 RX ADMIN — MIDAZOLAM HYDROCHLORIDE 2 MG: 1 INJECTION, SOLUTION INTRAMUSCULAR; INTRAVENOUS at 07:15

## 2025-02-18 RX ADMIN — MAGNESIUM SULFATE HEPTAHYDRATE 2 G: 500 INJECTION, SOLUTION INTRAMUSCULAR; INTRAVENOUS at 10:32

## 2025-02-18 RX ADMIN — ALBUMIN HUMAN 25 G: 0.05 INJECTION, SOLUTION INTRAVENOUS at 20:38

## 2025-02-18 RX ADMIN — INSULIN LISPRO 6 UNITS: 100 INJECTION, SOLUTION INTRAVENOUS; SUBCUTANEOUS at 23:33

## 2025-02-18 RX ADMIN — Medication 0.02 MCG/KG/MIN: at 09:29

## 2025-02-18 RX ADMIN — ROCURONIUM 20 MG: 50 INJECTION, SOLUTION INTRAVENOUS at 10:51

## 2025-02-18 RX ADMIN — CEFAZOLIN 2 G: 1 INJECTION, POWDER, FOR SOLUTION INTRAMUSCULAR; INTRAVENOUS at 08:23

## 2025-02-18 RX ADMIN — POTASSIUM CHLORIDE 40 MEQ: 1.5 POWDER, FOR SOLUTION ORAL at 12:12

## 2025-02-18 RX ADMIN — DIPHENHYDRAMINE HYDROCHLORIDE 25 MG: 50 INJECTION, SOLUTION INTRAMUSCULAR; INTRAVENOUS at 08:47

## 2025-02-18 RX ADMIN — ALBUMIN HUMAN 12.5 G: 0.05 INJECTION, SOLUTION INTRAVENOUS at 17:28

## 2025-02-18 RX ADMIN — PROPOFOL 40 MCG/KG/MIN: 10 INJECTION, EMULSION INTRAVENOUS at 12:21

## 2025-02-18 RX ADMIN — PROPOFOL 30.07 MCG/KG/MIN: 10 INJECTION, EMULSION INTRAVENOUS at 16:20

## 2025-02-18 RX ADMIN — CEFAZOLIN 2 G: 1 INJECTION, POWDER, FOR SOLUTION INTRAMUSCULAR; INTRAVENOUS at 12:21

## 2025-02-18 RX ADMIN — CALCIUM GLUCONATE 1 G: 20 INJECTION, SOLUTION INTRAVENOUS at 23:15

## 2025-02-18 RX ADMIN — PHENYLEPHRINE-NACL IV SOLUTION 10 MG/250ML-0.9% 0.4 MCG/KG/MIN: 10-0.9/25 SOLUTION at 07:41

## 2025-02-18 RX ADMIN — Medication 40 PERCENT: at 16:00

## 2025-02-18 RX ADMIN — SODIUM CHLORIDE, POTASSIUM CHLORIDE, SODIUM LACTATE AND CALCIUM CHLORIDE 500 ML: 600; 310; 30; 20 INJECTION, SOLUTION INTRAVENOUS at 18:28

## 2025-02-18 RX ADMIN — PROPOFOL 30 MCG/KG/MIN: 10 INJECTION, EMULSION INTRAVENOUS at 22:32

## 2025-02-18 RX ADMIN — ETOMIDATE INJECTION 100 MG: 2 SOLUTION INTRAVENOUS at 07:43

## 2025-02-18 ASSESSMENT — COLUMBIA-SUICIDE SEVERITY RATING SCALE - C-SSRS
6. HAVE YOU EVER DONE ANYTHING, STARTED TO DO ANYTHING, OR PREPARED TO DO ANYTHING TO END YOUR LIFE?: NO
2. HAVE YOU ACTUALLY HAD ANY THOUGHTS OF KILLING YOURSELF?: NO
1. IN THE PAST MONTH, HAVE YOU WISHED YOU WERE DEAD OR WISHED YOU COULD GO TO SLEEP AND NOT WAKE UP?: NO

## 2025-02-18 ASSESSMENT — PAIN - FUNCTIONAL ASSESSMENT
PAIN_FUNCTIONAL_ASSESSMENT: 0-10
PAIN_FUNCTIONAL_ASSESSMENT: CPOT (CRITICAL CARE PAIN OBSERVATION TOOL)

## 2025-02-18 ASSESSMENT — PAIN SCALES - GENERAL: PAINLEVEL_OUTOF10: 0 - NO PAIN

## 2025-02-18 NOTE — ANESTHESIA PROCEDURE NOTES
Airway  Date/Time: 2/18/2025 7:46 AM  Urgency: elective    Airway not difficult    Staffing  Performed: resident   Authorized by: Rich Street MD    Performed by: Fabiola Ariza MD  Patient location during procedure: OR    Indications and Patient Condition  Indications for airway management: anesthesia  Spontaneous Ventilation: absent  Sedation level: deep  Preoxygenated: yes  Patient position: sniffing  Mask difficulty assessment: 2 - vent by mask + OA or adjuvant +/- NMBA  Planned trial extubation    Final Airway Details  Final airway type: endotracheal airway      Successful airway: ETT  Cuffed: yes   Successful intubation technique: direct laryngoscopy  Facilitating devices/methods: intubating stylet  Endotracheal tube insertion site: oral  Blade: Karson  Blade size: #3  ETT size (mm): 7.0  Cormack-Lehane Classification: grade I - full view of glottis  Placement verified by: chest auscultation and capnometry   Inital cuff pressure (cm H2O): 5  Cuff volume (mL): 6  Measured from: lips  ETT to lips (cm): 21  Number of attempts at approach: 1

## 2025-02-18 NOTE — BRIEF OP NOTE
Date: 2025  OR Location: [facility] OR    Name: Barbie Milan, : 1960, Age: 65 y.o., MRN: [id number], Sex: female    Diagnosis  Pre-op Diagnosis      * Coronary artery disease involving native coronary artery of native heart with other form of angina pectoris [I25.118] Post-op Diagnosis     * Coronary artery disease involving native coronary artery of native heart with other form of angina pectoris [I25.118]     Procedures  CABG x 3, LIMA-LAD, SVG-CX, SVG-PDA, POSTERIOR PERICARDIAL WINDOW  06608 - SD CORONARY ARTERY BYPASS 4 CORONARY VENOUS GRAFTS    Median Sternotomy  CABGx3 (Lima-LAD, SVG sequenced LatCx-PDA)  Posterior Pericardiotomy  ESVH R leg    Chest Tubes/Drains: 2 mediastina Cts, 1 L pleural CT       Temporary Pacing Wires: Ventricular PMW    -Settings: VVI @ 80 BPM   -Underlying Rhythm: Clayton    Permanent pacer/ICD: No   -Preoperative settings:    -Intra-op/ Postoperative settings:    Sternotomy performed by: Gallo Canchola MD    Conduit Harvested by: Neymar YANG    Sternal Wires placed by: Mayito BALL, Neymar YANG    Arm/Leg/Groin Closure/Cutdown performed by: R leg closed Neymar YANG    Cardio Pulmonary Bypass Time: 78 mins  Cross-clamp Time: 64 mins  Circulatory Arrest: No Time:     Is patient candidate for Emergency Re-sternotomy? Yes   -If yes, POD #10 is - 2025    Surgeons      * Gallo Canchola - Primary    Resident/Fellow/Other Assistant:  Surgeons and Role:  * No surgeons found with a matching role *  Neymar Ramosut CELIA BALL    Staff:   Circulator: Marissa Gomez Person: René  Surgical Assistant: Diego  Surgical Assistant: Maxi  Surgical Assistant: Efrain Gaxiola Scrub: Massiel    Anesthesia Staff: Anesthesiologist: Rich Street MD  Anesthesia Resident: Fabiola Ariza MD  Perfusionist: Rupesh Tan    Procedure Summary  Anesthesia: Anesthesia type not filed in the log.  ASA: ASA status not filed in the log.  Estimated Blood Loss: 250 mL  Intra-op  See previous documentation from back in May. Dr. Serna does not recommend the patient re-establish care with him here in [place]. He states that it would be best for her to see an adult congenital specialist, which is why he referred her to Dr. Olson, the provider she has an appt with on 7/31/24.     Do see that she did see Dr. Eduardo 5/28/24 who is an peds and adult congenital provider. Will forward to Dr. Eduardo's team to review as unsure if they are managing ALL her cardiac needs or if there is a reason that she would still need to see Dr. Olson. Looks like their was previous notation that Dr. Eduardo does not manage HF which Dr. Olson had said he would manage previously along with adult congenital dx. Please advise if appt with Dr. Olson can be cancelled. Will also include Dr. Olson's team so everyone is on the same page.    Per 5/16/24 TE:        Again this patient is not established/following with Dr. Serna anymore so please defer all questions going forward to Dr. Eduardo.        Medications:   Administrations occurring from 0650 to 1310 on 02/18/25:   Medication Name Total Dose   papaverine injection 300 mg   vancomycin (Vancocin) vial for injection 4 g   heparin 1,000 unit/mL injection 10,000 Units   sodium chloride 0.9 % irrigation solution 4,000 mL   calcium chloride 100 mg/mL syringe 1 g   ceFAZolin (Ancef) vial 1 g 4 g   diphenhydrAMINE 50 mg/mL 25 mg   electrolyte-A (Plasmalyte-A) Cannot be calculated   EPINEPHrine (Adrenalin) 4 mg/ 250 mL NS (16 mcg/mL) infusion 0.27 mg   etomidate (Amidate) injection 100 mg   famotidine (Pepcid) 10 mg/mL 20 mg   fentaNYL (Sublimaze) injection 50 mcg/mL 500 mcg   heparin 1,000 units/mL 35,000 Units   lidocaine (Xylocaine) injection 1 % 100 mg   lidocaine (Xylocaine) injection 2 % 100 mg   magnesium sulfate 50 % injection 2 g   magnesium sulfate IV 2 g/50 mL water (premix) 2 g   methadone (Dolophine) injection 20 mg   midazolam PF (Versed) injection 1 mg/mL 2 mg   milrinone (Primacor) 20 mg/100 mL in D5W premix 2.44 mg   norepinephrine (Levophed) 8 mg / 250 mL NS  (premix) 0.01 mg   phenylephrine (Jr-Synephrine) 10 mg/250 mL NS (40 mcg/mL) infusion 0.68 mg   phenylephrine (Jr-Synephrine) injection 320 mcg   phenylephrine (Jr-Synephrine) injection 4,000 mcg   propofol (Diprivan) IV infusion 256.01 mg   protamine 10 mg/mL 430 mg   rocuronium 10 mg/mL 120 mg   sodium bicarbonate injection 1 mEq/mL 50 mEq   tranexamic acid (Cyklokapron) 6,250 mg in sodium chloride 0.9% 312.5 mL (20 mg/mL) infusion 1,028.21 mg   tranexamic acid 1,000 mg/100 mL NS (premix) 1,347 mg              Anesthesia Record               Intraprocedure I/O Totals          Intake    Magnesium Sulfate 0.00 mL    The total shown is the total volume documented since Anesthesia Start was filed.    electrolyte-A (Plasmalyte-A) 1400.00 mL    Norepinephrine Drip 0.00 mL    The total shown is the total volume documented since Anesthesia Start was filed.    Tranexamic Acid 0.00 mL    The  total shown is the total volume documented since Anesthesia Start was filed.    Epinephrine Drip 0.00 mL    The total shown is the total volume documented since Anesthesia Start was filed.    Propofol Drip 0.00 mL    The total shown is the total volume documented since Anesthesia Start was filed.    Milrinone Drip 0.00 mL    The total shown is the total volume documented since Anesthesia Start was filed.    Phenylephrine Drip 0.00 mL    The total shown is the total volume documented since Anesthesia Start was filed.    Cell Saver 659 mL    Total Intake 2059 mL       Output    Urine 1350 mL    Total Output 1350 mL       Net    Net Volume 709 mL          Specimen: No specimens collected               Findings: CAD    Complications:  None; patient tolerated the procedure well.     Disposition: ICU - intubated and hemodynamically stable.  Condition: stable  Specimens Collected: No specimens collected  Attending Attestation: I was present and scrubbed for the entire procedure.    Gallo Canchola  Phone Number: [phone]

## 2025-02-18 NOTE — H&P
CTICU History & Physical    Subjective   HPI:  Patient is a 65-year-old female s/p scheduled CABG x 3 [LIMA to LAD, SVG to LCx, SVG to PDA], posterior pericardial window secondary to CAD with Dr. Gallo Canchola on 2/18/2025.  Past medical history remarkable for CAD s/p stent x 4 RCA, MI 2007, HLD, HTN, PAD, T2DM, overactive bladder, a recurrent UTI, polycythemia, fibromyalgia, osteoarthritis, gout, hard of hearing, smoking.    Cardiac Testing:     TTE:  TTE @ Sebastián LVEF 25-30%     LHC:  No previous LHC.     Procedure/Surgeon: Dr. Gallo Canchola  Frontliner/Anesthesia: Dr. Rich Street / Dr. Fabiola Ariza   Out of OR Time (document on ventilator card): 1235      OR Course/Issues: PACs & PVCs      CPB time: 80 min     Cross clamp time: 64 min  Circ arrest time: None  Echo Pre/Post: PRE LVEF 25-30% [Sebastián]/ POST LVEF 30-35%, inferior wall hypokinesis, LA dilation, moderate MR, normal AV, normal RV, NSR post op   Chest Tubes/Drains: Left pleural chest tube, mediastinal chest tube x 2   Temporary wires location/setting: VVI       Fluids  Crystalloid: 1000 mL  Colloid: 200 mL  Cellsaver: 650 mL, TEG normal  Products: None  EBL: 250 mL  UOP: 1200 mL     Anesthesia  Intubation: 7.0 ETT @ 20 cm  Intravenous Access: PIV 16G R, Brachial    AICD: None   PPM: None  Regional anesthesia: None  Benzodiazepine dose/last administration: 2 mg midazolam total  Opioid dose/last administration: 400 mcg fentanyl total  NMB dose/last administration: 100 mg rocuronium total  TOF/ reversal given: In CTICU 1230  Antibiotic time: 1220 Ancef 2g x 2   Temperature on admission to ICU: 36.5 C    Past Medical History:   Diagnosis Date    Abnormal gait 04/19/2021    Acute upper respiratory infection 01/13/2025    Allergic rhinitis with postnasal drip 10/03/2024    Anxiety     Arthralgia of right knee 10/06/2021    Bilateral adrenal adenomas 12/17/2022    Bladder diverticulum 12/17/2022    CAD (coronary artery disease)     (MI 2007 stentx4 RCA),     Carpal tunnel syndrome 04/19/2021    Chronic otitis media 01/13/2025    Chronic sinusitis 01/13/2025    Coronary artery disease     Cyst of ovary, unspecified laterality 06/08/2023    Degeneration of lumbosacral intervertebral disc 01/13/2025    Disease due to severe acute respiratory syndrome coronavirus 2 (SARS-CoV-2) 01/13/2025    Disturbance in speech 04/19/2021    Dizziness and giddiness 01/13/2025    DM (diabetes mellitus) (Multi)     Fatigue     Fatty liver 12/17/2022    Fibromyalgia 01/13/2025    Hearing aid worn     HL (hearing loss)     20% in Left ear, none in right ear- reads lips    Hyperlipidemia     Hypertension     Low back pain 01/13/2025    Lumbosacral spondylosis without myelopathy 01/13/2025    Muscle spasticity 03/24/2022    Myocardial infarction (Multi) 2007    2007 x7, 6 months ago    Obesity with body mass index 30 or greater 12/15/2015    Body mass index 30+ - obesity      Otosclerosis of both ears 03/24/2022    Overactive bladder 09/10/2024    Perforation of tympanic membrane 01/13/2025    Peripheral vascular disease (CMS-HCC) 01/13/2025    4 stents in abdomin, patient reports vasculature in abdomin    Polycythemia 04/02/2022    Primary fibromyalgia syndrome 06/29/2017    Psychophysiological insomnia 10/28/2022    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) 03/24/2022    Recurrent otitis media, right 03/24/2022    Sciatica 04/19/2021    Shortness of breath     FLETCHER    Spinal stenosis of lumbar region 08/29/2018    Sprain of knee 01/13/2025    Tear of lateral meniscus of knee 06/08/2022    Tobacco use     Total perforation of tympanic membrane, right 06/19/2023    Type 2 diabetes mellitus     Urinary tract infection with hematuria 09/10/2024    Uterine fibroid 2003    Vertigo     Wears glasses 04/19/2021     Past Surgical History:   Procedure Laterality Date    CARDIAC CATHETERIZATION  10/29/2007    CHOLECYSTECTOMY      CORONARY ANGIOPLASTY WITH STENT PLACEMENT  2007     x4 stents    DESCENDING AORTIC ANEURYSM REPAIR W/ STENT      HYSTERECTOMY  2023    ILIAC ARTERY STENT Bilateral 2023    KNEE SURGERY      KNEE ARTHROSCOPY- meniscal repair    OOPHORECTOMY      TUBAL LIGATION      VULVA SURGERY Right     for dysplasia     Medications Prior to Admission   Medication Sig Dispense Refill Last Dose/Taking    aspirin 81 mg EC tablet Take 1 tablet (81 mg) by mouth once daily.   2025 Morning    chlorhexidine (Hibiclens) 4 % external liquid Apply topically once daily as needed for wound care. Use for 5 days prior to surgery as body wash, do not use on face or genital region 473 mL 0 2025 Morning    furosemide (Lasix) 40 mg tablet Take 1 tablet (40 mg) by mouth once daily.   2025 Morning    metoprolol succinate XL (Toprol-XL) 50 mg 24 hr tablet Take 1 tablet (50 mg) by mouth once daily. Do not crush or chew.   2025 Morning    PARoxetine (Paxil) 10 mg tablet Take 1 tablet (10 mg) by mouth once daily in the morning.   2025 Morning    [] chlorhexidine (Peridex) 0.12 % solution Use 15 mL in the mouth or throat if needed for wound care for up to 2 days. Use as mouth wash for night before and morning of surgery 120 mL 0     HYDROcodone-acetaminophen (Norco) 5-325 mg tablet Take 1 tablet by mouth 3 times a day as needed.       ibuprofen 200 mg tablet Take 1 tablet (200 mg) by mouth if needed for mild pain (1 - 3).       lisinopril 10 mg tablet Take 1 tablet (10 mg) by mouth once daily.       nitroglycerin (Nitrostat) 0.4 mg SL tablet Place 1 tablet (0.4 mg) under the tongue every 5 minutes if needed.       traZODone (Desyrel) 50 mg tablet Take 1 tablet (50 mg) by mouth once daily at bedtime. (Patient not taking: Reported on 2025)        Celecoxib, Macrobid [nitrofurantoin monohyd/m-cryst], Niacin, Sulfa (sulfonamide antibiotics), Inositol, Metoprolol, Sulfur, Tobramycin, Topiramate, Nitrofurantoin, and Statins-hmg-coa reductase  inhibitors  Social History     Tobacco Use    Smoking status: Every Day     Current packs/day: 0.25     Average packs/day: 0.3 packs/day for 50.0 years (12.5 ttl pk-yrs)     Types: Cigarettes    Smokeless tobacco: Never   Vaping Use    Vaping status: Every Day   Substance Use Topics    Alcohol use: Not Currently    Drug use: Never     Family History   Problem Relation Name Age of Onset    Stroke Mother      No Known Problems Father      Stroke Daughter         Review of Systems:  Unable to assess secondary to patient perioperative anesthesia.    Objective   Vitals:  Most Recent:  Vitals:    02/18/25 0638   BP: 136/65   Pulse: 68   Resp: 14   Temp: 36.5 °C (97.7 °F)   SpO2: 95%       24hr Min/Max:  Temp  Min: 36.5 °C (97.7 °F)  Max: 36.5 °C (97.7 °F)  Pulse  Min: 68  Max: 89  BP  Min: 136/65  Max: 151/78  Resp  Min: 14  Max: 14  SpO2  Min: 95 %  Max: 98 %    I/O:  No intake/output data recorded.    LDA:  CVC 02/18/25 Double lumen Right Internal jugular (Active)   Placement Date/Time: 02/18/25 (c) 0800   Hand Hygiene Performed Prior to CVC Insertion: Yes  Site Prep: Chlorhexidine   Site Prep Agent has Completely Dried Before Insertion: Yes  All 5 Sterile Barriers Used (Gloves, Gown, Cap, Mask, Large Sterile Prema...   Number of days: 0       Introducer 02/18/25 Internal jugular (Active)   Placement Date/Time: 02/18/25 (c) 0800   Hand Hygiene Completed: Yes  Location: Internal jugular  Placement Verified: Pressure tracing changes;Transesophageal echocardiogram   Number of days: 0       Arterial Line 02/18/25 Left Brachial (Active)   Placement Date/Time: 02/18/25 (c) 0720   Size: 20 G  Orientation: Left  Location: Brachial  Securement Method: Transparent dressing  Patient Tolerance: Tolerated well   Number of days: 0       ETT  7 mm (Active)   Placement Date/Time: 02/18/25 (c) 0746   Mask Ventilation: Vent by mask + OA or adjuvant +/- NMBA  Technique: Direct laryngoscopy  ETT Type: ETT - single  Single Lumen Tube Size: 7  mm  Cuffed: Yes  Laryngoscope: Karson  Blade Size: 3  Location: Ora...   Number of days: 0       Urethral Catheter Non-latex 14 Fr. (Active)   Placement Date/Time: 02/18/25 0745   Placed by: reina borrero  Hand Hygiene Completed: Yes  Catheter Type: Non-latex  Tube Size (Fr.): 14 Fr.  Catheter Balloon Size: 10 mL  Urine Returned: Yes   Number of days: 0       Physical Exam:   - GENERAL: Sedated ... .No acute distress. Well-nourished.  - NEUROLOGIC: No focal neurological deficits. Cam ...  - LUNGS: Diminished bases. No accessory muscle use. Intubated ...  - CARDIOVASCULAR: Regular rate and rhythm.  Epicardial wires ...  - ABDOMEN: Soft, non-tender and non-distended. No palpable masses.  - : Clear, yellow urine in dela cruz.   - EXTREMITIES: No edema. Non-tender.?  - SKIN: No rashes or lesions. Warm.  - PSYCHIATRIC: Calm, unable to assess sedated.       Lab Review:  Results from last 7 days   Lab Units 02/14/25  1014   WBC AUTO x10*3/uL 9.4   HEMOGLOBIN g/dL 15.0   HEMATOCRIT % 43.8   PLATELETS AUTO x10*3/uL 250     Results from last 7 days   Lab Units 02/14/25  1014   SODIUM mmol/L 138   POTASSIUM mmol/L 3.9   CHLORIDE mmol/L 100   CO2 mmol/L 26   BUN mg/dL 14   CREATININE mg/dL 0.74   CALCIUM mg/dL 9.3   PROTEIN TOTAL g/dL 6.4   BILIRUBIN TOTAL mg/dL 0.8   ALK PHOS U/L 82   ALT U/L 28   AST U/L 38   GLUCOSE mg/dL 120*         Results from last 7 days   Lab Units 02/18/25  1105   POCT PH, ARTERIAL pH 7.36*   POCT PCO2, ARTERIAL mm Hg 48*   POCT PO2, ARTERIAL mm Hg 123*   POCT HCO3 CALCULATED, ARTERIAL mmol/L 27.1*   POCT BASE EXCESS, ARTERIAL mmol/L 1.2       Most recent labs and imaging reviewed.    Daily Risk Screen  Intubated: Yes  Central line: Yes  Dela Cruz: Yes    Assessment/Plan     Assessment:     Plan:  NEURO/MSK:  PMH of OA, gout, fibromyalgia. Patient is intubated and sedated on propofol infusion. Methadone 20 prior to surgery. Acute post operative pain.  -->  - Serial neuro and pain assessments   - Continue  propofol until NMB reversal, then daily sedation vacation at minimum   - NMB reversal when normothermic and hemodynamically stable.    - Scheduled Tylenol 975 mg q 8 hr   - PRN oxycodone  - PRN dilaudid for pain   - PT Consult, OOB to chair as tolerated, chair position if not tolerated   - CAM ICU score qshift  - Sleep/wake cycle hygiene  - Hold home Norco 5-3 25 mg 3 times daily, ibuprofen 200 Mg,   - Continue Paxil 10 Mg every morning, trazodone 50 Mg nightly     CV:  Patient has a history of CAD s/p stent x 4 RCA, MI 2007, HLD, HTN, PVD Now s/p CABG x 3 [LIMA to LAD, SVG to LCx, SVG to PDA], posterior pericardial window secondary to CAD with Dr. Gallo Canchola on 2/18/2025. PRE LVEF 25-30% [Sebastián]/ POST LVEF 30-35% . Arrived to CTICU on 2/18/2025.  A/V epicardial wires set VVI @ 50bpm. -->  - Milrinone gtt, epinephrine gtt, levo gtt   - Maintain goal MAP 60-80  - Mixed venous and CI Q4H  - Volume resuscitate as clinically indicated  - Maintain epicardial wires set VVI @ 50  - Start ASA 6hrs post-op for CABG  - If CABG is 2/2 STEMI/unstable angina, will receive Plavix POD2  - Start statin tomorrow  - Hold home Aspirin 81 Mg, nitroglycerin 0.4 Mg as needed, lisinopril 10 Mg daily, metoprolol 50 Mg daily,     PULM:  No history of pulmonary disease. Extensive smoking hx.  Currently intubated on ventilator. Post operative atelectasis. Chest tubes left pleural chest tube, mediastinal chest tube x 2 . -->  - F/u post op CXR  - Once reversed, wean ventilator settings towards CPAP & extubation   - Wean FiO2 maintaining SpO2 >92%.   - IS q1h and OOB to chair when extubated  - Chest tubes to wall suction.    GI:  No PMH.  OG in place.-->  - Continue PPI until extubated  - NPO, will perform bedside swallow eval post extubation   - Colace/senna BID and miralax BID     :  CSA-ALEXSANDER Risk Score 3.  PMH of overactive bladder, recurrent UTI,  baseline CRE 0.75. Creatinine stable post-op. Pichardo in place and making adequate UOP.  -->  - Continue dela cruz catheter for strict I/Os.  - Goal UOP 0.5ml/kg/hr  - RFP as clinically indicated  - Replete electrolytes per CTICU protocol  - Hold home medications: Lasix 40 Mg daily        Variable Points   Male 1   Age < 40 0   Age 41-60 1   Age 61-80 2   Age > 81 3   CKD 1   NYHA > 2 1   Previous cardiac Surgery 1     Points Pre-Op ICU Admit   0-1 Low Low   2-3 Medium Low   4 High Low   5-9 High Medium   > 10 High High        ENDO:  PMH of T2DM A1c: 6.4. -->  - Maintain BG <180, insulin per CTICU protocol     HEME:  Polycythemia, Acute blood loss anemia and thrombocytopenia. -->    - Monitor drain output volume and characteristics  - CBC, coags, and fibrinogen post op and as clinically indicated  - Start ASA 6hrs post-op for CABG  - If CABG is 2/2 STEMI/unstable angina, will receive Plavix POD2  - SQH tomorrow   - SCDs for DVT prophylaxis.  - Last type and screen: 12/20/2024     ID:  Afebrile, no current indications of infection. MRSA negative. -->  - Trend temp q4h  - Periop cefazolin x 48hrs     Skin:  No active skin issues.  - preventative Mepilex dressings in place on sacrum and heels  - change preventative Mepilex weekly or more frequently as indicated (when moist/soiled)   - every shift skin assessment per nursing and weekly ICU skin rounds  - moisture barrier to be applied with pastora care  - active skin problems addressed with nursing on daily rounds     Proph:  SCDs  PPI     G:  Line  Right IJ MAC w Minimac with PA catheter placed 2/18/2025  Left brachial a-line placed 2/18/2025    F: Family: will update at bedside postoperatively.     A,B,C,D,E,F,G: reviewed       Dispo: CTICU care for now.    CTICU TEAM PHONE [phone]    Scott Mcneal MD   Anesthesiology and Perioperative Medicine, PGY-1

## 2025-02-18 NOTE — ANESTHESIA PROCEDURE NOTES
Central Venous Line:    Date/Time: 2/18/2025 8:00 AM    A central venous line was placed in the OR for the following indication(s): central venous access and CVP monitoring.  Staffing  Performed: resident   Authorized by: Rich Street MD    Performed by: Fabiola Ariza MD    Sterility preparation included the following: provider hand hygiene performed prior to central venous catheter insertion, all 5 sterile barriers used (gloves, gown, cap, mask, large sterile drape) during central venous catheter insertion, antiseptic used during central venous catheter insertion and skin prep agent completely dried prior to procedure.  The patient was placed in Trendelenburg position.    Right internal jugular vein was prepped.    The site was prepped with Chlorhexidine.  Size: 9 Fr   Length: 11.5 (16 cm)  Catheter type: introducer   Number of Lumens: double lumen    This catheter was not an oximetric catheter.    During the procedure, the following specific steps were taken: target vein identified, needle advanced into vein and blood aspirated and guidewire advanced into vein.  Seldinger technique used.  Procedure performed using ultrasound guidance.  Sterile gel and probe cover used in ultrasound-guided central venous catheter insertion.    Intravenous verification was obtained by ultrasound, venous blood return and manometry.      Post insertion care included: all ports aspirated, all ports flushed easily, guidewire removed intact, Biopatch applied, line sutured in place and dressing applied.    During the procedure the patient experienced: patient tolerated procedure well with no complications.      A oximetric, 7.5 (size) Pulmonary Artery Catheter (PAC) was placed through the Introducer CVL in the right internal jugular vein.  The PAC placement was confirmed by pressure tracing changes and RUBINA.  The patient experienced the following events during the procedure: patient tolerated procedure well with no  complications.

## 2025-02-18 NOTE — ANESTHESIA PROCEDURE NOTES
Peripheral IV  Date/Time: 2/18/2025 6:45 AM      Placement  Needle size: 16 G  Laterality: right  Location: hand  Local anesthetic: injectable  Site prep: chlorhexidine  Technique: anatomical landmarks  Attempts: 1

## 2025-02-18 NOTE — ANESTHESIA PROCEDURE NOTES
Arterial Line:    Date/Time: 2/18/2025 7:20 AM    Staffing  Performed: attending and resident   Authorized by: Rich Street MD    Performed by: Fabiola Ariza MD    An arterial line was placed. Procedure performed using ultrasound guidance.in the OR for the following indication(s): continuous blood pressure monitoring and blood sampling needed.    A 20 gauge (size), 12 cm (length), Arrow (type) catheter was placed into the Left brachial artery, secured by Tegaderm,   Seldinger technique used.  Events:  patient tolerated procedure well with no complications.

## 2025-02-18 NOTE — ANESTHESIA POSTPROCEDURE EVALUATION
Patient: Barbie Milan    Procedure Summary       Date: 02/18/25 Room / Location: [facility] OR 21 / [facility] OR    Anesthesia Start: 0712 Anesthesia Stop: 1306    Procedure: CABG x 3, LIMA-LAD, SVG-CX, SVG-PDA, POSTERIOR PERICARDIAL WINDOW (Chest) Diagnosis:       Coronary artery disease involving native coronary artery of native heart with other form of angina pectoris      (Coronary artery disease involving native coronary artery of native heart with other form of angina pectoris [I25.118])    Surgeons: Gallo Canchola MD Responsible Provider: Rich Street MD    Anesthesia Type: general ASA Status: 4            Anesthesia Type: general    Vitals Value Taken Time   /65 02/18/25 1306   Temp 36.5 °C (97.7 °F) 02/18/25 1305   Pulse 111 02/18/25 1305   Resp 22 02/18/25 1305   SpO2 95 % 02/18/25 1305   Vitals shown include unfiled device data.    Anesthesia Post Evaluation    Patient location during evaluation: ICU  Patient participation: complete - patient cannot participate  Level of consciousness: sedated  Pain management: adequate  Airway patency: patent  Cardiovascular status: acceptable  Respiratory status: acceptable and intubated  Hydration status: acceptable  Postoperative Nausea and Vomiting: none        No notable events documented.

## 2025-02-18 NOTE — OP NOTE
CABG x 3, LIMA-LAD, SVG-CX, SVG-PDA, POSTERIOR PERICARDIAL WINDOW Operative Note     Date: 2025  OR Location: [facility] OR    Name: Barbie Milan, : 1960, Age: 65 y.o., MRN: [id number], Sex: female    Diagnosis  Pre-op Diagnosis      * Coronary artery disease involving native coronary artery of native heart with other form of angina pectoris [I25.118] Post-op Diagnosis     * Coronary artery disease involving native coronary artery of native heart with other form of angina pectoris [I25.118]     Procedures  CABG x 3, LIMA-LAD, SVG-CX, SVG-PDA, POSTERIOR PERICARDIAL WINDOW  [id number] - ID CORONARY ARTERY BYPASS 4 CORONARY VENOUS GRAFTS      Surgeons      * Gallo Canchola - Primary    Resident/Fellow/Other Assistant:  Surgeons and Role:  * No surgeons found with a matching role *    Staff:   Circulator: Marissa  Scrub Person: René  Surgical Assistant: Diego  Surgical Assistant: Maxi  Surgical Assistant: Efrain Gaxiola Scrub: Massiel    Anesthesia Staff: Anesthesiologist: Rich Street MD  Anesthesia Resident: Fabiola Ariza MD  Perfusionist: Rupesh Tan    Procedure Summary  Anesthesia: Anesthesia type not filed in the log.  ASA: ASA status not filed in the log.  Estimated Blood Loss: 250 mL  Intra-op Medications:   Administrations occurring from 0650 to 1310 on 25:   Medication Name Total Dose   papaverine injection 300 mg   vancomycin (Vancocin) vial for injection 4 g   heparin 1,000 unit/mL injection 10,000 Units   sodium chloride 0.9 % irrigation solution 4,000 mL   calcium chloride 100 mg/mL syringe 1 g   ceFAZolin (Ancef) vial 1 g 2 g   diphenhydrAMINE 50 mg/mL 25 mg   EPINEPHrine (Adrenalin) 4 mg/ 250 mL NS (16 mcg/mL) infusion 0.27 mg   etomidate (Amidate) injection 100 mg   famotidine (Pepcid) 10 mg/mL 20 mg   fentaNYL (Sublimaze) injection 50 mcg/mL 500 mcg   heparin 1,000 units/mL 35,000 Units   lidocaine (Xylocaine) injection 1 % 100 mg   lidocaine (Xylocaine) injection 2 %  100 mg   magnesium sulfate 50 % injection 2 g   magnesium sulfate IV 2 g/50 mL water (premix) 2 g   methadone (Dolophine) injection 20 mg   midazolam PF (Versed) injection 1 mg/mL 2 mg   milrinone (Primacor) 20 mg/100 mL in D5W premix 2.44 mg   norepinephrine (Levophed) 8 mg / 250 mL NS  (premix) 0.01 mg   phenylephrine (Jr-Synephrine) 10 mg/250 mL NS (40 mcg/mL) infusion 0.68 mg   phenylephrine (Jr-Synephrine) injection 320 mcg   phenylephrine (Jr-Synephrine) injection 4,000 mcg   propofol (Diprivan) IV infusion 50 mg   protamine 10 mg/mL 430 mg   rocuronium 10 mg/mL 120 mg   sodium bicarbonate injection 1 mEq/mL 50 mEq   tranexamic acid (Cyklokapron) 6,250 mg in sodium chloride 0.9% 312.5 mL (20 mg/mL) infusion 1,324.55 mg   tranexamic acid 1,000 mg/100 mL NS (premix) 1,347 mg              Anesthesia Record               Intraprocedure I/O Totals          Intake    Magnesium Sulfate 0.00 mL    The total shown is the total volume documented since Anesthesia Start was filed.    Norepinephrine Drip 0.00 mL    The total shown is the total volume documented since Anesthesia Start was filed.    Tranexamic Acid 0.00 mL    The total shown is the total volume documented since Anesthesia Start was filed.    Epinephrine Drip 0.00 mL    The total shown is the total volume documented since Anesthesia Start was filed.    Propofol Drip 0.00 mL    The total shown is the total volume documented since Anesthesia Start was filed.    Milrinone Drip 0.00 mL    The total shown is the total volume documented since Anesthesia Start was filed.    Phenylephrine Drip 0.00 mL    The total shown is the total volume documented since Anesthesia Start was filed.    Cell Saver 436 mL    Total Intake 436 mL       Output    Urine 750 mL    Total Output 750 mL       Net    Net Volume -314 mL          Specimen: No specimens collected              Drains and/or Catheters:   Urethral Catheter Non-latex 14 Fr. (Active)       Tourniquet Times:          Implants:     Findings: There were no pericardial adhesions or effusions.  The ascending aorta was soft without evidence of atherosclerosis.  The heart was enlarged and demonstrated decreased contractility.  The patient had severe diffuse calcific coronary artery disease, however we were able to find locations on the LAD, posterior lateral circumflex, and posterior descending arteries that was suitable for grafting.    Indications: Barbie Milan is an 65 y.o. female who is having surgery for Coronary artery disease involving native coronary artery of native heart with other form of angina pectoris [I25.118].  Coronary catheterization demonstrates high-grade obstruction of the LAD and circumflex coronary arteries and total occlusion of the right coronary artery.  Echocardiography demonstrates moderately severe left ventricular dysfunction and no significant valvular abnormalities.  The patient was referred for coronary artery bypass grafting.    Procedure Details: A median sternotomy was performed.  The left internal thoracic artery was dissected from the chest wall in a skeletonized manner and saphenous vein was harvested endoscopically from the leg.  The thymus was divided and the pericardium was opened.  The ascending aorta was palpated and it was without evidence of atherosclerosis.  The patient was heparinized.  The ascending aorta and right atrium were cannulated for cardiopulmonary bypass and an antegrade cardioplegia cannula was placed in the ascending aorta.  The patient was placed on cardiopulmonary bypass, the ascending aorta was crossclamped, and the heart was arrested and protected with cold blood cardioplegia.  During the remainder of the cross-clamp time cold blood cardioplegia was administered every 15 to 20 minutes.  A length of reverse saphenous vein graft was used as a sequential graft.  First with a side-to-side anastomosis to the posterior lateral circumflex and then an end-to-side  anastomosis to the posterior descending artery.  The in situ left internal thoracic artery was used to bypass the LAD.  All 3 distal anastomoses were constructed with running 7-0 suture and prior to completing the anastomoses they were probed and found to be widely patent.  The proximal anastomoses of the saphenous vein graft was constructed to the ascending aorta with a running 6-0 Prolene stitch.  The heart and ascending aorta were de-aired and aortic cross-clamp was removed.  A posterior pericardiotomy was then performed extending from the inferior left pulmonary vein down to the diaphragm.  When the patient's heart was completely de-aired and recovered, she was weaned from cardiopulmonary bypass without difficulty.  All cannulas were removed and the heparin effect was reversed with protamine.  Hemostasis was obtained, mediastinal and left pleural chest tubes were placed, and the wounds were closed in the standard fashion.  The patient tolerated the procedure well and was brought to the intensive care unit in stable condition.  To sponge counts, instrument counts, and needle counts reported correct by the circulating nurse.  There were no specimen sent to pathology.  The drains included mediastinal and pleural chest tubes.  Estimated blood loss was 250 cc.  I performed the procedure.  Complications:  None; patient tolerated the procedure well.    Disposition: ICU - intubated and hemodynamically stable.  Condition: stable         Task Performed by RNFA or Surgical Assistant:  The RNFA assisted throughout the procedure as well as harvested the saphenous vein endoscopically and closed the sternum and mediastinal incision.          Additional Details: None    Attending Attestation: I performed the procedure.    Gallo JULIENNE Canchola  Phone Number: [phone]

## 2025-02-19 ENCOUNTER — APPOINTMENT (OUTPATIENT)
Dept: RADIOLOGY | Facility: HOSPITAL | Age: 65
DRG: 235 | End: 2025-02-19
Payer: MEDICARE

## 2025-02-19 ENCOUNTER — APPOINTMENT (OUTPATIENT)
Dept: CARDIOLOGY | Facility: HOSPITAL | Age: 65
DRG: 235 | End: 2025-02-19
Payer: MEDICARE

## 2025-02-19 LAB
ALBUMIN SERPL BCP-MCNC: 4.4 G/DL (ref 3.4–5)
ALBUMIN SERPL BCP-MCNC: 4.6 G/DL (ref 3.4–5)
ANION GAP BLDA CALCULATED.4IONS-SCNC: 11 MMO/L (ref 10–25)
ANION GAP BLDA CALCULATED.4IONS-SCNC: 12 MMO/L (ref 10–25)
ANION GAP BLDA CALCULATED.4IONS-SCNC: 12 MMO/L (ref 10–25)
ANION GAP BLDA CALCULATED.4IONS-SCNC: 14 MMO/L (ref 10–25)
ANION GAP BLDA CALCULATED.4IONS-SCNC: 14 MMO/L (ref 10–25)
ANION GAP BLDA CALCULATED.4IONS-SCNC: 15 MMO/L (ref 10–25)
ANION GAP BLDA CALCULATED.4IONS-SCNC: 16 MMO/L (ref 10–25)
ANION GAP BLDA CALCULATED.4IONS-SCNC: 17 MMO/L (ref 10–25)
ANION GAP BLDA CALCULATED.4IONS-SCNC: 18 MMO/L (ref 10–25)
ANION GAP BLDA CALCULATED.4IONS-SCNC: 19 MMO/L (ref 10–25)
ANION GAP BLDA CALCULATED.4IONS-SCNC: 21 MMO/L (ref 10–25)
ANION GAP BLDMV CALCULATED.4IONS-SCNC: 13 MMO/L (ref 10–25)
ANION GAP BLDMV CALCULATED.4IONS-SCNC: 15 MMO/L (ref 10–25)
ANION GAP BLDMV CALCULATED.4IONS-SCNC: 16 MMO/L (ref 10–25)
ANION GAP BLDMV CALCULATED.4IONS-SCNC: 17 MMO/L (ref 10–25)
ANION GAP BLDMV CALCULATED.4IONS-SCNC: 18 MMO/L (ref 10–25)
ANION GAP SERPL CALC-SCNC: 15 MMOL/L (ref 10–20)
ANION GAP SERPL CALC-SCNC: 18 MMOL/L (ref 10–20)
BASE EXCESS BLDA CALC-SCNC: -0.2 MMOL/L (ref -2–3)
BASE EXCESS BLDA CALC-SCNC: -0.4 MMOL/L (ref -2–3)
BASE EXCESS BLDA CALC-SCNC: -1.2 MMOL/L (ref -2–3)
BASE EXCESS BLDA CALC-SCNC: -1.3 MMOL/L (ref -2–3)
BASE EXCESS BLDA CALC-SCNC: -2.1 MMOL/L (ref -2–3)
BASE EXCESS BLDA CALC-SCNC: -2.3 MMOL/L (ref -2–3)
BASE EXCESS BLDA CALC-SCNC: -2.7 MMOL/L (ref -2–3)
BASE EXCESS BLDA CALC-SCNC: -2.8 MMOL/L (ref -2–3)
BASE EXCESS BLDA CALC-SCNC: -4.1 MMOL/L (ref -2–3)
BASE EXCESS BLDA CALC-SCNC: -4.1 MMOL/L (ref -2–3)
BASE EXCESS BLDA CALC-SCNC: -5.4 MMOL/L (ref -2–3)
BASE EXCESS BLDMV CALC-SCNC: -0.9 MMOL/L (ref -2–3)
BASE EXCESS BLDMV CALC-SCNC: -2.6 MMOL/L (ref -2–3)
BASE EXCESS BLDMV CALC-SCNC: -3.3 MMOL/L (ref -2–3)
BASE EXCESS BLDMV CALC-SCNC: -4.7 MMOL/L (ref -2–3)
BASE EXCESS BLDMV CALC-SCNC: -5.3 MMOL/L (ref -2–3)
BASOPHILS # BLD AUTO: 0.02 X10*3/UL (ref 0–0.1)
BASOPHILS NFR BLD AUTO: 0.2 %
BODY TEMPERATURE: 37 DEGREES CELSIUS
BUN SERPL-MCNC: 16 MG/DL (ref 6–23)
BUN SERPL-MCNC: 20 MG/DL (ref 6–23)
CA-I BLD-SCNC: 1.06 MMOL/L (ref 1.1–1.33)
CA-I BLD-SCNC: 1.07 MMOL/L (ref 1.1–1.33)
CA-I BLDA-SCNC: 1 MMOL/L (ref 1.1–1.33)
CA-I BLDA-SCNC: 1.04 MMOL/L (ref 1.1–1.33)
CA-I BLDA-SCNC: 1.07 MMOL/L (ref 1.1–1.33)
CA-I BLDA-SCNC: 1.08 MMOL/L (ref 1.1–1.33)
CA-I BLDA-SCNC: 1.08 MMOL/L (ref 1.1–1.33)
CA-I BLDA-SCNC: 1.1 MMOL/L (ref 1.1–1.33)
CA-I BLDA-SCNC: 1.12 MMOL/L (ref 1.1–1.33)
CA-I BLDA-SCNC: 1.13 MMOL/L (ref 1.1–1.33)
CA-I BLDA-SCNC: 1.17 MMOL/L (ref 1.1–1.33)
CA-I BLDMV-SCNC: 0.91 MMOL/L (ref 1.1–1.33)
CA-I BLDMV-SCNC: 1.03 MMOL/L (ref 1.1–1.33)
CA-I BLDMV-SCNC: 1.03 MMOL/L (ref 1.1–1.33)
CA-I BLDMV-SCNC: 1.09 MMOL/L (ref 1.1–1.33)
CA-I BLDMV-SCNC: 1.22 MMOL/L (ref 1.1–1.33)
CALCIUM SERPL-MCNC: 8.2 MG/DL (ref 8.6–10.6)
CALCIUM SERPL-MCNC: 8.3 MG/DL (ref 8.6–10.6)
CHLORIDE BLD-SCNC: 104 MMOL/L (ref 98–107)
CHLORIDE BLD-SCNC: 106 MMOL/L (ref 98–107)
CHLORIDE BLD-SCNC: 108 MMOL/L (ref 98–107)
CHLORIDE BLD-SCNC: 108 MMOL/L (ref 98–107)
CHLORIDE BLD-SCNC: 112 MMOL/L (ref 98–107)
CHLORIDE BLDA-SCNC: 105 MMOL/L (ref 98–107)
CHLORIDE BLDA-SCNC: 106 MMOL/L (ref 98–107)
CHLORIDE BLDA-SCNC: 106 MMOL/L (ref 98–107)
CHLORIDE BLDA-SCNC: 107 MMOL/L (ref 98–107)
CHLORIDE BLDA-SCNC: 107 MMOL/L (ref 98–107)
CHLORIDE BLDA-SCNC: 108 MMOL/L (ref 98–107)
CHLORIDE BLDA-SCNC: 108 MMOL/L (ref 98–107)
CHLORIDE SERPL-SCNC: 104 MMOL/L (ref 98–107)
CHLORIDE SERPL-SCNC: 105 MMOL/L (ref 98–107)
CO2 SERPL-SCNC: 24 MMOL/L (ref 21–32)
CO2 SERPL-SCNC: 26 MMOL/L (ref 21–32)
CREAT SERPL-MCNC: 1 MG/DL (ref 0.5–1.05)
CREAT SERPL-MCNC: 1.05 MG/DL (ref 0.5–1.05)
EGFRCR SERPLBLD CKD-EPI 2021: 59 ML/MIN/1.73M*2
EGFRCR SERPLBLD CKD-EPI 2021: 62 ML/MIN/1.73M*2
EOSINOPHIL # BLD AUTO: 0.01 X10*3/UL (ref 0–0.7)
EOSINOPHIL NFR BLD AUTO: 0.1 %
ERYTHROCYTE [DISTWIDTH] IN BLOOD BY AUTOMATED COUNT: 13.2 % (ref 11.5–14.5)
ERYTHROCYTE [DISTWIDTH] IN BLOOD BY AUTOMATED COUNT: 13.2 % (ref 11.5–14.5)
GLUCOSE BLD MANUAL STRIP-MCNC: 147 MG/DL (ref 74–99)
GLUCOSE BLD MANUAL STRIP-MCNC: 156 MG/DL (ref 74–99)
GLUCOSE BLD MANUAL STRIP-MCNC: 172 MG/DL (ref 74–99)
GLUCOSE BLD-MCNC: 148 MG/DL (ref 74–99)
GLUCOSE BLD-MCNC: 156 MG/DL (ref 74–99)
GLUCOSE BLD-MCNC: 157 MG/DL (ref 74–99)
GLUCOSE BLD-MCNC: 162 MG/DL (ref 74–99)
GLUCOSE BLD-MCNC: 199 MG/DL (ref 74–99)
GLUCOSE BLDA-MCNC: 146 MG/DL (ref 74–99)
GLUCOSE BLDA-MCNC: 157 MG/DL (ref 74–99)
GLUCOSE BLDA-MCNC: 161 MG/DL (ref 74–99)
GLUCOSE BLDA-MCNC: 163 MG/DL (ref 74–99)
GLUCOSE BLDA-MCNC: 169 MG/DL (ref 74–99)
GLUCOSE BLDA-MCNC: 174 MG/DL (ref 74–99)
GLUCOSE BLDA-MCNC: 179 MG/DL (ref 74–99)
GLUCOSE BLDA-MCNC: 181 MG/DL (ref 74–99)
GLUCOSE BLDA-MCNC: 191 MG/DL (ref 74–99)
GLUCOSE BLDA-MCNC: 210 MG/DL (ref 74–99)
GLUCOSE BLDA-MCNC: 215 MG/DL (ref 74–99)
GLUCOSE SERPL-MCNC: 175 MG/DL (ref 74–99)
GLUCOSE SERPL-MCNC: 207 MG/DL (ref 74–99)
HCO3 BLDA-SCNC: 20.6 MMOL/L (ref 22–26)
HCO3 BLDA-SCNC: 20.9 MMOL/L (ref 22–26)
HCO3 BLDA-SCNC: 20.9 MMOL/L (ref 22–26)
HCO3 BLDA-SCNC: 23.2 MMOL/L (ref 22–26)
HCO3 BLDA-SCNC: 23.7 MMOL/L (ref 22–26)
HCO3 BLDA-SCNC: 24 MMOL/L (ref 22–26)
HCO3 BLDA-SCNC: 24.3 MMOL/L (ref 22–26)
HCO3 BLDA-SCNC: 24.8 MMOL/L (ref 22–26)
HCO3 BLDA-SCNC: 25 MMOL/L (ref 22–26)
HCO3 BLDA-SCNC: 25.3 MMOL/L (ref 22–26)
HCO3 BLDA-SCNC: 25.4 MMOL/L (ref 22–26)
HCO3 BLDMV-SCNC: 20.3 MMOL/L (ref 22–26)
HCO3 BLDMV-SCNC: 20.6 MMOL/L (ref 22–26)
HCO3 BLDMV-SCNC: 22.6 MMOL/L (ref 22–26)
HCO3 BLDMV-SCNC: 24.4 MMOL/L (ref 22–26)
HCO3 BLDMV-SCNC: 25.7 MMOL/L (ref 22–26)
HCT VFR BLD AUTO: 28.7 % (ref 36–46)
HCT VFR BLD AUTO: 30.3 % (ref 36–46)
HCT VFR BLD EST: 26 % (ref 36–46)
HCT VFR BLD EST: 28 % (ref 36–46)
HCT VFR BLD EST: 29 % (ref 36–46)
HCT VFR BLD EST: 31 % (ref 36–46)
HCT VFR BLD EST: 32 % (ref 36–46)
HCT VFR BLD EST: 33 % (ref 36–46)
HCT VFR BLD EST: 34 % (ref 36–46)
HCT VFR BLD EST: 39 % (ref 36–46)
HCT VFR BLD EST: ABNORMAL %
HGB BLD-MCNC: 10.1 G/DL (ref 12–16)
HGB BLD-MCNC: 9.7 G/DL (ref 12–16)
HGB BLDA-MCNC: 10.2 G/DL (ref 12–16)
HGB BLDA-MCNC: 10.2 G/DL (ref 12–16)
HGB BLDA-MCNC: 10.4 G/DL (ref 12–16)
HGB BLDA-MCNC: 10.6 G/DL (ref 12–16)
HGB BLDA-MCNC: 11.3 G/DL (ref 12–16)
HGB BLDA-MCNC: 12.9 G/DL (ref 12–16)
HGB BLDA-MCNC: 9.6 G/DL (ref 12–16)
HGB BLDA-MCNC: 9.7 G/DL (ref 12–16)
HGB BLDA-MCNC: ABNORMAL G/DL
HGB BLDMV-MCNC: 11 G/DL (ref 12–16)
HGB BLDMV-MCNC: 8.6 G/DL (ref 12–16)
HGB BLDMV-MCNC: 9.2 G/DL (ref 12–16)
HGB BLDMV-MCNC: 9.6 G/DL (ref 12–16)
HGB BLDMV-MCNC: 9.8 G/DL (ref 12–16)
IMM GRANULOCYTES # BLD AUTO: 0.06 X10*3/UL (ref 0–0.7)
IMM GRANULOCYTES NFR BLD AUTO: 0.6 % (ref 0–0.9)
INHALED O2 CONCENTRATION: 100 %
INHALED O2 CONCENTRATION: 40 %
INHALED O2 CONCENTRATION: 50 %
INHALED O2 CONCENTRATION: 57 %
INHALED O2 CONCENTRATION: 57 %
INHALED O2 CONCENTRATION: 60 %
INHALED O2 CONCENTRATION: 65 %
INHALED O2 CONCENTRATION: 66 %
INHALED O2 CONCENTRATION: 75 %
INHALED O2 CONCENTRATION: 75 %
LACTATE BLDA-SCNC: 2.6 MMOL/L (ref 0.4–2)
LACTATE BLDA-SCNC: 2.7 MMOL/L (ref 0.4–2)
LACTATE BLDA-SCNC: 3 MMOL/L (ref 0.4–2)
LACTATE BLDA-SCNC: 3.1 MMOL/L (ref 0.4–2)
LACTATE BLDA-SCNC: 3.3 MMOL/L (ref 0.4–2)
LACTATE BLDA-SCNC: 3.4 MMOL/L (ref 0.4–2)
LACTATE BLDA-SCNC: 3.5 MMOL/L (ref 0.4–2)
LACTATE BLDA-SCNC: 4.1 MMOL/L (ref 0.4–2)
LACTATE BLDA-SCNC: 6.3 MMOL/L (ref 0.4–2)
LACTATE BLDA-SCNC: 6.7 MMOL/L (ref 0.4–2)
LACTATE BLDA-SCNC: 7.2 MMOL/L (ref 0.4–2)
LACTATE BLDMV-SCNC: 2.1 MMOL/L (ref 0.4–2)
LACTATE BLDMV-SCNC: 2.9 MMOL/L (ref 0.4–2)
LACTATE BLDMV-SCNC: 3.1 MMOL/L (ref 0.4–2)
LACTATE BLDMV-SCNC: 3.2 MMOL/L (ref 0.4–2)
LACTATE BLDMV-SCNC: 5.7 MMOL/L (ref 0.4–2)
LYMPHOCYTES # BLD AUTO: 0.8 X10*3/UL (ref 1.2–4.8)
LYMPHOCYTES NFR BLD AUTO: 7.5 %
MAGNESIUM SERPL-MCNC: 2.33 MG/DL (ref 1.6–2.4)
MAGNESIUM SERPL-MCNC: 2.51 MG/DL (ref 1.6–2.4)
MCH RBC QN AUTO: 33 PG (ref 26–34)
MCH RBC QN AUTO: 33.8 PG (ref 26–34)
MCHC RBC AUTO-ENTMCNC: 33.3 G/DL (ref 32–36)
MCHC RBC AUTO-ENTMCNC: 33.8 G/DL (ref 32–36)
MCV RBC AUTO: 100 FL (ref 80–100)
MCV RBC AUTO: 99 FL (ref 80–100)
MONOCYTES # BLD AUTO: 0.92 X10*3/UL (ref 0.1–1)
MONOCYTES NFR BLD AUTO: 8.6 %
NEUTROPHILS # BLD AUTO: 8.91 X10*3/UL (ref 1.2–7.7)
NEUTROPHILS NFR BLD AUTO: 83 %
NRBC BLD-RTO: 0 /100 WBCS (ref 0–0)
NRBC BLD-RTO: 0 /100 WBCS (ref 0–0)
OXYHGB MFR BLDA: 89 % (ref 94–98)
OXYHGB MFR BLDA: 89.3 % (ref 94–98)
OXYHGB MFR BLDA: 90.5 % (ref 94–98)
OXYHGB MFR BLDA: 92.5 % (ref 94–98)
OXYHGB MFR BLDA: 93.2 % (ref 94–98)
OXYHGB MFR BLDA: 93.3 % (ref 94–98)
OXYHGB MFR BLDA: 93.6 % (ref 94–98)
OXYHGB MFR BLDA: 93.7 % (ref 94–98)
OXYHGB MFR BLDA: 95.2 % (ref 94–98)
OXYHGB MFR BLDA: 96.4 % (ref 94–98)
OXYHGB MFR BLDA: ABNORMAL %
OXYHGB MFR BLDMV: 60.7 % (ref 45–75)
OXYHGB MFR BLDMV: 61.2 % (ref 45–75)
OXYHGB MFR BLDMV: 62 % (ref 45–75)
OXYHGB MFR BLDMV: 67.1 % (ref 45–75)
OXYHGB MFR BLDMV: 67.5 % (ref 45–75)
PCO2 BLDA: 37 MM HG (ref 38–42)
PCO2 BLDA: 37 MM HG (ref 38–42)
PCO2 BLDA: 40 MM HG (ref 38–42)
PCO2 BLDA: 41 MM HG (ref 38–42)
PCO2 BLDA: 41 MM HG (ref 38–42)
PCO2 BLDA: 42 MM HG (ref 38–42)
PCO2 BLDA: 43 MM HG (ref 38–42)
PCO2 BLDA: 46 MM HG (ref 38–42)
PCO2 BLDA: 50 MM HG (ref 38–42)
PCO2 BLDA: 55 MM HG (ref 38–42)
PCO2 BLDA: 57 MM HG (ref 38–42)
PCO2 BLDMV: 36 MM HG (ref 41–51)
PCO2 BLDMV: 40 MM HG (ref 41–51)
PCO2 BLDMV: 41 MM HG (ref 41–51)
PCO2 BLDMV: 51 MM HG (ref 41–51)
PCO2 BLDMV: 57 MM HG (ref 41–51)
PH BLDA: 7.25 PH (ref 7.38–7.42)
PH BLDA: 7.27 PH (ref 7.38–7.42)
PH BLDA: 7.29 PH (ref 7.38–7.42)
PH BLDA: 7.31 PH (ref 7.38–7.42)
PH BLDA: 7.35 PH (ref 7.38–7.42)
PH BLDA: 7.35 PH (ref 7.38–7.42)
PH BLDA: 7.36 PH (ref 7.38–7.42)
PH BLDA: 7.38 PH (ref 7.38–7.42)
PH BLDA: 7.39 PH (ref 7.38–7.42)
PH BLDMV: 7.24 PH (ref 7.33–7.43)
PH BLDMV: 7.31 PH (ref 7.33–7.43)
PH BLDMV: 7.31 PH (ref 7.33–7.43)
PH BLDMV: 7.36 PH (ref 7.33–7.43)
PH BLDMV: 7.36 PH (ref 7.33–7.43)
PHOSPHATE SERPL-MCNC: 3.2 MG/DL (ref 2.5–4.9)
PHOSPHATE SERPL-MCNC: 4.3 MG/DL (ref 2.5–4.9)
PLATELET # BLD AUTO: 108 X10*3/UL (ref 150–450)
PLATELET # BLD AUTO: 148 X10*3/UL (ref 150–450)
PO2 BLDA: 100 MM HG (ref 85–95)
PO2 BLDA: 59 MM HG (ref 85–95)
PO2 BLDA: 64 MM HG (ref 85–95)
PO2 BLDA: 67 MM HG (ref 85–95)
PO2 BLDA: 68 MM HG (ref 85–95)
PO2 BLDA: 69 MM HG (ref 85–95)
PO2 BLDA: 70 MM HG (ref 85–95)
PO2 BLDA: 77 MM HG (ref 85–95)
PO2 BLDA: 78 MM HG (ref 85–95)
PO2 BLDMV: 35 MM HG (ref 35–45)
PO2 BLDMV: 36 MM HG (ref 35–45)
PO2 BLDMV: 36 MM HG (ref 35–45)
PO2 BLDMV: 38 MM HG (ref 35–45)
PO2 BLDMV: 39 MM HG (ref 35–45)
POTASSIUM BLDA-SCNC: 3.7 MMOL/L (ref 3.5–5.3)
POTASSIUM BLDA-SCNC: 4.1 MMOL/L (ref 3.5–5.3)
POTASSIUM BLDA-SCNC: 4.1 MMOL/L (ref 3.5–5.3)
POTASSIUM BLDA-SCNC: 4.2 MMOL/L (ref 3.5–5.3)
POTASSIUM BLDA-SCNC: 4.3 MMOL/L (ref 3.5–5.3)
POTASSIUM BLDA-SCNC: 4.4 MMOL/L (ref 3.5–5.3)
POTASSIUM BLDA-SCNC: 4.4 MMOL/L (ref 3.5–5.3)
POTASSIUM BLDA-SCNC: 4.5 MMOL/L (ref 3.5–5.3)
POTASSIUM BLDA-SCNC: 4.6 MMOL/L (ref 3.5–5.3)
POTASSIUM BLDMV-SCNC: 3 MMOL/L (ref 3.5–5.3)
POTASSIUM BLDMV-SCNC: 3.4 MMOL/L (ref 3.5–5.3)
POTASSIUM BLDMV-SCNC: 3.6 MMOL/L (ref 3.5–5.3)
POTASSIUM BLDMV-SCNC: 4 MMOL/L (ref 3.5–5.3)
POTASSIUM BLDMV-SCNC: 4.2 MMOL/L (ref 3.5–5.3)
POTASSIUM SERPL-SCNC: 3.7 MMOL/L (ref 3.5–5.3)
POTASSIUM SERPL-SCNC: 4.1 MMOL/L (ref 3.5–5.3)
RBC # BLD AUTO: 2.87 X10*6/UL (ref 4–5.2)
RBC # BLD AUTO: 3.06 X10*6/UL (ref 4–5.2)
SAO2 % BLDA: 91 % (ref 94–100)
SAO2 % BLDA: 91 % (ref 94–100)
SAO2 % BLDA: 92 % (ref 94–100)
SAO2 % BLDA: 95 % (ref 94–100)
SAO2 % BLDA: 96 % (ref 94–100)
SAO2 % BLDA: 98 % (ref 94–100)
SAO2 % BLDA: 99 % (ref 94–100)
SAO2 % BLDA: ABNORMAL %
SAO2 % BLDMV: 62 % (ref 45–75)
SAO2 % BLDMV: 62 % (ref 45–75)
SAO2 % BLDMV: 63 % (ref 45–75)
SAO2 % BLDMV: 69 % (ref 45–75)
SAO2 % BLDMV: 70 % (ref 45–75)
SODIUM BLDA-SCNC: 138 MMOL/L (ref 136–145)
SODIUM BLDA-SCNC: 139 MMOL/L (ref 136–145)
SODIUM BLDA-SCNC: 140 MMOL/L (ref 136–145)
SODIUM BLDA-SCNC: 141 MMOL/L (ref 136–145)
SODIUM BLDA-SCNC: 142 MMOL/L (ref 136–145)
SODIUM BLDA-SCNC: 142 MMOL/L (ref 136–145)
SODIUM BLDA-SCNC: 143 MMOL/L (ref 136–145)
SODIUM BLDA-SCNC: 143 MMOL/L (ref 136–145)
SODIUM BLDMV-SCNC: 141 MMOL/L (ref 136–145)
SODIUM BLDMV-SCNC: 142 MMOL/L (ref 136–145)
SODIUM BLDMV-SCNC: 142 MMOL/L (ref 136–145)
SODIUM BLDMV-SCNC: 143 MMOL/L (ref 136–145)
SODIUM BLDMV-SCNC: 143 MMOL/L (ref 136–145)
SODIUM SERPL-SCNC: 142 MMOL/L (ref 136–145)
SODIUM SERPL-SCNC: 142 MMOL/L (ref 136–145)
WBC # BLD AUTO: 10.7 X10*3/UL (ref 4.4–11.3)
WBC # BLD AUTO: 11.5 X10*3/UL (ref 4.4–11.3)

## 2025-02-19 PROCEDURE — P9045 ALBUMIN (HUMAN), 5%, 250 ML: HCPCS | Mod: JZ,TB

## 2025-02-19 PROCEDURE — 82947 ASSAY GLUCOSE BLOOD QUANT: CPT

## 2025-02-19 PROCEDURE — 2500000002 HC RX 250 W HCPCS SELF ADMINISTERED DRUGS (ALT 637 FOR MEDICARE OP, ALT 636 FOR OP/ED): Performed by: NURSE PRACTITIONER

## 2025-02-19 PROCEDURE — 97166 OT EVAL MOD COMPLEX 45 MIN: CPT | Mod: GO

## 2025-02-19 PROCEDURE — 2500000005 HC RX 250 GENERAL PHARMACY W/O HCPCS

## 2025-02-19 PROCEDURE — 2500000005 HC RX 250 GENERAL PHARMACY W/O HCPCS: Performed by: NURSE PRACTITIONER

## 2025-02-19 PROCEDURE — 82330 ASSAY OF CALCIUM: CPT

## 2025-02-19 PROCEDURE — 94660 CPAP INITIATION&MGMT: CPT

## 2025-02-19 PROCEDURE — 71045 X-RAY EXAM CHEST 1 VIEW: CPT

## 2025-02-19 PROCEDURE — 85027 COMPLETE CBC AUTOMATED: CPT

## 2025-02-19 PROCEDURE — 97161 PT EVAL LOW COMPLEX 20 MIN: CPT | Mod: GP

## 2025-02-19 PROCEDURE — 2500000001 HC RX 250 WO HCPCS SELF ADMINISTERED DRUGS (ALT 637 FOR MEDICARE OP)

## 2025-02-19 PROCEDURE — 99291 CRITICAL CARE FIRST HOUR: CPT

## 2025-02-19 PROCEDURE — 94640 AIRWAY INHALATION TREATMENT: CPT

## 2025-02-19 PROCEDURE — 93005 ELECTROCARDIOGRAM TRACING: CPT

## 2025-02-19 PROCEDURE — 93010 ELECTROCARDIOGRAM REPORT: CPT | Performed by: INTERNAL MEDICINE

## 2025-02-19 PROCEDURE — 84132 ASSAY OF SERUM POTASSIUM: CPT

## 2025-02-19 PROCEDURE — 37799 UNLISTED PX VASCULAR SURGERY: CPT

## 2025-02-19 PROCEDURE — 2500000004 HC RX 250 GENERAL PHARMACY W/ HCPCS (ALT 636 FOR OP/ED): Mod: JZ,TB

## 2025-02-19 PROCEDURE — 94762 N-INVAS EAR/PLS OXIMTRY CONT: CPT

## 2025-02-19 PROCEDURE — 2020000001 HC ICU ROOM DAILY

## 2025-02-19 PROCEDURE — 83735 ASSAY OF MAGNESIUM: CPT

## 2025-02-19 PROCEDURE — 2500000002 HC RX 250 W HCPCS SELF ADMINISTERED DRUGS (ALT 637 FOR MEDICARE OP, ALT 636 FOR OP/ED)

## 2025-02-19 PROCEDURE — 71045 X-RAY EXAM CHEST 1 VIEW: CPT | Performed by: RADIOLOGY

## 2025-02-19 PROCEDURE — 2500000004 HC RX 250 GENERAL PHARMACY W/ HCPCS (ALT 636 FOR OP/ED): Mod: JZ,TB | Performed by: NURSE PRACTITIONER

## 2025-02-19 PROCEDURE — 85025 COMPLETE CBC W/AUTO DIFF WBC: CPT

## 2025-02-19 PROCEDURE — 2500000004 HC RX 250 GENERAL PHARMACY W/ HCPCS (ALT 636 FOR OP/ED)

## 2025-02-19 RX ORDER — CLOPIDOGREL BISULFATE 75 MG/1
75 TABLET ORAL DAILY
Status: DISCONTINUED | OUTPATIENT
Start: 2025-02-19 | End: 2025-02-26 | Stop reason: HOSPADM

## 2025-02-19 RX ORDER — BUDESONIDE 0.5 MG/2ML
0.5 INHALANT ORAL
Status: DISCONTINUED | OUTPATIENT
Start: 2025-02-19 | End: 2025-02-26 | Stop reason: HOSPADM

## 2025-02-19 RX ORDER — METHOCARBAMOL 100 MG/ML
500 INJECTION, SOLUTION INTRAMUSCULAR; INTRAVENOUS ONCE
Status: COMPLETED | OUTPATIENT
Start: 2025-02-19 | End: 2025-02-19

## 2025-02-19 RX ORDER — IPRATROPIUM BROMIDE AND ALBUTEROL SULFATE 2.5; .5 MG/3ML; MG/3ML
3 SOLUTION RESPIRATORY (INHALATION)
Status: DISCONTINUED | OUTPATIENT
Start: 2025-02-19 | End: 2025-02-19

## 2025-02-19 RX ORDER — HEPARIN SODIUM 5000 [USP'U]/ML
5000 INJECTION, SOLUTION INTRAVENOUS; SUBCUTANEOUS EVERY 8 HOURS
Status: DISCONTINUED | OUTPATIENT
Start: 2025-02-19 | End: 2025-02-26 | Stop reason: HOSPADM

## 2025-02-19 RX ORDER — SODIUM CHLORIDE, SODIUM LACTATE, POTASSIUM CHLORIDE, CALCIUM CHLORIDE 600; 310; 30; 20 MG/100ML; MG/100ML; MG/100ML; MG/100ML
5 INJECTION, SOLUTION INTRAVENOUS CONTINUOUS
Status: ACTIVE | OUTPATIENT
Start: 2025-02-19 | End: 2025-02-21

## 2025-02-19 RX ORDER — ALBUMIN HUMAN 50 G/1000ML
25 SOLUTION INTRAVENOUS ONCE
Status: COMPLETED | OUTPATIENT
Start: 2025-02-19 | End: 2025-02-19

## 2025-02-19 RX ORDER — SODIUM CHLORIDE, SODIUM LACTATE, POTASSIUM CHLORIDE, CALCIUM CHLORIDE 600; 310; 30; 20 MG/100ML; MG/100ML; MG/100ML; MG/100ML
10 INJECTION, SOLUTION INTRAVENOUS CONTINUOUS
Status: DISCONTINUED | OUTPATIENT
Start: 2025-02-19 | End: 2025-02-20

## 2025-02-19 RX ORDER — OXYCODONE HYDROCHLORIDE 5 MG/1
5 TABLET ORAL ONCE
Status: COMPLETED | OUTPATIENT
Start: 2025-02-19 | End: 2025-02-19

## 2025-02-19 RX ORDER — ACETAMINOPHEN 10 MG/ML
1000 INJECTION, SOLUTION INTRAVENOUS EVERY 6 HOURS SCHEDULED
Status: COMPLETED | OUTPATIENT
Start: 2025-02-19 | End: 2025-02-20

## 2025-02-19 RX ORDER — IPRATROPIUM BROMIDE AND ALBUTEROL SULFATE 2.5; .5 MG/3ML; MG/3ML
3 SOLUTION RESPIRATORY (INHALATION) EVERY 6 HOURS
Status: DISCONTINUED | OUTPATIENT
Start: 2025-02-19 | End: 2025-02-23

## 2025-02-19 RX ORDER — EZETIMIBE 10 MG/1
10 TABLET ORAL NIGHTLY
Status: DISCONTINUED | OUTPATIENT
Start: 2025-02-19 | End: 2025-02-26 | Stop reason: HOSPADM

## 2025-02-19 RX ORDER — FUROSEMIDE 10 MG/ML
20 INJECTION INTRAMUSCULAR; INTRAVENOUS DAILY
Status: DISCONTINUED | OUTPATIENT
Start: 2025-02-20 | End: 2025-02-21

## 2025-02-19 RX ORDER — HYDROMORPHONE HYDROCHLORIDE 0.2 MG/ML
0.2 INJECTION INTRAMUSCULAR; INTRAVENOUS; SUBCUTANEOUS
Status: DISCONTINUED | OUTPATIENT
Start: 2025-02-19 | End: 2025-02-19

## 2025-02-19 RX ORDER — HYDROMORPHONE HYDROCHLORIDE 0.2 MG/ML
0.2 INJECTION INTRAMUSCULAR; INTRAVENOUS; SUBCUTANEOUS EVERY 2 HOUR PRN
Status: DISCONTINUED | OUTPATIENT
Start: 2025-02-19 | End: 2025-02-21

## 2025-02-19 RX ORDER — EPINEPHRINE IN 0.9 % SOD CHLOR 4MG/250ML
0.02 PLASTIC BAG, INJECTION (ML) INTRAVENOUS CONTINUOUS
Status: DISCONTINUED | OUTPATIENT
Start: 2025-02-19 | End: 2025-02-20

## 2025-02-19 RX ORDER — MAGNESIUM SULFATE HEPTAHYDRATE 40 MG/ML
2 INJECTION, SOLUTION INTRAVENOUS ONCE
Status: COMPLETED | OUTPATIENT
Start: 2025-02-19 | End: 2025-02-19

## 2025-02-19 RX ORDER — FUROSEMIDE 10 MG/ML
20 INJECTION INTRAMUSCULAR; INTRAVENOUS ONCE
Status: COMPLETED | OUTPATIENT
Start: 2025-02-19 | End: 2025-02-19

## 2025-02-19 RX ADMIN — INSULIN LISPRO 3 UNITS: 100 INJECTION, SOLUTION INTRAVENOUS; SUBCUTANEOUS at 04:43

## 2025-02-19 RX ADMIN — PANTOPRAZOLE SODIUM 40 MG: 40 INJECTION, POWDER, FOR SOLUTION INTRAVENOUS at 06:06

## 2025-02-19 RX ADMIN — Medication 75 PERCENT: at 20:43

## 2025-02-19 RX ADMIN — METHOCARBAMOL 500 MG: 1000 INJECTION, SOLUTION INTRAMUSCULAR; INTRAVENOUS at 11:34

## 2025-02-19 RX ADMIN — ALBUMIN HUMAN 25 G: 0.05 INJECTION, SOLUTION INTRAVENOUS at 00:52

## 2025-02-19 RX ADMIN — ACETAMINOPHEN 1000 MG: 1000 INJECTION, SOLUTION INTRAVENOUS at 08:01

## 2025-02-19 RX ADMIN — ACETAMINOPHEN 1000 MG: 1000 INJECTION, SOLUTION INTRAVENOUS at 15:59

## 2025-02-19 RX ADMIN — HYDROMORPHONE HYDROCHLORIDE 0.2 MG: 0.2 INJECTION, SOLUTION INTRAMUSCULAR; INTRAVENOUS; SUBCUTANEOUS at 14:26

## 2025-02-19 RX ADMIN — OXYCODONE 5 MG: 5 TABLET ORAL at 15:57

## 2025-02-19 RX ADMIN — HEPARIN SODIUM 5000 UNITS: 5000 INJECTION, SOLUTION INTRAVENOUS; SUBCUTANEOUS at 11:34

## 2025-02-19 RX ADMIN — MAGNESIUM SULFATE HEPTAHYDRATE 2 G: 40 INJECTION, SOLUTION INTRAVENOUS at 03:43

## 2025-02-19 RX ADMIN — FUROSEMIDE 20 MG: 10 INJECTION, SOLUTION INTRAVENOUS at 11:34

## 2025-02-19 RX ADMIN — BUDESONIDE 0.5 MG: 0.5 INHALANT RESPIRATORY (INHALATION) at 20:40

## 2025-02-19 RX ADMIN — Medication 75 PERCENT: at 11:20

## 2025-02-19 RX ADMIN — CALCIUM GLUCONATE 1 G: 20 INJECTION, SOLUTION INTRAVENOUS at 05:54

## 2025-02-19 RX ADMIN — INSULIN LISPRO 3 UNITS: 100 INJECTION, SOLUTION INTRAVENOUS; SUBCUTANEOUS at 11:34

## 2025-02-19 RX ADMIN — ACETAMINOPHEN 1000 MG: 1000 INJECTION, SOLUTION INTRAVENOUS at 23:00

## 2025-02-19 RX ADMIN — INSULIN LISPRO 3 UNITS: 100 INJECTION, SOLUTION INTRAVENOUS; SUBCUTANEOUS at 07:36

## 2025-02-19 RX ADMIN — PROPOFOL 30 MCG/KG/MIN: 10 INJECTION, EMULSION INTRAVENOUS at 04:42

## 2025-02-19 RX ADMIN — HEPARIN SODIUM 5000 UNITS: 5000 INJECTION, SOLUTION INTRAVENOUS; SUBCUTANEOUS at 20:00

## 2025-02-19 RX ADMIN — HYDROMORPHONE HYDROCHLORIDE 0.2 MG: 0.2 INJECTION, SOLUTION INTRAMUSCULAR; INTRAVENOUS; SUBCUTANEOUS at 06:06

## 2025-02-19 RX ADMIN — INSULIN LISPRO 3 UNITS: 100 INJECTION, SOLUTION INTRAVENOUS; SUBCUTANEOUS at 20:21

## 2025-02-19 RX ADMIN — ALBUMIN HUMAN 25 G: 0.05 INJECTION, SOLUTION INTRAVENOUS at 18:32

## 2025-02-19 RX ADMIN — Medication 75 PERCENT: at 20:42

## 2025-02-19 RX ADMIN — EPINEPHRINE IN SODIUM CHLORIDE 0.02 MCG/KG/MIN: 16 INJECTION INTRAVENOUS at 17:14

## 2025-02-19 RX ADMIN — ASPIRIN 81 MG CHEWABLE TABLET 81 MG: 81 TABLET CHEWABLE at 15:58

## 2025-02-19 RX ADMIN — IPRATROPIUM BROMIDE AND ALBUTEROL SULFATE 3 ML: .5; 3 SOLUTION RESPIRATORY (INHALATION) at 20:40

## 2025-02-19 RX ADMIN — PAROXETINE 10 MG: 10 TABLET, FILM COATED ORAL at 15:58

## 2025-02-19 RX ADMIN — HYDROMORPHONE HYDROCHLORIDE 0.2 MG: 0.2 INJECTION, SOLUTION INTRAMUSCULAR; INTRAVENOUS; SUBCUTANEOUS at 12:24

## 2025-02-19 RX ADMIN — BUDESONIDE 0.5 MG: 0.5 INHALANT RESPIRATORY (INHALATION) at 10:30

## 2025-02-19 RX ADMIN — HYDROMORPHONE HYDROCHLORIDE 0.2 MG: 0.2 INJECTION, SOLUTION INTRAMUSCULAR; INTRAVENOUS; SUBCUTANEOUS at 07:03

## 2025-02-19 RX ADMIN — CEFAZOLIN SODIUM 2 G: 2 INJECTION, SOLUTION INTRAVENOUS at 04:43

## 2025-02-19 RX ADMIN — IPRATROPIUM BROMIDE AND ALBUTEROL SULFATE 3 ML: .5; 3 SOLUTION RESPIRATORY (INHALATION) at 10:30

## 2025-02-19 RX ADMIN — CLOPIDOGREL BISULFATE 75 MG: 75 TABLET ORAL at 15:57

## 2025-02-19 RX ADMIN — CALCIUM GLUCONATE 1 G: 20 INJECTION, SOLUTION INTRAVENOUS at 14:56

## 2025-02-19 RX ADMIN — HYDROMORPHONE HYDROCHLORIDE 0.2 MG: 0.2 INJECTION, SOLUTION INTRAMUSCULAR; INTRAVENOUS; SUBCUTANEOUS at 07:50

## 2025-02-19 RX ADMIN — POTASSIUM CHLORIDE 20 MEQ: 14.9 INJECTION, SOLUTION INTRAVENOUS at 03:43

## 2025-02-19 RX ADMIN — IPRATROPIUM BROMIDE AND ALBUTEROL SULFATE 3 ML: .5; 3 SOLUTION RESPIRATORY (INHALATION) at 16:55

## 2025-02-19 RX ADMIN — CEFAZOLIN SODIUM 2 G: 2 INJECTION, SOLUTION INTRAVENOUS at 20:21

## 2025-02-19 RX ADMIN — CEFAZOLIN SODIUM 2 G: 2 INJECTION, SOLUTION INTRAVENOUS at 11:34

## 2025-02-19 SDOH — HEALTH STABILITY: MENTAL HEALTH: HOW OFTEN DO YOU HAVE A DRINK CONTAINING ALCOHOL?: MONTHLY OR LESS

## 2025-02-19 SDOH — HEALTH STABILITY: PHYSICAL HEALTH: ON AVERAGE, HOW MANY MINUTES DO YOU ENGAGE IN EXERCISE AT THIS LEVEL?: 0 MIN

## 2025-02-19 SDOH — SOCIAL STABILITY: SOCIAL NETWORK
DO YOU BELONG TO ANY CLUBS OR ORGANIZATIONS SUCH AS CHURCH GROUPS, UNIONS, FRATERNAL OR ATHLETIC GROUPS, OR SCHOOL GROUPS?: NO

## 2025-02-19 SDOH — ECONOMIC STABILITY: FOOD INSECURITY: WITHIN THE PAST 12 MONTHS, YOU WORRIED THAT YOUR FOOD WOULD RUN OUT BEFORE YOU GOT THE MONEY TO BUY MORE.: NEVER TRUE

## 2025-02-19 SDOH — SOCIAL STABILITY: SOCIAL INSECURITY: ARE YOU MARRIED, WIDOWED, DIVORCED, SEPARATED, NEVER MARRIED, OR LIVING WITH A PARTNER?: LIVING WITH PARTNER

## 2025-02-19 SDOH — SOCIAL STABILITY: SOCIAL NETWORK: HOW OFTEN DO YOU ATTEND MEETINGS OF THE CLUBS OR ORGANIZATIONS YOU BELONG TO?: NEVER

## 2025-02-19 SDOH — HEALTH STABILITY: PHYSICAL HEALTH
HOW OFTEN DO YOU NEED TO HAVE SOMEONE HELP YOU WHEN YOU READ INSTRUCTIONS, PAMPHLETS, OR OTHER WRITTEN MATERIAL FROM YOUR DOCTOR OR PHARMACY?: NEVER

## 2025-02-19 SDOH — ECONOMIC STABILITY: INCOME INSECURITY: IN THE PAST 12 MONTHS HAS THE ELECTRIC, GAS, OIL, OR WATER COMPANY THREATENED TO SHUT OFF SERVICES IN YOUR HOME?: NO

## 2025-02-19 SDOH — HEALTH STABILITY: MENTAL HEALTH: HOW MANY DRINKS CONTAINING ALCOHOL DO YOU HAVE ON A TYPICAL DAY WHEN YOU ARE DRINKING?: 1 OR 2

## 2025-02-19 SDOH — HEALTH STABILITY: PHYSICAL HEALTH: ON AVERAGE, HOW MANY DAYS PER WEEK DO YOU ENGAGE IN MODERATE TO STRENUOUS EXERCISE (LIKE A BRISK WALK)?: 0 DAYS

## 2025-02-19 SDOH — SOCIAL STABILITY: SOCIAL INSECURITY
WITHIN THE LAST YEAR, HAVE YOU BEEN RAPED OR FORCED TO HAVE ANY KIND OF SEXUAL ACTIVITY BY YOUR PARTNER OR EX-PARTNER?: PATIENT UNABLE TO ANSWER

## 2025-02-19 SDOH — ECONOMIC STABILITY: FOOD INSECURITY: WITHIN THE PAST 12 MONTHS, THE FOOD YOU BOUGHT JUST DIDN'T LAST AND YOU DIDN'T HAVE MONEY TO GET MORE.: NEVER TRUE

## 2025-02-19 SDOH — HEALTH STABILITY: MENTAL HEALTH
DO YOU FEEL STRESS - TENSE, RESTLESS, NERVOUS, OR ANXIOUS, OR UNABLE TO SLEEP AT NIGHT BECAUSE YOUR MIND IS TROUBLED ALL THE TIME - THESE DAYS?: ONLY A LITTLE

## 2025-02-19 SDOH — SOCIAL STABILITY: SOCIAL INSECURITY
WITHIN THE LAST YEAR, HAVE YOU BEEN KICKED, HIT, SLAPPED, OR OTHERWISE PHYSICALLY HURT BY YOUR PARTNER OR EX-PARTNER?: PATIENT UNABLE TO ANSWER

## 2025-02-19 SDOH — SOCIAL STABILITY: SOCIAL INSECURITY
WITHIN THE LAST YEAR, HAVE YOU BEEN HUMILIATED OR EMOTIONALLY ABUSED IN OTHER WAYS BY YOUR PARTNER OR EX-PARTNER?: PATIENT UNABLE TO ANSWER

## 2025-02-19 SDOH — SOCIAL STABILITY: SOCIAL NETWORK: IN A TYPICAL WEEK, HOW MANY TIMES DO YOU TALK ON THE PHONE WITH FAMILY, FRIENDS, OR NEIGHBORS?: THREE TIMES A WEEK

## 2025-02-19 SDOH — HEALTH STABILITY: MENTAL HEALTH: HOW OFTEN DO YOU HAVE SIX OR MORE DRINKS ON ONE OCCASION?: NEVER

## 2025-02-19 SDOH — SOCIAL STABILITY: SOCIAL NETWORK: HOW OFTEN DO YOU ATTEND CHURCH OR RELIGIOUS SERVICES?: NEVER

## 2025-02-19 SDOH — SOCIAL STABILITY: SOCIAL INSECURITY: WITHIN THE LAST YEAR, HAVE YOU BEEN AFRAID OF YOUR PARTNER OR EX-PARTNER?: PATIENT UNABLE TO ANSWER

## 2025-02-19 SDOH — SOCIAL STABILITY: SOCIAL NETWORK: HOW OFTEN DO YOU GET TOGETHER WITH FRIENDS OR RELATIVES?: THREE TIMES A WEEK

## 2025-02-19 ASSESSMENT — PAIN SCALES - GENERAL
PAINLEVEL_OUTOF10: 9
PAINLEVEL_OUTOF10: 0 - NO PAIN
PAINLEVEL_OUTOF10: 8
PAINLEVEL_OUTOF10: 6
PAINLEVEL_OUTOF10: 5 - MODERATE PAIN
PAINLEVEL_OUTOF10: 8
PAINLEVEL_OUTOF10: 8
PAINLEVEL_OUTOF10: 9

## 2025-02-19 ASSESSMENT — ACTIVITIES OF DAILY LIVING (ADL)
ADL_ASSISTANCE: INDEPENDENT
BATHING_ASSISTANCE: MAXIMAL
ADL_ASSISTANCE: INDEPENDENT
LACK_OF_TRANSPORTATION: NO

## 2025-02-19 ASSESSMENT — PAIN - FUNCTIONAL ASSESSMENT
PAIN_FUNCTIONAL_ASSESSMENT: 0-10
PAIN_FUNCTIONAL_ASSESSMENT: 0-10
PAIN_FUNCTIONAL_ASSESSMENT: CPOT (CRITICAL CARE PAIN OBSERVATION TOOL)
PAIN_FUNCTIONAL_ASSESSMENT: 0-10
PAIN_FUNCTIONAL_ASSESSMENT: CPOT (CRITICAL CARE PAIN OBSERVATION TOOL)
PAIN_FUNCTIONAL_ASSESSMENT: 0-10

## 2025-02-19 ASSESSMENT — COGNITIVE AND FUNCTIONAL STATUS - GENERAL
MOVING FROM LYING ON BACK TO SITTING ON SIDE OF FLAT BED WITH BEDRAILS: A LITTLE
STANDING UP FROM CHAIR USING ARMS: A LOT
MOBILITY SCORE: 13
WALKING IN HOSPITAL ROOM: A LOT
TURNING FROM BACK TO SIDE WHILE IN FLAT BAD: A LOT
MOVING TO AND FROM BED TO CHAIR: A LOT
CLIMB 3 TO 5 STEPS WITH RAILING: A LOT

## 2025-02-19 ASSESSMENT — PAIN SCALES - PAIN ASSESSMENT IN ADVANCED DEMENTIA (PAINAD)
TOTALSCORE: MEDICATION (SEE MAR)
TOTALSCORE: MEDICATION (SEE MAR)

## 2025-02-19 ASSESSMENT — PAIN DESCRIPTION - ORIENTATION: ORIENTATION: MID

## 2025-02-19 ASSESSMENT — PAIN DESCRIPTION - LOCATION: LOCATION: BACK

## 2025-02-19 ASSESSMENT — LIFESTYLE VARIABLES
SKIP TO QUESTIONS 9-10: 1
AUDIT-C TOTAL SCORE: 1

## 2025-02-19 NOTE — H&P
CTICU Progress Note     Subjective   HPI:  Patient is a 65-year-old female s/p scheduled CABG x 3 [LIMA to LAD, SVG to LCx, SVG to PDA], posterior pericardial window secondary to CAD with Dr. Gallo Canchola on 2/18/2025.  Past medical history remarkable for CAD s/p stent x 4 RCA, MI 2007, HLD, HTN, PAD, T2DM, overactive bladder, a recurrent UTI, polycythemia, fibromyalgia, osteoarthritis, gout, hard of hearing, smoking.    Past Medical History:   Diagnosis Date    Abnormal gait 04/19/2021    Acute upper respiratory infection 01/13/2025    Allergic rhinitis with postnasal drip 10/03/2024    Anxiety     Arthralgia of right knee 10/06/2021    Bilateral adrenal adenomas 12/17/2022    Bladder diverticulum 12/17/2022    CAD (coronary artery disease)     (MI 2007 stentx4 RCA),    Carpal tunnel syndrome 04/19/2021    Chronic otitis media 01/13/2025    Chronic sinusitis 01/13/2025    Coronary artery disease     Cyst of ovary, unspecified laterality 06/08/2023    Degeneration of lumbosacral intervertebral disc 01/13/2025    Disease due to severe acute respiratory syndrome coronavirus 2 (SARS-CoV-2) 01/13/2025    Disturbance in speech 04/19/2021    Dizziness and giddiness 01/13/2025    DM (diabetes mellitus) (Multi)     Fatigue     Fatty liver 12/17/2022    Fibromyalgia 01/13/2025    Hearing aid worn     HL (hearing loss)     20% in Left ear, none in right ear- reads lips    Hyperlipidemia     Hypertension     Low back pain 01/13/2025    Lumbosacral spondylosis without myelopathy 01/13/2025    Muscle spasticity 03/24/2022    Myocardial infarction (Multi) 2007    2007 x7, 6 months ago    Obesity with body mass index 30 or greater 12/15/2015    Body mass index 30+ - obesity      Otosclerosis of both ears 03/24/2022    Overactive bladder 09/10/2024    Perforation of tympanic membrane 01/13/2025    Peripheral vascular disease (CMS-HCC) 01/13/2025    4 stents in abdomin, patient reports vasculature in abdomin    Polycythemia  2022    Primary fibromyalgia syndrome 2017    Psychophysiological insomnia 10/28/2022    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) 2022    Recurrent otitis media, right 2022    Sciatica 2021    Shortness of breath     FLETCHER    Spinal stenosis of lumbar region 2018    Sprain of knee 2025    Tear of lateral meniscus of knee 2022    Tobacco use     Total perforation of tympanic membrane, right 2023    Type 2 diabetes mellitus     Urinary tract infection with hematuria 09/10/2024    Uterine fibroid     Vertigo     Wears glasses 2021     Past Surgical History:   Procedure Laterality Date    CARDIAC CATHETERIZATION  10/29/2007    CHOLECYSTECTOMY      CORONARY ANGIOPLASTY WITH STENT PLACEMENT  2007    x4 stents    DESCENDING AORTIC ANEURYSM REPAIR W/ STENT      HYSTERECTOMY  2023    ILIAC ARTERY STENT Bilateral 2023    KNEE SURGERY      KNEE ARTHROSCOPY- meniscal repair    OOPHORECTOMY      TUBAL LIGATION  1981    VULVA SURGERY Right     for dysplasia     Medications Prior to Admission   Medication Sig Dispense Refill Last Dose/Taking    aspirin 81 mg EC tablet Take 1 tablet (81 mg) by mouth once daily.   2025 Morning    chlorhexidine (Hibiclens) 4 % external liquid Apply topically once daily as needed for wound care. Use for 5 days prior to surgery as body wash, do not use on face or genital region 473 mL 0 2025 Morning    furosemide (Lasix) 40 mg tablet Take 1 tablet (40 mg) by mouth once daily.   2025 Morning    metoprolol succinate XL (Toprol-XL) 50 mg 24 hr tablet Take 1 tablet (50 mg) by mouth once daily. Do not crush or chew.   2025 Morning    PARoxetine (Paxil) 10 mg tablet Take 1 tablet (10 mg) by mouth once daily in the morning.   2025 Morning    [] chlorhexidine (Peridex) 0.12 % solution Use 15 mL in the mouth or throat if needed for wound care for up to 2 days. Use as mouth wash  for night before and morning of surgery 120 mL 0     HYDROcodone-acetaminophen (Norco) 5-325 mg tablet Take 1 tablet by mouth 3 times a day as needed.       ibuprofen 200 mg tablet Take 1 tablet (200 mg) by mouth if needed for mild pain (1 - 3).       lisinopril 10 mg tablet Take 1 tablet (10 mg) by mouth once daily.       nitroglycerin (Nitrostat) 0.4 mg SL tablet Place 1 tablet (0.4 mg) under the tongue every 5 minutes if needed.       traZODone (Desyrel) 50 mg tablet Take 1 tablet (50 mg) by mouth once daily at bedtime. (Patient not taking: Reported on 2/14/2025)        Celecoxib, Macrobid [nitrofurantoin monohyd/m-cryst], Niacin, Sulfa (sulfonamide antibiotics), Inositol, Metoprolol, Sulfur, Tobramycin, Topiramate, Nitrofurantoin, and Statins-hmg-coa reductase inhibitors  Social History     Tobacco Use    Smoking status: Every Day     Current packs/day: 0.25     Average packs/day: 0.3 packs/day for 50.0 years (12.5 ttl pk-yrs)     Types: Cigarettes    Smokeless tobacco: Never   Vaping Use    Vaping status: Every Day   Substance Use Topics    Alcohol use: Not Currently    Drug use: Never     Family History   Problem Relation Name Age of Onset    Stroke Mother      No Known Problems Father      Stroke Daughter         Review of Systems:  Unable to assess secondary to patient perioperative anesthesia.    Objective   Vitals:  Most Recent:  Vitals:    02/19/25 1400   BP:    Pulse: 104   Resp: (!) 33   Temp:    SpO2: (!) 89%       24hr Min/Max:  Temp  Min: 37.2 °C (99 °F)  Max: 38.2 °C (100.8 °F)  Pulse  Min: 88  Max: 109  Resp  Min: 15  Max: 33  SpO2  Min: 84 %  Max: 96 %    I/O:  I/O last 2 completed shifts:  In: 7106.7 [I.V.:1298.2; Blood:659; NG/GT:150; IV Piggyback:4999.5]  Out: 3295 [Urine:2605; Emesis/NG output:275; Chest Tube:415]    LDA:  CVC 02/18/25 Double lumen Right Internal jugular (Active)   Placement Date/Time: 02/18/25 (c) 0800   Hand Hygiene Performed Prior to CVC Insertion: Yes  Site Prep:  Chlorhexidine   Site Prep Agent has Completely Dried Before Insertion: Yes  All 5 Sterile Barriers Used (Gloves, Gown, Cap, Mask, Large Sterile Prema...   Number of days: 0       Introducer 02/18/25 Internal jugular (Active)   Placement Date/Time: 02/18/25 (c) 0800   Hand Hygiene Completed: Yes  Location: Internal jugular  Placement Verified: Pressure tracing changes;Transesophageal echocardiogram   Number of days: 0       Arterial Line 02/18/25 Left Brachial (Active)   Placement Date/Time: 02/18/25 (c) 0720   Size: 20 G  Orientation: Left  Location: Brachial  Securement Method: Transparent dressing  Patient Tolerance: Tolerated well   Number of days: 0       ETT  7 mm (Active)   Placement Date/Time: 02/18/25 (c) 0746   Mask Ventilation: Vent by mask + OA or adjuvant +/- NMBA  Technique: Direct laryngoscopy  ETT Type: ETT - single  Single Lumen Tube Size: 7 mm  Cuffed: Yes  Laryngoscope: Karson  Blade Size: 3  Location: Ora...   Number of days: 0       Urethral Catheter Non-latex 14 Fr. (Active)   Placement Date/Time: 02/18/25 0745   Placed by: reina borrero  Hand Hygiene Completed: Yes  Catheter Type: Non-latex  Tube Size (Fr.): 14 Fr.  Catheter Balloon Size: 10 mL  Urine Returned: Yes   Number of days: 0       Physical Exam:   - GENERAL: Sedated ... .No acute distress. Well-nourished.  - NEUROLOGIC: No focal neurological deficits. Cam ...  - LUNGS: Diminished bases. No accessory muscle use. Intubated ...  - CARDIOVASCULAR: Regular rate and rhythm.  Epicardial wires ...  - ABDOMEN: Soft, non-tender and non-distended. No palpable masses.  - : Clear, yellow urine in dela cruz.   - EXTREMITIES: No edema. Non-tender.?  - SKIN: No rashes or lesions. Warm.  - PSYCHIATRIC: Calm, unable to assess sedated.       Lab Review:  Results from last 7 days   Lab Units 02/19/25  1215   WBC AUTO x10*3/uL 10.7   HEMOGLOBIN g/dL 9.7*   HEMATOCRIT % 28.7*   PLATELETS AUTO x10*3/uL 108*     Results from last 7 days   Lab Units 02/19/25  1212  02/18/25  1310 02/14/25  1014   SODIUM mmol/L 142   < > 138   POTASSIUM mmol/L 4.1   < > 3.9   CHLORIDE mmol/L 105   < > 100   CO2 mmol/L 26   < > 26   BUN mg/dL 20   < > 14   CREATININE mg/dL 1.00   < > 0.74   CALCIUM mg/dL 8.3*   < > 9.3   PROTEIN TOTAL g/dL  --   --  6.4   BILIRUBIN TOTAL mg/dL  --   --  0.8   ALK PHOS U/L  --   --  82   ALT U/L  --   --  28   AST U/L  --   --  38   GLUCOSE mg/dL 175*   < > 120*    < > = values in this interval not displayed.     Results from last 7 days   Lab Units 02/19/25  1212   MAGNESIUM mg/dL 2.51*     Results from last 7 days   Lab Units 02/19/25  1225   POCT PH, ARTERIAL pH 7.35*   POCT PCO2, ARTERIAL mm Hg 42   POCT PO2, ARTERIAL mm Hg 77*   POCT HCO3 CALCULATED, ARTERIAL mmol/L 23.2   POCT BASE EXCESS, ARTERIAL mmol/L -2.3*       Most recent labs and imaging reviewed.    Daily Risk Screen  Intubated: Yes  Central line: Yes  Pichardo: Yes    Assessment/Plan     Assessment:     Plan:  NEURO/MSK:  PMH of OA, gout, fibromyalgia. Patient is intubated and sedated on propofol infusion. Methadone 20 prior to surgery. Acute post operative pain.  -->  -2/19--acetaminophen IV, oxycodone 5 Mg as needed, Dilaudid as needed. Robaxin x1. Holding home Norco 5. Multimodal pain control.  Patient endorsing back pain 2/2 fibromyalgia, OA.  Continue Paxil 10 Mg, trazodone 50 Mg.  OOB today requiring as needed pain meds.  - Serial neuro and pain assessments   - Continue propofol until NMB reversal, then daily sedation vacation at minimum   - NMB reversal when normothermic and hemodynamically stable.    - Scheduled Tylenol 650 mg q 8 hr   - PRN oxycodone  - PRN dilaudid for pain   - PT Consult, OOB to chair as tolerated, chair position if not tolerated   - CAM ICU score qshift  - Sleep/wake cycle hygiene  - Hold home Norco 5-3 25 mg 3 times daily, ibuprofen 200 Mg,   - Continue Paxil 10 Mg every morning, trazodone 50 Mg nightly     CV:  Patient has a history of CAD s/p stent x 4 RCA, MI 2007,  HLD, HTN, PVD Now s/p CABG x 3 [LIMA to LAD, SVG to LCx, SVG to PDA], posterior pericardial window secondary to CAD with Dr. Gallo Canchola on 2/18/2025. PRE LVEF 25-30% [Sebastián]/ POST LVEF 30-35% . Arrived to CTICU on 2/18/2025.  A/V epicardial wires set VVI @ 50bpm. -->  - 2/19--epinephrine gtt. 0.02.  Milrinone gtt., levo gtt. discontinued.  Continue ASA 81 Mg, start Plavix 75 Mg.  Zetia 10 Mg 2/2 myalgias on statin.  Mixed venous and CI every 4 hours.  MAP goal 60-80.  - Epinephrine gtt  - Maintain goal MAP 60-80  - Mixed venous and CI Q4H  - Volume resuscitate as clinically indicated  - Maintain epicardial wires set VVI @ 50  - Start ASA 6hrs post-op for CABG  - If CABG is 2/2 STEMI/unstable angina, will receive Plavix POD2  - Start statin tomorrow  - Hold home Aspirin 81 Mg, nitroglycerin 0.4 Mg as needed, lisinopril 10 Mg daily, metoprolol 50 Mg daily,     PULM:  No history of pulmonary disease. Extensive smoking hx.  Currently intubated on ventilator. Post operative atelectasis. Chest tubes left pleural chest tube, mediastinal chest tube x 2 . -->  -2/19.  Self extubated this a.m., weaned to CPAP.  Chest tubes left pleural, mediastinal x 2 minimal output.  Serial ABG.  - F/u post op CXR  - Once reversed, wean ventilator settings towards CPAP & extubation   - Wean FiO2 maintaining SpO2 >92%.   - IS q1h and OOB to chair when extubated  - Chest tubes to wall suction.    GI:  No PMH.    -2/19--passed swallow eval.  Cardiac diet.  Bowel regimen.  - Colace/senna BID and miralax BID     :  CSA-ALEXSANDER Risk Score 3.  PMH of overactive bladder, recurrent UTI,  baseline CRE 0.75. Creatinine stable post-op. Dela Cruz in place and making adequate UOP. -->  -2/19--adequate UOP, Lasix 20 Mg daily to encourage UOP.  RFP as indicated.  Continue to monitor creatinine.  - Continue dela cruz catheter for strict I/Os.  - Goal UOP 0.5ml/kg/hr  - RFP as clinically indicated  - Replete electrolytes per CTICU protocol  - Hold home medications:  Lasix 40 Mg daily        Variable Points   Male 1   Age < 40 0   Age 41-60 1   Age 61-80 2   Age > 81 3   CKD 1   NYHA > 2 1   Previous cardiac Surgery 1     Points Pre-Op ICU Admit   0-1 Low Low   2-3 Medium Low   4 High Low   5-9 High Medium   > 10 High High        ENDO:  PMH of T2DM A1c: 6.4. -->  - Maintain BG <180, insulin per CTICU protocol     HEME:  Polycythemia, Acute blood loss anemia and thrombocytopenia. -->    - 2/19--ASA, resume Plavix today.  Continue SCDs.  Active type and screen.  - Monitor drain output volume and characteristics  - CBC, coags, and fibrinogen post op and as clinically indicated  - Start ASA 6hrs post-op for CABG  - If CABG is 2/2 STEMI/unstable angina, will receive Plavix POD2  - SQH tomorrow   - SCDs for DVT prophylaxis.  - Last type and screen: 12/20/2024     ID:  Afebrile, no current indications of infection. MRSA negative. -->  - Trend temp q4h  - Periop cefazolin x 48hrs     Skin:  No active skin issues.  - preventative Mepilex dressings in place on sacrum and heels  - change preventative Mepilex weekly or more frequently as indicated (when moist/soiled)   - every shift skin assessment per nursing and weekly ICU skin rounds  - moisture barrier to be applied with pastora care  - active skin problems addressed with nursing on daily rounds     Proph:  SCDs  PPI     G:  Line  Right IJ MAC w Minimac with PA catheter placed 2/18/2025  Left brachial a-line placed 2/18/2025    F: Family: will update at bedside postoperatively.     A,B,C,D,E,F,G: reviewed       Dispo: CTICU care for now.    CTICU TEAM PHONE [phone]    Scott Mcneal MD   Anesthesiology and Perioperative Medicine, PGY-1

## 2025-02-19 NOTE — PROGRESS NOTES
scheduled CABG x 3 [LIMA to LAD, SVG to LCx, SVG to PDA], posterior pericardial window secondary to CAD with Dr. Gallo Canchola on 2/18/2025. Extubated this a.m. - awaiting PT/OT recs. At minimum home with [facility] per surgery carepath. Patient resides [place] [address] in [place], [place].     Samanta Godinez (LSW, MSW)

## 2025-02-19 NOTE — PROGRESS NOTES
Physical Therapy    Physical Therapy Evaluation    Patient Name: Barbie Milan  MRN: [id number]  Department: [facility]  Room: 06/06-A  Today's Date: 2/19/2025   Time Calculation  Start Time: 1417  Stop Time: 1448  Time Calculation (min): 31 min    Assessment/Plan   PT Assessment  PT Assessment Results: Decreased strength, Decreased endurance, Decreased mobility  Rehab Prognosis: Good  Barriers to Discharge Home:  (TBD)  Evaluation/Treatment Tolerance: Patient limited by pain  Medical Staff Made Aware: Yes (RN aware)  Strengths: Living arrangement secure, Premorbid level of function, Support of Caregivers  Barriers to Participation: Comorbidities  End of Session Communication: Bedside nurse (RT)  Assessment Comment: Pt is a 65 year old female with a PMHx of CAD s/p stent x 4 RCA, MI 2007, HLD, HTN, PAD, T2DM, overactive bladder, a recurrent UTI, polycythemia, fibromyalgia, osteoarthritis, gout, hard of hearing, smoking presenting for CABG x 3 (LIMA to LAD, SVG to LCx, SVG to PDA), posterior pericardial window secondary to CAD with Dr. Gallo Canchola on 2/18/2025. On this date pt performs bed mobility, sit to stand, and side steps to chair with mod A x2 arm in arm. Pt is limited d/t pain and strength appears WFL. Pt demonstrates deficits in strength, endurance, and mobility. Pt will benefit from ongoing PT to address deficits and improve functional mobility.  End of Session Patient Position: Up in chair, Alarm off, caregiver present (RN present)  IP OR SWING BED PT PLAN  Inpatient or Swing Bed: Inpatient  PT Plan  Treatment/Interventions: Bed mobility, Transfer training, Gait training, Stair training, Balance training, Neuromuscular re-education, Strengthening, Endurance training, Range of motion, Therapeutic exercise, Therapeutic activity, Home exercise program, Positioning, Postural re-education  PT Plan: Ongoing PT  PT Frequency: 5 times per week  PT Discharge Recommendations:  (see chart)  Equipment Recommended  upon Discharge:  (TBD)  PT Recommended Transfer Status: Assist x2  PT - OK to Discharge: Yes    Subjective   General Visit Information:  General  Reason for Referral: CABG x 3 (LIMA to LAD, SVG to LCx, SVG to PDA), posterior pericardial window secondary to CAD with Dr. Gallo Canchola on 2/18/2025  Past Medical History Relevant to Rehab: CAD s/p stent x 4 RCA, MI 2007, HLD, HTN, PAD, T2DM, overactive bladder, a recurrent UTI, polycythemia, fibromyalgia, osteoarthritis, gout, hard of hearing, smoking  Family/Caregiver Present: Yes  Caregiver Feedback: daughter is present and supportive throughout session  Prior to Session Communication: Bedside nurse  Patient Position Received: Bed, 3 rail up, Alarm off, not on at start of session  Preferred Learning Style: kinesthetic, verbal, visual  General Comment: pt is pleasant, cooperative, and willing to participate in therapy. pt displays shallow breathing throughout session.  Home Living:  Home Living  Type of Home: House  Lives With: Significant other (boyfriend)  Home Adaptive Equipment:  (has cane and walker if needed. was not previously using)  Home Layout: One level, Laundry in basement, Full bath main level, Able to live on main level with bedroom/bathroom  Home Access: Stairs to enter with rails  Entrance Stairs-Rails: Both  Entrance Stairs-Number of Steps: 2-3  Bathroom Shower/Tub: Tub/shower unit  Bathroom Toilet: Standard  Bathroom Equipment: Grab bars in shower (will be getting shower bench)  Home Living Comments: daughter, son in law, and grandkids close by to help when needed  Prior Level of Function:  Prior Function Per Pt/Caregiver Report  Level of [place]: Independent with ADLs and functional transfers, Independent with homemaking with ambulation, Independent with homemaking with wheelchair  Receives Help From: Family (boyfriend, daughter, son in law, grandkids)  ADL Assistance: Independent  Homemaking Assistance: Independent  Ambulatory Assistance:  Independent  Prior Function Comments: + driving. no falls  Precautions:  Precautions  Hearing/Visual Limitations: [place]. no hearing in R ear. 20% left. reads lips  Medical Precautions: Fall precautions, Cardiac precautions, Chest tube, Oxygen therapy device and L/min (2 chest tubes. 11.5 L O2)  Post-Surgical Precautions: Move in the Tube  Precautions Comment: MAP 60-80, SpO2 >92%      Date/Time Vitals Session Patient Position Pulse Resp SpO2 BP MAP (mmHg)    02/19/25 1417 Pre PT  Lying  103  30  87 %  110/59  --    02/19/25 1448 Post PT  Sitting  99  35  86 %  130/71  --       Vital Signs Comment: with mobility O2 dropped to low 80s. RT notified and pt was titrated to 15 L. SpO2 went back up 86%.     Objective   Pain:  Pain Assessment  0-10 (Numeric) Pain Score:  (pt noted severe back pain but doesnot provide a rating at this time. was given pain medication just before session)  Cognition:  Cognition  Overall Cognitive Status: Within Functional Limits  Orientation Level: Oriented X4  Insight: Within function limits  Impulsive: Within functional limits    General Assessments:  General Observation  General Observation: R PIV, L A line, tele, nasal cannula (11.5 L O2 up to 15L end of session), CVC, dela cruz, 2 CT, VVI 50      Activity Tolerance  Endurance: Tolerates 10 - 20 min exercise with multiple rests  Early Mobility/Exercise Safety Screen: Proceed with mobilization - No exclusion criteria met    Strength  Strength Comments: grossly WFL observed via function  Coordination  Movements are Fluid and Coordinated: Yes    Postural Control  Postural Control: Within Functional Limits    Static Sitting Balance  Static Sitting-Balance Support: Bilateral upper extremity supported, Feet supported  Static Sitting-Level of Assistance: Close supervision  Static Sitting-Comment/Number of Minutes: ~3 minutes. observed hemodynamics. VCs for deep breaths  Dynamic Sitting Balance  Dynamic Sitting-Balance Support: Bilateral upper extremity  supported, Feet supported  Dynamic Sitting-Level of Assistance: Contact guard    Static Standing Balance  Static Standing-Balance Support: Bilateral upper extremity supported (arm in arm)  Static Standing-Level of Assistance: Moderate assistance (x2)  Static Standing-Comment/Number of Minutes: VCs for full hip/knee extension. VCs for breathing throughout mobility  Dynamic Standing Balance  Dynamic Standing-Balance Support: Bilateral upper extremity supported (arm in arm)  Dynamic Standing-Level of Assistance: Moderate assistance (x2)  Functional Assessments:  Bed Mobility  Bed Mobility: Yes  Bed Mobility 1  Bed Mobility 1: Supine to sitting  Level of Assistance 1: Moderate assistance, Moderate verbal cues  Bed Mobility Comments 1: VCs for sequencing and arm placement.    Transfers  Transfer: Yes  Transfer 1  Transfer From 1: Sit to, Stand to  Transfer to 1: Sit, Stand  Technique 1: Sit to stand, Stand to sit  Transfer Level of Assistance 1: Arm in arm assistance, Moderate assistance, +2  Trials/Comments 1: 1 trial. VCs for hip/knee extension and upright posture. VCs for breathing and fatigue/symptom monitoring    Ambulation/Gait Training  Ambulation/Gait Training Performed: Yes  Ambulation/Gait Training 1  Surface 1: Level tile  Assistance 1: Arm in arm assistance, Moderate assistance, Moderate verbal cues (x2)  Quality of Gait 1: Inconsistent stride length, Decreased step length (small side steps. pt appears strong but ambulated slowly likely d/t pain.)  Comments/Distance (ft) 1: ~2 feet to chair. further ambulation deferred d/t severe back pain    Stairs  Stairs: No  Extremity/Trunk Assessments:  RLE   RLE : Within Functional Limits  LLE   LLE : Within Functional Limits  Outcome Measures:  [facility] Basic Mobility  Turning from your back to your side while in a flat bed without using bedrails: A little  Moving from lying on your back to sitting on the side of a flat bed without using bedrails: A lot  Moving to and  from bed to chair (including a wheelchair): A lot  Standing up from a chair using your arms (e.g. wheelchair or bedside chair): A lot  To walk in hospital room: A lot  Climbing 3-5 steps with railing: A lot  Basic Mobility - Total Score: 13    FSS-ICU  Ambulation: Walks <50 feet with any assistance x1 or walks any distance with assistance x2 people  Rolling: Minimal assistance (performs 75% or more of task)  Sitting: Supervision or set-up only  Transfer Sit-to-Stand: Moderate assistance (performs 50 - 74% of task)  Transfer Supine-to-Sit: Moderate assistance (performs 50 - 74% of task)  Total Score: 16    Early Mobility/Exercise Safety Screen: Proceed with mobilization - No exclusion criteria met  ICU Mobility Scale: Transferring bed to chair [5]    Encounter Problems       Encounter Problems (Active)       Mobility       LTG - Patient will ambulate household distance ~150 feet with SBA and LRAD. (Progressing)       Start:  02/19/25    Expected End:  03/05/25            LTG - Patient will navigate 4-6 steps with rails/device with SBA (Progressing)       Start:  02/19/25    Expected End:  03/05/25            Pt will ambulate >/= 150 ft with LRAD and SBA and no signs of fatigue or SOB.   (Progressing)       Start:  02/19/25    Expected End:  03/05/25               PT Transfers       LTG - Patient will transfer from one surface to another independently with LRAD (Progressing)       Start:  02/19/25    Expected End:  03/05/25            STG - Patient will perform bed mobility independently.  (Progressing)       Start:  02/19/25    Expected End:  03/05/25                   Education Documentation  Handouts, taught by CRISTOPHER GuevaraPT at 2/19/2025  3:13 PM.  Learner: Family, Patient  Readiness: Acceptance  Method: Explanation  Response: Verbalizes Understanding  Comment: Pt and daughter educated on safe mobility and transfers. Educated on slow and deep breathing to maximize O2 intake and decrease respiratory rate. Pt  and daughter educated on MITT and given handout.    Precautions, taught by ERI Lisa at 2/19/2025  3:13 PM.  Learner: Family, Patient  Readiness: Acceptance  Method: Explanation  Response: Verbalizes Understanding  Comment: Pt and daughter educated on safe mobility and transfers. Educated on slow and deep breathing to maximize O2 intake and decrease respiratory rate. Pt and daughter educated on MITT and given handout.    Body Mechanics, taught by ERI Lisa at 2/19/2025  3:13 PM.  Learner: Family, Patient  Readiness: Acceptance  Method: Explanation  Response: Verbalizes Understanding  Comment: Pt and daughter educated on safe mobility and transfers. Educated on slow and deep breathing to maximize O2 intake and decrease respiratory rate. Pt and daughter educated on MITT and given handout.    Mobility Training, taught by ERI Lisa at 2/19/2025  3:13 PM.  Learner: Family, Patient  Readiness: Acceptance  Method: Explanation  Response: Verbalizes Understanding  Comment: Pt and daughter educated on safe mobility and transfers. Educated on slow and deep breathing to maximize O2 intake and decrease respiratory rate. Pt and daughter educated on MITT and given handout.        02/19/25 at 3:16 PM - ERI LISA

## 2025-02-19 NOTE — SIGNIFICANT EVENT
02/19/25 0620   Daily Screen   Total RSBI (S)  48.26   Weaning Parameters   Weaning Vital Capacity (S)  446 mL   Negative Inspiratory Force (NIF) (S)  -25   Spontaneous Minute Volume (MV) (S)  6.7   Weaning Tidal Volume (S)  373 mL   Respiratory Depth/Rhythm (S)    (rr 18)   Respiratory Effort (S)  Unlabored

## 2025-02-19 NOTE — PROGRESS NOTES
Occupational Therapy    Evaluation    Patient Name: Barbie Milan  MRN: [id number]  Today's Date: 2/19/2025  Room: 06/06  Time Calculation  Start Time: 1417  Stop Time: 1448  Time Calculation (min): 31 min    Assessment  IP OT Assessment  OT Assessment: Pt SOB and with shallow breathing throughout session. Encouraged relaxation. MOD A x2 for all aspects of bed mobility and functional mobility at this time. Functional task completion limited this date d/t painand SOB. Imapired self-care and functional mobility. Would benefit from skilled services to maximze functional potential.  Prognosis: Good  Barriers to Discharge Home: Caregiver assistance, Physical needs  Caregiver Assistance: Caregiver assistance needed per identified barriers - however, level of patient's required assistance exceeds assistance available at home  Physical Needs: Ambulating household distances limited by function/safety, Intermittent mobility assistance needed, Intermittent ADL assistance needed, High falls risk due to function or environment  Evaluation/Treatment Tolerance: Patient limited by pain  Medical Staff Made Aware: Yes  End of Session Communication: Bedside nurse  End of Session Patient Position: Up in chair, Alarm off, caregiver present  Plan:  Inpatient Plan  Treatment Interventions: ADL retraining, Functional transfer training, UE strengthening/ROM, Endurance training, Neuromuscular reeducation, Compensatory technique education  OT Frequency: 3 times per week  OT Discharge Recommendations:  (TBD. Pending progress with therapy. Anticipate LOW.)  Equipment Recommended upon Discharge: Wheeled walker  OT Recommended Transfer Status: Moderate assist  OT - OK to Discharge: Yes  OT Assessment  OT Assessment Results: Decreased ADL status, Decreased upper extremity strength, Decreased endurance, Decreased functional mobility, Decreased gross motor control  Prognosis: Good  Evaluation/Treatment Tolerance: Patient limited by  pain  Medical Staff Made Aware: Yes    Subjective   Current Problem:  1. Atherosclerosis of native coronary artery of native heart without angina pectoris  Anesthesia Intraoperative Transesophageal Echocardiogram    Anesthesia Intraoperative Transesophageal Echocardiogram      2. Coronary artery disease involving native coronary artery of native heart with other form of angina pectoris        3. Atherosclerotic heart disease of native coronary artery with unspecified angina pectoris  Anesthesia Intraoperative Transesophageal Echocardiogram        General:  Reason for Referral: 66 y/o female now CABG x 3 (LIMA to LAD, SVG to LCx, SVG to PDA), posterior pericardial window secondary to CAD with Dr. Gallo Canchola on 2/18/2025  Past Medical History Relevant to Rehab: CAD s/p stent x 4 RCA, MI 2007, HLD, HTN, PAD, T2DM, overactive bladder, a recurrent UTI, polycythemia, fibromyalgia, osteoarthritis, gout, hard of hearing, smoking  Co-Treatment: PT  Co-Treatment Reason: To maximize pt safety with intent to mobility  Prior to Session Communication: Bedside nurse  Patient Position Received: Bed, 3 rail up, Alarm off, not on at start of session  Family/Caregiver Present: Yes  Caregiver Feedback: daughter is present and supportive throughout session  General Comment: Pt pleasant and cooperative. Deaf in R ear and very little hearing in L. Able to read lips. Dtr present and assists with collateral info. SOB and shallow breathing throughout session. RN present and aware. (Epi .02)   Precautions:  Hearing/Visual Limitations: [place]. Deaf in R ear and very little hearing in L. Able to read lips.  Medical Precautions: Cardiac precautions, Fall precautions, Chest tube, Oxygen therapy device and L/min (CTx2, 11.3L HFNC (increased to 15L end of session, per RN. RN present).)  Post-Surgical Precautions: Move in the Tube  Precautions Comment: MAP 60-80, SpO2 >92%, vvi@50  Vital Signs:   02/19/25 1418 02/19/25 1425   Vital Signs   Vitals  Session Pre OT Post OT   Heart Rate 99 98   Resp (!) 29 (!) 34   SpO2 (!) 88 % (!) 86 %   /59 128/70   BP Method Arterial line Arterial line   Patient Position Lying Sitting   Vital Signs Comment  --  SpO2 decreased to low 80s upon transfer to chair. Encouraged relaxation and paced breathing. RN present, O2 increased to 86%. RN present at end of session.       Pain:  Pain Assessment  0-10 (Numeric) Pain Score:  (Unrated LBP)  Lines/Tubes/Drains:  CVC 02/18/25 Double lumen Right Internal jugular (Active)   Number of days: 1       Introducer 02/18/25 Internal jugular (Active)   Number of days: 1       Arterial Line 02/18/25 Left Brachial (Active)   Number of days: 1       Pulmonary Artery Catheter (Active)   Number of days: 1       Urethral Catheter Non-latex 14 Fr. (Active)   Number of days: 1       Chest Tube Left Pleural (Active)   Number of days: 1       Y Chest Tube 1 and 2 Mediastinal Mediastinal (Active)   Number of days: 1         Objective   Cognition:  Overall Cognitive Status: Within Functional Limits  Orientation Level: Oriented X4  Cognition Comments: Able to follow commands mainly by lip reading.           Home Living:  Type of Home: House  Lives With: Significant other  Home Adaptive Equipment: Walker rolling or standard, Cane  Home Layout: One level, Laundry in basement, Full bath main level, Able to live on main level with bedroom/bathroom  Home Access: Stairs to enter with rails  Entrance Stairs-Number of Steps: 2-3  Bathroom Shower/Tub: Tub/shower unit  Bathroom Toilet: Standard  Bathroom Equipment: Grab bars in shower  Home Living Comments: Family close by and helpful   Prior Function:  Level of [place]: Independent with ADLs and functional transfers, Independent with homemaking with ambulation, Independent with homemaking with wheelchair  Receives Help From: Family  ADL Assistance: Independent  Homemaking Assistance: Independent  Ambulatory Assistance: Independent  Prior Function  Comments: + driving. no falls  IADL History:     ADL:  Grooming Assistance: Moderate  Bathing Assistance: Maximal  UE Dressing Assistance: Minimal  LE Dressing Assistance: Maximal  Toileting Assistance with Device: Maximal  Activity Tolerance:  Endurance: Tolerates 10 - 20 min exercise with multiple rests  Early Mobility/Exercise Safety Screen: Proceed with mobilization - No exclusion criteria met  Activity Tolerance Comments: Functional task completion limited by pain and SOB  Balance:  Static Sitting Balance  Static Sitting-Level of Assistance: Contact guard  Static Standing Balance  Static Standing-Level of Assistance: Moderate assistance  Bed Mobility/Transfers: Bed Mobility  Bed Mobility: Yes  Bed Mobility 1  Bed Mobility 1: Supine to sitting  Level of Assistance 1: Moderate assistance, +2   and Transfers  Transfer: Yes  Transfer 1  Transfer From 1: Sit to  Transfer to 1: Stand  Technique 1: Sit to stand, Stand to sit  Transfer Level of Assistance 1: Moderate assistance, +2, Arm in arm assistance  Transfers 2  Transfer From 2: Bed to  Transfer to 2: Chair with arms  Technique 2: Stand pivot  Transfer Level of Assistance 2: Moderate assistance, +2, Arm in arm assistance  Trials/Comments 2: Able to take a few steps to transfer to chair. Assist for controlled descent  IADL's:      Vision:     and Vision - Complex Assessment  Ocular Range of Motion: Within Functional Limits  Sensation:  Light Touch: No apparent deficits  Strength:  Strength Comments: BUE strength observed to be at least 3/5  Perception:  Inattention/Neglect: Appears intact  Coordination:  Movements are Fluid and Coordinated: Yes   Hand Function:  Hand Function  Gross Grasp: Functional  Coordination: Functional  Extremities:  ,  ,  , and      Outcome Measures:          ICU Mobility Screen  Early Mobility/Exercise Safety Screen: Proceed with mobilization - No exclusion criteria met,          Education Documentation  Handouts, taught by Concetta AVILA  Dominique OT at 2/19/2025  3:35 PM.  Learner: Patient  Readiness: Acceptance  Method: Explanation  Response: Verbalizes Understanding    Body Mechanics, taught by Concetta Fox OT at 2/19/2025  3:35 PM.  Learner: Patient  Readiness: Acceptance  Method: Explanation  Response: Verbalizes Understanding    Precautions, taught by Concetta Fxo OT at 2/19/2025  3:35 PM.  Learner: Patient  Readiness: Acceptance  Method: Explanation  Response: Verbalizes Understanding    ADL Training, taught by Concetta Fox OT at 2/19/2025  3:35 PM.  Learner: Patient  Readiness: Acceptance  Method: Explanation  Response: Verbalizes Understanding    Education Comments  No comments found.        Goals:     Encounter Problems       Encounter Problems (Active)       ADLs       Patient will complete lower body dressing with SBA  for donning and doffing all LE clothes in order to increase Indep with task participation.        Start:  02/19/25    Expected End:  03/05/25            Patient will complete toileting, including clothing management and hygiene, with SBA in order to maximize functional Indep with task completion.        Start:  02/19/25    Expected End:  03/05/25               COGNITION/SAFETY       Pt will identify and adhere to post-op MITT precautions during ADL and functional activity tasks with no more than min verbal cues in order to increase safety and independence upon discharge.        Start:  02/19/25    Expected End:  03/05/25               EXERCISE/STRENGTHENING       Pt will increase endurance to tolerate 15-20min of activity with no more than 1 rest break in order to increase ability to engage in ADL completion.        Start:  02/19/25    Expected End:  03/05/25               MOBILITY       Pt will demo increased functional mobility to tolerate tasks necessary to complete ADL routine with SBA.       Start:  02/19/25    Expected End:  03/05/25               TRANSFERS       Patient will complete functional transfers  using least restrictive device with  SBA  in order to maximize functional potential and increase safety.        Start:  02/19/25    Expected End:  03/05/25 02/19/25 at 3:36 PM   Concetta Fox OT   Rehab Office: [phone]

## 2025-02-19 NOTE — PROGRESS NOTES
02/19/25 1234   Discharge Planning   Living Arrangements Spouse/significant other  (lives with s/o)   Support Systems Spouse/significant other;Children;Family members;Friends/neighbors  (Mainly s/o, daughter Nancy)   Assistance Needed Disability / Indep ADLs / drives /  no home O2 / no HD / current smoker /   Type of Residence Private residence  (Two floor home but plans to stay main level at dc and has a full bath main floor.)   Number of Stairs to Enter Residence 3   Number of Stairs Within Residence 14   Do you have animals or pets at home? Yes   Type of Animals or Pets dog - yorki   Who is requesting discharge planning? Provider   Home or Post Acute Services Other (Comment)   Expected Discharge Disposition Othe   Does the patient need discharge transport arranged?   (If home = no / If to post acute facility = yes.)   Financial Resource Strain   How hard is it for you to pay for the very basics like food, housing, medical care, and heating? Not hard   Housing Stability   In the last 12 months, was there a time when you were not able to pay the mortgage or rent on time? N   In the past 12 months, how many times have you moved where you were living? 0   At any time in the past 12 months, were you homeless or living in a shelter (including now)? N   Transportation Needs   In the past 12 months, has lack of transportation kept you from medical appointments or from getting medications? no   In the past 12 months, has lack of transportation kept you from meetings, work, or from getting things needed for daily living? No   Patient Choice   Patient / Family choosing to utilize agency / facility established prior to hospitalization No   Stroke Family Assessment   Stroke Family Assessment Needed No   Intensity of Service   Intensity of Service 0-30 min     DC/SDOH assessments with daughter (Nancy) @ bedside. Nancy is one of three adult children. Two live out of state. Nancy lives near to patient - 1 block away. Per  Nancy, patient is deaf in one ear and has 20% hearing in other ear. She has hearing for one ear with 20% hearing.     Verified EPIC info of address / contacts / PCP / pharmacy.     Samanta Godinez (LSW, MSW)

## 2025-02-20 ENCOUNTER — APPOINTMENT (OUTPATIENT)
Dept: RADIOLOGY | Facility: HOSPITAL | Age: 65
DRG: 235 | End: 2025-02-20
Payer: MEDICARE

## 2025-02-20 LAB
ALBUMIN SERPL BCP-MCNC: 4.2 G/DL (ref 3.4–5)
ALBUMIN SERPL BCP-MCNC: 4.2 G/DL (ref 3.4–5)
ALP SERPL-CCNC: 36 U/L (ref 33–136)
ALT SERPL W P-5'-P-CCNC: 15 U/L (ref 7–45)
ANION GAP BLDA CALCULATED.4IONS-SCNC: 11 MMO/L (ref 10–25)
ANION GAP BLDA CALCULATED.4IONS-SCNC: 13 MMO/L (ref 10–25)
ANION GAP BLDA CALCULATED.4IONS-SCNC: 14 MMO/L (ref 10–25)
ANION GAP SERPL CALC-SCNC: 16 MMOL/L (ref 10–20)
ANION GAP SERPL CALC-SCNC: 16 MMOL/L (ref 10–20)
AST SERPL W P-5'-P-CCNC: 45 U/L (ref 9–39)
BASE EXCESS BLDA CALC-SCNC: -1 MMOL/L (ref -2–3)
BASE EXCESS BLDA CALC-SCNC: 1.8 MMOL/L (ref -2–3)
BASE EXCESS BLDA CALC-SCNC: 3.2 MMOL/L (ref -2–3)
BASOPHILS # BLD AUTO: 0.01 X10*3/UL (ref 0–0.1)
BASOPHILS # BLD AUTO: 0.02 X10*3/UL (ref 0–0.1)
BASOPHILS NFR BLD AUTO: 0.1 %
BASOPHILS NFR BLD AUTO: 0.2 %
BILIRUB DIRECT SERPL-MCNC: 0.2 MG/DL (ref 0–0.3)
BILIRUB SERPL-MCNC: 0.9 MG/DL (ref 0–1.2)
BODY TEMPERATURE: 37 DEGREES CELSIUS
BUN SERPL-MCNC: 24 MG/DL (ref 6–23)
BUN SERPL-MCNC: 26 MG/DL (ref 6–23)
CA-I BLD-SCNC: 1.09 MMOL/L (ref 1.1–1.33)
CA-I BLD-SCNC: 1.12 MMOL/L (ref 1.1–1.33)
CA-I BLDA-SCNC: 1.15 MMOL/L (ref 1.1–1.33)
CA-I BLDA-SCNC: 1.16 MMOL/L (ref 1.1–1.33)
CA-I BLDA-SCNC: 1.2 MMOL/L (ref 1.1–1.33)
CALCIUM SERPL-MCNC: 8.6 MG/DL (ref 8.6–10.6)
CALCIUM SERPL-MCNC: 8.6 MG/DL (ref 8.6–10.6)
CHLORIDE BLDA-SCNC: 105 MMOL/L (ref 98–107)
CHLORIDE BLDA-SCNC: 106 MMOL/L (ref 98–107)
CHLORIDE BLDA-SCNC: 108 MMOL/L (ref 98–107)
CHLORIDE SERPL-SCNC: 104 MMOL/L (ref 98–107)
CHLORIDE SERPL-SCNC: 105 MMOL/L (ref 98–107)
CO2 SERPL-SCNC: 26 MMOL/L (ref 21–32)
CO2 SERPL-SCNC: 26 MMOL/L (ref 21–32)
CREAT SERPL-MCNC: 0.75 MG/DL (ref 0.5–1.05)
CREAT SERPL-MCNC: 0.76 MG/DL (ref 0.5–1.05)
EGFRCR SERPLBLD CKD-EPI 2021: 87 ML/MIN/1.73M*2
EGFRCR SERPLBLD CKD-EPI 2021: 88 ML/MIN/1.73M*2
EOSINOPHIL # BLD AUTO: 0 X10*3/UL (ref 0–0.7)
EOSINOPHIL # BLD AUTO: 0.04 X10*3/UL (ref 0–0.7)
EOSINOPHIL NFR BLD AUTO: 0 %
EOSINOPHIL NFR BLD AUTO: 0.5 %
ERYTHROCYTE [DISTWIDTH] IN BLOOD BY AUTOMATED COUNT: 13.6 % (ref 11.5–14.5)
ERYTHROCYTE [DISTWIDTH] IN BLOOD BY AUTOMATED COUNT: 13.6 % (ref 11.5–14.5)
GLUCOSE BLD MANUAL STRIP-MCNC: 121 MG/DL (ref 74–99)
GLUCOSE BLD MANUAL STRIP-MCNC: 141 MG/DL (ref 74–99)
GLUCOSE BLD MANUAL STRIP-MCNC: 142 MG/DL (ref 74–99)
GLUCOSE BLD MANUAL STRIP-MCNC: 84 MG/DL (ref 74–99)
GLUCOSE BLDA-MCNC: 125 MG/DL (ref 74–99)
GLUCOSE BLDA-MCNC: 160 MG/DL (ref 74–99)
GLUCOSE BLDA-MCNC: 160 MG/DL (ref 74–99)
GLUCOSE SERPL-MCNC: 125 MG/DL (ref 74–99)
GLUCOSE SERPL-MCNC: 153 MG/DL (ref 74–99)
HCO3 BLDA-SCNC: 24.3 MMOL/L (ref 22–26)
HCO3 BLDA-SCNC: 25.6 MMOL/L (ref 22–26)
HCO3 BLDA-SCNC: 27.9 MMOL/L (ref 22–26)
HCT VFR BLD AUTO: 26.3 % (ref 36–46)
HCT VFR BLD AUTO: 27.1 % (ref 36–46)
HCT VFR BLD EST: 27 % (ref 36–46)
HCT VFR BLD EST: 28 % (ref 36–46)
HCT VFR BLD EST: 29 % (ref 36–46)
HGB BLD-MCNC: 8.7 G/DL (ref 12–16)
HGB BLD-MCNC: 8.7 G/DL (ref 12–16)
HGB BLDA-MCNC: 9.1 G/DL (ref 12–16)
HGB BLDA-MCNC: 9.4 G/DL (ref 12–16)
HGB BLDA-MCNC: 9.6 G/DL (ref 12–16)
IMM GRANULOCYTES # BLD AUTO: 0.04 X10*3/UL (ref 0–0.7)
IMM GRANULOCYTES # BLD AUTO: 0.05 X10*3/UL (ref 0–0.7)
IMM GRANULOCYTES NFR BLD AUTO: 0.5 % (ref 0–0.9)
IMM GRANULOCYTES NFR BLD AUTO: 0.6 % (ref 0–0.9)
INHALED O2 CONCENTRATION: 40 %
INHALED O2 CONCENTRATION: 40 %
INHALED O2 CONCENTRATION: 60 %
LACTATE BLDA-SCNC: 2.4 MMOL/L (ref 0.4–2)
LACTATE BLDA-SCNC: 2.9 MMOL/L (ref 0.4–2)
LACTATE BLDA-SCNC: 4.1 MMOL/L (ref 0.4–2)
LYMPHOCYTES # BLD AUTO: 1.1 X10*3/UL (ref 1.2–4.8)
LYMPHOCYTES # BLD AUTO: 1.36 X10*3/UL (ref 1.2–4.8)
LYMPHOCYTES NFR BLD AUTO: 12.8 %
LYMPHOCYTES NFR BLD AUTO: 16.2 %
MAGNESIUM SERPL-MCNC: 2.02 MG/DL (ref 1.6–2.4)
MAGNESIUM SERPL-MCNC: 2.38 MG/DL (ref 1.6–2.4)
MCH RBC QN AUTO: 33.3 PG (ref 26–34)
MCH RBC QN AUTO: 33.9 PG (ref 26–34)
MCHC RBC AUTO-ENTMCNC: 32.1 G/DL (ref 32–36)
MCHC RBC AUTO-ENTMCNC: 33.1 G/DL (ref 32–36)
MCV RBC AUTO: 102 FL (ref 80–100)
MCV RBC AUTO: 104 FL (ref 80–100)
MONOCYTES # BLD AUTO: 0.45 X10*3/UL (ref 0.1–1)
MONOCYTES # BLD AUTO: 0.67 X10*3/UL (ref 0.1–1)
MONOCYTES NFR BLD AUTO: 5.4 %
MONOCYTES NFR BLD AUTO: 7.8 %
NEUTROPHILS # BLD AUTO: 6.47 X10*3/UL (ref 1.2–7.7)
NEUTROPHILS # BLD AUTO: 6.79 X10*3/UL (ref 1.2–7.7)
NEUTROPHILS NFR BLD AUTO: 77.1 %
NEUTROPHILS NFR BLD AUTO: 78.8 %
NRBC BLD-RTO: 0 /100 WBCS (ref 0–0)
NRBC BLD-RTO: 0 /100 WBCS (ref 0–0)
OXYHGB MFR BLDA: 92.7 % (ref 94–98)
OXYHGB MFR BLDA: 95.3 % (ref 94–98)
OXYHGB MFR BLDA: 96.6 % (ref 94–98)
PCO2 BLDA: 36 MM HG (ref 38–42)
PCO2 BLDA: 42 MM HG (ref 38–42)
PCO2 BLDA: 42 MM HG (ref 38–42)
PH BLDA: 7.37 PH (ref 7.38–7.42)
PH BLDA: 7.43 PH (ref 7.38–7.42)
PH BLDA: 7.46 PH (ref 7.38–7.42)
PHOSPHATE SERPL-MCNC: 2 MG/DL (ref 2.5–4.9)
PHOSPHATE SERPL-MCNC: 2.1 MG/DL (ref 2.5–4.9)
PLATELET # BLD AUTO: 83 X10*3/UL (ref 150–450)
PLATELET # BLD AUTO: 90 X10*3/UL (ref 150–450)
PO2 BLDA: 65 MM HG (ref 85–95)
PO2 BLDA: 74 MM HG (ref 85–95)
PO2 BLDA: 94 MM HG (ref 85–95)
POTASSIUM BLDA-SCNC: 3.5 MMOL/L (ref 3.5–5.3)
POTASSIUM BLDA-SCNC: 3.9 MMOL/L (ref 3.5–5.3)
POTASSIUM BLDA-SCNC: 4.1 MMOL/L (ref 3.5–5.3)
POTASSIUM SERPL-SCNC: 3.6 MMOL/L (ref 3.5–5.3)
POTASSIUM SERPL-SCNC: 3.8 MMOL/L (ref 3.5–5.3)
PROT SERPL-MCNC: 5.6 G/DL (ref 6.4–8.2)
RBC # BLD AUTO: 2.57 X10*6/UL (ref 4–5.2)
RBC # BLD AUTO: 2.61 X10*6/UL (ref 4–5.2)
SAO2 % BLDA: 95 % (ref 94–100)
SAO2 % BLDA: 98 % (ref 94–100)
SAO2 % BLDA: 99 % (ref 94–100)
SODIUM BLDA-SCNC: 141 MMOL/L (ref 136–145)
SODIUM SERPL-SCNC: 142 MMOL/L (ref 136–145)
SODIUM SERPL-SCNC: 143 MMOL/L (ref 136–145)
WBC # BLD AUTO: 8.4 X10*3/UL (ref 4.4–11.3)
WBC # BLD AUTO: 8.6 X10*3/UL (ref 4.4–11.3)

## 2025-02-20 PROCEDURE — 2500000004 HC RX 250 GENERAL PHARMACY W/ HCPCS (ALT 636 FOR OP/ED)

## 2025-02-20 PROCEDURE — 2020000001 HC ICU ROOM DAILY

## 2025-02-20 PROCEDURE — 97530 THERAPEUTIC ACTIVITIES: CPT | Mod: GO

## 2025-02-20 PROCEDURE — 97530 THERAPEUTIC ACTIVITIES: CPT | Mod: GP

## 2025-02-20 PROCEDURE — 2500000001 HC RX 250 WO HCPCS SELF ADMINISTERED DRUGS (ALT 637 FOR MEDICARE OP)

## 2025-02-20 PROCEDURE — 2500000005 HC RX 250 GENERAL PHARMACY W/O HCPCS

## 2025-02-20 PROCEDURE — 84132 ASSAY OF SERUM POTASSIUM: CPT

## 2025-02-20 PROCEDURE — 2500000002 HC RX 250 W HCPCS SELF ADMINISTERED DRUGS (ALT 637 FOR MEDICARE OP, ALT 636 FOR OP/ED): Performed by: NURSE PRACTITIONER

## 2025-02-20 PROCEDURE — 2500000002 HC RX 250 W HCPCS SELF ADMINISTERED DRUGS (ALT 637 FOR MEDICARE OP, ALT 636 FOR OP/ED)

## 2025-02-20 PROCEDURE — 37799 UNLISTED PX VASCULAR SURGERY: CPT

## 2025-02-20 PROCEDURE — 2500000005 HC RX 250 GENERAL PHARMACY W/O HCPCS: Performed by: NURSE PRACTITIONER

## 2025-02-20 PROCEDURE — 85025 COMPLETE CBC W/AUTO DIFF WBC: CPT

## 2025-02-20 PROCEDURE — 94640 AIRWAY INHALATION TREATMENT: CPT

## 2025-02-20 PROCEDURE — 99291 CRITICAL CARE FIRST HOUR: CPT

## 2025-02-20 PROCEDURE — 71045 X-RAY EXAM CHEST 1 VIEW: CPT

## 2025-02-20 PROCEDURE — 82947 ASSAY GLUCOSE BLOOD QUANT: CPT

## 2025-02-20 PROCEDURE — 83735 ASSAY OF MAGNESIUM: CPT

## 2025-02-20 PROCEDURE — 2500000004 HC RX 250 GENERAL PHARMACY W/ HCPCS (ALT 636 FOR OP/ED): Mod: JZ,TB | Performed by: NURSE PRACTITIONER

## 2025-02-20 PROCEDURE — 84075 ASSAY ALKALINE PHOSPHATASE: CPT

## 2025-02-20 PROCEDURE — 71045 X-RAY EXAM CHEST 1 VIEW: CPT | Performed by: RADIOLOGY

## 2025-02-20 PROCEDURE — 84100 ASSAY OF PHOSPHORUS: CPT

## 2025-02-20 PROCEDURE — 82330 ASSAY OF CALCIUM: CPT

## 2025-02-20 RX ORDER — OXYCODONE HYDROCHLORIDE 10 MG/1
10 TABLET ORAL EVERY 6 HOURS PRN
Status: DISCONTINUED | OUTPATIENT
Start: 2025-02-20 | End: 2025-02-21

## 2025-02-20 RX ORDER — ACETAMINOPHEN 325 MG/1
650 TABLET ORAL EVERY 6 HOURS
Status: DISCONTINUED | OUTPATIENT
Start: 2025-02-20 | End: 2025-02-21

## 2025-02-20 RX ORDER — FUROSEMIDE 10 MG/ML
20 INJECTION INTRAMUSCULAR; INTRAVENOUS ONCE
Status: COMPLETED | OUTPATIENT
Start: 2025-02-20 | End: 2025-02-20

## 2025-02-20 RX ORDER — METHOCARBAMOL 100 MG/ML
1000 INJECTION, SOLUTION INTRAMUSCULAR; INTRAVENOUS EVERY 6 HOURS PRN
Status: DISPENSED | OUTPATIENT
Start: 2025-02-20 | End: 2025-02-23

## 2025-02-20 RX ORDER — OXYCODONE HYDROCHLORIDE 5 MG/1
5 TABLET ORAL EVERY 6 HOURS PRN
Status: DISCONTINUED | OUTPATIENT
Start: 2025-02-20 | End: 2025-02-26 | Stop reason: HOSPADM

## 2025-02-20 RX ADMIN — POLYETHYLENE GLYCOL 3350 17 G: 17 POWDER, FOR SOLUTION ORAL at 09:01

## 2025-02-20 RX ADMIN — FUROSEMIDE 20 MG: 10 INJECTION, SOLUTION INTRAVENOUS at 09:01

## 2025-02-20 RX ADMIN — ACETAMINOPHEN 1000 MG: 1000 INJECTION, SOLUTION INTRAVENOUS at 03:15

## 2025-02-20 RX ADMIN — CEFAZOLIN SODIUM 2 G: 2 INJECTION, SOLUTION INTRAVENOUS at 11:08

## 2025-02-20 RX ADMIN — EZETIMIBE 10 MG: 10 TABLET ORAL at 20:28

## 2025-02-20 RX ADMIN — ACETAMINOPHEN 650 MG: 325 TABLET ORAL at 16:32

## 2025-02-20 RX ADMIN — HEPARIN SODIUM 5000 UNITS: 5000 INJECTION, SOLUTION INTRAVENOUS; SUBCUTANEOUS at 10:22

## 2025-02-20 RX ADMIN — Medication 50 PERCENT: at 23:38

## 2025-02-20 RX ADMIN — Medication 40 L/MIN: at 08:26

## 2025-02-20 RX ADMIN — METHOCARBAMOL 1000 MG: 1000 INJECTION, SOLUTION INTRAMUSCULAR; INTRAVENOUS at 04:59

## 2025-02-20 RX ADMIN — IPRATROPIUM BROMIDE AND ALBUTEROL SULFATE 3 ML: .5; 3 SOLUTION RESPIRATORY (INHALATION) at 15:23

## 2025-02-20 RX ADMIN — ACETAMINOPHEN 650 MG: 325 TABLET ORAL at 11:09

## 2025-02-20 RX ADMIN — BUDESONIDE 0.5 MG: 0.5 INHALANT RESPIRATORY (INHALATION) at 09:04

## 2025-02-20 RX ADMIN — SENNOSIDES AND DOCUSATE SODIUM 2 TABLET: 50; 8.6 TABLET ORAL at 20:28

## 2025-02-20 RX ADMIN — IPRATROPIUM BROMIDE AND ALBUTEROL SULFATE 3 ML: .5; 3 SOLUTION RESPIRATORY (INHALATION) at 03:21

## 2025-02-20 RX ADMIN — HYDROMORPHONE HYDROCHLORIDE 0.2 MG: 0.2 INJECTION, SOLUTION INTRAMUSCULAR; INTRAVENOUS; SUBCUTANEOUS at 03:15

## 2025-02-20 RX ADMIN — CLOPIDOGREL BISULFATE 75 MG: 75 TABLET ORAL at 09:01

## 2025-02-20 RX ADMIN — FUROSEMIDE 20 MG: 10 INJECTION, SOLUTION INTRAVENOUS at 04:59

## 2025-02-20 RX ADMIN — METHOCARBAMOL 1000 MG: 1000 INJECTION, SOLUTION INTRAMUSCULAR; INTRAVENOUS at 10:22

## 2025-02-20 RX ADMIN — INSULIN LISPRO 3 UNITS: 100 INJECTION, SOLUTION INTRAVENOUS; SUBCUTANEOUS at 04:59

## 2025-02-20 RX ADMIN — HEPARIN SODIUM 5000 UNITS: 5000 INJECTION, SOLUTION INTRAVENOUS; SUBCUTANEOUS at 03:15

## 2025-02-20 RX ADMIN — IPRATROPIUM BROMIDE AND ALBUTEROL SULFATE 3 ML: .5; 3 SOLUTION RESPIRATORY (INHALATION) at 20:16

## 2025-02-20 RX ADMIN — ASPIRIN 81 MG CHEWABLE TABLET 81 MG: 81 TABLET CHEWABLE at 09:00

## 2025-02-20 RX ADMIN — POLYETHYLENE GLYCOL 3350 17 G: 17 POWDER, FOR SOLUTION ORAL at 20:28

## 2025-02-20 RX ADMIN — CEFAZOLIN SODIUM 2 G: 2 INJECTION, SOLUTION INTRAVENOUS at 04:59

## 2025-02-20 RX ADMIN — POTASSIUM CHLORIDE 40 MEQ: 1.5 POWDER, FOR SOLUTION ORAL at 14:17

## 2025-02-20 RX ADMIN — OXYCODONE 5 MG: 5 TABLET ORAL at 15:29

## 2025-02-20 RX ADMIN — Medication 40 L/MIN: at 10:51

## 2025-02-20 RX ADMIN — BUDESONIDE 0.5 MG: 0.5 INHALANT RESPIRATORY (INHALATION) at 20:17

## 2025-02-20 RX ADMIN — HEPARIN SODIUM 5000 UNITS: 5000 INJECTION, SOLUTION INTRAVENOUS; SUBCUTANEOUS at 18:03

## 2025-02-20 RX ADMIN — Medication 40 L/MIN: at 08:25

## 2025-02-20 RX ADMIN — IPRATROPIUM BROMIDE AND ALBUTEROL SULFATE 6 ML: .5; 3 SOLUTION RESPIRATORY (INHALATION) at 08:54

## 2025-02-20 RX ADMIN — PAROXETINE 10 MG: 10 TABLET, FILM COATED ORAL at 09:01

## 2025-02-20 RX ADMIN — SENNOSIDES AND DOCUSATE SODIUM 2 TABLET: 50; 8.6 TABLET ORAL at 09:01

## 2025-02-20 ASSESSMENT — COGNITIVE AND FUNCTIONAL STATUS - GENERAL
TOILETING: A LOT
HELP NEEDED FOR BATHING: A LOT
PERSONAL GROOMING: A LITTLE
CLIMB 3 TO 5 STEPS WITH RAILING: A LOT
DAILY ACTIVITIY SCORE: 16
MOVING TO AND FROM BED TO CHAIR: A LITTLE
MOVING FROM LYING ON BACK TO SITTING ON SIDE OF FLAT BED WITH BEDRAILS: A LITTLE
DRESSING REGULAR UPPER BODY CLOTHING: A LITTLE
TURNING FROM BACK TO SIDE WHILE IN FLAT BAD: A LOT
WALKING IN HOSPITAL ROOM: A LITTLE
STANDING UP FROM CHAIR USING ARMS: A LITTLE
MOBILITY SCORE: 16
DRESSING REGULAR LOWER BODY CLOTHING: A LOT

## 2025-02-20 ASSESSMENT — PAIN SCALES - GENERAL
PAINLEVEL_OUTOF10: 2
PAINLEVEL_OUTOF10: 6
PAINLEVEL_OUTOF10: 0 - NO PAIN
PAINLEVEL_OUTOF10: 1
PAINLEVEL_OUTOF10: 0 - NO PAIN
PAINLEVEL_OUTOF10: 6
PAINLEVEL_OUTOF10: 0 - NO PAIN

## 2025-02-20 ASSESSMENT — PAIN - FUNCTIONAL ASSESSMENT
PAIN_FUNCTIONAL_ASSESSMENT: 0-10

## 2025-02-20 ASSESSMENT — PAIN DESCRIPTION - LOCATION: LOCATION: CHEST

## 2025-02-20 NOTE — PROGRESS NOTES
Occupational Therapy    Occupational Therapy Treatment    Name: Barbie Milan  MRN: [id number]  : 1960  Date: 25  Room:       Time Calculation  Start Time: 939  Stop Time: 1004  Time Calculation (min): 25 min    Assessment:  OT Assessment: Pt SOB and with shallow breathing throughout session. Encouraged relaxation. MIN A for functional transfers, but needed much encouragement. Functional task completion limited this date d/t reported pain and SOB. Imapired self-care and functional mobility. Would benefit from skilled services to maximze functional potential.  Prognosis: Good  Barriers to Discharge Home: Caregiver assistance, Physical needs  Caregiver Assistance: Caregiver assistance needed per identified barriers - however, level of patient's required assistance exceeds assistance available at home  Physical Needs: Ambulating household distances limited by function/safety, Intermittent mobility assistance needed, Intermittent ADL assistance needed, High falls risk due to function or environment  Evaluation/Treatment Tolerance: Patient limited by pain  Medical Staff Made Aware: Yes  End of Session Communication: Bedside nurse  End of Session Patient Position: Up in chair, Alarm off, caregiver present  Plan:  Treatment Interventions: ADL retraining, Functional transfer training, UE strengthening/ROM, Endurance training, Neuromuscular reeducation, Compensatory technique education  OT Frequency: 3 times per week  OT Discharge Recommendations:  (TBD. Pending progress with therapy. Anticipate LOW.)  Equipment Recommended upon Discharge: Wheeled walker  OT Recommended Transfer Status: Minimal assist  OT - OK to Discharge: Yes    Subjective   General:  OT Last Visit  OT Received On: 25  Reason for Referral: 64 y/o female now CABG x 3 (LIMA to LAD, SVG to LCx, SVG to PDA), posterior pericardial window secondary to CAD with Dr. Gallo Canchola on 2025  Co-Treatment: PT  Co-Treatment Reason:  Partial co-tx with PT to maximize safe functional mobility.  Prior to Session Communication: Bedside nurse  Patient Position Received: Bed, 3 rail up, Alarm off, not on at start of session  Family/Caregiver Present: No  General Comment: Pt pleasant and agreeable to participating. Observed increased anxiety and increased RR upon completing functional mobility tasks. Encoruaged relaxation and paced breathing during session. (Epi .02)   Precautions:  Hearing/Visual Limitations: [place]. Deaf in R ear and very little hearing in L. Able to read lips.  Medical Precautions: Cardiac precautions, Fall precautions, Chest tube, Oxygen therapy device and L/min (CTx2, AirVo 40L/70%)  Post-Surgical Precautions: Move in the Tube  Precautions Comment: MAP 60-80, SpO2 >92%, vvi@50  Vitals:      Lines/Tubes/Drains:  CVC 02/18/25 Double lumen Right Internal jugular (Active)   Number of days: 2       Introducer 02/18/25 Internal jugular (Active)   Number of days: 2       Arterial Line 02/18/25 Left Brachial (Active)   Number of days: 2       Pulmonary Artery Catheter (Active)   Number of days: 1       Urethral Catheter Non-latex 14 Fr. (Active)   Number of days: 2       Chest Tube Left Pleural (Active)   Number of days: 1       Y Chest Tube 1 and 2 Mediastinal Mediastinal (Active)   Number of days: 1       Cognition:  Overall Cognitive Status: Within Functional Limits  Orientation Level: Oriented X4  Cognition Comments: Pt calm and pleasant while in bed. Appears to increase in anxiety in anticipation of movement.    Pain Assessment:  Pain Assessment  0-10 (Numeric) Pain Score:  (Unrated LBP. Reports of pain appear to worsen with movement.)     Objective     Bed Mobility/Transfers:   Bed Mobility  Bed Mobility: Yes  Bed Mobility 1  Bed Mobility 1: Supine to sitting  Level of Assistance 1: Moderate assistance  Bed Mobility Comments 1: Cues for task sequencing and pacing.  Transfers  Transfer: Yes  Transfer 1  Transfer From 1: Sit  to  Transfer to 1: Stand, Bed  Technique 1: Sit to stand, Stand to sit  Transfer Level of Assistance 1: Minimum assistance, Arm in arm assistance  Transfers 2  Transfer From 2: Bed to  Transfer to 2: Chair with arms  Technique 2: Stand pivot  Transfer Level of Assistance 2: Minimum assistance, Moderate verbal cues  Trials/Comments 2: Cues for task sequencing and paced breathing.  Transfers 3  Transfer From 3: Sit to  Transfer to 3: Stand, Chair with arms  Technique 3: Sit to stand, Stand to sit  Transfer Device 3: Walker  Transfer Level of Assistance 3: Minimum assistance, Moderate verbal cues  Trials/Comments 3: Cues for hand placement and pacing with breathing. Needed lengthy rest prior to second stand, stating that her back needed to 'calm down'.                Cognitive Skill Development:  Cognitive Skill Development  Cognitive Skill Development Activity 1: Pt needed freuqnt cues for proper breathing. RR to the 40s (SpO2 remained >92%) in anticipation of movement. Fixates on anticipating back  pain. Cues and techniques for relaxation throughout. She demo'd difficulty with follow through.  Cognitive Skill Development Activity 1: Pt needed freuqnt cues for proper breathing. RR to the 40s (SpO2 remained >92%) in anticipation of movement. Fixates on anticipating back  pain. Cues and techniques for relaxation throughout. She demo'd difficulty with follow through.      Outcome Measures:  [facility] Daily Activity  Putting on and taking off regular lower body clothing: A lot  Bathing (including washing, rinsing, drying): A lot  Putting on and taking off regular upper body clothing: A little  Toileting, which includes using toilet, bedpan or urinal: A lot  Taking care of personal grooming such as brushing teeth: A little  Eating Meals: None  Daily Activity - Total Score: 16     Education Documentation  Handouts, taught by Concetta Fox OT at 2/20/2025 12:03 PM.  Learner: Patient  Readiness: Acceptance  Method:  Explanation  Response: Verbalizes Understanding    Body Mechanics, taught by Concetta Fox OT at 2/20/2025 12:03 PM.  Learner: Patient  Readiness: Acceptance  Method: Explanation  Response: Verbalizes Understanding    Precautions, taught by Concetta Fox OT at 2/20/2025 12:03 PM.  Learner: Patient  Readiness: Acceptance  Method: Explanation  Response: Verbalizes Understanding    ADL Training, taught by oCncetta Fox OT at 2/20/2025 12:03 PM.  Learner: Patient  Readiness: Acceptance  Method: Explanation  Response: Verbalizes Understanding    Handouts, taught by Concetta Fox OT at 2/19/2025  3:35 PM.  Learner: Patient  Readiness: Acceptance  Method: Explanation  Response: Verbalizes Understanding    Body Mechanics, taught by Concetta Fox OT at 2/19/2025  3:35 PM.  Learner: Patient  Readiness: Acceptance  Method: Explanation  Response: Verbalizes Understanding    Precautions, taught by Concetta Fox OT at 2/19/2025  3:35 PM.  Learner: Patient  Readiness: Acceptance  Method: Explanation  Response: Verbalizes Understanding    ADL Training, taught by Concetta Fox OT at 2/19/2025  3:35 PM.  Learner: Patient  Readiness: Acceptance  Method: Explanation  Response: Verbalizes Understanding    Education Comments  No comments found.        Goals:  Encounter Problems       Encounter Problems (Active)       ADLs       Patient will complete lower body dressing with SBA  for donning and doffing all LE clothes in order to increase Indep with task participation.        Start:  02/19/25    Expected End:  03/05/25            Patient will complete toileting, including clothing management and hygiene, with SBA in order to maximize functional Indep with task completion.        Start:  02/19/25    Expected End:  03/05/25               COGNITION/SAFETY       Pt will identify and adhere to post-op MITT precautions during ADL and functional activity tasks with no more than min verbal cues in order to increase safety and  independence upon discharge.        Start:  02/19/25    Expected End:  03/05/25               EXERCISE/STRENGTHENING       Pt will increase endurance to tolerate 15-20min of activity with no more than 1 rest break in order to increase ability to engage in ADL completion.        Start:  02/19/25    Expected End:  03/05/25               MOBILITY       Pt will demo increased functional mobility to tolerate tasks necessary to complete ADL routine with SBA.       Start:  02/19/25    Expected End:  03/05/25               TRANSFERS       Patient will complete functional transfers using least restrictive device with  SBA  in order to maximize functional potential and increase safety.        Start:  02/19/25    Expected End:  03/05/25 02/20/25 at 12:03 PM   Concetta Fox, OT   [phone]

## 2025-02-20 NOTE — H&P
CTICU Progress Note     Subjective   Overnight Event:  No acute events overnight patient OOB on Airvo 50L/FiO2 40%.     Past Medical History:   Diagnosis Date    Abnormal gait 04/19/2021    Acute upper respiratory infection 01/13/2025    Allergic rhinitis with postnasal drip 10/03/2024    Anxiety     Arthralgia of right knee 10/06/2021    Bilateral adrenal adenomas 12/17/2022    Bladder diverticulum 12/17/2022    CAD (coronary artery disease)     (MI 2007 stentx4 RCA),    Carpal tunnel syndrome 04/19/2021    Chronic otitis media 01/13/2025    Chronic sinusitis 01/13/2025    Coronary artery disease     Cyst of ovary, unspecified laterality 06/08/2023    Degeneration of lumbosacral intervertebral disc 01/13/2025    Disease due to severe acute respiratory syndrome coronavirus 2 (SARS-CoV-2) 01/13/2025    Disturbance in speech 04/19/2021    Dizziness and giddiness 01/13/2025    DM (diabetes mellitus) (Multi)     Fatigue     Fatty liver 12/17/2022    Fibromyalgia 01/13/2025    Hearing aid worn     HL (hearing loss)     20% in Left ear, none in right ear- reads lips    Hyperlipidemia     Hypertension     Low back pain 01/13/2025    Lumbosacral spondylosis without myelopathy 01/13/2025    Muscle spasticity 03/24/2022    Myocardial infarction (Multi) 2007    2007 x7, 6 months ago    Obesity with body mass index 30 or greater 12/15/2015    Body mass index 30+ - obesity      Otosclerosis of both ears 03/24/2022    Overactive bladder 09/10/2024    Perforation of tympanic membrane 01/13/2025    Peripheral vascular disease ([facility]) 01/13/2025    4 stents in abdomin, patient reports vasculature in abdomin    Polycythemia 04/02/2022    Primary fibromyalgia syndrome 06/29/2017    Psychophysiological insomnia 10/28/2022    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) 03/24/2022    Recurrent otitis media, right 03/24/2022    Sciatica 04/19/2021    Shortness of breath     FLETCHER    Spinal stenosis of lumbar region  2018    Sprain of knee 2025    Tear of lateral meniscus of knee 2022    Tobacco use     Total perforation of tympanic membrane, right 2023    Type 2 diabetes mellitus     Urinary tract infection with hematuria 09/10/2024    Uterine fibroid     Vertigo     Wears glasses 2021     Past Surgical History:   Procedure Laterality Date    CARDIAC CATHETERIZATION  10/29/2007    CHOLECYSTECTOMY      CORONARY ANGIOPLASTY WITH STENT PLACEMENT  2007    x4 stents    DESCENDING AORTIC ANEURYSM REPAIR W/ STENT      HYSTERECTOMY  2023    ILIAC ARTERY STENT Bilateral 2023    KNEE SURGERY      KNEE ARTHROSCOPY- meniscal repair    OOPHORECTOMY      TUBAL LIGATION  1981    VULVA SURGERY Right     for dysplasia     Medications Prior to Admission   Medication Sig Dispense Refill Last Dose/Taking    aspirin 81 mg EC tablet Take 1 tablet (81 mg) by mouth once daily.   2025 Morning    chlorhexidine (Hibiclens) 4 % external liquid Apply topically once daily as needed for wound care. Use for 5 days prior to surgery as body wash, do not use on face or genital region 473 mL 0 2025 Morning    furosemide (Lasix) 40 mg tablet Take 1 tablet (40 mg) by mouth once daily.   2025 Morning    metoprolol succinate XL (Toprol-XL) 50 mg 24 hr tablet Take 1 tablet (50 mg) by mouth once daily. Do not crush or chew.   2025 Morning    PARoxetine (Paxil) 10 mg tablet Take 1 tablet (10 mg) by mouth once daily in the morning.   2025 Morning    [] chlorhexidine (Peridex) 0.12 % solution Use 15 mL in the mouth or throat if needed for wound care for up to 2 days. Use as mouth wash for night before and morning of surgery 120 mL 0     HYDROcodone-acetaminophen (Norco) 5-325 mg tablet Take 1 tablet by mouth 3 times a day as needed.       ibuprofen 200 mg tablet Take 1 tablet (200 mg) by mouth if needed for mild pain (1 - 3).       lisinopril 10 mg tablet Take 1 tablet (10 mg) by mouth once  daily.       nitroglycerin (Nitrostat) 0.4 mg SL tablet Place 1 tablet (0.4 mg) under the tongue every 5 minutes if needed.       traZODone (Desyrel) 50 mg tablet Take 1 tablet (50 mg) by mouth once daily at bedtime. (Patient not taking: Reported on 2/14/2025)        Celecoxib, Macrobid [nitrofurantoin monohyd/m-cryst], Niacin, Sulfa (sulfonamide antibiotics), Inositol, Metoprolol, Sulfur, Tobramycin, Topiramate, Nitrofurantoin, and Statins-hmg-coa reductase inhibitors  Social History     Tobacco Use    Smoking status: Every Day     Current packs/day: 0.25     Average packs/day: 0.3 packs/day for 50.0 years (12.5 ttl pk-yrs)     Types: Cigarettes    Smokeless tobacco: Never   Vaping Use    Vaping status: Every Day   Substance Use Topics    Alcohol use: Not Currently    Drug use: Never     Family History   Problem Relation Name Age of Onset    Stroke Mother      No Known Problems Father      Stroke Daughter         Review of Systems:  Unable to assess secondary to patient perioperative anesthesia.    Objective   Vitals:  Most Recent:  Vitals:    02/20/25 0700   BP:    Pulse: 94   Resp: 15   Temp:    SpO2: 95%       24hr Min/Max:  Temp  Min: 36.6 °C (97.9 °F)  Max: 38.2 °C (100.8 °F)  Pulse  Min: 86  Max: 111  BP  Min: 110/59  Max: 130/71  Resp  Min: 14  Max: 37  SpO2  Min: 84 %  Max: 97 %    I/O:  I/O last 2 completed shifts:  In: 2024.7 [I.V.:774.7; IV Piggyback:1250]  Out: 2255 [Urine:1455; Chest Tube:800]    LDA:  CVC 02/18/25 Double lumen Right Internal jugular (Active)   Placement Date/Time: 02/18/25 (c) 0800   Hand Hygiene Performed Prior to CVC Insertion: Yes  Site Prep: Chlorhexidine   Site Prep Agent has Completely Dried Before Insertion: Yes  All 5 Sterile Barriers Used (Gloves, Gown, Cap, Mask, Large Sterile Prema...   Number of days: 0       Introducer 02/18/25 Internal jugular (Active)   Placement Date/Time: 02/18/25 (c) 0800   Hand Hygiene Completed: Yes  Location: Internal jugular  Placement Verified:  Pressure tracing changes;Transesophageal echocardiogram   Number of days: 0       Arterial Line 02/18/25 Left Brachial (Active)   Placement Date/Time: 02/18/25 (c) 0720   Size: 20 G  Orientation: Left  Location: Brachial  Securement Method: Transparent dressing  Patient Tolerance: Tolerated well   Number of days: 0       ETT  7 mm (Active)   Placement Date/Time: 02/18/25 (c) 0746   Mask Ventilation: Vent by mask + OA or adjuvant +/- NMBA  Technique: Direct laryngoscopy  ETT Type: ETT - single  Single Lumen Tube Size: 7 mm  Cuffed: Yes  Laryngoscope: Karson  Blade Size: 3  Location: Ora...   Number of days: 0       Urethral Catheter Non-latex 14 Fr. (Active)   Placement Date/Time: 02/18/25 0745   Placed by: reina borrero  Hand Hygiene Completed: Yes  Catheter Type: Non-latex  Tube Size (Fr.): 14 Fr.  Catheter Balloon Size: 10 mL  Urine Returned: Yes   Number of days: 0       Physical Exam:   - GENERAL: Sedated ... .No acute distress. Well-nourished.  - NEUROLOGIC: No focal neurological deficits. Cam ...  - LUNGS: Diminished bases. No accessory muscle use. Intubated ...  - CARDIOVASCULAR: Regular rate and rhythm.  Epicardial wires ...  - ABDOMEN: Soft, non-tender and non-distended. No palpable masses.  - : Clear, yellow urine in dela cruz.   - EXTREMITIES: No edema. Non-tender.?  - SKIN: No rashes or lesions. Warm.  - PSYCHIATRIC: Calm, unable to assess sedated.       Lab Review:  Results from last 7 days   Lab Units 02/19/25  1215   WBC AUTO x10*3/uL 10.7   HEMOGLOBIN g/dL 9.7*   HEMATOCRIT % 28.7*   PLATELETS AUTO x10*3/uL 108*     Results from last 7 days   Lab Units 02/19/25  1212 02/18/25  1310 02/14/25  1014   SODIUM mmol/L 142   < > 138   POTASSIUM mmol/L 4.1   < > 3.9   CHLORIDE mmol/L 105   < > 100   CO2 mmol/L 26   < > 26   BUN mg/dL 20   < > 14   CREATININE mg/dL 1.00   < > 0.74   CALCIUM mg/dL 8.3*   < > 9.3   PROTEIN TOTAL g/dL  --   --  6.4   BILIRUBIN TOTAL mg/dL  --   --  0.8   ALK PHOS U/L  --   --  82    ALT U/L  --   --  28   AST U/L  --   --  38   GLUCOSE mg/dL 175*   < > 120*    < > = values in this interval not displayed.     Results from last 7 days   Lab Units 02/19/25  1212   MAGNESIUM mg/dL 2.51*     Results from last 7 days   Lab Units 02/20/25  0447   POCT PH, ARTERIAL pH 7.43*   POCT PCO2, ARTERIAL mm Hg 42   POCT PO2, ARTERIAL mm Hg 94   POCT HCO3 CALCULATED, ARTERIAL mmol/L 27.9*   POCT BASE EXCESS, ARTERIAL mmol/L 3.2*       Most recent labs and imaging reviewed.    Daily Risk Screen  Intubated: Yes  Central line: Yes  Pichardo: Yes    Assessment/Plan   Patient is a 65-year-old female s/p scheduled CABG x 3 [LIMA to LAD, SVG to LCx, SVG to PDA], posterior pericardial window secondary to CAD with Dr. Gallo Canchola on 2/18/2025.  Past medical history remarkable for CAD s/p stent x 4 RCA, MI 2007, HLD, HTN, PAD, T2DM, overactive bladder, a recurrent UTI, polycythemia, fibromyalgia, osteoarthritis, gout, hard of hearing, smoking.     Plan:  NEURO/MSK:  PMH of OA, gout, fibromyalgia. Patient is intubated and sedated on propofol infusion. Methadone 20 prior to surgery. Acute post operative pain.  -->  -2/20--acetaminophen IV, oxycodone 5 Mg as needed, Dilaudid as needed. Holding home Norco 5. Multimodal pain control.  Patient endorsing back pain 2/2 fibromyalgia, OA.  Continue Paxil 10 Mg, trazodone 50 Mg.  OOB today requiring as needed pain meds.  - Serial neuro and pain assessments   - Continue propofol until NMB reversal, then daily sedation vacation at minimum   - NMB reversal when normothermic and hemodynamically stable.    - Scheduled Tylenol 650 mg q 8 hr   - PRN oxycodone  - PRN dilaudid for pain   - PT Consult, OOB to chair as tolerated, chair position if not tolerated   - CAM ICU score qshift  - Sleep/wake cycle hygiene  - Hold home Norco 5-3 25 mg 3 times daily, ibuprofen 200 Mg,   - Continue Paxil 10 Mg every morning, trazodone 50 Mg nightly     CV:  Patient has a history of CAD s/p stent x 4 RCA,  MI 2007, HLD, HTN, PVD Now s/p CABG x 3 [LIMA to LAD, SVG to LCx, SVG to PDA], posterior pericardial window secondary to CAD with Dr. Gallo Canchola on 2/18/2025. PRE LVEF 25-30% [[place]]/ POST LVEF 30-35% . Arrived to CTICU on 2/18/2025.  A/V epicardial wires set VVI @ 50bpm. -->  - 2/20-- wean epinephrine gtt.  Continue ASA 81 Mg, Plavix 75 Mg.  Zetia 10 Mg 2/2 myalgias on statin.  Mixed venous and CI every 4 hours.  MAP goal 60-80.  - Epinephrine gtt  - Maintain goal MAP 60-80  - Mixed venous and CI Q4H  - Volume resuscitate as clinically indicated  - Maintain epicardial wires set VVI @ 50  - Start ASA 6hrs post-op for CABG  - If CABG is 2/2 STEMI/unstable angina, will receive Plavix POD2  - Start statin tomorrow  - Hold home Aspirin 81 Mg, nitroglycerin 0.4 Mg as needed, lisinopril 10 Mg daily, metoprolol 50 Mg daily,     PULM:  No history of pulmonary disease. Extensive smoking hx.  Currently intubated on ventilator. Post operative atelectasis. Chest tubes left pleural chest tube, mediastinal chest tube x 2 . -->  - 2/20. Minimal chest tube output, on Airvo 50L/FiO2 40%. CXR increased pulmonary congestion. Continue diuresis with Lasix 20mg.   -2/19.  Self extubated this a.m., weaned to CPAP.    - F/u post op CXR  - Once reversed, wean ventilator settings towards CPAP & extubation   - Wean FiO2 maintaining SpO2 >92%.   - IS q1h and OOB to chair when extubated  - Chest tubes to wall suction.    GI:  No PMH.    - 2/20: Cardiac diet.  Bowel regimen.  - Colace/senna BID and miralax BID     :  CSA-ALEXSANDER Risk Score 3.  PMH of overactive bladder, recurrent UTI,  baseline CRE 0.75. Creatinine stable post-op. Dela Cruz in place and making adequate UOP. -->  -2/20--adequate UOP, Lasix 20 Mg daily to encourage UOP.  RFP as indicated.  Continue to monitor creatinine.  - Continue dela cruz catheter for strict I/Os.  - Goal UOP 0.5ml/kg/hr  - RFP as clinically indicated  - Replete electrolytes per CTICU protocol  - Hold home  medications: Lasix 40 Mg daily        Variable Points   Male 1   Age < 40 0   Age 41-60 1   Age 61-80 2   Age > 81 3   CKD 1   NYHA > 2 1   Previous cardiac Surgery 1     Points Pre-Op ICU Admit   0-1 Low Low   2-3 Medium Low   4 High Low   5-9 High Medium   > 10 High High        ENDO:  PMH of T2DM A1c: 6.4. -->  - Maintain BG <180, insulin per CTICU protocol     HEME:  Polycythemia, Acute blood loss anemia and thrombocytopenia. -->    - 2/20--ASA, Plavix .  Continue SCDs.  Active type and screen.  - Monitor drain output volume and characteristics  - CBC, coags, and fibrinogen post op and as clinically indicated  - Start ASA 6hrs post-op for CABG  - If CABG is 2/2 STEMI/unstable angina, will receive Plavix POD2  - SQH tomorrow   - SCDs for DVT prophylaxis.  - Last type and screen: 12/20/2024     ID:  Afebrile, no current indications of infection. MRSA negative. -->  - Trend temp q4h  - Periop cefazolin x 48hrs. Course complete.     Skin:  No active skin issues.  - preventative Mepilex dressings in place on sacrum and heels  - change preventative Mepilex weekly or more frequently as indicated (when moist/soiled)   - every shift skin assessment per nursing and weekly ICU skin rounds  - moisture barrier to be applied with pastora care  - active skin problems addressed with nursing on daily rounds     Proph:  SCDs  PPI     G:  Line  Right IJ MAC w Minimac with PA catheter placed 2/18/2025  Left brachial a-line placed 2/18/2025    F: Family: will update at bedside postoperatively.     A,B,C,D,E,F,G: reviewed       Dispo: CTICU care for now.    CTICU TEAM PHONE [phone]    Scott Mcneal MD   Anesthesiology and Perioperative Medicine, PGY-1

## 2025-02-20 NOTE — CARE PLAN
Problem: Pain - Adult  Goal: Verbalizes/displays adequate comfort level or baseline comfort level  Outcome: Progressing     Problem: Safety - Adult  Goal: Free from fall injury  Outcome: Progressing     Problem: Discharge Planning  Goal: Discharge to home or other facility with appropriate resources  Outcome: Progressing     Problem: Chronic Conditions and Co-morbidities  Goal: Patient's chronic conditions and co-morbidity symptoms are monitored and maintained or improved  Outcome: Progressing     Problem: Nutrition  Goal: Nutrient intake appropriate for maintaining nutritional needs  Outcome: Progressing     Problem: Skin  Goal: Decreased wound size/increased tissue granulation at next dressing change  Outcome: Progressing  Flowsheets (Taken 2/20/2025 0833)  Decreased wound size/increased tissue granulation at next dressing change:   Promote sleep for wound healing   Protective dressings over bony prominences  Goal: Participates in plan/prevention/treatment measures  Outcome: Progressing  Flowsheets (Taken 2/20/2025 0833)  Participates in plan/prevention/treatment measures:   Elevate heels   Discuss with provider PT/OT consult  Goal: Prevent/manage excess moisture  Outcome: Progressing  Goal: Prevent/minimize sheer/friction injuries  Outcome: Progressing  Flowsheets (Taken 2/20/2025 0833)  Prevent/minimize sheer/friction injuries:   Use pull sheet   Turn/reposition every 2 hours/use positioning/transfer devices   HOB 30 degrees or less  Goal: Promote/optimize nutrition  Outcome: Progressing  Goal: Promote skin healing  Outcome: Progressing  Flowsheets (Taken 2/20/2025 0833)  Promote skin healing:   Turn/reposition every 2 hours/use positioning/transfer devices   Protective dressings over bony prominences   Assess skin/pad under line(s)/device(s)   Rotate device position/do not position patient on device     Problem: Fall/Injury  Goal: Not fall by end of shift  Outcome: Progressing  Goal: Be free from injury by  end of the shift  Outcome: Progressing  Goal: Verbalize understanding of personal risk factors for fall in the hospital  Outcome: Progressing  Goal: Verbalize understanding of risk factor reduction measures to prevent injury from fall in the home  Outcome: Progressing  Goal: Use assistive devices by end of the shift  Outcome: Progressing  Goal: Pace activities to prevent fatigue by end of the shift  Outcome: Progressing     Problem: Respiratory  Goal: Clear secretions with interventions this shift  Outcome: Progressing  Goal: Minimize anxiety/maximize coping throughout shift  Outcome: Progressing  Goal: Minimal/no exertional discomfort or dyspnea this shift  Outcome: Progressing  Goal: No signs of respiratory distress (eg. Use of accessory muscles. Peds grunting)  Outcome: Progressing  Goal: Patent airway maintained this shift  Outcome: Progressing  Goal: Tolerate mechanical ventilation evidenced by VS/agitation level this shift  Outcome: Progressing  Goal: Tolerate pulmonary toileting this shift  Outcome: Progressing  Goal: Verbalize decreased shortness of breath this shift  Outcome: Progressing  Goal: Wean oxygen to maintain O2 saturation per order/standard this shift  Outcome: Progressing  Goal: Increase self care and/or family involvement in next 24 hours  Outcome: Progressing

## 2025-02-20 NOTE — CARE PLAN
Problem: Pain - Adult  Goal: Verbalizes/displays adequate comfort level or baseline comfort level  2/19/2025 1941 by Ruiz Kent RN  Outcome: Progressing  2/19/2025 1941 by Ruiz Kent RN  Outcome: Progressing     Problem: Safety - Adult  Goal: Free from fall injury  2/19/2025 1941 by Ruiz Kent RN  Outcome: Progressing  2/19/2025 1941 by Ruiz Kent RN  Outcome: Progressing     Problem: Discharge Planning  Goal: Discharge to home or other facility with appropriate resources  2/19/2025 1941 by Ruiz Kent RN  Outcome: Progressing  2/19/2025 1941 by Ruiz Kent RN  Outcome: Progressing     Problem: Chronic Conditions and Co-morbidities  Goal: Patient's chronic conditions and co-morbidity symptoms are monitored and maintained or improved  2/19/2025 1941 by Ruiz Kent RN  Outcome: Progressing  2/19/2025 1941 by Ruiz Kent RN  Outcome: Progressing     Problem: Nutrition  Goal: Nutrient intake appropriate for maintaining nutritional needs  2/19/2025 1941 by Ruiz Kent RN  Outcome: Progressing  2/19/2025 1941 by Ruiz Kent RN  Outcome: Progressing     Problem: Skin  Goal: Decreased wound size/increased tissue granulation at next dressing change  2/19/2025 1941 by Ruiz Kent RN  Outcome: Progressing  2/19/2025 1941 by Ruiz Kent RN  Outcome: Progressing  Goal: Participates in plan/prevention/treatment measures  2/19/2025 1941 by Ruiz Kent RN  Outcome: Progressing  2/19/2025 1941 by Ruiz Kent RN  Outcome: Progressing  Goal: Prevent/manage excess moisture  2/19/2025 1941 by Ruiz Kent RN  Outcome: Progressing  2/19/2025 1941 by Ruiz Kent RN  Outcome: Progressing  Goal: Prevent/minimize sheer/friction injuries  2/19/2025 1941 by Ruiz Kent RN  Outcome: Progressing  2/19/2025 1941 by Ruiz Kent RN  Outcome: Progressing  Goal: Promote/optimize nutrition  2/19/2025 1941  by Ruiz Kent RN  Outcome: Progressing  2/19/2025 1941 by Ruiz eKnt RN  Outcome: Progressing  Goal: Promote skin healing  2/19/2025 1941 by Ruiz Kent RN  Outcome: Progressing  2/19/2025 1941 by Ruiz Kent RN  Outcome: Progressing     Problem: Fall/Injury  Goal: Not fall by end of shift  2/19/2025 1941 by Ruiz Kent RN  Outcome: Progressing  2/19/2025 1941 by Ruiz Kent RN  Outcome: Progressing  Goal: Be free from injury by end of the shift  2/19/2025 1941 by Ruiz Kent RN  Outcome: Progressing  2/19/2025 1941 by Ruiz Kent RN  Outcome: Progressing  Goal: Verbalize understanding of personal risk factors for fall in the hospital  2/19/2025 1941 by Ruiz Kent RN  Outcome: Progressing  2/19/2025 1941 by Ruiz Kent RN  Outcome: Progressing  Goal: Verbalize understanding of risk factor reduction measures to prevent injury from fall in the home  2/19/2025 1941 by Ruiz Kent RN  Outcome: Progressing  2/19/2025 1941 by Ruiz Kent RN  Outcome: Progressing  Goal: Use assistive devices by end of the shift  2/19/2025 1941 by Ruiz Kent RN  Outcome: Progressing  2/19/2025 1941 by Ruiz Kent RN  Outcome: Progressing  Goal: Pace activities to prevent fatigue by end of the shift  2/19/2025 1941 by Ruiz Kent RN  Outcome: Progressing  2/19/2025 1941 by Ruiz Kent RN  Outcome: Progressing     Problem: Respiratory  Goal: Clear secretions with interventions this shift  2/19/2025 1941 by Ruiz Kent RN  Outcome: Progressing  2/19/2025 1941 by Ruiz Kent RN  Outcome: Progressing  Goal: Minimize anxiety/maximize coping throughout shift  2/19/2025 1941 by Ruiz Kent RN  Outcome: Progressing  2/19/2025 1941 by Ruiz Kent RN  Outcome: Progressing  Goal: Minimal/no exertional discomfort or dyspnea this shift  2/19/2025 1941 by Ruiz Kent, RN  Outcome:  Progressing  2/19/2025 1941 by Ruiz Kent RN  Outcome: Progressing  Goal: No signs of respiratory distress (eg. Use of accessory muscles. Peds grunting)  2/19/2025 1941 by Ruiz Kent RN  Outcome: Progressing  2/19/2025 1941 by Ruiz Kent RN  Outcome: Progressing  Goal: Patent airway maintained this shift  2/19/2025 1941 by Ruiz Kent RN  Outcome: Progressing  2/19/2025 1941 by Ruiz Kent RN  Outcome: Progressing  Goal: Tolerate mechanical ventilation evidenced by VS/agitation level this shift  2/19/2025 1941 by Ruiz Kent RN  Outcome: Progressing  2/19/2025 1941 by Ruiz Kent RN  Outcome: Progressing  Goal: Tolerate pulmonary toileting this shift  2/19/2025 1941 by Ruiz Kent RN  Outcome: Progressing  2/19/2025 1941 by Ruiz Kent RN  Outcome: Progressing  Goal: Verbalize decreased shortness of breath this shift  2/19/2025 1941 by Ruiz Kent RN  Outcome: Progressing  2/19/2025 1941 by Ruiz Kent RN  Outcome: Progressing  Goal: Wean oxygen to maintain O2 saturation per order/standard this shift  2/19/2025 1941 by Ruiz Kent RN  Outcome: Progressing  2/19/2025 1941 by Ruiz Kent RN  Outcome: Progressing  Goal: Increase self care and/or family involvement in next 24 hours  2/19/2025 1941 by Ruiz Kent RN  Outcome: Progressing  2/19/2025 1941 by Ruiz Kent RN  Outcome: Progressing

## 2025-02-20 NOTE — PROGRESS NOTES
Physical Therapy    Physical Therapy Treatment    Patient Name: Barbie Milan  MRN: [id number]  Department: [facility]  Room: 06/06-A  Today's Date: 2/20/2025  Time Calculation  Start Time: 0946  Stop Time: 1012  Time Calculation (min): 26 min     Assessment/Plan   PT Assessment  PT Assessment Results: Decreased strength, Decreased endurance, Decreased mobility  Rehab Prognosis: Good  Barriers to Discharge Home:  (TBD)  Evaluation/Treatment Tolerance: Patient limited by pain  Medical Staff Made Aware: Yes (RN aware)  Strengths: Premorbid level of function, Support of Caregivers  Barriers to Participation: Comorbidities  End of Session Communication: Bedside nurse  Assessment Comment: On this date pt performs bed mobility with mod A. Pt performs sit to stand x2 trials with min A. Pt ambulates with min A x2 arm in arm assist. Pt is likely limited d/t pain. Pt will continue to benefit from therapy to address impairments and improve functional mobility.  End of Session Patient Position: Up in chair, Alarm off, not on at start of session  PT Plan  Inpatient/Swing Bed or Outpatient: Inpatient  PT Plan  Treatment/Interventions: Bed mobility, Transfer training, Gait training, Stair training, Balance training, Neuromuscular re-education, Strengthening, Endurance training, Range of motion, Therapeutic exercise, Therapeutic activity, Home exercise program, Positioning, Postural re-education  PT Plan: Ongoing PT  PT Frequency: 5 times per week  PT Discharge Recommendations:  (high vs low pending pain control)  Equipment Recommended upon Discharge:  (TBD)  PT Recommended Transfer Status: Assist x2  PT - OK to Discharge: Yes    General Visit Information:   PT  Visit  PT Received On: 02/20/25  General  Reason for Referral: 66 y/o female now CABG x 3 (LIMA to LAD, SVG to LCx, SVG to PDA), posterior pericardial window secondary to CAD with Dr. Gallo Canchola on 2/18/2025  Past Medical History Relevant to Rehab: CAD s/p stent x 4  RCA, MI 2007, HLD, HTN, PAD, T2DM, overactive bladder, a recurrent UTI, polycythemia, fibromyalgia, osteoarthritis, gout, hard of hearing, smoking  Family/Caregiver Present: No  Caregiver Feedback: daughter is present and supportive throughout session  Co-Treatment: OT  Co-Treatment Reason: to maximize pt safety, function, and mobility.  Prior to Session Communication: Bedside nurse  Patient Position Received: Bed, 3 rail up, Alarm off, not on at start of session  Preferred Learning Style: kinesthetic, verbal, visual  General Comment: Pt pleasant and cooperative. Displays sign of anxiety with intent to mobilize    Subjective   Precautions:  Precautions  Hearing/Visual Limitations: [place]. Deaf in R ear and very little hearing in L. Able to read lips.  Medical Precautions: Cardiac precautions, Fall precautions, Chest tube, Oxygen therapy device and L/min (2 chest tubes. 40L 72% Airvo)  Post-Surgical Precautions: Move in the Tube  Precautions Comment: MAP 60-80, SpO2 >92%, vvi@50     Date/Time Vitals Session Patient Position Pulse Resp SpO2 BP MAP (mmHg)    02/20/25 0946 Pre PT  Lying  100  25  94 %  131/63  --    02/20/25 1012 Post PT  Sitting  106  28  95 %  164/77  --     Vital Signs Comment: VSS throughout session. increased RR up to 40 that decreased once resting.     Objective   Pain:  Pain Assessment  Pain Assessment: 0-10  0-10 (Numeric) Pain Score: 0 - No pain (worsening pain with ambulation)  Cognition:  Cognition  Overall Cognitive Status: Within Functional Limits  Orientation Level: Oriented X4  Insight: Within function limits  Impulsive: Within functional limits  Coordination:  Movements are Fluid and Coordinated: Yes  Postural Control:  Postural Control  Postural Control: Within Functional Limits  Static Sitting Balance  Static Sitting-Balance Support: Bilateral upper extremity supported, Feet supported  Static Sitting-Level of Assistance: Close supervision  Static Sitting-Comment/Number of Minutes: ~3  minutes. observed hemodynamics. VCs for deep breaths  Dynamic Sitting Balance  Dynamic Sitting-Balance Support: Bilateral upper extremity supported, Feet supported  Dynamic Sitting-Level of Assistance: Contact guard  Static Standing Balance  Static Standing-Balance Support: Right upper extremity supported, Bilateral upper extremity supported (arm in arm x1 trial, FWW x1 trial)  Static Standing-Level of Assistance: Minimum assistance  Static Standing-Comment/Number of Minutes: Pt had good control of COM over JERRY throughout standing  Dynamic Standing Balance  Dynamic Standing-Balance Support: Bilateral upper extremity supported (arm in arm)  Dynamic Standing-Level of Assistance: Minimum assistance  Extremity/Trunk Assessments:  RLE   RLE : Within Functional Limits  LLE   LLE : Within Functional Limits  Activity Tolerance:  Activity Tolerance  Endurance: Tolerates 10 - 20 min exercise with multiple rests  Early Mobility/Exercise Safety Screen: Proceed with mobilization - No exclusion criteria met  Treatments:  Therapeutic Activity  Therapeutic Activity Performed: Yes  Therapeutic Activity 1: standing marches with FWW 1x10    Bed Mobility  Bed Mobility: Yes  Bed Mobility 1  Bed Mobility 1: Supine to sitting  Level of Assistance 1: Moderate assistance, Moderate verbal cues  Bed Mobility Comments 1: VCs for sequencing and fatigue/symptom monitoring    Ambulation/Gait Training  Ambulation/Gait Training Performed: Yes  Ambulation/Gait Training 1  Surface 1: Level tile  Device 1:  (arm in arm)  Assistance 1: Arm in arm assistance, Minimum assistance, Moderate verbal cues (x2)  Quality of Gait 1: Inconsistent stride length, Decreased step length (pt appears strong but ambulates slowly likely d/t pain)  Comments/Distance (ft) 1: ~2 feet side steps to chair. increased time for line management. good control of COM over JERRY. no LOB. further transfers and ambulation deferred d/t pain.  Transfers  Transfer: Yes  Transfer  1  Transfer From 1: Sit to, Stand to  Transfer to 1: Sit, Stand  Technique 1: Stand to sit, Sit to stand  Transfer Level of Assistance 1: Minimum assistance, Moderate verbal cues  Trials/Comments 1: VCs for sequencing and paced breathing  Transfers 2  Transfer From 2: Sit to, Stand to  Transfer to 2: Sit, Stand  Technique 2: Sit to stand, Stand to sit  Transfer Device 2: Walker  Transfer Level of Assistance 2: Minimum assistance, Moderate verbal cues  Trials/Comments 2: VCs for sequencing and arm placement with FWW.    Stairs  Stairs: No    Outcome Measures:  [facility] Basic Mobility  Turning from your back to your side while in a flat bed without using bedrails: A little  Moving from lying on your back to sitting on the side of a flat bed without using bedrails: A lot  Moving to and from bed to chair (including a wheelchair): A little  Standing up from a chair using your arms (e.g. wheelchair or bedside chair): A little  To walk in hospital room: A little  Climbing 3-5 steps with railing: A lot  Basic Mobility - Total Score: 16    FSS-ICU  Ambulation: Walks <50 feet with any assistance x1 or walks any distance with assistance x2 people  Rolling: Minimal assistance (performs 75% or more of task)  Sitting: Supervision or set-up only  Transfer Sit-to-Stand: Minimal assistance (performs 75% or more of task)  Transfer Supine-to-Sit: Moderate assistance (performs 50 - 74% of task)  Total Score: 17    Early Mobility/Exercise Safety Screen: Proceed with mobilization - No exclusion criteria met  ICU Mobility Scale: Walking with assistance of 2 or more people [7]    Education Documentation  Precautions, taught by ERI Guevara at 2/20/2025 11:00 AM.  Learner: Patient  Readiness: Acceptance  Method: Explanation  Response: Verbalizes Understanding  Comment: Pt educated on safe mobility and transfer techniques. Pt educated on use of FWW and sequencing for transfers with device. Pt reminded of precautions. Pt educated on paced  breathing and fatigue/symptom monitoring.    Body Mechanics, taught by CRISTOPHER LisaPT at 2/20/2025 11:00 AM.  Learner: Patient  Readiness: Acceptance  Method: Explanation  Response: Verbalizes Understanding  Comment: Pt educated on safe mobility and transfer techniques. Pt educated on use of FWW and sequencing for transfers with device. Pt reminded of precautions. Pt educated on paced breathing and fatigue/symptom monitoring.    Mobility Training, taught by CRISTOPHER LisaPT at 2/20/2025 11:00 AM.  Learner: Patient  Readiness: Acceptance  Method: Explanation  Response: Verbalizes Understanding  Comment: Pt educated on safe mobility and transfer techniques. Pt educated on use of FWW and sequencing for transfers with device. Pt reminded of precautions. Pt educated on paced breathing and fatigue/symptom monitoring.    Encounter Problems       Encounter Problems (Active)       Mobility       LTG - Patient will ambulate household distance ~150 feet with SBA and LRAD. (Progressing)       Start:  02/19/25    Expected End:  03/05/25            LTG - Patient will navigate 4-6 steps with rails/device with SBA (Progressing)       Start:  02/19/25    Expected End:  03/05/25            Pt will ambulate >/= 150 ft with LRAD and SBA and no signs of fatigue or SOB.   (Progressing)       Start:  02/19/25    Expected End:  03/05/25               PT Transfers       LTG - Patient will transfer from one surface to another independently with LRAD (Progressing)       Start:  02/19/25    Expected End:  03/05/25            STG - Patient will perform bed mobility independently.  (Progressing)       Start:  02/19/25    Expected End:  03/05/25 02/20/25 at 11:41 AM - ERI LISA

## 2025-02-20 NOTE — PROGRESS NOTES
02/20/25 1054   Discharge Planning   Who is requesting discharge planning? Provider   Home or Post Acute Services In home services   Type of Home Care Services Home nursing visits;Home OT;Home PT  ([facility])   Expected Discharge Disposition Home H  ([facility])   Does the patient need discharge transport arranged? No   Patient Choice   Provider Choice list and CMS website (https://medicare.gov/care-compare#search) for post-acute Quality and Resource Measure Data were provided and reviewed with: Patient;Family  (2/20 - AOC from patient and updated daughter, Yaa)   Intensity of Service   Intensity of Service 0-30 min     Samanta Godinez (LSW, MSW)

## 2025-02-20 NOTE — DOCUMENTATION CLARIFICATION NOTE
"    PATIENT:               DEON CHAUDHARI  ACCT #:                  [id number]  MRN:                       [id number]  :                       1960  ADMIT DATE:       2025 5:57 AM  DISCH DATE:  RESPONDING PROVIDER #:        [id number]          PROVIDER RESPONSE TEXT:    Chronic Systolic Congestive Heart Failure    CDI QUERY TEXT:    Clarification    Instruction:    Based on your assessment of the patient and the clinical information, please provide the requested documentation by clicking on the appropriate radio button and enter any additional information if prompted.    Question: Is there a diagnosis indicative of the clinical information    When answering this query, please exercise your independent professional judgment. The fact that a question is being asked, does not imply that any particular answer is desired or expected.    The patient's clinical indicators include:  Clinical Information:  PN 65 YOF w/PMH s/f CAD s/p stents, MI , HTN, smoker.    Clinical Indicators:  Interval [facility] home meds list includes Lasix 40 mg QD and Lisinopril 10 mg started 2024, also on Toprol XL 50 mg QD     PN \"PRE LVEF 25-30% ([place])/ POST LVEF 30-35%\"     03:39 CXR \"Mild interstitial pulmonary edema is similar previous study. Small left pleural effusion and subsegmental atelectasis is noted.\"    Treatment:  Lasix 20 mg IV   Lasix 20 mg IV x2    Risk Factors: Hx MI, CAD, MI. Smoker. Surgery . IVF resuscitation.  Options provided:  -- Acute Systolic Congestive Heart Failure  -- Acute on Chronic Systolic Congestive Heart Failure  -- Chronic Systolic Congestive Heart Failure  -- Other - I will add my own diagnosis  -- Refer to Clinical Documentation Reviewer    Query created by: Zulema Begum on 2025 1:00 PM      Electronically signed by:  RENE ZHOU MD 2025 3:37 PM          "

## 2025-02-21 ENCOUNTER — APPOINTMENT (OUTPATIENT)
Dept: RADIOLOGY | Facility: HOSPITAL | Age: 65
DRG: 235 | End: 2025-02-21
Payer: MEDICARE

## 2025-02-21 ENCOUNTER — APPOINTMENT (OUTPATIENT)
Dept: CARDIOLOGY | Facility: HOSPITAL | Age: 65
DRG: 235 | End: 2025-02-21
Payer: MEDICARE

## 2025-02-21 LAB
ALBUMIN SERPL BCP-MCNC: 3.9 G/DL (ref 3.4–5)
ALBUMIN SERPL BCP-MCNC: 4.4 G/DL (ref 3.4–5)
ANION GAP BLDA CALCULATED.4IONS-SCNC: 10 MMO/L (ref 10–25)
ANION GAP BLDA CALCULATED.4IONS-SCNC: 12 MMO/L (ref 10–25)
ANION GAP BLDMV CALCULATED.4IONS-SCNC: 9 MMO/L (ref 10–25)
ANION GAP BLDV CALCULATED.4IONS-SCNC: 13 MMOL/L (ref 10–25)
ANION GAP SERPL CALC-SCNC: 15 MMOL/L (ref 10–20)
ANION GAP SERPL CALC-SCNC: 15 MMOL/L (ref 10–20)
ATRIAL RATE: 90 BPM
BASE EXCESS BLDA CALC-SCNC: 3.2 MMOL/L (ref -2–3)
BASE EXCESS BLDA CALC-SCNC: 6.3 MMOL/L (ref -2–3)
BASE EXCESS BLDMV CALC-SCNC: 2.2 MMOL/L (ref -2–3)
BASE EXCESS BLDV CALC-SCNC: -1.5 MMOL/L (ref -2–3)
BASOPHILS # BLD AUTO: 0.02 X10*3/UL (ref 0–0.1)
BASOPHILS # BLD AUTO: 0.04 X10*3/UL (ref 0–0.1)
BASOPHILS NFR BLD AUTO: 0.2 %
BASOPHILS NFR BLD AUTO: 0.4 %
BODY TEMPERATURE: 37 DEGREES CELSIUS
BUN SERPL-MCNC: 23 MG/DL (ref 6–23)
BUN SERPL-MCNC: 26 MG/DL (ref 6–23)
CA-I BLD-SCNC: 1.05 MMOL/L (ref 1.1–1.33)
CA-I BLD-SCNC: 1.13 MMOL/L (ref 1.1–1.33)
CA-I BLDA-SCNC: 1.1 MMOL/L (ref 1.1–1.33)
CA-I BLDA-SCNC: 1.22 MMOL/L (ref 1.1–1.33)
CA-I BLDMV-SCNC: 1.12 MMOL/L (ref 1.1–1.33)
CA-I BLDV-SCNC: 1.15 MMOL/L (ref 1.1–1.33)
CALCIUM SERPL-MCNC: 8.5 MG/DL (ref 8.6–10.6)
CALCIUM SERPL-MCNC: 8.8 MG/DL (ref 8.6–10.6)
CHLORIDE BLD-SCNC: 108 MMOL/L (ref 98–107)
CHLORIDE BLDA-SCNC: 102 MMOL/L (ref 98–107)
CHLORIDE BLDA-SCNC: 105 MMOL/L (ref 98–107)
CHLORIDE BLDV-SCNC: 102 MMOL/L (ref 98–107)
CHLORIDE SERPL-SCNC: 105 MMOL/L (ref 98–107)
CHLORIDE SERPL-SCNC: 99 MMOL/L (ref 98–107)
CO2 SERPL-SCNC: 26 MMOL/L (ref 21–32)
CO2 SERPL-SCNC: 28 MMOL/L (ref 21–32)
CREAT SERPL-MCNC: 0.69 MG/DL (ref 0.5–1.05)
CREAT SERPL-MCNC: 0.7 MG/DL (ref 0.5–1.05)
EGFRCR SERPLBLD CKD-EPI 2021: >90 ML/MIN/1.73M*2
EGFRCR SERPLBLD CKD-EPI 2021: >90 ML/MIN/1.73M*2
EJECTION FRACTION APICAL 4 CHAMBER: 35.4
EJECTION FRACTION: 43 %
EOSINOPHIL # BLD AUTO: 0.22 X10*3/UL (ref 0–0.7)
EOSINOPHIL # BLD AUTO: 0.39 X10*3/UL (ref 0–0.7)
EOSINOPHIL NFR BLD AUTO: 2.7 %
EOSINOPHIL NFR BLD AUTO: 3.8 %
ERYTHROCYTE [DISTWIDTH] IN BLOOD BY AUTOMATED COUNT: 13.3 % (ref 11.5–14.5)
ERYTHROCYTE [DISTWIDTH] IN BLOOD BY AUTOMATED COUNT: 13.5 % (ref 11.5–14.5)
GLUCOSE BLD MANUAL STRIP-MCNC: 107 MG/DL (ref 74–99)
GLUCOSE BLD MANUAL STRIP-MCNC: 127 MG/DL (ref 74–99)
GLUCOSE BLD MANUAL STRIP-MCNC: 131 MG/DL (ref 74–99)
GLUCOSE BLD MANUAL STRIP-MCNC: 138 MG/DL (ref 74–99)
GLUCOSE BLD-MCNC: 109 MG/DL (ref 74–99)
GLUCOSE BLDA-MCNC: 122 MG/DL (ref 74–99)
GLUCOSE BLDA-MCNC: 123 MG/DL (ref 74–99)
GLUCOSE BLDV-MCNC: 103 MG/DL (ref 74–99)
GLUCOSE SERPL-MCNC: 112 MG/DL (ref 74–99)
GLUCOSE SERPL-MCNC: 121 MG/DL (ref 74–99)
HCO3 BLDA-SCNC: 26.8 MMOL/L (ref 22–26)
HCO3 BLDA-SCNC: 29.1 MMOL/L (ref 22–26)
HCO3 BLDMV-SCNC: 26.5 MMOL/L (ref 22–26)
HCO3 BLDV-SCNC: 23.7 MMOL/L (ref 22–26)
HCT VFR BLD AUTO: 27 % (ref 36–46)
HCT VFR BLD AUTO: 29.2 % (ref 36–46)
HCT VFR BLD EST: 25 % (ref 36–46)
HCT VFR BLD EST: 26 % (ref 36–46)
HCT VFR BLD EST: 29 % (ref 36–46)
HCT VFR BLD EST: 30 % (ref 36–46)
HGB BLD-MCNC: 8.8 G/DL (ref 12–16)
HGB BLD-MCNC: 9.7 G/DL (ref 12–16)
HGB BLDA-MCNC: 10 G/DL (ref 12–16)
HGB BLDA-MCNC: 9.6 G/DL (ref 12–16)
HGB BLDMV-MCNC: 8.5 G/DL (ref 12–16)
HGB BLDV-MCNC: 8.3 G/DL (ref 12–16)
IMM GRANULOCYTES # BLD AUTO: 0.04 X10*3/UL (ref 0–0.7)
IMM GRANULOCYTES # BLD AUTO: 0.07 X10*3/UL (ref 0–0.7)
IMM GRANULOCYTES NFR BLD AUTO: 0.5 % (ref 0–0.9)
IMM GRANULOCYTES NFR BLD AUTO: 0.7 % (ref 0–0.9)
INHALED O2 CONCENTRATION: 40 %
INHALED O2 CONCENTRATION: 50 %
LACTATE BLDA-SCNC: 1 MMOL/L (ref 0.4–2)
LACTATE BLDA-SCNC: 1.9 MMOL/L (ref 0.4–2)
LACTATE BLDMV-SCNC: 0.8 MMOL/L (ref 0.4–2)
LACTATE BLDV-SCNC: 3.1 MMOL/L (ref 0.4–2)
LYMPHOCYTES # BLD AUTO: 1.91 X10*3/UL (ref 1.2–4.8)
LYMPHOCYTES # BLD AUTO: 2.09 X10*3/UL (ref 1.2–4.8)
LYMPHOCYTES NFR BLD AUTO: 20.4 %
LYMPHOCYTES NFR BLD AUTO: 23.5 %
MAGNESIUM SERPL-MCNC: 1.99 MG/DL (ref 1.6–2.4)
MAGNESIUM SERPL-MCNC: 2.26 MG/DL (ref 1.6–2.4)
MCH RBC QN AUTO: 33.2 PG (ref 26–34)
MCH RBC QN AUTO: 33.7 PG (ref 26–34)
MCHC RBC AUTO-ENTMCNC: 32.6 G/DL (ref 32–36)
MCHC RBC AUTO-ENTMCNC: 33.2 G/DL (ref 32–36)
MCV RBC AUTO: 101 FL (ref 80–100)
MCV RBC AUTO: 102 FL (ref 80–100)
MITRAL VALVE E/A RATIO: 0.81
MONOCYTES # BLD AUTO: 0.42 X10*3/UL (ref 0.1–1)
MONOCYTES # BLD AUTO: 0.59 X10*3/UL (ref 0.1–1)
MONOCYTES NFR BLD AUTO: 5.2 %
MONOCYTES NFR BLD AUTO: 5.8 %
NEUTROPHILS # BLD AUTO: 5.53 X10*3/UL (ref 1.2–7.7)
NEUTROPHILS # BLD AUTO: 7.08 X10*3/UL (ref 1.2–7.7)
NEUTROPHILS NFR BLD AUTO: 67.9 %
NEUTROPHILS NFR BLD AUTO: 68.9 %
NRBC BLD-RTO: 0 /100 WBCS (ref 0–0)
NRBC BLD-RTO: 0.2 /100 WBCS (ref 0–0)
OXYHGB MFR BLDA: 94.4 % (ref 94–98)
OXYHGB MFR BLDA: 95.7 % (ref 94–98)
OXYHGB MFR BLDMV: 49.3 % (ref 45–75)
OXYHGB MFR BLDV: 53.6 % (ref 45–75)
P AXIS: 113 DEGREES
P OFFSET: 193 MS
P ONSET: 158 MS
PCO2 BLDA: 34 MM HG (ref 38–42)
PCO2 BLDA: 36 MM HG (ref 38–42)
PCO2 BLDMV: 39 MM HG (ref 41–51)
PCO2 BLDV: 41 MM HG (ref 41–51)
PH BLDA: 7.48 PH (ref 7.38–7.42)
PH BLDA: 7.54 PH (ref 7.38–7.42)
PH BLDMV: 7.44 PH (ref 7.33–7.43)
PH BLDV: 7.37 PH (ref 7.33–7.43)
PHOSPHATE SERPL-MCNC: 1.4 MG/DL (ref 2.5–4.9)
PHOSPHATE SERPL-MCNC: 1.9 MG/DL (ref 2.5–4.9)
PLATELET # BLD AUTO: 118 X10*3/UL (ref 150–450)
PLATELET # BLD AUTO: 89 X10*3/UL (ref 150–450)
PO2 BLDA: 71 MM HG (ref 85–95)
PO2 BLDA: 76 MM HG (ref 85–95)
PO2 BLDMV: 27 MM HG (ref 35–45)
PO2 BLDV: 33 MM HG (ref 35–45)
POTASSIUM BLDA-SCNC: 3.3 MMOL/L (ref 3.5–5.3)
POTASSIUM BLDA-SCNC: 4.1 MMOL/L (ref 3.5–5.3)
POTASSIUM BLDMV-SCNC: 3.6 MMOL/L (ref 3.5–5.3)
POTASSIUM BLDV-SCNC: 3.9 MMOL/L (ref 3.5–5.3)
POTASSIUM SERPL-SCNC: 3.4 MMOL/L (ref 3.5–5.3)
POTASSIUM SERPL-SCNC: 3.9 MMOL/L (ref 3.5–5.3)
PR INTERVAL: 128 MS
Q ONSET: 222 MS
QRS COUNT: 15 BEATS
QRS DURATION: 134 MS
QT INTERVAL: 390 MS
QTC CALCULATION(BAZETT): 477 MS
QTC FREDERICIA: 446 MS
R AXIS: 221 DEGREES
RBC # BLD AUTO: 2.65 X10*6/UL (ref 4–5.2)
RBC # BLD AUTO: 2.88 X10*6/UL (ref 4–5.2)
RIGHT VENTRICLE FREE WALL PEAK S': 0.07 CM/S
SAO2 % BLDA: 97 % (ref 94–100)
SAO2 % BLDA: 98 % (ref 94–100)
SAO2 % BLDMV: 51 % (ref 45–75)
SAO2 % BLDV: 55 % (ref 45–75)
SODIUM BLDA-SCNC: 138 MMOL/L (ref 136–145)
SODIUM BLDA-SCNC: 140 MMOL/L (ref 136–145)
SODIUM BLDMV-SCNC: 140 MMOL/L (ref 136–145)
SODIUM BLDV-SCNC: 135 MMOL/L (ref 136–145)
SODIUM SERPL-SCNC: 139 MMOL/L (ref 136–145)
SODIUM SERPL-SCNC: 142 MMOL/L (ref 136–145)
T AXIS: 159 DEGREES
T OFFSET: 417 MS
VENTRICULAR RATE: 90 BPM
WBC # BLD AUTO: 10.3 X10*3/UL (ref 4.4–11.3)
WBC # BLD AUTO: 8.1 X10*3/UL (ref 4.4–11.3)

## 2025-02-21 PROCEDURE — 99291 CRITICAL CARE FIRST HOUR: CPT | Performed by: NURSE PRACTITIONER

## 2025-02-21 PROCEDURE — 2500000004 HC RX 250 GENERAL PHARMACY W/ HCPCS (ALT 636 FOR OP/ED): Performed by: NURSE PRACTITIONER

## 2025-02-21 PROCEDURE — 84132 ASSAY OF SERUM POTASSIUM: CPT

## 2025-02-21 PROCEDURE — 99291 CRITICAL CARE FIRST HOUR: CPT | Performed by: ANESTHESIOLOGY

## 2025-02-21 PROCEDURE — 2500000004 HC RX 250 GENERAL PHARMACY W/ HCPCS (ALT 636 FOR OP/ED)

## 2025-02-21 PROCEDURE — 93325 DOPPLER ECHO COLOR FLOW MAPG: CPT | Performed by: INTERNAL MEDICINE

## 2025-02-21 PROCEDURE — P9047 ALBUMIN (HUMAN), 25%, 50ML: HCPCS | Performed by: NURSE PRACTITIONER

## 2025-02-21 PROCEDURE — 71045 X-RAY EXAM CHEST 1 VIEW: CPT

## 2025-02-21 PROCEDURE — 2500000005 HC RX 250 GENERAL PHARMACY W/O HCPCS

## 2025-02-21 PROCEDURE — 93325 DOPPLER ECHO COLOR FLOW MAPG: CPT

## 2025-02-21 PROCEDURE — 2500000005 HC RX 250 GENERAL PHARMACY W/O HCPCS: Performed by: NURSE PRACTITIONER

## 2025-02-21 PROCEDURE — 2020000001 HC ICU ROOM DAILY

## 2025-02-21 PROCEDURE — 2500000002 HC RX 250 W HCPCS SELF ADMINISTERED DRUGS (ALT 637 FOR MEDICARE OP, ALT 636 FOR OP/ED): Performed by: NURSE PRACTITIONER

## 2025-02-21 PROCEDURE — 97116 GAIT TRAINING THERAPY: CPT | Mod: GP

## 2025-02-21 PROCEDURE — 2500000001 HC RX 250 WO HCPCS SELF ADMINISTERED DRUGS (ALT 637 FOR MEDICARE OP): Performed by: NURSE PRACTITIONER

## 2025-02-21 PROCEDURE — 2500000002 HC RX 250 W HCPCS SELF ADMINISTERED DRUGS (ALT 637 FOR MEDICARE OP, ALT 636 FOR OP/ED)

## 2025-02-21 PROCEDURE — 37799 UNLISTED PX VASCULAR SURGERY: CPT

## 2025-02-21 PROCEDURE — 83735 ASSAY OF MAGNESIUM: CPT

## 2025-02-21 PROCEDURE — 93321 DOPPLER ECHO F-UP/LMTD STD: CPT | Performed by: INTERNAL MEDICINE

## 2025-02-21 PROCEDURE — 84132 ASSAY OF SERUM POTASSIUM: CPT | Performed by: NURSE PRACTITIONER

## 2025-02-21 PROCEDURE — 71045 X-RAY EXAM CHEST 1 VIEW: CPT | Performed by: RADIOLOGY

## 2025-02-21 PROCEDURE — 85025 COMPLETE CBC W/AUTO DIFF WBC: CPT

## 2025-02-21 PROCEDURE — 82330 ASSAY OF CALCIUM: CPT

## 2025-02-21 PROCEDURE — 2500000001 HC RX 250 WO HCPCS SELF ADMINISTERED DRUGS (ALT 637 FOR MEDICARE OP)

## 2025-02-21 PROCEDURE — 82947 ASSAY GLUCOSE BLOOD QUANT: CPT

## 2025-02-21 PROCEDURE — 2500000004 HC RX 250 GENERAL PHARMACY W/ HCPCS (ALT 636 FOR OP/ED): Mod: JZ,TB | Performed by: NURSE PRACTITIONER

## 2025-02-21 PROCEDURE — 94640 AIRWAY INHALATION TREATMENT: CPT

## 2025-02-21 PROCEDURE — 97530 THERAPEUTIC ACTIVITIES: CPT | Mod: GP

## 2025-02-21 PROCEDURE — 93308 TTE F-UP OR LMTD: CPT | Performed by: INTERNAL MEDICINE

## 2025-02-21 RX ORDER — ALBUMIN HUMAN 250 G/1000ML
25 SOLUTION INTRAVENOUS ONCE
Status: COMPLETED | OUTPATIENT
Start: 2025-02-21 | End: 2025-02-21

## 2025-02-21 RX ORDER — FUROSEMIDE 10 MG/ML
40 INJECTION INTRAMUSCULAR; INTRAVENOUS ONCE
Status: COMPLETED | OUTPATIENT
Start: 2025-02-21 | End: 2025-02-21

## 2025-02-21 RX ORDER — AMOXICILLIN 250 MG
3 CAPSULE ORAL 2 TIMES DAILY
Status: DISCONTINUED | OUTPATIENT
Start: 2025-02-21 | End: 2025-02-26

## 2025-02-21 RX ORDER — ACETAMINOPHEN 325 MG/1
975 TABLET ORAL EVERY 6 HOURS
Status: COMPLETED | OUTPATIENT
Start: 2025-02-21 | End: 2025-02-23

## 2025-02-21 RX ORDER — MAGNESIUM SULFATE HEPTAHYDRATE 40 MG/ML
2 INJECTION, SOLUTION INTRAVENOUS ONCE
Status: COMPLETED | OUTPATIENT
Start: 2025-02-21 | End: 2025-02-21

## 2025-02-21 RX ORDER — ADHESIVE BANDAGE
30 BANDAGE TOPICAL ONCE
Status: COMPLETED | OUTPATIENT
Start: 2025-02-21 | End: 2025-02-21

## 2025-02-21 RX ORDER — HYDRALAZINE HYDROCHLORIDE 20 MG/ML
5 INJECTION INTRAMUSCULAR; INTRAVENOUS ONCE
Status: COMPLETED | OUTPATIENT
Start: 2025-02-21 | End: 2025-02-21

## 2025-02-21 RX ORDER — BISACODYL 10 MG/1
10 SUPPOSITORY RECTAL ONCE
Status: COMPLETED | OUTPATIENT
Start: 2025-02-21 | End: 2025-02-21

## 2025-02-21 RX ORDER — FUROSEMIDE 10 MG/ML
40 INJECTION INTRAMUSCULAR; INTRAVENOUS ONCE
Status: DISCONTINUED | OUTPATIENT
Start: 2025-02-21 | End: 2025-02-21

## 2025-02-21 RX ORDER — NOREPINEPHRINE BITARTRATE/D5W 8 MG/250ML
0-.5 PLASTIC BAG, INJECTION (ML) INTRAVENOUS CONTINUOUS
Status: DISCONTINUED | OUTPATIENT
Start: 2025-02-21 | End: 2025-02-23

## 2025-02-21 RX ORDER — ACETAZOLAMIDE 500 MG/5ML
500 INJECTION, POWDER, LYOPHILIZED, FOR SOLUTION INTRAVENOUS ONCE
Status: COMPLETED | OUTPATIENT
Start: 2025-02-21 | End: 2025-02-21

## 2025-02-21 RX ORDER — FUROSEMIDE 10 MG/ML
60 INJECTION INTRAMUSCULAR; INTRAVENOUS ONCE
Status: DISCONTINUED | OUTPATIENT
Start: 2025-02-21 | End: 2025-02-21

## 2025-02-21 RX ORDER — INSULIN LISPRO 100 [IU]/ML
0-15 INJECTION, SOLUTION INTRAVENOUS; SUBCUTANEOUS
Status: DISCONTINUED | OUTPATIENT
Start: 2025-02-21 | End: 2025-02-25

## 2025-02-21 RX ORDER — CHLOROTHIAZIDE SODIUM 500 MG/1
500 INJECTION INTRAVENOUS ONCE
Status: COMPLETED | OUTPATIENT
Start: 2025-02-21 | End: 2025-02-21

## 2025-02-21 RX ADMIN — HYDROMORPHONE HYDROCHLORIDE 0.2 MG: 0.2 INJECTION, SOLUTION INTRAMUSCULAR; INTRAVENOUS; SUBCUTANEOUS at 06:17

## 2025-02-21 RX ADMIN — HEPARIN SODIUM 5000 UNITS: 5000 INJECTION, SOLUTION INTRAVENOUS; SUBCUTANEOUS at 10:15

## 2025-02-21 RX ADMIN — ACETAZOLAMIDE 500 MG: 500 INJECTION, POWDER, LYOPHILIZED, FOR SOLUTION INTRAVENOUS at 15:21

## 2025-02-21 RX ADMIN — ACETAMINOPHEN 975 MG: 325 TABLET ORAL at 16:53

## 2025-02-21 RX ADMIN — POTASSIUM CHLORIDE 20 MEQ: 1500 TABLET, EXTENDED RELEASE ORAL at 15:02

## 2025-02-21 RX ADMIN — EZETIMIBE 10 MG: 10 TABLET ORAL at 20:08

## 2025-02-21 RX ADMIN — Medication 40 L/MIN: at 15:42

## 2025-02-21 RX ADMIN — PAROXETINE 10 MG: 10 TABLET, FILM COATED ORAL at 08:10

## 2025-02-21 RX ADMIN — ASPIRIN 81 MG CHEWABLE TABLET 81 MG: 81 TABLET CHEWABLE at 08:10

## 2025-02-21 RX ADMIN — Medication 50 PERCENT: at 02:48

## 2025-02-21 RX ADMIN — POTASSIUM PHOSPHATE, MONOBASIC 500 MG: 500 TABLET, SOLUBLE ORAL at 16:53

## 2025-02-21 RX ADMIN — FUROSEMIDE 40 MG: 10 INJECTION, SOLUTION INTRAVENOUS at 08:10

## 2025-02-21 RX ADMIN — CLOPIDOGREL BISULFATE 75 MG: 75 TABLET ORAL at 08:10

## 2025-02-21 RX ADMIN — ALBUMIN HUMAN 25 G: 0.25 SOLUTION INTRAVENOUS at 14:22

## 2025-02-21 RX ADMIN — Medication 40 L/MIN: at 09:56

## 2025-02-21 RX ADMIN — POTASSIUM PHOSPHATE, MONOBASIC 500 MG: 500 TABLET, SOLUBLE ORAL at 20:08

## 2025-02-21 RX ADMIN — ACETAMINOPHEN 975 MG: 325 TABLET ORAL at 10:15

## 2025-02-21 RX ADMIN — ACETAMINOPHEN 975 MG: 325 TABLET ORAL at 23:12

## 2025-02-21 RX ADMIN — FUROSEMIDE 40 MG: 10 INJECTION, SOLUTION INTRAVENOUS at 15:21

## 2025-02-21 RX ADMIN — HEPARIN SODIUM 5000 UNITS: 5000 INJECTION, SOLUTION INTRAVENOUS; SUBCUTANEOUS at 04:00

## 2025-02-21 RX ADMIN — POTASSIUM CHLORIDE 40 MEQ: 29.8 INJECTION, SOLUTION INTRAVENOUS at 15:01

## 2025-02-21 RX ADMIN — POTASSIUM PHOSPHATE, MONOBASIC 500 MG: 500 TABLET, SOLUBLE ORAL at 12:56

## 2025-02-21 RX ADMIN — IPRATROPIUM BROMIDE AND ALBUTEROL SULFATE 3 ML: .5; 3 SOLUTION RESPIRATORY (INHALATION) at 09:56

## 2025-02-21 RX ADMIN — PERFLUTREN 10 ML OF DILUTION: 6.52 INJECTION, SUSPENSION INTRAVENOUS at 14:07

## 2025-02-21 RX ADMIN — ALBUMIN HUMAN 25 G: 0.25 SOLUTION INTRAVENOUS at 08:39

## 2025-02-21 RX ADMIN — FUROSEMIDE 40 MG: 10 INJECTION, SOLUTION INTRAVENOUS at 11:24

## 2025-02-21 RX ADMIN — BISACODYL 10 MG: 10 SUPPOSITORY RECTAL at 15:02

## 2025-02-21 RX ADMIN — Medication 40 L/MIN: at 20:07

## 2025-02-21 RX ADMIN — BUDESONIDE 0.5 MG: 0.5 INHALANT RESPIRATORY (INHALATION) at 09:55

## 2025-02-21 RX ADMIN — NOREPINEPHRINE BITARTRATE 0.01 MCG/KG/MIN: 8 INJECTION, SOLUTION INTRAVENOUS at 15:02

## 2025-02-21 RX ADMIN — CHLOROTHIAZIDE SODIUM 500 MG: 500 INJECTION, POWDER, LYOPHILIZED, FOR SOLUTION INTRAVENOUS at 11:24

## 2025-02-21 RX ADMIN — IPRATROPIUM BROMIDE AND ALBUTEROL SULFATE 3 ML: .5; 3 SOLUTION RESPIRATORY (INHALATION) at 15:42

## 2025-02-21 RX ADMIN — HYDRALAZINE HYDROCHLORIDE 5 MG: 20 INJECTION INTRAMUSCULAR; INTRAVENOUS at 08:39

## 2025-02-21 RX ADMIN — SENNOSIDES AND DOCUSATE SODIUM 2 TABLET: 50; 8.6 TABLET ORAL at 08:10

## 2025-02-21 RX ADMIN — OXYCODONE 5 MG: 5 TABLET ORAL at 11:32

## 2025-02-21 RX ADMIN — POTASSIUM PHOSPHATE, MONOBASIC 500 MG: 500 TABLET, SOLUBLE ORAL at 08:10

## 2025-02-21 RX ADMIN — BUDESONIDE 0.5 MG: 0.5 INHALANT RESPIRATORY (INHALATION) at 20:26

## 2025-02-21 RX ADMIN — MAGNESIUM HYDROXIDE 30 ML: 400 SUSPENSION ORAL at 10:13

## 2025-02-21 RX ADMIN — POLYETHYLENE GLYCOL 3350 17 G: 17 POWDER, FOR SOLUTION ORAL at 08:10

## 2025-02-21 RX ADMIN — PERFLUTREN 10 ML OF DILUTION: 6.52 INJECTION, SUSPENSION INTRAVENOUS at 14:14

## 2025-02-21 RX ADMIN — Medication 40 L/MIN: at 20:08

## 2025-02-21 RX ADMIN — IPRATROPIUM BROMIDE AND ALBUTEROL SULFATE 3 ML: .5; 3 SOLUTION RESPIRATORY (INHALATION) at 20:25

## 2025-02-21 RX ADMIN — HEPARIN SODIUM 5000 UNITS: 5000 INJECTION, SOLUTION INTRAVENOUS; SUBCUTANEOUS at 18:05

## 2025-02-21 RX ADMIN — IPRATROPIUM BROMIDE AND ALBUTEROL SULFATE 3 ML: .5; 3 SOLUTION RESPIRATORY (INHALATION) at 02:48

## 2025-02-21 RX ADMIN — MAGNESIUM SULFATE HEPTAHYDRATE 2 G: 40 INJECTION, SOLUTION INTRAVENOUS at 08:08

## 2025-02-21 ASSESSMENT — PAIN - FUNCTIONAL ASSESSMENT
PAIN_FUNCTIONAL_ASSESSMENT: 0-10

## 2025-02-21 ASSESSMENT — PAIN SCALES - GENERAL
PAINLEVEL_OUTOF10: 0 - NO PAIN
PAINLEVEL_OUTOF10: 6
PAINLEVEL_OUTOF10: 0 - NO PAIN
PAINLEVEL_OUTOF10: 0 - NO PAIN
PAINLEVEL_OUTOF10: 5 - MODERATE PAIN
PAINLEVEL_OUTOF10: 0 - NO PAIN
PAINLEVEL_OUTOF10: 6

## 2025-02-21 ASSESSMENT — COGNITIVE AND FUNCTIONAL STATUS - GENERAL
STANDING UP FROM CHAIR USING ARMS: A LITTLE
WALKING IN HOSPITAL ROOM: A LITTLE
MOBILITY SCORE: 16
MOVING TO AND FROM BED TO CHAIR: A LITTLE
TURNING FROM BACK TO SIDE WHILE IN FLAT BAD: A LOT
CLIMB 3 TO 5 STEPS WITH RAILING: A LOT
MOVING FROM LYING ON BACK TO SITTING ON SIDE OF FLAT BED WITH BEDRAILS: A LITTLE

## 2025-02-21 NOTE — CARE PLAN
Problem: Pain - Adult  Goal: Verbalizes/displays adequate comfort level or baseline comfort level  Outcome: Progressing     Problem: Safety - Adult  Goal: Free from fall injury  Outcome: Progressing     Problem: Discharge Planning  Goal: Discharge to home or other facility with appropriate resources  Outcome: Progressing     Problem: Chronic Conditions and Co-morbidities  Goal: Patient's chronic conditions and co-morbidity symptoms are monitored and maintained or improved  Outcome: Progressing     Problem: Nutrition  Goal: Nutrient intake appropriate for maintaining nutritional needs  Outcome: Progressing     Problem: Skin  Goal: Decreased wound size/increased tissue granulation at next dressing change  Outcome: Progressing  Goal: Participates in plan/prevention/treatment measures  Outcome: Progressing  Goal: Prevent/manage excess moisture  Outcome: Progressing  Goal: Prevent/minimize sheer/friction injuries  Outcome: Progressing  Goal: Promote/optimize nutrition  Outcome: Progressing  Goal: Promote skin healing  Outcome: Progressing     Problem: Fall/Injury  Goal: Not fall by end of shift  Outcome: Progressing  Goal: Be free from injury by end of the shift  Outcome: Progressing  Goal: Verbalize understanding of personal risk factors for fall in the hospital  Outcome: Progressing  Goal: Verbalize understanding of risk factor reduction measures to prevent injury from fall in the home  Outcome: Progressing  Goal: Use assistive devices by end of the shift  Outcome: Progressing  Goal: Pace activities to prevent fatigue by end of the shift  Outcome: Progressing     Problem: Respiratory  Goal: Clear secretions with interventions this shift  Outcome: Progressing  Goal: Minimize anxiety/maximize coping throughout shift  Outcome: Progressing  Goal: Minimal/no exertional discomfort or dyspnea this shift  Outcome: Progressing  Goal: No signs of respiratory distress (eg. Use of accessory muscles. Peds grunting)  Outcome:  Progressing  Goal: Patent airway maintained this shift  Outcome: Progressing  Goal: Tolerate mechanical ventilation evidenced by VS/agitation level this shift  Outcome: Progressing  Goal: Tolerate pulmonary toileting this shift  Outcome: Progressing  Goal: Verbalize decreased shortness of breath this shift  Outcome: Progressing  Goal: Wean oxygen to maintain O2 saturation per order/standard this shift  Outcome: Progressing  Goal: Increase self care and/or family involvement in next 24 hours  Outcome: Progressing   The patient's goals for the shift include diuretics and HDS      The clinical goals for the shift include diuretics and HDS      Over the shift, the patient did not make progress toward the following goals. Barriers to progression include. Recommendations to address these barriers include.

## 2025-02-21 NOTE — CARE PLAN
Problem: Pain - Adult  Goal: Verbalizes/displays adequate comfort level or baseline comfort level  Outcome: Progressing     Problem: Safety - Adult  Goal: Free from fall injury  Outcome: Progressing     Problem: Discharge Planning  Goal: Discharge to home or other facility with appropriate resources  Outcome: Progressing     Problem: Chronic Conditions and Co-morbidities  Goal: Patient's chronic conditions and co-morbidity symptoms are monitored and maintained or improved  Outcome: Progressing     Problem: Nutrition  Goal: Nutrient intake appropriate for maintaining nutritional needs  Outcome: Progressing     Problem: Skin  Goal: Decreased wound size/increased tissue granulation at next dressing change  Outcome: Progressing  Goal: Participates in plan/prevention/treatment measures  Outcome: Progressing  Goal: Prevent/manage excess moisture  Outcome: Progressing  Goal: Prevent/minimize sheer/friction injuries  Outcome: Progressing  Goal: Promote/optimize nutrition  Outcome: Progressing  Goal: Promote skin healing  Outcome: Progressing     Problem: Fall/Injury  Goal: Not fall by end of shift  Outcome: Progressing  Goal: Be free from injury by end of the shift  Outcome: Progressing  Goal: Verbalize understanding of personal risk factors for fall in the hospital  Outcome: Progressing  Goal: Verbalize understanding of risk factor reduction measures to prevent injury from fall in the home  Outcome: Progressing  Goal: Use assistive devices by end of the shift  Outcome: Progressing  Goal: Pace activities to prevent fatigue by end of the shift  Outcome: Progressing     Problem: Respiratory  Goal: Clear secretions with interventions this shift  Outcome: Progressing  Goal: Minimize anxiety/maximize coping throughout shift  Outcome: Progressing  Goal: Minimal/no exertional discomfort or dyspnea this shift  Outcome: Progressing  Goal: No signs of respiratory distress (eg. Use of accessory muscles. Peds grunting)  Outcome:  Progressing  Goal: Patent airway maintained this shift  Outcome: Progressing  Goal: Tolerate mechanical ventilation evidenced by VS/agitation level this shift  Outcome: Progressing  Goal: Tolerate pulmonary toileting this shift  Outcome: Progressing  Goal: Verbalize decreased shortness of breath this shift  Outcome: Progressing  Goal: Wean oxygen to maintain O2 saturation per order/standard this shift  Outcome: Progressing  Goal: Increase self care and/or family involvement in next 24 hours  Outcome: Progressing

## 2025-02-21 NOTE — PROGRESS NOTES
Physical Therapy    Physical Therapy Treatment    Patient Name: Barbie Milan  MRN: [id number]  [place] [facility] [place]  Room: 06/06-A  Today's Date: 2/21/2025  Time Calculation  Start Time: 0850  Stop Time: 0948  Time Calculation (min): 58 min         Assessment/Plan   PT Assessment  PT Assessment Results: Decreased strength, Decreased endurance, Decreased mobility  Rehab Prognosis: Good  Evaluation/Treatment Tolerance: Patient limited by fatigue  Medical Staff Made Aware: Yes  End of Session Communication: Bedside nurse  Assessment Comment: Patient able to ambulate 8 feet with a walker today (ventimask 15L), but has high fear of falling and a very short shuffled gait pattern. Patient did better with post-mobility cuing compared to concurrent cuing, but improved within session. Patient reported no pain with mobility and appears to be transferring better.  When in chair, patient given HEP to perform seated marches 15 sec every 15 min. Patient on board. PT hopeful to increase ambulatory distance more next session.  End of Session Patient Position: Up in chair, Alarm off, not on at start of session  PT Plan  Inpatient/Swing Bed or Outpatient: Inpatient  PT Plan  Treatment/Interventions: Bed mobility, Transfer training, Gait training, Stair training, Balance training, Neuromuscular re-education, Neurodevelopmental intervention, Strengthening, Endurance training, Range of motion, Therapeutic exercise, Therapeutic activity, Home exercise program  PT Plan: Ongoing PT  PT Frequency: 5 times per week  PT Discharge Recommendations: High intensity level of continued care  Equipment Recommended upon Discharge: Wheeled walker  PT Recommended Transfer Status: Assist x2  PT - OK to Discharge: Yes      General Visit Information:   PT  Visit  PT Received On: 02/21/25  Response to Previous Treatment: Patient with no complaints from previous session.  General  Reason for Referral: 66 y/o female now CABG x 3 (LIMA to LAD, SVG to LCx,  SVG to PDA), posterior pericardial window secondary to CAD with Dr. Gallo Canchola on 2/18/2025  Past Medical History Relevant to Rehab: CAD s/p stent x 4 RCA, MI 2007, HLD, HTN, PAD, T2DM, overactive bladder, a recurrent UTI, polycythemia, fibromyalgia, osteoarthritis, gout, hard of hearing, smoking  Family/Caregiver Present: No  Prior to Session Communication: Bedside nurse  Patient Position Received: Bed, 3 rail up, Alarm off, not on at start of session  General Comment: Patient open to working with PT    Subjective   Precautions:  Precautions  Hearing/Visual Limitations: Lummi. Deaf in R ear and very little hearing in L. Able to read lips.  Medical Precautions: Cardiac precautions, Fall precautions, Chest tube, Oxygen therapy device and L/min  Post-Surgical Precautions: Move in the Tube  Precautions Comment: MAP 60-80, SpO2 >92%, vvi@50     Date/Time Vitals Session Patient Position Pulse Resp SpO2 BP MAP (mmHg)    02/21/25 0916 --  --  95  17  93 %  --  --    02/21/25 0948 Post PT  --  92  25  95 %  --  --    02/21/25 0956 --  --  --  --  92 %  --  --    02/21/25 1000 --  --  95  20  90 %  --  --    02/21/25 1100 --  --  104  22  91 %  --  --                Objective   Pain:  Pain Assessment  Pain Assessment: 0-10  0-10 (Numeric) Pain Score: 0 - No pain  Cognition:  Cognition  Orientation Level: Oriented X4  Coordination:     Postural Control:       Activity Tolerance:  Activity Tolerance  Endurance: Decreased tolerance for upright activites  Treatments:  Therapeutic Activity  Therapeutic Activity 1: Sit to stand x2  Therapeutic Activity 2: Bed to chair  Therapeutic Activity 3: Bed mobility  Therapeutic Activity 4: Seated marches (HEP)    Bed Mobility 1  Bed Mobility 1: Supine to sitting  Level of Assistance 1: Moderate assistance, Moderate verbal cues    Ambulation/Gait Training 1  Surface 1: Level tile  Device 1: Rolling walker  Assistance 1: Minimum assistance  Comments/Distance (ft) 1: 8 feet forward. Very short  shuffled steps with step-to gait pattern. Concurrent cuing not successful. Post-mobility education more successful  Transfer 1  Transfer From 1: Sit to, Stand to  Transfer to 1: Sit, Stand  Technique 1: Stand to sit, Sit to stand  Transfer Level of Assistance 1: Minimum assistance  Trials/Comments 1: Slow, but successful  Transfers 2  Transfer From 2: Bed to  Transfer to 2: Chair with arms  Technique 2: Lateral  Transfer Device 2: Walker  Transfer Level of Assistance 2: Minimum assistance, Moderate verbal cues  Trials/Comments 2: Very short steps    Outcome Measures:  [facility] Basic Mobility  Turning from your back to your side while in a flat bed without using bedrails: A little  Moving from lying on your back to sitting on the side of a flat bed without using bedrails: A lot  Moving to and from bed to chair (including a wheelchair): A little  Standing up from a chair using your arms (e.g. wheelchair or bedside chair): A little  To walk in hospital room: A little  Climbing 3-5 steps with railing: A lot  Basic Mobility - Total Score: 16    FSS-ICU  Ambulation: Walks <50 feet with any assistance x1 or walks any distance with assistance x2 people  Rolling: Minimal assistance (performs 75% or more of task)  Sitting: Modified independence, requires use of assistive device  Transfer Sit-to-Stand: Minimal assistance (performs 75% or more of task)  Transfer Supine-to-Sit: Moderate assistance (performs 50 - 74% of task)  Total Score: 18         ICU Mobility Scale: Walking with assistance of 2 or more people [7]    Education Documentation  Handouts, taught by Maxi Real PT at 2/21/2025 11:09 AM.  Learner: Patient  Readiness: Acceptance  Method: Demonstration, Explanation  Response: Verbalizes Understanding, Demonstrated Understanding  Comment: HEP, goals of PT, safety with mobility    Precautions, taught by Maxi Real PT at 2/21/2025 11:09 AM.  Learner: Patient  Readiness: Acceptance  Method: Demonstration,  Explanation  Response: Verbalizes Understanding, Demonstrated Understanding  Comment: HEP, goals of PT, safety with mobility    Body Mechanics, taught by Maxi Real PT at 2/21/2025 11:09 AM.  Learner: Patient  Readiness: Acceptance  Method: Demonstration, Explanation  Response: Verbalizes Understanding, Demonstrated Understanding  Comment: HEP, goals of PT, safety with mobility    Mobility Training, taught by Maxi Real PT at 2/21/2025 11:09 AM.  Learner: Patient  Readiness: Acceptance  Method: Demonstration, Explanation  Response: Verbalizes Understanding, Demonstrated Understanding  Comment: HEP, goals of PT, safety with mobility    Education Comments  No comments found.        OP EDUCATION:       Encounter Problems       Encounter Problems (Active)       Mobility       LTG - Patient will ambulate household distance ~150 feet with SBA and LRAD. (Progressing)       Start:  02/19/25    Expected End:  03/05/25            LTG - Patient will navigate 4-6 steps with rails/device with SBA (Progressing)       Start:  02/19/25    Expected End:  03/05/25            Pt will ambulate >/= 150 ft with LRAD and SBA and no signs of fatigue or SOB.   (Progressing)       Start:  02/19/25    Expected End:  03/05/25               PT Transfers       LTG - Patient will transfer from one surface to another independently with LRAD (Progressing)       Start:  02/19/25    Expected End:  03/05/25            STG - Patient will perform bed mobility independently.  (Progressing)       Start:  02/19/25    Expected End:  03/05/25

## 2025-02-21 NOTE — PROGRESS NOTES
CTICU Progress Note  Barbie Milan/[id number]    Admit Date: 2/18/2025  Hospital Length of Stay: 3   ICU Length of Stay: 2d 18h   CT SURGEON:     SUBJECTIVE:   Overnight remained on [place] 50%. diuresed    MEDICATIONS  Infusions:  lactated Ringer's, Last Rate: 5 mL/hr (02/21/25 0600)      Scheduled:  acetaminophen, 650 mg, q6h  aspirin, 81 mg, Daily  budesonide, 0.5 mg, BID  clopidogrel, 75 mg, Daily  ezetimibe, 10 mg, Nightly  furosemide, 40 mg, Once  heparin, 5,000 Units, q8h  insulin lispro, 0-15 Units, Before meals & nightly  ipratropium-albuteroL, 3 mL, q6h  magnesium sulfate, 2 g, Once  oxygen, , Continuous - Inhalation  oxygen, , Continuous - Inhalation  PARoxetine, 10 mg, q AM  polyethylene glycol, 17 g, BID  potassium phosphate (monobasic), 500 mg, 4x daily  potassium phosphate, 21 mmol, Once  sennosides-docusate sodium, 2 tablet, BID      PRN:  albuterol, 2.5 mg, q6h PRN  calcium gluconate, 1 g, q6h PRN  calcium gluconate, 2 g, q6h PRN  HYDROmorphone, 0.2 mg, q2h PRN  ipratropium-albuteroL, 3 mL, q6h PRN  magnesium sulfate, 2 g, q6h PRN  magnesium sulfate, 4 g, q6h PRN  methocarbamol, 1,000 mg, q6h PRN  naloxone, 0.2 mg, q5 min PRN  ondansetron, 4 mg, q8h PRN   Or  ondansetron, 4 mg, q8h PRN  oxyCODONE, 10 mg, q6h PRN  oxyCODONE, 5 mg, q6h PRN  potassium chloride CR, 20 mEq, q6h PRN   Or  potassium chloride, 20 mEq, q6h PRN  potassium chloride CR, 40 mEq, q6h PRN   Or  potassium chloride, 40 mEq, q6h PRN  potassium chloride, 20 mEq, q6h PRN  potassium chloride, 40 mEq, q6h PRN        PHYSICAL EXAM:   Visit Vitals  /77 (BP Location: Left arm)   Pulse 91   Temp 37.5 °C (99.5 °F) (Core)   Resp 20   LMP  (LMP Unknown)   SpO2 96%   OB Status Postmenopausal   Smoking Status Every Day     Wt Readings from Last 5 Encounters:   02/17/25 89.8 kg (198 lb)   02/14/25 89.8 kg (198 lb)     INTAKE/OUTPUT:  I/O last 3 completed shifts:  In: 962.4 [I.V.:462.4; IV Piggyback:500]  Out: 2990 [Urine:2380; Chest  Tube:610]         Vent settings:  FiO2 (%):  [50 %-70 %] 50 %    Physical Exam:   - CONSTITUTION: Adult female in ICU bed. Mild respiratory distress on airvo  - NEUROLOGIC: Aox3 cam negative, no focal deficits  - CARDIOVASCULAR: Sinus with frequent PVC/PAC . V wires backed up VVI @ 40, MA 6, sens 0.8  - RESPIRATORY: Tachypnea, Crackles, Wet cough  - GI: Distended but soft.  - : Clear yellow urine in dela cruz  - EXTREMITIES: genearlized nonpitting edema. Pulses palpable  - SKIN: warm  - PSYCHIATRIC: WNL    Daily Risk Screen  Intubated: N/A  Central line: Yes, remove tomorrow  Dela Cruz: Yes, Critical I/O during aggressive diuresis    Images: CXR 2/20  IMPRESSION:  1. Moderate interstitial pulmonary edema  2. Possible small left pleural effusion and subsegmental atelectasis  left lower lobe    Invasive Hemodynamics:    Most Recent Range Past 24hrs   BP (Art) 114/58 Arterial Line BP 1  Min: 86/74  Max: 155/75   MAP(Art) 77 mmHg Arterial Line MAP 1 (mmHg)   Min: 71 mmHg  Max: 103 mmHg   RA/CVP   No data recorded   PA 37/30 PAP  Min: 36/18  Max: 62/33   PA(mean) 35 mmHg PAP (Mean)  Min: 25 mmHg  Max: 44 mmHg   CO 5.8 L/min CO (L/min)  Min: 5.8 L/min  Max: 7.4 L/min   CI 3 L/min/m2 CI (L/min/m2)  Min: 3 L/min/m2  Max: 3.8 L/min/m2   Mixed Venous   No data recorded   SVR  754 (dyne*sec)/cm5 SVR (dyne*sec)/cm5  Min: 754 (dyne*sec)/cm5  Max: 866 (dyne*sec)/cm5         Assessment/Plan   Patient is a 65-year-old female s/p scheduled CABG x 3 [LIMA to LAD, SVG to LCx, SVG to PDA], posterior pericardial window secondary to CAD with Dr. Gallo Canchola on 2/18/2025.  Past medical history remarkable for CAD s/p stent x 4 RCA, MI 2007, HLD, HTN, PAD, T2DM, overactive bladder, a recurrent UTI, polycythemia, fibromyalgia, osteoarthritis, gout, hard of hearing, smoking.     Plan:  NEURO/MSK:  PMH of OA, gout, fibromyalgia. Patient is intubated and sedated on propofol infusion. Methadone 20 prior to surgery. Acute post operative pain.  -->  -  Serial neuro and pain assessments   - Scheduled Tylenol 975 mg q 6hr   - PRN oxycodone  - PRN dilaudid for pain   - PT Consult, OOB to chair as tolerated, chair position if not tolerated   - CAM ICU score qshift  - Sleep/wake cycle hygiene  - Hold home Norco 5-3 25 mg 3 times daily, ibuprofen 200 Mg,   - Continue Paxil 10 Mg every morning, trazodone 50 Mg nightly     CV:  Patient has a history of CAD s/p stent x 4 RCA, MI 2007, HLD, HTN, PVD Now s/p CABG x 3 [LIMA to LAD, SVG to LCx, SVG to PDA], posterior pericardial window secondary to CAD with Dr. Gallo Canchola on 2/18/2025. PRE LVEF 25-30% [[place]]/ POST LVEF 30-35% . Epi weaned off yesterday. A/V epicardial wires set VVI @ 50bpm.  CI 2.9-->  - Maintain goal MAP 65-80  - Mixed venous and CI Q4H  - Volume resuscitate as clinically indicated  - Maintain epicardial wires set VVI @ 50  - Continue ASA and plavix  - TTE today  - start afterload reduction?  - Continue Ezetimibe  - Hold home lisinopril 10 Mg daily, metoprolol 50 Mg daily,     PULM:  No history of pulmonary disease. Extensive smoking hx.  Currently on airvo. Acute postop respiratory insufficiency. Chest tubes left pleural chest tube no airleak, minimal drainage-->  -2/19.  Self extubated to CPAP.    - Daily CXR  - Wean FiO2 maintaining SpO2 >92%.   - IS q1h and OOB to chair   - Bronchial hygiene  - Chest tubes to wall suction-remove     GI:  No PMH.  No postop BM yet  - Colace/senna BID and miralax BID, MOM  - Cardiac diet      :  CSA-ALEXSANDER Risk Score 3.  PMH of overactive bladder, recurrent UTI,  baseline CRE 0.75. Creatinine stable post-op. Dela Cruz in place and making adequate UOP. Hypervolemia. Hypophosphatemia -->  - Continue dela cruz catheter for strict I/Os.  - Goal UOP 0.5ml/kg/hr  - Diurese for goal -1-2L/24hrs.   - RFP as clinically indicated  - Replete electrolytes per CTICU protocol  - Hold home medications: Lasix 40 Mg daily     ENDO:  PMH of T2DM A1c: 6.4. -->  - Maintain BG <180, insulin per  CTICU protocol     HEME:  Polycythemia, Acute blood loss anemia and thrombocytopenia. -->    - Monitor drain output volume and characteristics  - CBC, coags, and fibrinogen post op and as clinically indicated  - Continue ASA  - Continue plavix  - SQH  - SCDs for DVT prophylaxis.  - Last type and screen: 2/18/2024     ID:  Afebrile, no current indications of infection. MRSA negative. -->  - Trend temp q4h  - Periop cefazolin x 48hrs. Course complete.     Skin:  No active skin issues.  - preventative Mepilex dressings in place on sacrum and heels  - change preventative Mepilex weekly or more frequently as indicated (when moist/soiled)   - every shift skin assessment per nursing and weekly ICU skin rounds  - moisture barrier to be applied with pastora care  - active skin problems addressed with nursing on daily rounds     Proph:  SCDs  Subcutaneous heparin     G:  Line  Right IJ MAC w [place] with PA catheter placed 2/18/2025  Left brachial a-line placed 2/18/2025  Pichardo 2/18     F: Family: will update at bedside postoperatively.     A,B,C,D,E,F,G: reviewed        Dispo: CTICU care for now.    CTICU TEAM PHONE [phone]    Code status: Full Code  Emergency contact: [email or web address]      I personally spent 45 minutes of critical care time directly and personally managing the patient exclusive of separately billable procedures.

## 2025-02-21 NOTE — PROGRESS NOTES
CTICU ATTENDING INTENSIVIST PROGRESS NOTE  Critically ill 65 year-old female smoker on 2/18 s/p CABG    ISSUES  Unstable hemodynamics   Uptrend in lactate --> resolved with 25% albumin   Recheck CI acceptable  Postop acute pulm insufficiency   Current smoker   Airvo O2   PCXR bibasilar atelectasis/effusions --> improved  Fibromyalgia   Chronic narcotic use   Paxil/trazodone  Hypophosphatemia  Acute (stress) on chronic (T2DM with A1C 6.4) hyperglycemia  Acute blood-loss anemia  Secondary thrombocytopenia    IMP/PLAN  Beware chronic narcotic use  Wean Airvo as tolerated  Diurese  Replete electrolytes  Glycemic control  Discussed with pt at bedside    CRITICAL CARE ATTESTATION: I have seen and personally examined the patient for 47 minutes of critical care time, to include reviewing the record, notes, lab values and radiographs, discussing with the primary physician and consultants and formulating the plan of care with critical care team and nursing staff.    Jose M Lambert MD  2/21/25

## 2025-02-22 ENCOUNTER — APPOINTMENT (OUTPATIENT)
Dept: RADIOLOGY | Facility: HOSPITAL | Age: 65
DRG: 235 | End: 2025-02-22
Payer: MEDICARE

## 2025-02-22 LAB
ABO GROUP (TYPE) IN BLOOD: NORMAL
ALBUMIN SERPL BCP-MCNC: 3.9 G/DL (ref 3.4–5)
ALBUMIN SERPL BCP-MCNC: 4.2 G/DL (ref 3.4–5)
ALBUMIN SERPL BCP-MCNC: 4.2 G/DL (ref 3.4–5)
ALBUMIN SERPL BCP-MCNC: 4.6 G/DL (ref 3.4–5)
ANION GAP BLDA CALCULATED.4IONS-SCNC: 12 MMO/L (ref 10–25)
ANION GAP SERPL CALC-SCNC: 13 MMOL/L (ref 10–20)
ANION GAP SERPL CALC-SCNC: 14 MMOL/L (ref 10–20)
ANION GAP SERPL CALC-SCNC: 17 MMOL/L (ref 10–20)
ANION GAP SERPL CALC-SCNC: 17 MMOL/L (ref 10–20)
ANTIBODY SCREEN: NORMAL
BASE EXCESS BLDA CALC-SCNC: 3.1 MMOL/L (ref -2–3)
BASOPHILS # BLD AUTO: 0.02 X10*3/UL (ref 0–0.1)
BASOPHILS # BLD AUTO: 0.04 X10*3/UL (ref 0–0.1)
BASOPHILS NFR BLD AUTO: 0.3 %
BASOPHILS NFR BLD AUTO: 0.5 %
BODY TEMPERATURE: 37 DEGREES CELSIUS
BUN SERPL-MCNC: 18 MG/DL (ref 6–23)
BUN SERPL-MCNC: 19 MG/DL (ref 6–23)
BUN SERPL-MCNC: 19 MG/DL (ref 6–23)
BUN SERPL-MCNC: 23 MG/DL (ref 6–23)
CA-I BLD-SCNC: 1.01 MMOL/L (ref 1.1–1.33)
CA-I BLD-SCNC: 1.14 MMOL/L (ref 1.1–1.33)
CA-I BLD-SCNC: 1.17 MMOL/L (ref 1.1–1.33)
CA-I BLDA-SCNC: 1.15 MMOL/L (ref 1.1–1.33)
CALCIUM SERPL-MCNC: 8.2 MG/DL (ref 8.6–10.6)
CALCIUM SERPL-MCNC: 8.5 MG/DL (ref 8.6–10.6)
CALCIUM SERPL-MCNC: 9.2 MG/DL (ref 8.6–10.6)
CALCIUM SERPL-MCNC: 9.8 MG/DL (ref 8.6–10.6)
CHLORIDE BLDA-SCNC: 102 MMOL/L (ref 98–107)
CHLORIDE SERPL-SCNC: 101 MMOL/L (ref 98–107)
CHLORIDE SERPL-SCNC: 102 MMOL/L (ref 98–107)
CHLORIDE SERPL-SCNC: 102 MMOL/L (ref 98–107)
CHLORIDE SERPL-SCNC: 107 MMOL/L (ref 98–107)
CO2 SERPL-SCNC: 23 MMOL/L (ref 21–32)
CO2 SERPL-SCNC: 23 MMOL/L (ref 21–32)
CO2 SERPL-SCNC: 24 MMOL/L (ref 21–32)
CO2 SERPL-SCNC: 27 MMOL/L (ref 21–32)
CREAT SERPL-MCNC: 0.57 MG/DL (ref 0.5–1.05)
CREAT SERPL-MCNC: 0.62 MG/DL (ref 0.5–1.05)
CREAT SERPL-MCNC: 0.63 MG/DL (ref 0.5–1.05)
CREAT SERPL-MCNC: 0.65 MG/DL (ref 0.5–1.05)
EGFRCR SERPLBLD CKD-EPI 2021: >90 ML/MIN/1.73M*2
EOSINOPHIL # BLD AUTO: 0.33 X10*3/UL (ref 0–0.7)
EOSINOPHIL # BLD AUTO: 0.47 X10*3/UL (ref 0–0.7)
EOSINOPHIL NFR BLD AUTO: 4.5 %
EOSINOPHIL NFR BLD AUTO: 5.4 %
ERYTHROCYTE [DISTWIDTH] IN BLOOD BY AUTOMATED COUNT: 13 % (ref 11.5–14.5)
ERYTHROCYTE [DISTWIDTH] IN BLOOD BY AUTOMATED COUNT: 13.1 % (ref 11.5–14.5)
GLUCOSE BLD MANUAL STRIP-MCNC: 132 MG/DL (ref 74–99)
GLUCOSE BLD MANUAL STRIP-MCNC: 150 MG/DL (ref 74–99)
GLUCOSE BLD MANUAL STRIP-MCNC: 160 MG/DL (ref 74–99)
GLUCOSE BLDA-MCNC: 156 MG/DL (ref 74–99)
GLUCOSE SERPL-MCNC: 116 MG/DL (ref 74–99)
GLUCOSE SERPL-MCNC: 158 MG/DL (ref 74–99)
GLUCOSE SERPL-MCNC: 164 MG/DL (ref 74–99)
GLUCOSE SERPL-MCNC: 87 MG/DL (ref 74–99)
HCO3 BLDA-SCNC: 26.9 MMOL/L (ref 22–26)
HCT VFR BLD AUTO: 26.9 % (ref 36–46)
HCT VFR BLD AUTO: 27.7 % (ref 36–46)
HCT VFR BLD EST: 29 % (ref 36–46)
HGB BLD-MCNC: 8.9 G/DL (ref 12–16)
HGB BLD-MCNC: 9.6 G/DL (ref 12–16)
HGB BLDA-MCNC: 9.5 G/DL (ref 12–16)
IMM GRANULOCYTES # BLD AUTO: 0.03 X10*3/UL (ref 0–0.7)
IMM GRANULOCYTES # BLD AUTO: 0.05 X10*3/UL (ref 0–0.7)
IMM GRANULOCYTES NFR BLD AUTO: 0.4 % (ref 0–0.9)
IMM GRANULOCYTES NFR BLD AUTO: 0.6 % (ref 0–0.9)
INHALED O2 CONCENTRATION: 40 %
LACTATE BLDA-SCNC: 1.1 MMOL/L (ref 0.4–2)
LYMPHOCYTES # BLD AUTO: 1.79 X10*3/UL (ref 1.2–4.8)
LYMPHOCYTES # BLD AUTO: 1.89 X10*3/UL (ref 1.2–4.8)
LYMPHOCYTES NFR BLD AUTO: 21.6 %
LYMPHOCYTES NFR BLD AUTO: 24.5 %
MAGNESIUM SERPL-MCNC: 1.99 MG/DL (ref 1.6–2.4)
MAGNESIUM SERPL-MCNC: 2.03 MG/DL (ref 1.6–2.4)
MAGNESIUM SERPL-MCNC: 2.2 MG/DL (ref 1.6–2.4)
MCH RBC QN AUTO: 33.5 PG (ref 26–34)
MCH RBC QN AUTO: 34.2 PG (ref 26–34)
MCHC RBC AUTO-ENTMCNC: 33.1 G/DL (ref 32–36)
MCHC RBC AUTO-ENTMCNC: 34.7 G/DL (ref 32–36)
MCV RBC AUTO: 101 FL (ref 80–100)
MCV RBC AUTO: 99 FL (ref 80–100)
MONOCYTES # BLD AUTO: 0.48 X10*3/UL (ref 0.1–1)
MONOCYTES # BLD AUTO: 0.49 X10*3/UL (ref 0.1–1)
MONOCYTES NFR BLD AUTO: 5.6 %
MONOCYTES NFR BLD AUTO: 6.6 %
NEUTROPHILS # BLD AUTO: 4.65 X10*3/UL (ref 1.2–7.7)
NEUTROPHILS # BLD AUTO: 5.79 X10*3/UL (ref 1.2–7.7)
NEUTROPHILS NFR BLD AUTO: 63.7 %
NEUTROPHILS NFR BLD AUTO: 66.3 %
NRBC BLD-RTO: 0 /100 WBCS (ref 0–0)
NRBC BLD-RTO: 0.2 /100 WBCS (ref 0–0)
OXYHGB MFR BLDA: 96.5 % (ref 94–98)
PCO2 BLDA: 37 MM HG (ref 38–42)
PH BLDA: 7.47 PH (ref 7.38–7.42)
PHOSPHATE SERPL-MCNC: 2.7 MG/DL (ref 2.5–4.9)
PHOSPHATE SERPL-MCNC: 2.8 MG/DL (ref 2.5–4.9)
PHOSPHATE SERPL-MCNC: 2.9 MG/DL (ref 2.5–4.9)
PHOSPHATE SERPL-MCNC: 3 MG/DL (ref 2.5–4.9)
PLATELET # BLD AUTO: 117 X10*3/UL (ref 150–450)
PLATELET # BLD AUTO: 132 X10*3/UL (ref 150–450)
PO2 BLDA: 92 MM HG (ref 85–95)
POTASSIUM BLDA-SCNC: 3.2 MMOL/L (ref 3.5–5.3)
POTASSIUM SERPL-SCNC: 3.3 MMOL/L (ref 3.5–5.3)
POTASSIUM SERPL-SCNC: 3.7 MMOL/L (ref 3.5–5.3)
POTASSIUM SERPL-SCNC: 3.7 MMOL/L (ref 3.5–5.3)
POTASSIUM SERPL-SCNC: 3.8 MMOL/L (ref 3.5–5.3)
RBC # BLD AUTO: 2.66 X10*6/UL (ref 4–5.2)
RBC # BLD AUTO: 2.81 X10*6/UL (ref 4–5.2)
RH FACTOR (ANTIGEN D): NORMAL
SAO2 % BLDA: 99 % (ref 94–100)
SODIUM BLDA-SCNC: 138 MMOL/L (ref 136–145)
SODIUM SERPL-SCNC: 137 MMOL/L (ref 136–145)
SODIUM SERPL-SCNC: 138 MMOL/L (ref 136–145)
SODIUM SERPL-SCNC: 140 MMOL/L (ref 136–145)
SODIUM SERPL-SCNC: 140 MMOL/L (ref 136–145)
WBC # BLD AUTO: 7.3 X10*3/UL (ref 4.4–11.3)
WBC # BLD AUTO: 8.7 X10*3/UL (ref 4.4–11.3)

## 2025-02-22 PROCEDURE — 2500000005 HC RX 250 GENERAL PHARMACY W/O HCPCS: Performed by: NURSE PRACTITIONER

## 2025-02-22 PROCEDURE — 94762 N-INVAS EAR/PLS OXIMTRY CONT: CPT

## 2025-02-22 PROCEDURE — 2500000004 HC RX 250 GENERAL PHARMACY W/ HCPCS (ALT 636 FOR OP/ED): Performed by: STUDENT IN AN ORGANIZED HEALTH CARE EDUCATION/TRAINING PROGRAM

## 2025-02-22 PROCEDURE — 37799 UNLISTED PX VASCULAR SURGERY: CPT

## 2025-02-22 PROCEDURE — 99233 SBSQ HOSP IP/OBS HIGH 50: CPT | Performed by: ANESTHESIOLOGY

## 2025-02-22 PROCEDURE — 86900 BLOOD TYPING SEROLOGIC ABO: CPT | Performed by: NURSE PRACTITIONER

## 2025-02-22 PROCEDURE — 2500000004 HC RX 250 GENERAL PHARMACY W/ HCPCS (ALT 636 FOR OP/ED): Performed by: NURSE PRACTITIONER

## 2025-02-22 PROCEDURE — 84132 ASSAY OF SERUM POTASSIUM: CPT

## 2025-02-22 PROCEDURE — 2020000001 HC ICU ROOM DAILY

## 2025-02-22 PROCEDURE — 94640 AIRWAY INHALATION TREATMENT: CPT

## 2025-02-22 PROCEDURE — 99291 CRITICAL CARE FIRST HOUR: CPT | Performed by: NURSE PRACTITIONER

## 2025-02-22 PROCEDURE — 97110 THERAPEUTIC EXERCISES: CPT | Mod: GP | Performed by: STUDENT IN AN ORGANIZED HEALTH CARE EDUCATION/TRAINING PROGRAM

## 2025-02-22 PROCEDURE — 2500000002 HC RX 250 W HCPCS SELF ADMINISTERED DRUGS (ALT 637 FOR MEDICARE OP, ALT 636 FOR OP/ED): Performed by: NURSE PRACTITIONER

## 2025-02-22 PROCEDURE — 83735 ASSAY OF MAGNESIUM: CPT

## 2025-02-22 PROCEDURE — 2500000001 HC RX 250 WO HCPCS SELF ADMINISTERED DRUGS (ALT 637 FOR MEDICARE OP): Performed by: NURSE PRACTITIONER

## 2025-02-22 PROCEDURE — 97530 THERAPEUTIC ACTIVITIES: CPT | Mod: GP | Performed by: STUDENT IN AN ORGANIZED HEALTH CARE EDUCATION/TRAINING PROGRAM

## 2025-02-22 PROCEDURE — 94668 MNPJ CHEST WALL SBSQ: CPT

## 2025-02-22 PROCEDURE — 82330 ASSAY OF CALCIUM: CPT

## 2025-02-22 PROCEDURE — 2500000001 HC RX 250 WO HCPCS SELF ADMINISTERED DRUGS (ALT 637 FOR MEDICARE OP)

## 2025-02-22 PROCEDURE — 2500000005 HC RX 250 GENERAL PHARMACY W/O HCPCS

## 2025-02-22 PROCEDURE — 82947 ASSAY GLUCOSE BLOOD QUANT: CPT

## 2025-02-22 PROCEDURE — 2500000004 HC RX 250 GENERAL PHARMACY W/ HCPCS (ALT 636 FOR OP/ED)

## 2025-02-22 PROCEDURE — 71045 X-RAY EXAM CHEST 1 VIEW: CPT

## 2025-02-22 PROCEDURE — 71045 X-RAY EXAM CHEST 1 VIEW: CPT | Performed by: RADIOLOGY

## 2025-02-22 PROCEDURE — P9047 ALBUMIN (HUMAN), 25%, 50ML: HCPCS | Performed by: NURSE PRACTITIONER

## 2025-02-22 PROCEDURE — 2500000002 HC RX 250 W HCPCS SELF ADMINISTERED DRUGS (ALT 637 FOR MEDICARE OP, ALT 636 FOR OP/ED)

## 2025-02-22 PROCEDURE — 85025 COMPLETE CBC W/AUTO DIFF WBC: CPT

## 2025-02-22 RX ORDER — ALBUMIN HUMAN 250 G/1000ML
25 SOLUTION INTRAVENOUS ONCE
Status: COMPLETED | OUTPATIENT
Start: 2025-02-22 | End: 2025-02-22

## 2025-02-22 RX ORDER — HYDRALAZINE HYDROCHLORIDE 10 MG/1
10 TABLET, FILM COATED ORAL EVERY 8 HOURS
Status: DISCONTINUED | OUTPATIENT
Start: 2025-02-22 | End: 2025-02-24

## 2025-02-22 RX ORDER — METOPROLOL TARTRATE 25 MG/1
12.5 TABLET, FILM COATED ORAL 2 TIMES DAILY
Status: DISCONTINUED | OUTPATIENT
Start: 2025-02-22 | End: 2025-02-24

## 2025-02-22 RX ORDER — POTASSIUM CHLORIDE 14.9 MG/ML
20 INJECTION INTRAVENOUS ONCE
Status: COMPLETED | OUTPATIENT
Start: 2025-02-22 | End: 2025-02-22

## 2025-02-22 RX ORDER — CHLOROTHIAZIDE SODIUM 500 MG/1
500 INJECTION INTRAVENOUS ONCE
Status: COMPLETED | OUTPATIENT
Start: 2025-02-22 | End: 2025-02-22

## 2025-02-22 RX ORDER — FUROSEMIDE 10 MG/ML
40 INJECTION INTRAMUSCULAR; INTRAVENOUS ONCE
Status: COMPLETED | OUTPATIENT
Start: 2025-02-22 | End: 2025-02-22

## 2025-02-22 RX ORDER — METHOCARBAMOL 500 MG/1
250 TABLET, FILM COATED ORAL ONCE
Status: COMPLETED | OUTPATIENT
Start: 2025-02-22 | End: 2025-02-22

## 2025-02-22 RX ADMIN — FUROSEMIDE 40 MG: 10 INJECTION, SOLUTION INTRAVENOUS at 11:36

## 2025-02-22 RX ADMIN — METHOCARBAMOL 250 MG: 500 TABLET ORAL at 08:26

## 2025-02-22 RX ADMIN — Medication 5 L/MIN: at 20:57

## 2025-02-22 RX ADMIN — CALCIUM GLUCONATE 1 G: 20 INJECTION, SOLUTION INTRAVENOUS at 10:13

## 2025-02-22 RX ADMIN — HYDRALAZINE HYDROCHLORIDE 10 MG: 10 TABLET ORAL at 20:58

## 2025-02-22 RX ADMIN — POTASSIUM CHLORIDE 20 MEQ: 14.9 INJECTION, SOLUTION INTRAVENOUS at 01:39

## 2025-02-22 RX ADMIN — POTASSIUM CHLORIDE 20 MEQ: 1500 TABLET, EXTENDED RELEASE ORAL at 10:13

## 2025-02-22 RX ADMIN — POTASSIUM CHLORIDE 40 MEQ: 29.8 INJECTION, SOLUTION INTRAVENOUS at 15:16

## 2025-02-22 RX ADMIN — ACETAMINOPHEN 975 MG: 325 TABLET ORAL at 05:26

## 2025-02-22 RX ADMIN — ASPIRIN 81 MG CHEWABLE TABLET 81 MG: 81 TABLET CHEWABLE at 08:26

## 2025-02-22 RX ADMIN — METOPROLOL TARTRATE 12.5 MG: 25 TABLET, FILM COATED ORAL at 20:58

## 2025-02-22 RX ADMIN — BUDESONIDE 0.5 MG: 0.5 INHALANT RESPIRATORY (INHALATION) at 20:51

## 2025-02-22 RX ADMIN — POLYETHYLENE GLYCOL 3350 17 G: 17 POWDER, FOR SOLUTION ORAL at 20:58

## 2025-02-22 RX ADMIN — ALBUMIN HUMAN 25 G: 0.25 SOLUTION INTRAVENOUS at 08:26

## 2025-02-22 RX ADMIN — HEPARIN SODIUM 5000 UNITS: 5000 INJECTION, SOLUTION INTRAVENOUS; SUBCUTANEOUS at 03:14

## 2025-02-22 RX ADMIN — Medication 6 L/MIN: at 08:12

## 2025-02-22 RX ADMIN — MAGNESIUM SULFATE HEPTAHYDRATE 2 G: 40 INJECTION, SOLUTION INTRAVENOUS at 01:38

## 2025-02-22 RX ADMIN — CHLOROTHIAZIDE SODIUM 500 MG: 500 INJECTION, POWDER, LYOPHILIZED, FOR SOLUTION INTRAVENOUS at 12:24

## 2025-02-22 RX ADMIN — ACETAMINOPHEN 975 MG: 325 TABLET ORAL at 10:16

## 2025-02-22 RX ADMIN — PAROXETINE 10 MG: 10 TABLET, FILM COATED ORAL at 08:26

## 2025-02-22 RX ADMIN — IPRATROPIUM BROMIDE AND ALBUTEROL SULFATE 3 ML: .5; 3 SOLUTION RESPIRATORY (INHALATION) at 02:31

## 2025-02-22 RX ADMIN — Medication 40 PERCENT: at 02:31

## 2025-02-22 RX ADMIN — ACETAMINOPHEN 975 MG: 325 TABLET ORAL at 18:05

## 2025-02-22 RX ADMIN — HEPARIN SODIUM 5000 UNITS: 5000 INJECTION, SOLUTION INTRAVENOUS; SUBCUTANEOUS at 10:13

## 2025-02-22 RX ADMIN — INSULIN LISPRO 3 UNITS: 100 INJECTION, SOLUTION INTRAVENOUS; SUBCUTANEOUS at 21:03

## 2025-02-22 RX ADMIN — BUDESONIDE 0.5 MG: 0.5 INHALANT RESPIRATORY (INHALATION) at 08:11

## 2025-02-22 RX ADMIN — IPRATROPIUM BROMIDE AND ALBUTEROL SULFATE 3 ML: .5; 3 SOLUTION RESPIRATORY (INHALATION) at 16:17

## 2025-02-22 RX ADMIN — OXYCODONE 5 MG: 5 TABLET ORAL at 20:58

## 2025-02-22 RX ADMIN — POTASSIUM CHLORIDE 40 MEQ: 1.5 POWDER, FOR SOLUTION ORAL at 00:47

## 2025-02-22 RX ADMIN — SENNOSIDES AND DOCUSATE SODIUM 3 TABLET: 50; 8.6 TABLET ORAL at 20:58

## 2025-02-22 RX ADMIN — EZETIMIBE 10 MG: 10 TABLET ORAL at 20:58

## 2025-02-22 RX ADMIN — HEPARIN SODIUM 5000 UNITS: 5000 INJECTION, SOLUTION INTRAVENOUS; SUBCUTANEOUS at 18:05

## 2025-02-22 RX ADMIN — IPRATROPIUM BROMIDE AND ALBUTEROL SULFATE 3 ML: .5; 3 SOLUTION RESPIRATORY (INHALATION) at 20:52

## 2025-02-22 RX ADMIN — Medication 8 L/MIN: at 12:19

## 2025-02-22 RX ADMIN — IPRATROPIUM BROMIDE AND ALBUTEROL SULFATE 3 ML: .5; 3 SOLUTION RESPIRATORY (INHALATION) at 08:11

## 2025-02-22 RX ADMIN — CLOPIDOGREL BISULFATE 75 MG: 75 TABLET ORAL at 08:26

## 2025-02-22 RX ADMIN — HYDRALAZINE HYDROCHLORIDE 10 MG: 10 TABLET ORAL at 12:24

## 2025-02-22 ASSESSMENT — PAIN SCALES - GENERAL
PAINLEVEL_OUTOF10: 0 - NO PAIN
PAINLEVEL_OUTOF10: 4
PAINLEVEL_OUTOF10: 4
PAINLEVEL_OUTOF10: 0 - NO PAIN
PAINLEVEL_OUTOF10: 6
PAINLEVEL_OUTOF10: 6
PAINLEVEL_OUTOF10: 0 - NO PAIN
PAINLEVEL_OUTOF10: 3
PAINLEVEL_OUTOF10: 6

## 2025-02-22 ASSESSMENT — PAIN - FUNCTIONAL ASSESSMENT
PAIN_FUNCTIONAL_ASSESSMENT: 0-10

## 2025-02-22 ASSESSMENT — COGNITIVE AND FUNCTIONAL STATUS - GENERAL
WALKING IN HOSPITAL ROOM: A LITTLE
MOVING TO AND FROM BED TO CHAIR: A LITTLE
MOBILITY SCORE: 15
MOVING FROM LYING ON BACK TO SITTING ON SIDE OF FLAT BED WITH BEDRAILS: A LITTLE
CLIMB 3 TO 5 STEPS WITH RAILING: TOTAL
TURNING FROM BACK TO SIDE WHILE IN FLAT BAD: A LOT
STANDING UP FROM CHAIR USING ARMS: A LITTLE

## 2025-02-22 ASSESSMENT — PAIN DESCRIPTION - LOCATION: LOCATION: CHEST

## 2025-02-22 NOTE — PROGRESS NOTES
Plan:  - analgesia with PRN oxy/tylenol  - cont paxil and trazodone  - considering metop this PM  - echo -  ef 40%  - diuresed yesterday; prn lasix today, will give 40 x1  - d/c PAC   - keep dela cruz; likely deline   - ASA/plavix/SQH      Assessment:    Neuro/MSK: expected acute post op pain  and fibromyalgia   A-F bundle; Sleep Hygiene measures: appropriate daytime stimulation/physical activity. Lights out at 2100, avoidance of night time lab draws/night baths, minimize mind altering medicaments  PT/OT and OOB as appropriate      CV: SHANTAL cards assessment: HTN hx , HPL, CAD s/p CABG and PCI - pci is remote; CABGx3, and PAD    -      -        Pulm: acute post op pulmonary insufficiency   BPH with IS/flutter/OOB  not vented      Renal: Hypokalemia  and hypophosphatemia   Trend UOP and renal indices; replete lytes PRN     GI: SHANTAL GI assessment: n/a  Proph with n/a  Diet, PRN anti-emetics, and Bowel regimen    ID/rheum: n/a  Current abx: pastora-op with cefazolin x48h  Follow cx data     Endo: NIDDM with stress hyperlgycemia  Cont ISS  for BG goal <180      Heme: ABL anemia and Secondary thrombocytopenia   -     Trend Hb and transfuse PRN for goal Hb>7  DVT proph with scd and subcutaneous heparin    Lines/tubes/restraints:   Central line:  remove PAC, keep CVC  Dela Cruz: critically ill patient who need accurate urinary output measurements  Restraint: n/a    Dispo: ICU    Exam:    A&O x 4  NSR  Adequate air-exchange  Abd soft, NT  Extremities warm     REASON FOR ADMISSION    65 y.o. female s/p scheduled CABG x 3 [LIMA to LAD, SVG to LCx, SVG to PDA], posterior pericardial window secondary to CAD with Dr. Gallo Canchola on 2/18/2025. Past medical history remarkable for CAD s/p stent x 4 RCA, MI 2007, HLD, HTN, PAD, T2DM, overactive bladder, a recurrent UTI, polycythemia, fibromyalgia, osteoarthritis, gout, hard of hearing, smoking.     TODAY'S EVENTS    2/22: pressers weaned off; doing well    This critically ill patient continues  "to be at-risk for clinically significant deterioration / failure due to the above mentioned dysfunctional, unstable organ systems.  I have personally identified and managed all complex critical care issues to prevent aforementioned clinical deterioration.  Critical care time is spent at bedside and/or the immediate area and has included, but is not limited to, the review of diagnostic tests, labs, radiographs, serial assessments of hemodynamics, respiratory status, ventilatory management, and family updates.  Time spent in procedures and teaching are reported separately. CF CRITICAL CARE TIME: 35 minutes          LABS:  CMP:  Results from last 7 days   Lab Units 02/22/25  0012 02/21/25  1311 02/21/25  0113 02/20/25  1235 02/20/25  0447   SODIUM mmol/L 140 139 142 142 143   POTASSIUM mmol/L 3.3* 3.4* 3.9 3.6 3.8   CHLORIDE mmol/L 102 99 105 104 105   CO2 mmol/L 27 28 26 26 26   ANION GAP mmol/L 14 15 15 16 16   BUN mg/dL 23 26* 23 24* 26*   CREATININE mg/dL 0.63 0.70 0.69 0.75 0.76   EGFR mL/min/1.73m*2 >90 >90 >90 88 87   MAGNESIUM mg/dL 1.99 2.26 1.99 2.02 2.38   ALBUMIN g/dL 4.2 4.4 3.9 4.2 4.2   ALK PHOS U/L  --   --   --   --  36   ALT U/L  --   --   --   --  15   AST U/L  --   --   --   --  45*   BILIRUBIN TOTAL mg/dL  --   --   --   --  0.9     CBC:  Results from last 7 days   Lab Units 02/22/25  0012 02/21/25  1311 02/21/25  0113 02/20/25  1235 02/20/25  0447 02/19/25  1215 02/19/25  0200 02/18/25  1310   WBC AUTO x10*3/uL 8.7 10.3 8.1 8.4 8.6 10.7 11.5* 23.9*   HEMOGLOBIN g/dL 8.9* 9.7* 8.8* 8.7* 8.7* 9.7* 10.1* 14.3   HEMATOCRIT % 26.9* 29.2* 27.0* 27.1* 26.3* 28.7* 30.3* 41.8   PLATELETS AUTO x10*3/uL 117* 118* 89* 83* 90* 108* 148* 222   MCV fL 101* 101* 102* 104* 102* 100 99 98     COAG:   Results from last 7 days   Lab Units 02/18/25  1310 02/17/25  1626   INR  1.3* 0.9     ABO: No results found for: \"ABO\"  HEME/ENDO:  Results from last 7 days   Lab Units 02/17/25  1626   HEMOGLOBIN A1C % 6.4*    " "  CARDIAC:       No lab exists for component: \"CK\", \"CKMBP\"  MICRO: No results found for the last 90 days.    "

## 2025-02-22 NOTE — PROGRESS NOTES
CTICU Progress Note  Barbie Milan/[id number]    Admit Date: 2/18/2025  Hospital Length of Stay: 4   ICU Length of Stay: 3d 18h   CT SURGEON:     SUBJECTIVE:   Overnight remained on [place] 40%. diuresed  Levo 0.06    MEDICATIONS  Infusions:  norepinephrine, Last Rate: 0.06 mcg/kg/min (02/22/25 0400)      Scheduled:  acetaminophen, 975 mg, q6h  albumin human, 25 g, Once  aspirin, 81 mg, Daily  budesonide, 0.5 mg, BID  clopidogrel, 75 mg, Daily  ezetimibe, 10 mg, Nightly  heparin, 5,000 Units, q8h  insulin lispro, 0-15 Units, Before meals & nightly  ipratropium-albuteroL, 3 mL, q6h  oxygen, , Continuous - Inhalation  oxygen, , Continuous - Inhalation  PARoxetine, 10 mg, q AM  perflutren protein A microsphere, 0.5 mL, Once in imaging  polyethylene glycol, 17 g, BID  sennosides-docusate sodium, 3 tablet, BID  sulfur hexafluoride microsphr, 2 mL, Once in imaging      PRN:  albuterol, 2.5 mg, q6h PRN  calcium gluconate, 1 g, q6h PRN  calcium gluconate, 2 g, q6h PRN  ipratropium-albuteroL, 3 mL, q6h PRN  magnesium sulfate, 2 g, q6h PRN  magnesium sulfate, 4 g, q6h PRN  methocarbamol, 1,000 mg, q6h PRN  naloxone, 0.2 mg, q5 min PRN  ondansetron, 4 mg, q8h PRN   Or  ondansetron, 4 mg, q8h PRN  oxyCODONE, 5 mg, q6h PRN  potassium chloride CR, 20 mEq, q6h PRN   Or  potassium chloride, 20 mEq, q6h PRN  potassium chloride CR, 40 mEq, q6h PRN   Or  potassium chloride, 40 mEq, q6h PRN  potassium chloride, 20 mEq, q6h PRN  potassium chloride, 40 mEq, q6h PRN        PHYSICAL EXAM:   Visit Vitals  /77 (BP Location: Left arm)   Pulse 83   Temp 36 °C (96.8 °F) (Temporal)   Resp 17   Wt 95 kg (209 lb 7 oz)   LMP  (LMP Unknown)   SpO2 98%   BMI 34.85 kg/m²   OB Status Postmenopausal   Smoking Status Every Day   BSA 2.09 m²     Wt Readings from Last 5 Encounters:   02/22/25 95 kg (209 lb 7 oz)   02/17/25 89.8 kg (198 lb)   02/14/25 89.8 kg (198 lb)     INTAKE/OUTPUT:  I/O last 3 completed shifts:  In: 1732.2 (18.2 mL/kg)  [P.O.:1300; I.V.:332.2 (3.5 mL/kg); IV Piggyback:100]  Out: 4250 (44.7 mL/kg) [Urine:4110 (1.2 mL/kg/hr); Chest Tube:140]  Weight: 95 kg          Vent settings:  FiO2 (%):  [40 %-50 %] 40 %    Physical Exam:   - CONSTITUTION: Adult female in ICU bed. No acute distress on airvo  - NEUROLOGIC: Aox3 cam negative, no focal deficits  - CARDIOVASCULAR: Sinus with frequent PVC/PAC . V wires backed up VVI @ 40, MA 6, sens 0.8  - RESPIRATORY: Crackles, Wet cough  - GI: Soft, mildly distended  - : Clear yellow urine in dela cruz  - EXTREMITIES: genearlized nonpitting edema. Pulses palpable  - SKIN: warm  - PSYCHIATRIC: WNL, very pleasant    Daily Risk Screen  Intubated: N/A  Central line: Yes, remove tomorrow. Remove PAC today  Dela Cruz: Yes, Critical I/O during aggressive diuresis    Images: CXR 2/21  IMPRESSION:  1. Mild interstitial pulmonary edema appears improving in comparison  previous study.  2. Possible small left pleural effusion and subsegmental atelectasis    Invasive Hemodynamics:    Most Recent Range Past 24hrs   BP (Art) 121/51 Arterial Line BP 1  Min: 89/33  Max: 129/59   MAP(Art) 75 mmHg Arterial Line MAP 1 (mmHg)   Min: 52 mmHg  Max: 93 mmHg   RA/CVP   No data recorded   PA 31/12 PAP  Min: 19/7  Max: 41/33   PA(mean) 20 mmHg PAP (Mean)  Min: 13 mmHg  Max: 36 mmHg   CO 4.9 L/min CO (L/min)  Min: 4.9 L/min  Max: 5.88 L/min   CI 2.5 L/min/m2 CI (L/min/m2)  Min: 2.5 L/min/m2  Max: 3 L/min/m2   Mixed Venous   No data recorded   SVR  680 (dyne*sec)/cm5 SVR (dyne*sec)/cm5  Min: 620 (dyne*sec)/cm5  Max: 680 (dyne*sec)/cm5         Assessment/Plan   Patient is a 65-year-old female s/p scheduled CABG x 3 [LIMA to LAD, SVG to LCx, SVG to PDA], posterior pericardial window secondary to CAD with Dr. Gallo Canchola on 2/18/2025.  Past medical history remarkable for CAD s/p stent x 4 RCA, MI 2007, HLD, HTN, PAD, T2DM, overactive bladder, a recurrent UTI, polycythemia, fibromyalgia, osteoarthritis, gout, hard of hearing, smoking.      Plan:  NEURO/MSK:  PMH of OA, gout, fibromyalgia. Patient is intubated and sedated on propofol infusion. Methadone 20 prior to surgery. Acute post operative pain.  -->  - Serial neuro and pain assessments   - Scheduled Tylenol 975 mg q 6hr   - PRN oxycodone  - PT Consult, OOB to chair as tolerated, chair position if not tolerated   - CAM ICU score qshift  - Sleep/wake cycle hygiene  - Hold home Norco 5-3 25 mg 3 times daily, ibuprofen 200 Mg,   - Continue Paxil 10 Mg every morning, trazodone 50 Mg nightly     CV:  Patient has a history of CAD s/p stent x 4 RCA, MI 2007, HLD, HTN, PVD Now s/p CABG x 3 [LIMA to LAD, SVG to LCx, SVG to PDA], posterior pericardial window secondary to CAD with Dr. Gallo Canchola on 2/18/2025. PRE LVEF 25-30% [[place]]/ POST LVEF 30-35% . 2/21 TTE showed LVEF of 40%, reduced RV. A/V epicardial wires set VVI @ 50bpm.  CI 2.5. Hypotension on levo-->  - Maintain goal MAP 65-80  - Mixed venous and CI Q4H  - Volume resuscitate as clinically indicated  - Start PO hydralazine 10mg q8hrs for afterload reduction  - Maintain epicardial wires set VVI @ 50  - Continue ASA and plavix  - Continue Ezetimibe  - Hold home lisinopril 10 Mg daily, metoprolol 50 Mg daily,     PULM:  No history of pulmonary disease. Extensive smoking hx.  Currently on airvo. Acute postop respiratory insufficiency. -->  -2/19.  Self extubated to CPAP.    - Daily CXR  - Wean FiO2 maintaining SpO2 >92%.   - IS q1h and OOB to chair   - Bronchial hygiene     GI:  No PMH.  2/21 BM  - Colace/senna BID and miralax BID  - Cardiac diet    :  CSA-ALEXSANDER Risk Score 3.  PMH of overactive bladder, recurrent UTI,  baseline CRE 0.75. Creatinine stable post-op. Dela Cruz in place and making adequate UOP. Hypervolemia. Hypophosphatemia -->  - Continue dela cruz catheter for strict I/Os.  - Goal UOP 0.5ml/kg/hr  - Diurese for goal -1/24hrs.   - RFP as clinically indicated  - Replete electrolytes per CTICU protocol  - Hold home medications: Lasix 40 Mg  daily     ENDO:  PMH of T2DM A1c: 6.4. -->  - Maintain BG <180, insulin per CTICU protocol     HEME:  Polycythemia, Acute blood loss anemia and thrombocytopenia. -->    - Monitor drain output volume and characteristics  - CBC, coags, and fibrinogen post op and as clinically indicated  - Continue ASA  - Continue plavix  - SQH  - SCDs for DVT prophylaxis.  - Last type and screen: 2/18/2024-ordered for today     ID:  Afebrile, no current indications of infection. MRSA negative. -->  - Trend temp q4h  - Periop cefazolin x 48hrs. Course complete.     Skin:  No active skin issues.  - preventative Mepilex dressings in place on sacrum and heels  - change preventative Mepilex weekly or more frequently as indicated (when moist/soiled)   - every shift skin assessment per nursing and weekly ICU skin rounds  - moisture barrier to be applied with pastora care  - active skin problems addressed with nursing on daily rounds     Proph:  SCDs  Subcutaneous heparin     G:  Line  Right IJ MAC w [place] with PA catheter placed 2/18/2025  Left brachial a-line placed 2/18/2025  Pichardo 2/18     F: Family: will update at bedside postoperatively.     A,B,C,D,E,F,G: reviewed        Dispo: CTICU care for now.    CTICU TEAM PHONE [phone]    Code status: Full Code  Emergency contact: [email or web address]      I personally spent 45 minutes of critical care time directly and personally managing the patient exclusive of separately billable procedures.

## 2025-02-22 NOTE — CARE PLAN
Problem: Safety - Adult  Goal: Free from fall injury  Outcome: Progressing     Problem: Discharge Planning  Goal: Discharge to home or other facility with appropriate resources  Outcome: Progressing     Problem: Chronic Conditions and Co-morbidities  Goal: Patient's chronic conditions and co-morbidity symptoms are monitored and maintained or improved  Outcome: Progressing     Problem: Nutrition  Goal: Nutrient intake appropriate for maintaining nutritional needs  Outcome: Progressing     Problem: Skin  Goal: Decreased wound size/increased tissue granulation at next dressing change  Outcome: Progressing  Goal: Participates in plan/prevention/treatment measures  Outcome: Progressing  Goal: Prevent/manage excess moisture  Outcome: Progressing  Goal: Prevent/minimize sheer/friction injuries  Outcome: Progressing  Goal: Promote/optimize nutrition  Outcome: Progressing  Goal: Promote skin healing  Outcome: Progressing     Problem: Fall/Injury  Goal: Not fall by end of shift  Outcome: Progressing  Goal: Be free from injury by end of the shift  Outcome: Progressing  Goal: Verbalize understanding of personal risk factors for fall in the hospital  Outcome: Progressing  Goal: Verbalize understanding of risk factor reduction measures to prevent injury from fall in the home  Outcome: Progressing  Goal: Use assistive devices by end of the shift  Outcome: Progressing  Goal: Pace activities to prevent fatigue by end of the shift  Outcome: Progressing     Problem: Respiratory  Goal: Clear secretions with interventions this shift  Outcome: Progressing  Goal: Minimize anxiety/maximize coping throughout shift  Outcome: Progressing  Goal: Minimal/no exertional discomfort or dyspnea this shift  Outcome: Progressing  Goal: No signs of respiratory distress (eg. Use of accessory muscles. Peds grunting)  Outcome: Progressing  Goal: Patent airway maintained this shift  Outcome: Progressing  Goal: Tolerate mechanical ventilation evidenced by  VS/agitation level this shift  Outcome: Progressing  Goal: Tolerate pulmonary toileting this shift  Outcome: Progressing  Goal: Verbalize decreased shortness of breath this shift  Outcome: Progressing  Goal: Wean oxygen to maintain O2 saturation per order/standard this shift  Outcome: Progressing  Goal: Increase self care and/or family involvement in next 24 hours  Outcome: Progressing   The patient's goals for the shift include HDS      The clinical goals for the shift include HDS safety    Over the shift, the patient did not make progress toward the following goals. Barriers to progression include. Recommendations to address these barriers include.

## 2025-02-22 NOTE — PROGRESS NOTES
Physical Therapy    Physical Therapy Treatment    Patient Name: Barbie Milan  MRN: [id number]  Today's Date: 2/22/2025  Room: 06/[facility]  Time Calculation  Start Time: 0922  Stop Time: 0950  Time Calculation (min): 28 min       Assessment/Plan   PT Plan  Treatment/Interventions: Bed mobility, Transfer training, Gait training, Stair training, Balance training, Neuromuscular re-education, Neurodevelopmental intervention, Strengthening, Endurance training, Range of motion, Therapeutic exercise, Therapeutic activity, Home exercise program  PT Plan: Ongoing PT  PT Frequency: 5 times per week  PT Discharge Recommendations: High intensity level of continued care  Equipment Recommended upon Discharge: Wheeled walker  PT Recommended Transfer Status: Assist x1, Assistive device  PT - OK to Discharge: Yes    General Visit Information:   Reason for Referral: 64 y/o female now CABG x 3 (LIMA to LAD, SVG to LCx, SVG to PDA), posterior pericardial window secondary to CAD with Dr. Gallo Canchola on 2/18/2025  Past Medical History Relevant to Rehab: CAD s/p stent x 4 RCA, MI 2007, HLD, HTN, PAD, T2DM, overactive bladder, a recurrent UTI, polycythemia, fibromyalgia, osteoarthritis, gout, hard of hearing, smoking  Prior to Session Communication: Bedside nurse  Patient Position Received: Bed, 3 rail up, Alarm off, not on at start of session   Subjective: Patient is alert, agreeable to PT.  In good spirits and motivated to get OOB today.    Precautions:  Precautions  Hearing/Visual Limitations: [place]  Medical Precautions: Fall precautions, Cardiac precautions, Oxygen therapy device and L/min  Post-Surgical Precautions: Move in the Tube  Vital Signs:   Date/Time Vitals Session Patient Position Pulse Resp SpO2 BP MAP (mmHg)    02/22/25 0900 --  --  96  17  95 %  --  --    02/22/25 0922 --  --  96  --  95 %  131/72  --    02/22/25 1000 --  --  90  20  98 %  --  --            Objective   Pain:  Pain Assessment  0-10 (Numeric) Pain Score: 4  (post 4)  Pain Type: Surgical pain  Pain Location: Chest  Pain Orientation: Mid  Cognition:  Cognition  Overall Cognitive Status: Within Functional Limits  Orientation Level: Oriented X4  Lines/Tubes/Drains:  CVC 02/18/25 Double lumen Right Internal jugular (Active)   Number of days: 4       Introducer 02/18/25 Internal jugular (Active)   Number of days: 4       Arterial Line 02/18/25 Left Brachial (Active)   Number of days: 4       Pulmonary Artery Catheter (Active)   Number of days: 3       Urethral Catheter Non-latex 14 Fr. (Active)   Number of days: 4     Medical Gas Therapy: Supplemental oxygen  Medical Gas Delivery Method: Nasal cannula  Oxygen Dose: *6 L/min  Continuous Medications/Drips:  norepinephrine, 0-0.5 mcg/kg/min (Dosing Weight), Last Rate: Stopped (02/22/25 0820)        PT Treatments:  Therapeutic Exercise  Therapeutic Exercise Activity 1: BLE PF, DF, alternating heel slide, 10 x 2, SLR 5 x 1 AROM  Therapeutic Activity  Therapeutic Activity 1: IS 10x 1000cc     Bed Mobility 1  Bed Mobility 1: Supine to sitting  Level of Assistance 1: Moderate assistance  Bed Mobility Comments 1: HOB 30, TAPS  Ambulation/Gait Training 1  Surface 1: Level tile  Device 1: Rolling walker  Assistance 1: Contact guard  Quality of Gait 1:  (path deviation, decresed gait speed)  Comments/Distance (ft) 1: 50'  Transfers  Transfer: Yes  Transfer 1  Transfer From 1: Sit to  Transfer to 1: Stand  Transfer Device 1: Walker  Transfer Level of Assistance 1: Contact guard  Trials/Comments 1: x3  Transfers 2  Transfer From 2: Stand to  Transfer to 2: Sit  Transfer Device 2: Walker  Transfer Level of Assistance 2: Contact guard  Trials/Comments 2: x3  Transfers 3  Transfer From 3: Bed to  Transfer to 3: Chair with arms  Transfer Device 3: Walker  Transfer Level of Assistance 3: Contact guard  Trials/Comments 3: x1  Transfers 4  Transfer From 4: Chair with arms to  Transfer to 4: Chair with arms  Transfer Device 4: Walker  Transfer  Level of Assistance 4: Contact guard  Trials/Comments 4: x1             Outcome Measures:  [facility] Basic Mobility  Turning from your back to your side while in a flat bed without using bedrails: A little  Moving from lying on your back to sitting on the side of a flat bed without using bedrails: A lot  Moving to and from bed to chair (including a wheelchair): A little  Standing up from a chair using your arms (e.g. wheelchair or bedside chair): A little  To walk in hospital room: A little  Climbing 3-5 steps with railing: Total  Basic Mobility - Total Score: 15           FSS-ICU  Ambulation: Walks <50 feet with any assistance x1 or walks any distance with assistance x2 people  Rolling: Minimal assistance (performs 75% or more of task)  Sitting: Minimal assistance (performs 75% or more of task)  Transfer Sit-to-Stand: Minimal assistance (performs 75% or more of task)  Transfer Supine-to-Sit: Moderate assistance (performs 50 - 74% of task)  Total Score: 16  ICU Mobility Screen  ICU Mobility Scale: Walking with assistance of 1 person             Education Documentation  Handouts, taught by Reji Dillon PT at 2/22/2025 10:52 AM.  Learner: Patient  Readiness: Acceptance  Method: Explanation  Response: Needs Reinforcement  Comment: KANE PAN    Precautions, taught by Reji Dillon PT at 2/22/2025 10:52 AM.  Learner: Patient  Readiness: Acceptance  Method: Explanation  Response: Needs Reinforcement  Comment: MAXIM IS    Body Mechanics, taught by Reji Dillon PT at 2/22/2025 10:52 AM.  Learner: Patient  Readiness: Acceptance  Method: Explanation  Response: Needs Reinforcement  Comment: MAXIM IS    Mobility Training, taught by Reji Dillon PT at 2/22/2025 10:52 AM.  Learner: Patient  Readiness: Acceptance  Method: Explanation  Response: Needs Reinforcement  Comment: MAXIM IS    Education Comments  No comments found.          OP EDUCATION:       Encounter Problems       Encounter Problems (Active)        Mobility       LTG - Patient will ambulate household distance ~150 feet with SBA and LRAD. (Progressing)       Start:  02/19/25    Expected End:  03/05/25            LTG - Patient will navigate 4-6 steps with rails/device with SBA (Progressing)       Start:  02/19/25    Expected End:  03/05/25            Pt will ambulate >/= 150 ft with LRAD and SBA and no signs of fatigue or SOB.   (Progressing)       Start:  02/19/25    Expected End:  03/05/25               PT Transfers       LTG - Patient will transfer from one surface to another independently with LRAD (Progressing)       Start:  02/19/25    Expected End:  03/05/25            STG - Patient will perform bed mobility independently.  (Progressing)       Start:  02/19/25    Expected End:  03/05/25                   Assessment: Patient is progressing Well with therapy this date.  Patient able to complete multiple bed level there-ex, standing and gait trials this date.  Increased WOB with RPD 7/10 with ambulation but no desaturation noted on good pleth reading.  Patient remains appropriate for Low intensity therapy when medically appropriate for discharge from acute stay.  Will continue to follow.      02/22/25 at 10:53 AM   Reji Dillon, PT   Rehab Office: [phone]

## 2025-02-23 ENCOUNTER — APPOINTMENT (OUTPATIENT)
Dept: RADIOLOGY | Facility: HOSPITAL | Age: 65
DRG: 235 | End: 2025-02-23
Payer: MEDICARE

## 2025-02-23 VITALS
SYSTOLIC BLOOD PRESSURE: 111 MMHG | WEIGHT: 203.93 LBS | TEMPERATURE: 97.9 F | HEART RATE: 85 BPM | BODY MASS INDEX: 33.93 KG/M2 | OXYGEN SATURATION: 95 % | RESPIRATION RATE: 18 BRPM | DIASTOLIC BLOOD PRESSURE: 52 MMHG

## 2025-02-23 LAB
ALBUMIN SERPL BCP-MCNC: 4.2 G/DL (ref 3.4–5)
ALBUMIN SERPL BCP-MCNC: 4.2 G/DL (ref 3.4–5)
ANION GAP BLDA CALCULATED.4IONS-SCNC: 11 MMO/L (ref 10–25)
ANION GAP SERPL CALC-SCNC: 14 MMOL/L (ref 10–20)
ANION GAP SERPL CALC-SCNC: 16 MMOL/L (ref 10–20)
BASE EXCESS BLDA CALC-SCNC: -0.1 MMOL/L (ref -2–3)
BASOPHILS # BLD AUTO: 0.03 X10*3/UL (ref 0–0.1)
BASOPHILS NFR BLD AUTO: 0.4 %
BODY TEMPERATURE: 37 DEGREES CELSIUS
BUN SERPL-MCNC: 19 MG/DL (ref 6–23)
BUN SERPL-MCNC: 20 MG/DL (ref 6–23)
CA-I BLD-SCNC: 1.21 MMOL/L (ref 1.1–1.33)
CA-I BLDA-SCNC: 1.22 MMOL/L (ref 1.1–1.33)
CALCIUM SERPL-MCNC: 8.8 MG/DL (ref 8.6–10.6)
CALCIUM SERPL-MCNC: 9.1 MG/DL (ref 8.6–10.6)
CHLORIDE BLDA-SCNC: 102 MMOL/L (ref 98–107)
CHLORIDE SERPL-SCNC: 101 MMOL/L (ref 98–107)
CHLORIDE SERPL-SCNC: 102 MMOL/L (ref 98–107)
CO2 SERPL-SCNC: 24 MMOL/L (ref 21–32)
CO2 SERPL-SCNC: 24 MMOL/L (ref 21–32)
CREAT SERPL-MCNC: 0.62 MG/DL (ref 0.5–1.05)
CREAT SERPL-MCNC: 0.69 MG/DL (ref 0.5–1.05)
EGFRCR SERPLBLD CKD-EPI 2021: >90 ML/MIN/1.73M*2
EGFRCR SERPLBLD CKD-EPI 2021: >90 ML/MIN/1.73M*2
EOSINOPHIL # BLD AUTO: 0.37 X10*3/UL (ref 0–0.7)
EOSINOPHIL NFR BLD AUTO: 5 %
ERYTHROCYTE [DISTWIDTH] IN BLOOD BY AUTOMATED COUNT: 12.9 % (ref 11.5–14.5)
GLUCOSE BLD MANUAL STRIP-MCNC: 102 MG/DL (ref 74–99)
GLUCOSE BLD MANUAL STRIP-MCNC: 103 MG/DL (ref 74–99)
GLUCOSE BLD MANUAL STRIP-MCNC: 107 MG/DL (ref 74–99)
GLUCOSE BLD MANUAL STRIP-MCNC: 131 MG/DL (ref 74–99)
GLUCOSE BLD MANUAL STRIP-MCNC: 151 MG/DL (ref 74–99)
GLUCOSE BLDA-MCNC: 109 MG/DL (ref 74–99)
GLUCOSE SERPL-MCNC: 110 MG/DL (ref 74–99)
GLUCOSE SERPL-MCNC: 118 MG/DL (ref 74–99)
HCO3 BLDA-SCNC: 23.6 MMOL/L (ref 22–26)
HCT VFR BLD AUTO: 26 % (ref 36–46)
HCT VFR BLD EST: 28 % (ref 36–46)
HGB BLD-MCNC: 8.9 G/DL (ref 12–16)
HGB BLDA-MCNC: 9.4 G/DL (ref 12–16)
IMM GRANULOCYTES # BLD AUTO: 0.04 X10*3/UL (ref 0–0.7)
IMM GRANULOCYTES NFR BLD AUTO: 0.5 % (ref 0–0.9)
INHALED O2 CONCENTRATION: 37 %
LACTATE BLDA-SCNC: 0.8 MMOL/L (ref 0.4–2)
LYMPHOCYTES # BLD AUTO: 2.09 X10*3/UL (ref 1.2–4.8)
LYMPHOCYTES NFR BLD AUTO: 28.4 %
MAGNESIUM SERPL-MCNC: 1.8 MG/DL (ref 1.6–2.4)
MAGNESIUM SERPL-MCNC: 2.36 MG/DL (ref 1.6–2.4)
MCH RBC QN AUTO: 33.8 PG (ref 26–34)
MCHC RBC AUTO-ENTMCNC: 34.2 G/DL (ref 32–36)
MCV RBC AUTO: 99 FL (ref 80–100)
MONOCYTES # BLD AUTO: 0.61 X10*3/UL (ref 0.1–1)
MONOCYTES NFR BLD AUTO: 8.3 %
NEUTROPHILS # BLD AUTO: 4.23 X10*3/UL (ref 1.2–7.7)
NEUTROPHILS NFR BLD AUTO: 57.4 %
NRBC BLD-RTO: 0.3 /100 WBCS (ref 0–0)
OXYHGB MFR BLDA: 97 % (ref 94–98)
PCO2 BLDA: 34 MM HG (ref 38–42)
PH BLDA: 7.45 PH (ref 7.38–7.42)
PHOSPHATE SERPL-MCNC: 4 MG/DL (ref 2.5–4.9)
PHOSPHATE SERPL-MCNC: 4.1 MG/DL (ref 2.5–4.9)
PLATELET # BLD AUTO: 114 X10*3/UL (ref 150–450)
PO2 BLDA: 109 MM HG (ref 85–95)
POTASSIUM BLDA-SCNC: 3.8 MMOL/L (ref 3.5–5.3)
POTASSIUM SERPL-SCNC: 3.8 MMOL/L (ref 3.5–5.3)
POTASSIUM SERPL-SCNC: 4 MMOL/L (ref 3.5–5.3)
RBC # BLD AUTO: 2.63 X10*6/UL (ref 4–5.2)
SAO2 % BLDA: 99 % (ref 94–100)
SODIUM BLDA-SCNC: 133 MMOL/L (ref 136–145)
SODIUM SERPL-SCNC: 136 MMOL/L (ref 136–145)
SODIUM SERPL-SCNC: 137 MMOL/L (ref 136–145)
WBC # BLD AUTO: 7.4 X10*3/UL (ref 4.4–11.3)

## 2025-02-23 PROCEDURE — 2500000004 HC RX 250 GENERAL PHARMACY W/ HCPCS (ALT 636 FOR OP/ED)

## 2025-02-23 PROCEDURE — 2500000002 HC RX 250 W HCPCS SELF ADMINISTERED DRUGS (ALT 637 FOR MEDICARE OP, ALT 636 FOR OP/ED): Performed by: NURSE PRACTITIONER

## 2025-02-23 PROCEDURE — 2500000002 HC RX 250 W HCPCS SELF ADMINISTERED DRUGS (ALT 637 FOR MEDICARE OP, ALT 636 FOR OP/ED)

## 2025-02-23 PROCEDURE — 2500000001 HC RX 250 WO HCPCS SELF ADMINISTERED DRUGS (ALT 637 FOR MEDICARE OP)

## 2025-02-23 PROCEDURE — 2500000001 HC RX 250 WO HCPCS SELF ADMINISTERED DRUGS (ALT 637 FOR MEDICARE OP): Performed by: NURSE PRACTITIONER

## 2025-02-23 PROCEDURE — 2500000004 HC RX 250 GENERAL PHARMACY W/ HCPCS (ALT 636 FOR OP/ED): Performed by: NURSE PRACTITIONER

## 2025-02-23 PROCEDURE — 84132 ASSAY OF SERUM POTASSIUM: CPT

## 2025-02-23 PROCEDURE — 2500000005 HC RX 250 GENERAL PHARMACY W/O HCPCS: Performed by: THORACIC SURGERY (CARDIOTHORACIC VASCULAR SURGERY)

## 2025-02-23 PROCEDURE — 2500000002 HC RX 250 W HCPCS SELF ADMINISTERED DRUGS (ALT 637 FOR MEDICARE OP, ALT 636 FOR OP/ED): Performed by: THORACIC SURGERY (CARDIOTHORACIC VASCULAR SURGERY)

## 2025-02-23 PROCEDURE — 71045 X-RAY EXAM CHEST 1 VIEW: CPT

## 2025-02-23 PROCEDURE — 37799 UNLISTED PX VASCULAR SURGERY: CPT

## 2025-02-23 PROCEDURE — 82330 ASSAY OF CALCIUM: CPT

## 2025-02-23 PROCEDURE — 94640 AIRWAY INHALATION TREATMENT: CPT

## 2025-02-23 PROCEDURE — 1200000002 HC GENERAL ROOM WITH TELEMETRY DAILY

## 2025-02-23 PROCEDURE — 82947 ASSAY GLUCOSE BLOOD QUANT: CPT

## 2025-02-23 PROCEDURE — 85025 COMPLETE CBC W/AUTO DIFF WBC: CPT

## 2025-02-23 PROCEDURE — 2500000005 HC RX 250 GENERAL PHARMACY W/O HCPCS

## 2025-02-23 PROCEDURE — 71045 X-RAY EXAM CHEST 1 VIEW: CPT | Performed by: RADIOLOGY

## 2025-02-23 PROCEDURE — 94668 MNPJ CHEST WALL SBSQ: CPT

## 2025-02-23 PROCEDURE — 83735 ASSAY OF MAGNESIUM: CPT

## 2025-02-23 PROCEDURE — 99291 CRITICAL CARE FIRST HOUR: CPT | Performed by: STUDENT IN AN ORGANIZED HEALTH CARE EDUCATION/TRAINING PROGRAM

## 2025-02-23 RX ORDER — FUROSEMIDE 10 MG/ML
40 INJECTION INTRAMUSCULAR; INTRAVENOUS ONCE
Status: COMPLETED | OUTPATIENT
Start: 2025-02-23 | End: 2025-02-23

## 2025-02-23 RX ORDER — ACETAMINOPHEN 325 MG/1
975 TABLET ORAL EVERY 6 HOURS PRN
Status: DISCONTINUED | OUTPATIENT
Start: 2025-02-23 | End: 2025-02-26 | Stop reason: HOSPADM

## 2025-02-23 RX ORDER — IPRATROPIUM BROMIDE AND ALBUTEROL SULFATE 2.5; .5 MG/3ML; MG/3ML
3 SOLUTION RESPIRATORY (INHALATION)
Status: DISCONTINUED | OUTPATIENT
Start: 2025-02-23 | End: 2025-02-26 | Stop reason: HOSPADM

## 2025-02-23 RX ADMIN — PAROXETINE 10 MG: 10 TABLET, FILM COATED ORAL at 08:42

## 2025-02-23 RX ADMIN — METOPROLOL TARTRATE 12.5 MG: 25 TABLET, FILM COATED ORAL at 21:12

## 2025-02-23 RX ADMIN — IPRATROPIUM BROMIDE AND ALBUTEROL SULFATE 3 ML: .5; 3 SOLUTION RESPIRATORY (INHALATION) at 14:13

## 2025-02-23 RX ADMIN — METOPROLOL TARTRATE 12.5 MG: 25 TABLET, FILM COATED ORAL at 08:42

## 2025-02-23 RX ADMIN — METHOCARBAMOL 1000 MG: 1000 INJECTION, SOLUTION INTRAMUSCULAR; INTRAVENOUS at 02:38

## 2025-02-23 RX ADMIN — OXYCODONE 5 MG: 5 TABLET ORAL at 14:33

## 2025-02-23 RX ADMIN — BUDESONIDE 0.5 MG: 0.5 INHALANT RESPIRATORY (INHALATION) at 07:56

## 2025-02-23 RX ADMIN — MAGNESIUM SULFATE HEPTAHYDRATE 2 G: 40 INJECTION, SOLUTION INTRAVENOUS at 02:57

## 2025-02-23 RX ADMIN — HEPARIN SODIUM 5000 UNITS: 5000 INJECTION, SOLUTION INTRAVENOUS; SUBCUTANEOUS at 11:33

## 2025-02-23 RX ADMIN — IPRATROPIUM BROMIDE AND ALBUTEROL SULFATE 3 ML: .5; 3 SOLUTION RESPIRATORY (INHALATION) at 07:53

## 2025-02-23 RX ADMIN — EZETIMIBE 10 MG: 10 TABLET ORAL at 21:12

## 2025-02-23 RX ADMIN — OXYCODONE 5 MG: 5 TABLET ORAL at 21:11

## 2025-02-23 RX ADMIN — SENNOSIDES AND DOCUSATE SODIUM 3 TABLET: 50; 8.6 TABLET ORAL at 08:42

## 2025-02-23 RX ADMIN — Medication 4 L/MIN: at 14:20

## 2025-02-23 RX ADMIN — HEPARIN SODIUM 5000 UNITS: 5000 INJECTION, SOLUTION INTRAVENOUS; SUBCUTANEOUS at 21:12

## 2025-02-23 RX ADMIN — ASPIRIN 81 MG CHEWABLE TABLET 81 MG: 81 TABLET CHEWABLE at 08:42

## 2025-02-23 RX ADMIN — CLOPIDOGREL BISULFATE 75 MG: 75 TABLET ORAL at 08:42

## 2025-02-23 RX ADMIN — POLYETHYLENE GLYCOL 3350 17 G: 17 POWDER, FOR SOLUTION ORAL at 08:43

## 2025-02-23 RX ADMIN — BUDESONIDE 0.5 MG: 0.5 INHALANT RESPIRATORY (INHALATION) at 20:49

## 2025-02-23 RX ADMIN — HEPARIN SODIUM 5000 UNITS: 5000 INJECTION, SOLUTION INTRAVENOUS; SUBCUTANEOUS at 02:38

## 2025-02-23 RX ADMIN — INSULIN LISPRO 3 UNITS: 100 INJECTION, SOLUTION INTRAVENOUS; SUBCUTANEOUS at 21:10

## 2025-02-23 RX ADMIN — HYDRALAZINE HYDROCHLORIDE 10 MG: 10 TABLET ORAL at 11:33

## 2025-02-23 RX ADMIN — ACETAMINOPHEN 975 MG: 325 TABLET ORAL at 21:11

## 2025-02-23 RX ADMIN — HYDRALAZINE HYDROCHLORIDE 10 MG: 10 TABLET ORAL at 04:15

## 2025-02-23 RX ADMIN — FUROSEMIDE 40 MG: 10 INJECTION, SOLUTION INTRAMUSCULAR; INTRAVENOUS at 08:42

## 2025-02-23 RX ADMIN — POTASSIUM CHLORIDE 20 MEQ: 1.5 POWDER, FOR SOLUTION ORAL at 02:58

## 2025-02-23 RX ADMIN — HYDRALAZINE HYDROCHLORIDE 10 MG: 10 TABLET ORAL at 21:12

## 2025-02-23 RX ADMIN — IPRATROPIUM BROMIDE AND ALBUTEROL SULFATE 3 ML: .5; 3 SOLUTION RESPIRATORY (INHALATION) at 20:49

## 2025-02-23 RX ADMIN — Medication 2 L/MIN: at 21:15

## 2025-02-23 RX ADMIN — ACETAMINOPHEN 975 MG: 325 TABLET ORAL at 00:11

## 2025-02-23 RX ADMIN — ACETAMINOPHEN 975 MG: 325 TABLET ORAL at 05:35

## 2025-02-23 ASSESSMENT — PAIN - FUNCTIONAL ASSESSMENT
PAIN_FUNCTIONAL_ASSESSMENT: 0-10

## 2025-02-23 ASSESSMENT — PAIN SCALES - GENERAL
PAINLEVEL_OUTOF10: 0 - NO PAIN
PAINLEVEL_OUTOF10: 4
PAINLEVEL_OUTOF10: 0 - NO PAIN
PAINLEVEL_OUTOF10: 9
PAINLEVEL_OUTOF10: 0 - NO PAIN
PAINLEVEL_OUTOF10: 0 - NO PAIN

## 2025-02-23 ASSESSMENT — PAIN DESCRIPTION - ORIENTATION: ORIENTATION: MID

## 2025-02-23 ASSESSMENT — PAIN DESCRIPTION - LOCATION
LOCATION: CHEST
LOCATION: CHEST

## 2025-02-23 NOTE — CARE PLAN
The patient's goals for the shift include: pt will remain pain free, comfortable, and get as much rest/sleep as possible.    The clinical goals for the shift include safety, monitor blood pressures, and I&O's.     Over the shift, the patient did not make progress toward the following goals. Barriers to progression include: patient required one PRN dose of pain medication overnight to remain comfortable.  Patient's urine output dropped off towards middle of night while their blood pressure held strong.  Patient's electrolytes needed repletment as diuresing them affected their electrolyte levels. Recommendations to address these barriers include: replete electrolytes as needed, administer pain medication as needed, and encourage patient to move and ambulate as tolerated and with assistance.

## 2025-02-23 NOTE — PROGRESS NOTES
CTICU Progress Note  Barbie Milan/[id number]    Admit Date: 2/18/2025  Hospital Length of Stay: 5   ICU Length of Stay: 4d 16h   CT SURGEON:     SUBJECTIVE:   No acute events overnight. Responded to diureses. -1.2L in last 24 hours. On 5L NC breathing comfortably. Remains of vasopressors with normal vitals.     MEDICATIONS  Infusions:  norepinephrine, Last Rate: Stopped (02/22/25 0820)      Scheduled:  acetaminophen, 975 mg, q6h  aspirin, 81 mg, Daily  budesonide, 0.5 mg, BID  clopidogrel, 75 mg, Daily  ezetimibe, 10 mg, Nightly  heparin, 5,000 Units, q8h  hydrALAZINE, 10 mg, q8h  insulin lispro, 0-15 Units, Before meals & nightly  ipratropium-albuteroL, 3 mL, q6h  metoprolol tartrate, 12.5 mg, BID  oxygen, , Continuous - Inhalation  oxygen, , Continuous - Inhalation  PARoxetine, 10 mg, q AM  perflutren protein A microsphere, 0.5 mL, Once in imaging  polyethylene glycol, 17 g, BID  sennosides-docusate sodium, 3 tablet, BID  sulfur hexafluoride microsphr, 2 mL, Once in imaging      PRN:  albuterol, 2.5 mg, q6h PRN  calcium gluconate, 1 g, q6h PRN  calcium gluconate, 2 g, q6h PRN  ipratropium-albuteroL, 3 mL, q6h PRN  magnesium sulfate, 2 g, q6h PRN  magnesium sulfate, 4 g, q6h PRN  naloxone, 0.2 mg, q5 min PRN  ondansetron, 4 mg, q8h PRN   Or  ondansetron, 4 mg, q8h PRN  oxyCODONE, 5 mg, q6h PRN  potassium chloride CR, 20 mEq, q6h PRN   Or  potassium chloride, 20 mEq, q6h PRN  potassium chloride CR, 40 mEq, q6h PRN   Or  potassium chloride, 40 mEq, q6h PRN  potassium chloride, 20 mEq, q6h PRN  potassium chloride, 40 mEq, q6h PRN        PHYSICAL EXAM:   Visit Vitals  /72   Pulse 80   Temp 36.4 °C (97.5 °F) (Temporal)   Resp 17   Wt 95 kg (209 lb 7 oz)   LMP  (LMP Unknown)   SpO2 98%   BMI 34.85 kg/m²   OB Status Postmenopausal   Smoking Status Every Day   BSA 2.09 m²     Wt Readings from Last 5 Encounters:   02/22/25 95 kg (209 lb 7 oz)   02/17/25 89.8 kg (198 lb)   02/14/25 89.8 kg (198 lb)      INTAKE/OUTPUT:  I/O last 3 completed shifts:  In: 2411.3 (25.4 mL/kg) [P.O.:2020; I.V.:291.3 (3.1 mL/kg); IV Piggyback:100]  Out: 5190 (54.6 mL/kg) [Urine:5170 (1.5 mL/kg/hr); Chest Tube:20]  Weight: 95 kg          Vent settings:  Vent Mode: Pressure support  FiO2 (%):  [40 %-90 %] 40 %  S RR:  [16] 16  S VT:  [450 mL] 450 mL  PEEP/CPAP (cm H2O):  [5 cm H20] 5 cm H20  NV SUP:  [5 cm H20] 5 cm H20  PIP Set (cm H2O):  [30 cm H2O] 30 cm H2O    Physical Exam:   - CONSTITUTION: Adult female in ICU bed. No acute distress on airvo  - NEUROLOGIC: Aox3 cam negative, no focal deficits  - CARDIOVASCULAR: Sinus with frequent PVC/PAC . V wires backed up VVI @ 40, MA 6, sens 0.8  - RESPIRATORY: Crackles, Wet cough  - GI: Soft, mildly distended  - : Clear yellow urine in dela cruz  - EXTREMITIES: genearlized nonpitting edema. Pulses palpable  - SKIN: warm  - PSYCHIATRIC: WNL, very pleasant    Daily Risk Screen  Intubated: N/A  Central line: Yes, remove  Dela Cruz: can dc     Images: CXR 2/21  IMPRESSION:  1. Mild interstitial pulmonary edema appears improving in comparison  previous study.  2. Possible small left pleural effusion and subsegmental atelectasis    Invasive Hemodynamics:    Most Recent Range Past 24hrs   BP (Art) 114/58 Arterial Line BP 1  Min: 82/41  Max: 143/75   MAP(Art) 78 mmHg Arterial Line MAP 1 (mmHg)   Min: 54 mmHg  Max: 96 mmHg   RA/CVP   No data recorded   PA 35/21 PAP  Min: 28/15  Max: 36/16   PA(mean) 26 mmHg PAP (Mean)  Min: 19 mmHg  Max: 26 mmHg   CO 4.05 L/min CO (L/min)  Min: 4.05 L/min  Max: 4.05 L/min   CI 2.06 L/min/m2 CI (L/min/m2)  Min: 2.06 L/min/m2  Max: 2.06 L/min/m2   Mixed Venous   No data recorded   SVR  680 (dyne*sec)/cm5 No data recorded         Assessment/Plan   Patient is a 65-year-old female s/p scheduled CABG x 3 [LIMA to LAD, SVG to LCx, SVG to PDA], posterior pericardial window secondary to CAD with Dr. Gallo Canchola on 2/18/2025.  Past medical history remarkable for CAD s/p stent x 4  RCA, MI 2007, HLD, HTN, PAD, T2DM, overactive bladder, a recurrent UTI, polycythemia, fibromyalgia, osteoarthritis, gout, hard of hearing, smoking.     Plan:  NEURO/MSK:  PMH of OA, gout, fibromyalgia. Patient is intubated and sedated on propofol infusion. Methadone 20 prior to surgery. Acute post operative pain.  -->  - Serial neuro and pain assessments   - Scheduled Tylenol 975 mg q 6hr   - PRN oxycodone  - PT Consult, OOB to chair as tolerated, chair position if not tolerated   - CAM ICU score qshift  - Sleep/wake cycle hygiene  - Hold home Norco 5-3 25 mg 3 times daily, ibuprofen 200 Mg,   - Continue Paxil 10 Mg every morning, trazodone 50 Mg nightly     CV:  Patient has a history of CAD s/p stent x 4 RCA, MI 2007, HLD, HTN, PVD Now s/p CABG x 3 [LIMA to LAD, SVG to LCx, SVG to PDA], posterior pericardial window secondary to CAD with Dr. Gallo Canchola on 2/18/2025. PRE LVEF 25-30% [[place]]/ POST LVEF 30-35% . 2/21 TTE showed LVEF of 40%, reduced RV. A/V epicardial wires set VVI @ 50bpm.  CI 2.5. Hypotension on levo-->  - Maintain goal MAP 65-80  - Volume resuscitate as clinically indicated  - PO hydralazine 10mg q8hrs for afterload reduction  - Maintain epicardial wires set VVI @ 50  - Continue ASA and plavix  - Continue Ezetimibe  - Hold home lisinopril 10 Mg daily, metoprolol 50 Mg daily. Can consider low dose BB      PULM:  No history of pulmonary disease. Extensive smoking hx.  Currently on airvo. Acute postop respiratory insufficiency. -->  -2/19.  Self extubated to CPAP.    - Daily CXR  - continue to wean FiO2 maintaining SpO2 >92%.   - IS q1h and OOB to chair   - Bronchial hygiene     GI:  No PMH.  2/21 BM  - Colace/senna BID and miralax BID  - Cardiac diet    :  CSA-ALEXSANDER Risk Score 3.  PMH of overactive bladder, recurrent UTI,  baseline CRE 0.75. Creatinine stable post-op. Dela Cruz in place and making adequate UOP. Hypervolemia. Hypophosphatemia -->  - can dc dela cruz catheter continue to monitor I/Os with  external cath.  - Goal UOP 0.5ml/kg/hr  - continue diuresis   - RFP as clinically indicated  - Replete electrolytes per CTICU protocol     ENDO:  PMH of T2DM A1c: 6.4. -->  - Maintain BG <180, insulin per CTICU protocol     HEME:  Polycythemia, Acute blood loss anemia and thrombocytopenia. -->    - Monitor drain output volume and characteristics  - CBC, coags, and fibrinogen post op and as clinically indicated  - Continue ASA  - Continue plavix  - SQH  - SCDs for DVT prophylaxis.  - Last type and screen: 2/22/25     ID:  Afebrile, no current indications of infection. MRSA negative. -->  - Trend temp q4h  - Periop cefazolin x 48hrs. Course complete.     Skin:  No active skin issues.  - preventative Mepilex dressings in place on sacrum and heels  - change preventative Mepilex weekly or more frequently as indicated (when moist/soiled)   - every shift skin assessment per nursing and weekly ICU skin rounds  - moisture barrier to be applied with pastora care  - active skin problems addressed with nursing on daily rounds     Proph:  SCDs  Subcutaneous heparin     G:  Line  Right IJ MAC to dc   Left brachial a-line placed 2/18/2025 to dc   Pichardo 2/18 to dc        Dispo: CTICU care, consider transition to medical floor for further management if she remains stable through this morning.    CTICU TEAM PHONE [phone]    Code status: Full Code      I personally spent 35 minutes of critical care time directly and personally managing the patient exclusive of separately billable procedures.    Kate Cruz MD.  Assistant Clinical Professor  Division of Critical Care Medicine  Department of Anesthesiology  Department of Cardiothoracic Anesthesiology

## 2025-02-23 NOTE — CARE PLAN
The patient's goals for the shift include      The clinical goals for the shift include Pt will remain HDS thoughout this shift      Problem: Safety - Adult  Goal: Free from fall injury  Outcome: Progressing     Problem: Discharge Planning  Goal: Discharge to home or other facility with appropriate resources  Outcome: Progressing     Problem: Chronic Conditions and Co-morbidities  Goal: Patient's chronic conditions and co-morbidity symptoms are monitored and maintained or improved  Outcome: Progressing     Problem: Nutrition  Goal: Nutrient intake appropriate for maintaining nutritional needs  Outcome: Progressing     Problem: Skin  Goal: Decreased wound size/increased tissue granulation at next dressing change  Outcome: Progressing  Goal: Participates in plan/prevention/treatment measures  Outcome: Progressing  Goal: Prevent/manage excess moisture  Outcome: Progressing  Goal: Prevent/minimize sheer/friction injuries  Outcome: Progressing  Goal: Promote/optimize nutrition  Outcome: Progressing  Goal: Promote skin healing  Outcome: Progressing     Problem: Fall/Injury  Goal: Not fall by end of shift  Outcome: Progressing  Goal: Be free from injury by end of the shift  Outcome: Progressing  Goal: Verbalize understanding of personal risk factors for fall in the hospital  Outcome: Progressing  Goal: Verbalize understanding of risk factor reduction measures to prevent injury from fall in the home  Outcome: Progressing  Goal: Use assistive devices by end of the shift  Outcome: Progressing  Goal: Pace activities to prevent fatigue by end of the shift  Outcome: Progressing     Problem: Respiratory  Goal: Clear secretions with interventions this shift  Outcome: Progressing  Goal: Minimize anxiety/maximize coping throughout shift  Outcome: Progressing  Goal: Minimal/no exertional discomfort or dyspnea this shift  Outcome: Progressing  Goal: No signs of respiratory distress (eg. Use of accessory muscles. Peds  grunting)  Outcome: Progressing  Goal: Patent airway maintained this shift  Outcome: Progressing  Goal: Tolerate mechanical ventilation evidenced by VS/agitation level this shift  Outcome: Progressing  Goal: Tolerate pulmonary toileting this shift  Outcome: Progressing  Goal: Verbalize decreased shortness of breath this shift  Outcome: Progressing  Goal: Wean oxygen to maintain O2 saturation per order/standard this shift  Outcome: Progressing  Goal: Increase self care and/or family involvement in next 24 hours  Outcome: Progressing     Problem: Diabetes  Goal: Achieve decreasing blood glucose levels by end of shift  Outcome: Progressing  Goal: Increase stability of blood glucose readings by end of shift  Outcome: Progressing  Goal: Decrease in ketones present in urine by end of shift  Outcome: Progressing  Goal: Maintain electrolyte levels within acceptable range throughout shift  Outcome: Progressing  Goal: Maintain glucose levels >70mg/dl to <250mg/dl throughout shift  Outcome: Progressing  Goal: No changes in neurological exam by end of shift  Outcome: Progressing  Goal: Learn about and adhere to nutrition recommendations by end of shift  Outcome: Progressing  Goal: Vital signs within normal range for age by end of shift  Outcome: Progressing  Goal: Increase self care and/or family involovement by end of shift  Outcome: Progressing  Goal: Receive DSME education by end of shift  Outcome: Progressing     Problem: Knowledge Deficit  Goal: Patient/family/caregiver demonstrates understanding of disease process, treatment plan, medications, and discharge instructions  Outcome: Progressing     Problem: Mechanical Ventilation  Goal: Patient Will Maintain Patent Airway  Outcome: Progressing  Goal: Oral health is maintained or improved  Outcome: Progressing  Goal: Tracheostomy will be managed safely  Outcome: Progressing  Goal: ET tube will be managed safely  Outcome: Progressing  Goal: Ability to express needs and  understand communication  Outcome: Progressing  Goal: Mobility/activity is maintained at optimum level for patient  Outcome: Progressing     Problem: Pain  Goal: Takes deep breaths with improved pain control throughout the shift  Outcome: Progressing  Goal: Turns in bed with improved pain control throughout the shift  Outcome: Progressing  Goal: Walks with improved pain control throughout the shift  Outcome: Progressing  Goal: Performs ADL's with improved pain control throughout shift  Outcome: Progressing  Goal: Participates in PT with improved pain control throughout the shift  Outcome: Progressing  Goal: Free from opioid side effects throughout the shift  Outcome: Progressing  Goal: Free from acute confusion related to pain meds throughout the shift  Outcome: Progressing

## 2025-02-24 ENCOUNTER — APPOINTMENT (OUTPATIENT)
Dept: RADIOLOGY | Facility: HOSPITAL | Age: 65
DRG: 235 | End: 2025-02-24
Payer: MEDICARE

## 2025-02-24 LAB
ALBUMIN SERPL BCP-MCNC: 4.1 G/DL (ref 3.4–5)
ANION GAP BLDV CALCULATED.4IONS-SCNC: 8 MMOL/L (ref 10–25)
ANION GAP SERPL CALC-SCNC: 12 MMOL/L (ref 10–20)
BASE EXCESS BLDV CALC-SCNC: 2.2 MMOL/L (ref -2–3)
BODY TEMPERATURE: 37 DEGREES CELSIUS
BUN SERPL-MCNC: 17 MG/DL (ref 6–23)
CA-I BLDV-SCNC: 1.12 MMOL/L (ref 1.1–1.33)
CALCIUM SERPL-MCNC: 8.8 MG/DL (ref 8.6–10.6)
CHLORIDE BLDV-SCNC: 99 MMOL/L (ref 98–107)
CHLORIDE SERPL-SCNC: 101 MMOL/L (ref 98–107)
CO2 SERPL-SCNC: 28 MMOL/L (ref 21–32)
CREAT SERPL-MCNC: 0.66 MG/DL (ref 0.5–1.05)
EGFRCR SERPLBLD CKD-EPI 2021: >90 ML/MIN/1.73M*2
ERYTHROCYTE [DISTWIDTH] IN BLOOD BY AUTOMATED COUNT: 13.3 % (ref 11.5–14.5)
GLUCOSE BLD MANUAL STRIP-MCNC: 104 MG/DL (ref 74–99)
GLUCOSE BLD MANUAL STRIP-MCNC: 107 MG/DL (ref 74–99)
GLUCOSE BLD MANUAL STRIP-MCNC: 126 MG/DL (ref 74–99)
GLUCOSE BLD MANUAL STRIP-MCNC: 99 MG/DL (ref 74–99)
GLUCOSE BLDV-MCNC: 117 MG/DL (ref 74–99)
GLUCOSE SERPL-MCNC: 97 MG/DL (ref 74–99)
HCO3 BLDV-SCNC: 26.5 MMOL/L (ref 22–26)
HCT VFR BLD AUTO: 28 % (ref 36–46)
HCT VFR BLD EST: 28 % (ref 36–46)
HGB BLD-MCNC: 9.3 G/DL (ref 12–16)
HGB BLDV-MCNC: 9.2 G/DL (ref 12–16)
INR PPP: 1.2 (ref 0.9–1.1)
LACTATE BLDV-SCNC: 1.3 MMOL/L (ref 0.4–2)
MAGNESIUM SERPL-MCNC: 2.03 MG/DL (ref 1.6–2.4)
MCH RBC QN AUTO: 33.9 PG (ref 26–34)
MCHC RBC AUTO-ENTMCNC: 33.2 G/DL (ref 32–36)
MCV RBC AUTO: 102 FL (ref 80–100)
NRBC BLD-RTO: 0.2 /100 WBCS (ref 0–0)
OXYHGB MFR BLDV: 59.4 % (ref 45–75)
PCO2 BLDV: 39 MM HG (ref 41–51)
PH BLDV: 7.44 PH (ref 7.33–7.43)
PHOSPHATE SERPL-MCNC: 3.5 MG/DL (ref 2.5–4.9)
PLATELET # BLD AUTO: 147 X10*3/UL (ref 150–450)
PO2 BLDV: 34 MM HG (ref 35–45)
POTASSIUM BLDV-SCNC: 3.7 MMOL/L (ref 3.5–5.3)
POTASSIUM SERPL-SCNC: 3.5 MMOL/L (ref 3.5–5.3)
PROTHROMBIN TIME: 13.1 SECONDS (ref 9.8–12.8)
RBC # BLD AUTO: 2.74 X10*6/UL (ref 4–5.2)
SAO2 % BLDV: 61 % (ref 45–75)
SODIUM BLDV-SCNC: 130 MMOL/L (ref 136–145)
SODIUM SERPL-SCNC: 137 MMOL/L (ref 136–145)
WBC # BLD AUTO: 8.2 X10*3/UL (ref 4.4–11.3)

## 2025-02-24 PROCEDURE — 71045 X-RAY EXAM CHEST 1 VIEW: CPT

## 2025-02-24 PROCEDURE — 94640 AIRWAY INHALATION TREATMENT: CPT

## 2025-02-24 PROCEDURE — 80069 RENAL FUNCTION PANEL: CPT

## 2025-02-24 PROCEDURE — 2500000002 HC RX 250 W HCPCS SELF ADMINISTERED DRUGS (ALT 637 FOR MEDICARE OP, ALT 636 FOR OP/ED): Performed by: THORACIC SURGERY (CARDIOTHORACIC VASCULAR SURGERY)

## 2025-02-24 PROCEDURE — 82947 ASSAY GLUCOSE BLOOD QUANT: CPT

## 2025-02-24 PROCEDURE — 2500000001 HC RX 250 WO HCPCS SELF ADMINISTERED DRUGS (ALT 637 FOR MEDICARE OP)

## 2025-02-24 PROCEDURE — 2500000001 HC RX 250 WO HCPCS SELF ADMINISTERED DRUGS (ALT 637 FOR MEDICARE OP): Performed by: NURSE PRACTITIONER

## 2025-02-24 PROCEDURE — 99232 SBSQ HOSP IP/OBS MODERATE 35: CPT

## 2025-02-24 PROCEDURE — 83735 ASSAY OF MAGNESIUM: CPT

## 2025-02-24 PROCEDURE — 2500000005 HC RX 250 GENERAL PHARMACY W/O HCPCS

## 2025-02-24 PROCEDURE — 2500000002 HC RX 250 W HCPCS SELF ADMINISTERED DRUGS (ALT 637 FOR MEDICARE OP, ALT 636 FOR OP/ED)

## 2025-02-24 PROCEDURE — 36415 COLL VENOUS BLD VENIPUNCTURE: CPT

## 2025-02-24 PROCEDURE — 97530 THERAPEUTIC ACTIVITIES: CPT | Mod: GO

## 2025-02-24 PROCEDURE — 2500000004 HC RX 250 GENERAL PHARMACY W/ HCPCS (ALT 636 FOR OP/ED)

## 2025-02-24 PROCEDURE — 85610 PROTHROMBIN TIME: CPT

## 2025-02-24 PROCEDURE — 71045 X-RAY EXAM CHEST 1 VIEW: CPT | Performed by: RADIOLOGY

## 2025-02-24 PROCEDURE — 85027 COMPLETE CBC AUTOMATED: CPT

## 2025-02-24 PROCEDURE — 1200000002 HC GENERAL ROOM WITH TELEMETRY DAILY

## 2025-02-24 RX ORDER — MULTIVIT-MIN/IRON FUM/FOLIC AC 7.5 MG-4
1 TABLET ORAL DAILY
Status: DISCONTINUED | OUTPATIENT
Start: 2025-02-24 | End: 2025-02-26 | Stop reason: HOSPADM

## 2025-02-24 RX ORDER — LISINOPRIL 5 MG/1
5 TABLET ORAL DAILY
Status: DISCONTINUED | OUTPATIENT
Start: 2025-02-24 | End: 2025-02-26 | Stop reason: HOSPADM

## 2025-02-24 RX ORDER — IRON POLYSACCHARIDE COMPLEX 150 MG
150 CAPSULE ORAL DAILY
Status: DISCONTINUED | OUTPATIENT
Start: 2025-02-24 | End: 2025-02-26 | Stop reason: HOSPADM

## 2025-02-24 RX ORDER — FUROSEMIDE 10 MG/ML
40 INJECTION INTRAMUSCULAR; INTRAVENOUS ONCE
Status: COMPLETED | OUTPATIENT
Start: 2025-02-24 | End: 2025-02-24

## 2025-02-24 RX ORDER — POTASSIUM CHLORIDE 20 MEQ/1
40 TABLET, EXTENDED RELEASE ORAL ONCE
Status: COMPLETED | OUTPATIENT
Start: 2025-02-24 | End: 2025-02-24

## 2025-02-24 RX ORDER — METOPROLOL TARTRATE 25 MG/1
25 TABLET, FILM COATED ORAL 2 TIMES DAILY
Status: DISCONTINUED | OUTPATIENT
Start: 2025-02-24 | End: 2025-02-26 | Stop reason: HOSPADM

## 2025-02-24 RX ADMIN — METOPROLOL TARTRATE 25 MG: 25 TABLET, FILM COATED ORAL at 08:36

## 2025-02-24 RX ADMIN — FUROSEMIDE 40 MG: 10 INJECTION, SOLUTION INTRAVENOUS at 11:02

## 2025-02-24 RX ADMIN — POLYSACCHARIDE-IRON COMPLEX 150 MG: 150 CAPSULE ORAL at 13:58

## 2025-02-24 RX ADMIN — Medication 21 PERCENT: at 20:32

## 2025-02-24 RX ADMIN — HEPARIN SODIUM 5000 UNITS: 5000 INJECTION, SOLUTION INTRAVENOUS; SUBCUTANEOUS at 05:17

## 2025-02-24 RX ADMIN — HYDRALAZINE HYDROCHLORIDE 10 MG: 10 TABLET ORAL at 05:17

## 2025-02-24 RX ADMIN — OXYCODONE 5 MG: 5 TABLET ORAL at 18:37

## 2025-02-24 RX ADMIN — IPRATROPIUM BROMIDE AND ALBUTEROL SULFATE 3 ML: .5; 3 SOLUTION RESPIRATORY (INHALATION) at 09:49

## 2025-02-24 RX ADMIN — LISINOPRIL 5 MG: 5 TABLET ORAL at 08:36

## 2025-02-24 RX ADMIN — OXYCODONE 5 MG: 5 TABLET ORAL at 11:02

## 2025-02-24 RX ADMIN — Medication 1 TABLET: at 13:58

## 2025-02-24 RX ADMIN — CLOPIDOGREL BISULFATE 75 MG: 75 TABLET ORAL at 08:36

## 2025-02-24 RX ADMIN — IPRATROPIUM BROMIDE AND ALBUTEROL SULFATE 3 ML: .5; 3 SOLUTION RESPIRATORY (INHALATION) at 20:32

## 2025-02-24 RX ADMIN — POTASSIUM CHLORIDE 40 MEQ: 1500 TABLET, EXTENDED RELEASE ORAL at 11:02

## 2025-02-24 RX ADMIN — HEPARIN SODIUM 5000 UNITS: 5000 INJECTION, SOLUTION INTRAVENOUS; SUBCUTANEOUS at 21:09

## 2025-02-24 RX ADMIN — IPRATROPIUM BROMIDE AND ALBUTEROL SULFATE 3 ML: .5; 3 SOLUTION RESPIRATORY (INHALATION) at 14:03

## 2025-02-24 RX ADMIN — Medication 2 L/MIN: at 08:36

## 2025-02-24 RX ADMIN — BUDESONIDE 0.5 MG: 0.5 INHALANT RESPIRATORY (INHALATION) at 09:54

## 2025-02-24 RX ADMIN — EZETIMIBE 10 MG: 10 TABLET ORAL at 21:09

## 2025-02-24 RX ADMIN — ACETAMINOPHEN 975 MG: 325 TABLET ORAL at 21:08

## 2025-02-24 RX ADMIN — ASPIRIN 81 MG CHEWABLE TABLET 81 MG: 81 TABLET CHEWABLE at 08:35

## 2025-02-24 RX ADMIN — ACETAMINOPHEN 975 MG: 325 TABLET ORAL at 05:17

## 2025-02-24 RX ADMIN — PAROXETINE 10 MG: 10 TABLET, FILM COATED ORAL at 08:35

## 2025-02-24 RX ADMIN — HEPARIN SODIUM 5000 UNITS: 5000 INJECTION, SOLUTION INTRAVENOUS; SUBCUTANEOUS at 12:32

## 2025-02-24 RX ADMIN — METOPROLOL TARTRATE 25 MG: 25 TABLET, FILM COATED ORAL at 21:09

## 2025-02-24 ASSESSMENT — PAIN SCALES - GENERAL
PAINLEVEL_OUTOF10: 6
PAINLEVEL_OUTOF10: 7
PAINLEVEL_OUTOF10: 6
PAINLEVEL_OUTOF10: 3
PAINLEVEL_OUTOF10: 0 - NO PAIN
PAINLEVEL_OUTOF10: 0 - NO PAIN

## 2025-02-24 ASSESSMENT — PAIN - FUNCTIONAL ASSESSMENT
PAIN_FUNCTIONAL_ASSESSMENT: 0-10

## 2025-02-24 ASSESSMENT — ACTIVITIES OF DAILY LIVING (ADL)
EFFECT OF PAIN ON DAILY ACTIVITIES: COMFORT
EFFECT OF PAIN ON DAILY ACTIVITIES: COMFORT

## 2025-02-24 ASSESSMENT — COGNITIVE AND FUNCTIONAL STATUS - GENERAL
HELP NEEDED FOR BATHING: A LITTLE
DAILY ACTIVITIY SCORE: 24
PERSONAL GROOMING: A LITTLE
DRESSING REGULAR LOWER BODY CLOTHING: A LITTLE
DAILY ACTIVITIY SCORE: 20
TOILETING: A LITTLE
MOBILITY SCORE: 24

## 2025-02-24 ASSESSMENT — PAIN DESCRIPTION - DESCRIPTORS
DESCRIPTORS: ACHING;DISCOMFORT;SORE
DESCRIPTORS: ACHING;DISCOMFORT

## 2025-02-24 ASSESSMENT — PAIN DESCRIPTION - LOCATION
LOCATION: CHEST
LOCATION: CHEST

## 2025-02-24 NOTE — SIGNIFICANT EVENT
Rapid Response Nurse Note: RADAR alert: 6    Pager time: [phone]  Arrival time: 1450  Event end time: 1500  Location: T3  [] Triage by phone or secure messaging    Rapid response initiated by:  [] Rapid response RN [] Family [] Nursing Supervisor [] Physician   [x] RADAR auto page [] Sepsis auto-page [] RN [] RT   [] NP/PA [] Other:     Primary reason for call:   [] BAT [] New CPAP/BiPAP [] Bleeding [] Change in mental status   [] Chest pain [] Code blue [] FiO2 >/= 50% [] HR </= 40 bpm   [] HR >/= 130 bpm [] Hyperglycemia [] Hypoglycemia [x] RADAR    [] RR </= 8 bpm [] RR >/= 30 bpm [] SBP </= 90 mmHg [] SpO2 < 90%   [] Seizure [] Sepsis [] Shortness of breath  [] Staff concern: see comments     Initial VS and/or RADAR VS: T 37.2 °C; HR 84; RR 18; /67; SPO2 91%.    Providers present at bedside (if applicable):     Name of ICU Provider contacted (if applicable):     Interventions:  [x] None [] ABG/VBG [] Assist w/ICU transfer [] BAT paged    [] Bag mask [] Blood [] Cardioversion [] Code Blue   [] Code blue for intubation [] Code status changed [] Chest x-ray [] EKG   [] IV fluid/bolus [] KUB x-ray [] Labs/cultures [] Medication   [] Nebulizer treatment [] NIPPV (CPAP/BiPAP) [] Oxygen [] Oral airway   [] Peripheral IV [] Palliative care consult [] CT/MRI [] Sepsis protocol    [] Suctioned [] Other:     Outcome:  [] Coded and  [] Code blue for intubation [] Coded and transferred to ICU []  on division   [x] Remained on division (no change)tele [] Remained on division + additional monitoring [] Remained in ED [] Transferred to ED   [] Transferred to ICU [] Transferred to inpatient status [] Transferred for interventions (procedure) [] Transferred to ICU stepdown    [] Transferred to surgery [] Transferred to telemetry [] Sepsis protocol [] STEMI protocol   [] Stroke protocol [x] Bedside nurse instructed to page rapid response for any concerns or acute change in condition/VS     Additional Comments:  RADAR alert- score of 6. Upon arrival pt resting comfortably. Pt is now on RA and pulse ox 92-94%. RR even and non labored. Updated with her bedside nurse and no immediate concerns. Continue to monitor.

## 2025-02-24 NOTE — PROGRESS NOTES
CARDIAC SURGERY DAILY PROGRESS NOTE    Barbie Milan is a 65 y.o. female, with a PMH of CAD s/p stent x 4 RCA, MI 2007, HLD, HTN, PAD, T2DM, overactive bladder, a recurrent UTI, polycythemia, fibromyalgia, osteoarthritis, gout, hard of hearing, and smoking who presented to [facility]/18/[facility] for CABGx3 (LIMA-LAD, SVG-CX, SVG-PDA).     OPERATION/PROCEDURE: 2/18: CABG x 3, LIMA-LAD, SVG-CX, SVG-PDA, POSTERIOR PERICARDIAL WINDOW  [id number] - [place] CORONARY ARTERY BYPASS 4 CORONARY VENOUS GRAFTS with Gallo Canchola MD    CTICU Course: Respiratory insufficiency with increased oxygenation requirements after self extubation.    Transferred to  on 2/23    Interval History:   No acute events overnight.    SUBJECTIVE:  No complaints at this time.    Objective   /67 (BP Location: Right arm, Patient Position: Sitting)   Pulse 84   Temp 37.2 °C (99 °F) (Temporal)   Resp 18   Wt 89.8 kg (198 lb)   LMP  (LMP Unknown)   SpO2 91%   BMI 32.95 kg/m²   0-10 (Numeric) Pain Score: 0 - No pain   3 Day Weight Change: Unable to Calculate    Intake and Output    Intake/Output Summary (Last 24 hours) at 2/24/2025 1351  Last data filed at 2/24/2025 1305  Gross per 24 hour   Intake 840 ml   Output 500 ml   Net 340 ml       Physical Exam  Physical Exam  Constitutional:       Appearance: Normal appearance.   HENT:      Head: Normocephalic and atraumatic.      Ears:      Comments: Hard of hearing  Eyes:      Extraocular Movements: Extraocular movements intact.      Pupils: Pupils are equal, round, and reactive to light.   Cardiovascular:      Rate and Rhythm: Normal rate and regular rhythm.      Pulses: Normal pulses.      Heart sounds: Normal heart sounds.      Comments: NSR 80s on tele  Epicardial pacing wires present  Pulmonary:      Effort: Pulmonary effort is normal.      Comments: Bibasilar posterior crackles  Abdominal:      General: Bowel sounds are normal.      Palpations: Abdomen is soft.    Musculoskeletal:      Cervical back: Normal range of motion.      Right lower leg: Edema present.      Left lower leg: Edema present.   Skin:     General: Skin is warm and dry.   Neurological:      General: No focal deficit present.      Mental Status: She is alert and oriented to person, place, and time.   Psychiatric:         Mood and Affect: Mood normal.       Medications  Scheduled medications  aspirin, 81 mg, oral, Daily  budesonide, 0.5 mg, nebulization, BID  clopidogrel, 75 mg, oral, Daily  ezetimibe, 10 mg, oral, Nightly  heparin, 5,000 Units, subcutaneous, q8h  insulin lispro, 0-15 Units, subcutaneous, Before meals & nightly  ipratropium-albuteroL, 3 mL, nebulization, TID  iron polysaccharides, 150 mg, oral, Daily  lisinopril, 5 mg, oral, Daily  metoprolol tartrate, 25 mg, oral, BID  multivitamin with minerals, 1 tablet, oral, Daily  oxygen, , inhalation, Continuous - Inhalation  PARoxetine, 10 mg, oral, q AM  polyethylene glycol, 17 g, oral, BID  sennosides-docusate sodium, 3 tablet, oral, BID    Continuous medications   PRN medications  PRN medications: acetaminophen, albuterol, ipratropium-albuteroL, naloxone, ondansetron **OR** ondansetron, oxyCODONE, oxygen    Labs  Results for orders placed or performed during the hospital encounter of 02/18/25 (from the past 24 hours)   POCT GLUCOSE   Result Value Ref Range    POCT Glucose 102 (H) 74 - 99 mg/dL   POCT GLUCOSE   Result Value Ref Range    POCT Glucose 151 (H) 74 - 99 mg/dL   Magnesium   Result Value Ref Range    Magnesium 2.03 1.60 - 2.40 mg/dL   Renal Function Panel   Result Value Ref Range    Glucose 97 74 - 99 mg/dL    Sodium 137 136 - 145 mmol/L    Potassium 3.5 3.5 - 5.3 mmol/L    Chloride 101 98 - 107 mmol/L    Bicarbonate 28 21 - 32 mmol/L    Anion Gap 12 10 - 20 mmol/L    Urea Nitrogen 17 6 - 23 mg/dL    Creatinine 0.66 0.50 - 1.05 mg/dL    eGFR >90 >60 mL/min/1.73m*2    Calcium 8.8 8.6 - 10.6 mg/dL    Phosphorus 3.5 2.5 - 4.9 mg/dL     Albumin 4.1 3.4 - 5.0 g/dL   Protime-INR   Result Value Ref Range    Protime 13.1 (H) 9.8 - 12.8 seconds    INR 1.2 (H) 0.9 - 1.1   CBC   Result Value Ref Range    WBC 8.2 4.4 - 11.3 x10*3/uL    nRBC 0.2 (H) 0.0 - 0.0 /100 WBCs    RBC 2.74 (L) 4.00 - 5.20 x10*6/uL    Hemoglobin 9.3 (L) 12.0 - 16.0 g/dL    Hematocrit 28.0 (L) 36.0 - 46.0 %     (H) 80 - 100 fL    MCH 33.9 26.0 - 34.0 pg    MCHC 33.2 32.0 - 36.0 g/dL    RDW 13.3 11.5 - 14.5 %    Platelets 147 (L) 150 - 450 x10*3/uL   POCT GLUCOSE   Result Value Ref Range    POCT Glucose 126 (H) 74 - 99 mg/dL   POCT GLUCOSE   Result Value Ref Range    POCT Glucose 107 (H) 74 - 99 mg/dL           IMAGING/ DIAGNOSTIC TESTING:  I have personally reviewed the following test result(s):    XR 1 view 2/24  Read pending    TTE 2/21  CONCLUSIONS:   1. Poorly visualized anatomical structures due to suboptimal image quality.   2. Left ventricular ejection fraction is mildly decreased, by visual estimate at 40-45%.   3. Abnormal left venticular wall motion.   4. No left ventricular thrombus visualized.   5. There is inferior and basal inferoseptal hypo- to akinesia.   6. There is reduced right ventricular systolic function.   7. Mildly enlarged right ventricle.   8. The left atrium is enlarged.    IMPRESSION & PLAN:  POD # 6 s/p CABGx3 (LIMA-LAD, SVG-CX, SVG-PDA)  - Increase activity/ ambulation; PT/OT  - Encourage IS, C/DB; respiratory therapy; wean O2 as bong   - Cardiac rehab referral   - Continue cardiac meds: ASA, BB, ezetimibe (statin allergy with myalgias)  - Continue plavix x1 year for NSTEMI   - Pain and anticonstipation meds  - 2v CXR ordered for 2/25  - Postop echo not indicated  - Cut epicardial wires prior to discharge   - Tele until discharge  - Optimize nutrition and electrolytes    Rhythm  - Tele: NSR 70-90s  - Continue BB  - Adjust medications as tolerated    Acute Blood Loss Anemia   Recent Labs     02/24/25  0556 02/23/25  0139 02/22/25  1355 02/22/25  0012  25  1311 25  0113 25  1235   HGB 9.3* 8.9* 9.6* 8.9* 9.7* 8.8* 8.7*   HCT 28.0* 26.0* 27.7* 26.9* 29.2* 27.0* 27.1*   - MV, PO Iron x1mo  - Daily labs, transfuse as indicated    Thrombocytopenia  Recent Labs     25  0556 25  0139 25  1355 25  0012 25  1311 25  0113 25  1235   * 114* 132* 117* 118* 89* 83*   - Etiology likely postop/CPB related  - Continue to trend with daily CBCs    Volume/Electrolyte Status: Preop wt Weight: 95 kg (209 lb 7 oz)   Vitals:    25 0110   Weight: 89.8 kg (198 lb)   - Weight: 89.8kg  - Adjust diuresis as needed for postop cardiac surgery hypervolemia  -  IV Lasix 40mg  - Replete electrolytes for hypokalemia/hypomagnesemia/hypophosphatemia as needed  - Daily weights and strict I&Os  - Daily RFP while admitted    Leukocytosis  Recent Labs     25  0556 25  0139 25  1355 25  0012 25  1311 25  0113 25  1235   WBC 8.2 7.4 7.3 8.7 10.3 8.1 8.4     Temp (36hrs), Av.6 °C (97.8 °F), Min:36 °C (96.8 °F), Max:37.2 °C (99 °F)  - aggressive pulmonary hygiene  - monitor for s/s infection  - likely atelectasis/ postoperative in etiology  - daily CBC to follow    Fibromyalgia/osteoarthritis: Home meds - Paroxetine and norco  - Hold home norco  - Continue Paroxetine 10mg daily    Hypertension: Home meds - furosemide 40mg, lisinopril 10mg  - Start lisinopril 5mg daily    Acute respiratory insufficiency: Self extubated in ICU and weaned from Airvo. Currently oxygenating well on 2L NC.  - Continue to wean FiO2 as tolerated with goal Spo2 > 92%  - Continue diuresis    VTE Prophylaxis: SCDs/TEDs, ambulation, SQ heparin  Code Status: Full Code      Dispo  - PT/OT recs low intensity  - Would benefit from homecare RN for cardiac surgery carepath  - Anticipate discharge 2-3 days, pending volume status and post op imaging  - Will continue to assess discharge needs    Sebastian Darby,  APRN-CNP  Cardiac Surgery DIANA  [facility]  Team Phone [phone]    2/24/2025  1:51 PM

## 2025-02-24 NOTE — CARE PLAN
Problem: Safety - Adult  Goal: Free from fall injury  Outcome: Progressing     Problem: Discharge Planning  Goal: Discharge to home or other facility with appropriate resources  Outcome: Progressing     Problem: Chronic Conditions and Co-morbidities  Goal: Patient's chronic conditions and co-morbidity symptoms are monitored and maintained or improved  Outcome: Progressing     Problem: Nutrition  Goal: Nutrient intake appropriate for maintaining nutritional needs  Outcome: Progressing     Problem: Skin  Goal: Decreased wound size/increased tissue granulation at next dressing change  Outcome: Progressing  Goal: Participates in plan/prevention/treatment measures  Outcome: Progressing  Goal: Prevent/manage excess moisture  Outcome: Progressing  Flowsheets (Taken 2/24/2025 1127)  Prevent/manage excess moisture: Moisturize dry skin  Goal: Prevent/minimize sheer/friction injuries  Outcome: Progressing  Goal: Promote/optimize nutrition  Outcome: Progressing  Goal: Promote skin healing  Outcome: Progressing     Problem: Fall/Injury  Goal: Not fall by end of shift  Outcome: Progressing  Goal: Be free from injury by end of the shift  Outcome: Progressing  Goal: Verbalize understanding of personal risk factors for fall in the hospital  Outcome: Progressing  Goal: Verbalize understanding of risk factor reduction measures to prevent injury from fall in the home  Outcome: Progressing  Goal: Use assistive devices by end of the shift  Outcome: Progressing  Goal: Pace activities to prevent fatigue by end of the shift  Outcome: Progressing     Problem: Respiratory  Goal: Clear secretions with interventions this shift  Outcome: Progressing  Goal: Minimize anxiety/maximize coping throughout shift  Outcome: Progressing  Goal: Minimal/no exertional discomfort or dyspnea this shift  Outcome: Progressing  Goal: No signs of respiratory distress (eg. Use of accessory muscles. Peds grunting)  Outcome: Progressing  Goal: Patent airway  maintained this shift  Outcome: Progressing  Goal: Tolerate mechanical ventilation evidenced by VS/agitation level this shift  Outcome: Progressing  Goal: Tolerate pulmonary toileting this shift  Outcome: Progressing  Goal: Verbalize decreased shortness of breath this shift  Outcome: Progressing  Goal: Wean oxygen to maintain O2 saturation per order/standard this shift  Outcome: Progressing  Goal: Increase self care and/or family involvement in next 24 hours  Outcome: Progressing     Problem: Diabetes  Goal: Achieve decreasing blood glucose levels by end of shift  Outcome: Progressing  Goal: Increase stability of blood glucose readings by end of shift  Outcome: Progressing  Goal: Decrease in ketones present in urine by end of shift  Outcome: Progressing  Goal: Maintain electrolyte levels within acceptable range throughout shift  Outcome: Progressing  Goal: Maintain glucose levels >70mg/dl to <250mg/dl throughout shift  Outcome: Progressing  Goal: No changes in neurological exam by end of shift  Outcome: Progressing  Goal: Learn about and adhere to nutrition recommendations by end of shift  Outcome: Progressing  Goal: Vital signs within normal range for age by end of shift  Outcome: Progressing  Goal: Increase self care and/or family involovement by end of shift  Outcome: Progressing  Goal: Receive DSME education by end of shift  Outcome: Progressing     Problem: Pain  Goal: Takes deep breaths with improved pain control throughout the shift  Outcome: Progressing  Goal: Turns in bed with improved pain control throughout the shift  Outcome: Progressing  Goal: Walks with improved pain control throughout the shift  Outcome: Progressing  Goal: Performs ADL's with improved pain control throughout shift  Outcome: Progressing  Goal: Participates in PT with improved pain control throughout the shift  Outcome: Progressing  Goal: Free from opioid side effects throughout the shift  Outcome: Progressing  Goal: Free from acute  confusion related to pain meds throughout the shift  Outcome: Progressing

## 2025-02-24 NOTE — PROGRESS NOTES
02/24/25 1004   Discharge Planning   Home or Post Acute Services In home services   Type of Home Care Services Home nursing visits;Home PT   Expected Discharge Disposition Home H   Does the patient need discharge transport arranged? No     Patient s/p cardiac surgery.  Discharge pending further diuresis and blood pressure management.  Patient will require home care upon discharge.  Waiting for confirmation of agency of choice.  ADOD 2-3 days.

## 2025-02-24 NOTE — PROGRESS NOTES
Occupational Therapy    Occupational Therapy Treatment    Name: Barbie Milan  MRN: [id number]  : 1960  Date: 25  Room: [address] [place] [address]A      Time Calculation  Start Time: 1202  Stop Time: 1216  Time Calculation (min): 14 min    Assessment:  OT Assessment: Overall CGA for functional mobility and SBA for self-care tasks. Decreased strength and endurance limiting occupational performance.  Prognosis: Good  Barriers to Discharge Home: No anticipated barriers  Medical Staff Made Aware: Yes  End of Session Communication: Bedside nurse  End of Session Patient Position: Bed, 3 rail up, Alarm off, not on at start of session  Plan:  Treatment Interventions: ADL retraining, Functional transfer training, UE strengthening/ROM, Endurance training, Neuromuscular reeducation, Compensatory technique education  OT Frequency: 2 times per week  OT Discharge Recommendations: Low intensity level of continued care  Equipment Recommended upon Discharge: Wheeled walker  OT Recommended Transfer Status:  (CGA)  OT - OK to Discharge: Yes    Subjective   General:  OT Last Visit  OT Received On: 25  Reason for Referral: 66 y/o female now CABG x 3 (LIMA to LAD, SVG to LCx, SVG to PDA), posterior pericardial window secondary to CAD with Dr. Gallo Canchola on 2025  Prior to Session Communication: Bedside nurse  Patient Position Received: Bed, 3 rail up, Alarm off, not on at start of session  General Comment: Pt is pleasant and cooperative. She puts forth good effort towards task completion.   Precautions:  Hearing/Visual Limitations: [place]  Medical Precautions: Fall precautions, Cardiac precautions, Oxygen therapy device and L/min (2LO2)  Post-Surgical Precautions: Move in the Tube  Precautions Comment: MAP 60-80, SpO2 >92%  Vitals:   Date/Time Vitals Session Patient Position Pulse Resp SpO2 BP MAP (mmHg)    25 1201 During OT  --  92  --  96 %  --  --           Lines/Tubes/Drains:       Cognition:  Overall Cognitive Status:  Within Functional Limits  Orientation Level: Oriented X4    Pain Assessment:  Pain Assessment  Pain Assessment: 0-10  0-10 (Numeric) Pain Score: 0 - No pain     Objective   Activities of Daily Living:               UE Dressing  UE Dressing Comments: SBA to don/doff robe while standing.             Bed Mobility/Transfers:   Bed Mobility  Bed Mobility: Yes  Bed Mobility 1  Bed Mobility 1: Supine to sitting, Sitting to supine  Level of Assistance 1: Close supervision  Transfers  Transfer: Yes  Transfer 1  Transfer From 1: Sit to  Transfer to 1: Stand  Technique 1: Sit to stand, Stand to sit  Transfer Level of Assistance 1: Contact guard                   Therapy/Activity:      Therapeutic Activity  Therapeutic Activity Performed: Yes  Therapeutic Activity 1: Pt ambulated through room and unit >75'. No AD, but did utilize dominguez rail for steadying support. Completed with CGA, slow pace, and 2 standing rest breaks. Mild SOB observed.  Therapeutic Activity 2: Static stand for approx 2min with no AD, SBA.         Outcome Measures:  [facility] Daily Activity  Putting on and taking off regular lower body clothing: A little  Bathing (including washing, rinsing, drying): A little  Putting on and taking off regular upper body clothing: None  Toileting, which includes using toilet, bedpan or urinal: A little  Taking care of personal grooming such as brushing teeth: A little  Eating Meals: None  Daily Activity - Total Score: 20     Education Documentation  Handouts, taught by Concetta Fox OT at 2/24/2025 12:37 PM.  Learner: Patient  Readiness: Acceptance  Method: Explanation  Response: Verbalizes Understanding    Body Mechanics, taught by Concetta Fox OT at 2/24/2025 12:37 PM.  Learner: Patient  Readiness: Acceptance  Method: Explanation  Response: Verbalizes Understanding    Precautions, taught by Concetta Fox OT at 2/24/2025 12:37 PM.  Learner: Patient  Readiness: Acceptance  Method: Explanation  Response: Verbalizes  Understanding    ADL Training, taught by Concetta Fox OT at 2/24/2025 12:37 PM.  Learner: Patient  Readiness: Acceptance  Method: Explanation  Response: Verbalizes Understanding    Education Comments  No comments found.        Goals:  Encounter Problems       Encounter Problems (Active)       ADLs       Patient will complete lower body dressing with SBA  for donning and doffing all LE clothes in order to increase Indep with task participation.        Start:  02/19/25    Expected End:  03/05/25            Patient will complete toileting, including clothing management and hygiene, with SBA in order to maximize functional Indep with task completion.        Start:  02/19/25    Expected End:  03/05/25               COGNITION/SAFETY       Pt will identify and adhere to post-op MITT precautions during ADL and functional activity tasks with no more than min verbal cues in order to increase safety and independence upon discharge.        Start:  02/19/25    Expected End:  03/05/25               EXERCISE/STRENGTHENING       Pt will increase endurance to tolerate 15-20min of activity with no more than 1 rest break in order to increase ability to engage in ADL completion.        Start:  02/19/25    Expected End:  03/05/25               MOBILITY       Pt will demo increased functional mobility to tolerate tasks necessary to complete ADL routine with SBA.       Start:  02/19/25    Expected End:  03/05/25               TRANSFERS       Patient will complete functional transfers using least restrictive device with  SBA  in order to maximize functional potential and increase safety.        Start:  02/19/25    Expected End:  03/05/25 02/24/25 at 12:38 PM   Concetta Fox, OT   [phone]

## 2025-02-25 ENCOUNTER — TELEPHONE (OUTPATIENT)
Dept: PRIMARY CARE CLINIC | Age: 65
End: 2025-02-25

## 2025-02-25 ENCOUNTER — APPOINTMENT (OUTPATIENT)
Dept: RADIOLOGY | Facility: HOSPITAL | Age: 65
DRG: 235 | End: 2025-02-25
Payer: MEDICARE

## 2025-02-25 LAB
ALBUMIN SERPL BCP-MCNC: 4 G/DL (ref 3.4–5)
ANION GAP SERPL CALC-SCNC: 16 MMOL/L (ref 10–20)
BUN SERPL-MCNC: 17 MG/DL (ref 6–23)
CALCIUM SERPL-MCNC: 8.5 MG/DL (ref 8.6–10.6)
CHLORIDE SERPL-SCNC: 100 MMOL/L (ref 98–107)
CO2 SERPL-SCNC: 23 MMOL/L (ref 21–32)
CREAT SERPL-MCNC: 0.73 MG/DL (ref 0.5–1.05)
EGFRCR SERPLBLD CKD-EPI 2021: >90 ML/MIN/1.73M*2
ERYTHROCYTE [DISTWIDTH] IN BLOOD BY AUTOMATED COUNT: 13.5 % (ref 11.5–14.5)
GLUCOSE BLD MANUAL STRIP-MCNC: 115 MG/DL (ref 74–99)
GLUCOSE BLD MANUAL STRIP-MCNC: 121 MG/DL (ref 74–99)
GLUCOSE SERPL-MCNC: 92 MG/DL (ref 74–99)
HCT VFR BLD AUTO: 28.4 % (ref 36–46)
HGB BLD-MCNC: 9 G/DL (ref 12–16)
INR PPP: 1.2 (ref 0.9–1.1)
MAGNESIUM SERPL-MCNC: 2.09 MG/DL (ref 1.6–2.4)
MCH RBC QN AUTO: 32.6 PG (ref 26–34)
MCHC RBC AUTO-ENTMCNC: 31.7 G/DL (ref 32–36)
MCV RBC AUTO: 103 FL (ref 80–100)
NRBC BLD-RTO: 0.2 /100 WBCS (ref 0–0)
PHOSPHATE SERPL-MCNC: 3.7 MG/DL (ref 2.5–4.9)
PLATELET # BLD AUTO: 196 X10*3/UL (ref 150–450)
POTASSIUM SERPL-SCNC: 3.4 MMOL/L (ref 3.5–5.3)
PROTHROMBIN TIME: 13.3 SECONDS (ref 9.8–12.8)
RBC # BLD AUTO: 2.76 X10*6/UL (ref 4–5.2)
SODIUM SERPL-SCNC: 136 MMOL/L (ref 136–145)
WBC # BLD AUTO: 8.9 X10*3/UL (ref 4.4–11.3)

## 2025-02-25 PROCEDURE — 94640 AIRWAY INHALATION TREATMENT: CPT

## 2025-02-25 PROCEDURE — 2500000002 HC RX 250 W HCPCS SELF ADMINISTERED DRUGS (ALT 637 FOR MEDICARE OP, ALT 636 FOR OP/ED)

## 2025-02-25 PROCEDURE — 99232 SBSQ HOSP IP/OBS MODERATE 35: CPT

## 2025-02-25 PROCEDURE — 2500000005 HC RX 250 GENERAL PHARMACY W/O HCPCS

## 2025-02-25 PROCEDURE — 71046 X-RAY EXAM CHEST 2 VIEWS: CPT

## 2025-02-25 PROCEDURE — 97530 THERAPEUTIC ACTIVITIES: CPT | Mod: GP,CQ

## 2025-02-25 PROCEDURE — 2500000001 HC RX 250 WO HCPCS SELF ADMINISTERED DRUGS (ALT 637 FOR MEDICARE OP)

## 2025-02-25 PROCEDURE — 84100 ASSAY OF PHOSPHORUS: CPT

## 2025-02-25 PROCEDURE — 2500000002 HC RX 250 W HCPCS SELF ADMINISTERED DRUGS (ALT 637 FOR MEDICARE OP, ALT 636 FOR OP/ED): Performed by: THORACIC SURGERY (CARDIOTHORACIC VASCULAR SURGERY)

## 2025-02-25 PROCEDURE — 83735 ASSAY OF MAGNESIUM: CPT

## 2025-02-25 PROCEDURE — 36415 COLL VENOUS BLD VENIPUNCTURE: CPT

## 2025-02-25 PROCEDURE — 2500000004 HC RX 250 GENERAL PHARMACY W/ HCPCS (ALT 636 FOR OP/ED)

## 2025-02-25 PROCEDURE — 1200000002 HC GENERAL ROOM WITH TELEMETRY DAILY

## 2025-02-25 PROCEDURE — 85027 COMPLETE CBC AUTOMATED: CPT

## 2025-02-25 PROCEDURE — 82947 ASSAY GLUCOSE BLOOD QUANT: CPT

## 2025-02-25 PROCEDURE — 85610 PROTHROMBIN TIME: CPT

## 2025-02-25 PROCEDURE — 97116 GAIT TRAINING THERAPY: CPT | Mod: GP,CQ

## 2025-02-25 PROCEDURE — 2500000001 HC RX 250 WO HCPCS SELF ADMINISTERED DRUGS (ALT 637 FOR MEDICARE OP): Performed by: NURSE PRACTITIONER

## 2025-02-25 RX ORDER — FUROSEMIDE 40 MG/1
40 TABLET ORAL DAILY
Status: DISCONTINUED | OUTPATIENT
Start: 2025-02-25 | End: 2025-02-26 | Stop reason: HOSPADM

## 2025-02-25 RX ADMIN — METOPROLOL TARTRATE 25 MG: 25 TABLET, FILM COATED ORAL at 09:38

## 2025-02-25 RX ADMIN — METOPROLOL TARTRATE 25 MG: 25 TABLET, FILM COATED ORAL at 20:34

## 2025-02-25 RX ADMIN — IPRATROPIUM BROMIDE AND ALBUTEROL SULFATE 3 ML: .5; 3 SOLUTION RESPIRATORY (INHALATION) at 20:08

## 2025-02-25 RX ADMIN — OXYCODONE 5 MG: 5 TABLET ORAL at 01:20

## 2025-02-25 RX ADMIN — CLOPIDOGREL BISULFATE 75 MG: 75 TABLET ORAL at 09:38

## 2025-02-25 RX ADMIN — OXYCODONE 5 MG: 5 TABLET ORAL at 11:28

## 2025-02-25 RX ADMIN — FUROSEMIDE 40 MG: 40 TABLET ORAL at 11:28

## 2025-02-25 RX ADMIN — PAROXETINE 10 MG: 10 TABLET, FILM COATED ORAL at 09:38

## 2025-02-25 RX ADMIN — BUDESONIDE 0.5 MG: 0.5 INHALANT RESPIRATORY (INHALATION) at 10:12

## 2025-02-25 RX ADMIN — IPRATROPIUM BROMIDE AND ALBUTEROL SULFATE 3 ML: .5; 3 SOLUTION RESPIRATORY (INHALATION) at 15:09

## 2025-02-25 RX ADMIN — LISINOPRIL 5 MG: 5 TABLET ORAL at 09:38

## 2025-02-25 RX ADMIN — IPRATROPIUM BROMIDE AND ALBUTEROL SULFATE 3 ML: .5; 3 SOLUTION RESPIRATORY (INHALATION) at 10:12

## 2025-02-25 RX ADMIN — HEPARIN SODIUM 5000 UNITS: 5000 INJECTION, SOLUTION INTRAVENOUS; SUBCUTANEOUS at 12:31

## 2025-02-25 RX ADMIN — Medication 1 L/MIN: at 09:39

## 2025-02-25 RX ADMIN — Medication 1 TABLET: at 09:38

## 2025-02-25 RX ADMIN — ACETAMINOPHEN 975 MG: 325 TABLET ORAL at 20:34

## 2025-02-25 RX ADMIN — ACETAMINOPHEN 975 MG: 325 TABLET ORAL at 14:04

## 2025-02-25 RX ADMIN — ACETAMINOPHEN 975 MG: 325 TABLET ORAL at 05:42

## 2025-02-25 RX ADMIN — HEPARIN SODIUM 5000 UNITS: 5000 INJECTION, SOLUTION INTRAVENOUS; SUBCUTANEOUS at 05:43

## 2025-02-25 RX ADMIN — BUDESONIDE 0.5 MG: 0.5 INHALANT RESPIRATORY (INHALATION) at 20:08

## 2025-02-25 RX ADMIN — POLYSACCHARIDE-IRON COMPLEX 150 MG: 150 CAPSULE ORAL at 09:38

## 2025-02-25 RX ADMIN — EZETIMIBE 10 MG: 10 TABLET ORAL at 20:34

## 2025-02-25 RX ADMIN — HEPARIN SODIUM 5000 UNITS: 5000 INJECTION, SOLUTION INTRAVENOUS; SUBCUTANEOUS at 20:35

## 2025-02-25 RX ADMIN — ASPIRIN 81 MG CHEWABLE TABLET 81 MG: 81 TABLET CHEWABLE at 09:38

## 2025-02-25 RX ADMIN — OXYCODONE 5 MG: 5 TABLET ORAL at 17:18

## 2025-02-25 ASSESSMENT — PAIN DESCRIPTION - DESCRIPTORS
DESCRIPTORS: ACHING;DISCOMFORT
DESCRIPTORS: ACHING;DISCOMFORT;SORE
DESCRIPTORS: ACHING;DISCOMFORT

## 2025-02-25 ASSESSMENT — PAIN - FUNCTIONAL ASSESSMENT
PAIN_FUNCTIONAL_ASSESSMENT: 0-10

## 2025-02-25 ASSESSMENT — COGNITIVE AND FUNCTIONAL STATUS - GENERAL
STANDING UP FROM CHAIR USING ARMS: A LITTLE
MOBILITY SCORE: 24
MOVING FROM LYING ON BACK TO SITTING ON SIDE OF FLAT BED WITH BEDRAILS: A LITTLE
MOVING TO AND FROM BED TO CHAIR: A LITTLE
WALKING IN HOSPITAL ROOM: A LITTLE
MOBILITY SCORE: 17
TURNING FROM BACK TO SIDE WHILE IN FLAT BAD: A LOT
CLIMB 3 TO 5 STEPS WITH RAILING: A LITTLE
DAILY ACTIVITIY SCORE: 24

## 2025-02-25 ASSESSMENT — PAIN SCALES - GENERAL
PAINLEVEL_OUTOF10: 2
PAINLEVEL_OUTOF10: 6
PAINLEVEL_OUTOF10: 0 - NO PAIN
PAINLEVEL_OUTOF10: 6
PAINLEVEL_OUTOF10: 6
PAINLEVEL_OUTOF10: 3
PAINLEVEL_OUTOF10: 5 - MODERATE PAIN

## 2025-02-25 ASSESSMENT — ACTIVITIES OF DAILY LIVING (ADL)
EFFECT OF PAIN ON DAILY ACTIVITIES: COMFORT
EFFECT OF PAIN ON DAILY ACTIVITIES: COMFORT

## 2025-02-25 ASSESSMENT — PAIN DESCRIPTION - LOCATION
LOCATION: CHEST
LOCATION: CHEST

## 2025-02-25 NOTE — PROGRESS NOTES
CARDIAC SURGERY DAILY PROGRESS NOTE    Barbie Milan is a 65 y.o. female, with a PMH of CAD s/p stent x 4 RCA, MI 2007, HLD, HTN, PAD, T2DM, overactive bladder, a recurrent UTI, polycythemia, fibromyalgia, osteoarthritis, gout, hard of hearing, and smoking who presented to [facility] on 2/18/2025 for CABGx3 (LIMA-LAD, SVG-CX, SVG-PDA).     OPERATION/PROCEDURE: 2/18: CABG x 3, LIMA-LAD, SVG-CX, SVG-PDA, POSTERIOR PERICARDIAL WINDOW  [address] - NE CORONARY ARTERY BYPASS 4 CORONARY VENOUS GRAFTS with Gallo Canchola MD    CTICU Course: Respiratory insufficiency with increased oxygenation requirements after self extubation.    Transferred to  on 2/23    Interval History:   No acute events overnight.    SUBJECTIVE:  No complaints at this time.    Objective   /81 (BP Location: Right arm, Patient Position: Sitting) Comment: Simultaneous filing. User may not have seen previous data.  Pulse 75   Temp 36.2 °C (97.2 °F) (Temporal)   Resp 17   Wt 90.9 kg (200 lb 8 oz)   LMP  (LMP Unknown)   SpO2 95%   BMI 33.36 kg/m²   0-10 (Numeric) Pain Score: 0 - No pain   3 Day Weight Change: -1.351 kg (-2 lb 15.7 oz) per day    Intake and Output    Intake/Output Summary (Last 24 hours) at 2/25/2025 1039  Last data filed at 2/25/2025 0542  Gross per 24 hour   Intake 600 ml   Output 150 ml   Net 450 ml       Physical Exam  Physical Exam  Constitutional:       Appearance: Normal appearance.   HENT:      Head: Normocephalic and atraumatic.      Ears:      Comments: Hard of hearing  Eyes:      Extraocular Movements: Extraocular movements intact.      Pupils: Pupils are equal, round, and reactive to light.   Cardiovascular:      Rate and Rhythm: Normal rate and regular rhythm.      Pulses: Normal pulses.      Heart sounds: Normal heart sounds.      Comments: NSR 80s on tele  Epicardial pacing wires present  Pulmonary:      Effort: Pulmonary effort is normal.      Breath sounds: Normal breath sounds.   Abdominal:       General: Bowel sounds are normal.      Palpations: Abdomen is soft.   Musculoskeletal:         General: Normal range of motion.      Cervical back: Normal range of motion.   Skin:     General: Skin is warm and dry.   Neurological:      General: No focal deficit present.      Mental Status: She is alert and oriented to person, place, and time.   Psychiatric:         Mood and Affect: Mood normal.       Medications  Scheduled medications  aspirin, 81 mg, oral, Daily  budesonide, 0.5 mg, nebulization, BID  clopidogrel, 75 mg, oral, Daily  ezetimibe, 10 mg, oral, Nightly  furosemide, 40 mg, oral, Daily  heparin, 5,000 Units, subcutaneous, q8h  insulin lispro, 0-15 Units, subcutaneous, Before meals & nightly  ipratropium-albuteroL, 3 mL, nebulization, TID  iron polysaccharides, 150 mg, oral, Daily  lisinopril, 5 mg, oral, Daily  metoprolol tartrate, 25 mg, oral, BID  multivitamin with minerals, 1 tablet, oral, Daily  oxygen, , inhalation, Continuous - Inhalation  PARoxetine, 10 mg, oral, q AM  polyethylene glycol, 17 g, oral, BID  sennosides-docusate sodium, 3 tablet, oral, BID    Continuous medications   PRN medications  PRN medications: acetaminophen, albuterol, ipratropium-albuteroL, naloxone, ondansetron **OR** ondansetron, oxyCODONE, oxygen    Labs  Results for orders placed or performed during the hospital encounter of 02/18/25 (from the past 24 hours)   POCT GLUCOSE   Result Value Ref Range    POCT Glucose 107 (H) 74 - 99 mg/dL   POCT GLUCOSE   Result Value Ref Range    POCT Glucose 104 (H) 74 - 99 mg/dL   POCT GLUCOSE   Result Value Ref Range    POCT Glucose 99 74 - 99 mg/dL   Magnesium   Result Value Ref Range    Magnesium 2.09 1.60 - 2.40 mg/dL   CBC   Result Value Ref Range    WBC 8.9 4.4 - 11.3 x10*3/uL    nRBC 0.2 (H) 0.0 - 0.0 /100 WBCs    RBC 2.76 (L) 4.00 - 5.20 x10*6/uL    Hemoglobin 9.0 (L) 12.0 - 16.0 g/dL    Hematocrit 28.4 (L) 36.0 - 46.0 %     (H) 80 - 100 fL    MCH 32.6 26.0 - 34.0 pg     MCHC 31.7 (L) 32.0 - 36.0 g/dL    RDW 13.5 11.5 - 14.5 %    Platelets 196 150 - 450 x10*3/uL   Renal Function Panel   Result Value Ref Range    Glucose 92 74 - 99 mg/dL    Sodium 136 136 - 145 mmol/L    Potassium 3.4 (L) 3.5 - 5.3 mmol/L    Chloride 100 98 - 107 mmol/L    Bicarbonate 23 21 - 32 mmol/L    Anion Gap 16 10 - 20 mmol/L    Urea Nitrogen 17 6 - 23 mg/dL    Creatinine 0.73 0.50 - 1.05 mg/dL    eGFR >90 >60 mL/min/1.73m*2    Calcium 8.5 (L) 8.6 - 10.6 mg/dL    Phosphorus 3.7 2.5 - 4.9 mg/dL    Albumin 4.0 3.4 - 5.0 g/dL   Protime-INR   Result Value Ref Range    Protime 13.3 (H) 9.8 - 12.8 seconds    INR 1.2 (H) 0.9 - 1.1   POCT GLUCOSE   Result Value Ref Range    POCT Glucose 115 (H) 74 - 99 mg/dL           IMAGING/ DIAGNOSTIC TESTING:  I have personally reviewed the following test result(s):    XR 2 view 2/25  IMPRESSION:  1.  Findings of central pulmonary vascular congestion with small left  pleural effusion, similar to prior exam.  2. Stable focal opacity in the left upper lobe may represent focal  infection or scarring though it is stable from the exam on  02/14/2025. Further evaluation with chest CT is recommended.    TTE 2/21  CONCLUSIONS:   1. Poorly visualized anatomical structures due to suboptimal image quality.   2. Left ventricular ejection fraction is mildly decreased, by visual estimate at 40-45%.   3. Abnormal left venticular wall motion.   4. No left ventricular thrombus visualized.   5. There is inferior and basal inferoseptal hypo- to akinesia.   6. There is reduced right ventricular systolic function.   7. Mildly enlarged right ventricle.   8. The left atrium is enlarged.    IMPRESSION & PLAN:  POD # 7 s/p CABGx3 (LIMA-LAD, SVG-CX, SVG-PDA)  - Increase activity/ ambulation; PT/OT  - Encourage IS, C/DB; respiratory therapy; wean O2 as bong   - Cardiac rehab referral   - Continue cardiac meds: ASA, BB, ezetimibe (statin allergy with myalgias)  - Continue plavix x1 year for NSTEMI   - Pain  and anticonstipation meds  - 2v CXR ordered for   - Postop echo not indicated  - Cut epicardial wires prior to discharge   - Tele until discharge  - Optimize nutrition and electrolytes    Rhythm  - Tele: NSR 70-90s  - Continue BB  - Adjust medications as tolerated    Acute Blood Loss Anemia   Recent Labs     25  0610 25  0556 25  0139 25  1355 25  0012 25  1311 25  0113   HGB 9.0* 9.3* 8.9* 9.6* 8.9* 9.7* 8.8*   HCT 28.4* 28.0* 26.0* 27.7* 26.9* 29.2* 27.0*   - MV, PO Iron x1mo  - Daily labs, transfuse as indicated    Thrombocytopenia  Recent Labs     25  0610 25  0556 25  0139 25  1355 25  0012 25  1311 25  0113    147* 114* 132* 117* 118* 89*   - Etiology likely postop/CPB related  - Continue to trend with daily CBCs    Volume/Electrolyte Status: Preop wt Weight: 95 kg (209 lb 7 oz)   Vitals:    25 0120   Weight: 90.9 kg (200 lb 8 oz)   - Weight: 90.9kg  - Adjust diuresis as needed for postop cardiac surgery hypervolemia  - Start home PO lasix 40mg daily  - Replete electrolytes for hypokalemia/hypomagnesemia/hypophosphatemia as needed  - Daily weights and strict I&Os  - Daily RFP while admitted    Leukocytosis  Recent Labs     25  0610 25  0556 25  0139 25  1355 25  0012 25  1311 25  0113   WBC 8.9 8.2 7.4 7.3 8.7 10.3 8.1     Temp (36hrs), Av.4 °C (97.6 °F), Min:36 °C (96.8 °F), Max:37.2 °C (99 °F)  - aggressive pulmonary hygiene  - monitor for s/s infection  - likely atelectasis/ postoperative in etiology  - daily CBC to follow    Fibromyalgia/osteoarthritis: Home meds - Paroxetine and norco  - Hold home norco  - Continue Paroxetine 10mg daily    Hypertension: Home meds - furosemide 40mg, lisinopril 10mg  - Continue lisinopril 5mg daily    Acute respiratory insufficiency: Resolved. Currently oxygenating well on room air.    VTE Prophylaxis: SCDs/TEDs, ambulation, SQ  heparin  Code Status: Full Code      Dispo  - PT/OT recs low intensity  - Would benefit from homecare RN for cardiac surgery carepath  - Anticipate discharge tomorrow. Awaiting ride, 2 hour drive.  - Will continue to assess discharge needs  - Will need further follow up as outpatient for incidental lung nodule findings on 2v CXR 2/25    TREY Jara-CNP  Cardiac Surgery DIANA  [facility]  Team Phone [phone]    2/25/2025  10:39 AM

## 2025-02-25 NOTE — CARE PLAN
Problem: Safety - Adult  Goal: Free from fall injury  Outcome: Progressing     Problem: Discharge Planning  Goal: Discharge to home or other facility with appropriate resources  Outcome: Progressing     Problem: Chronic Conditions and Co-morbidities  Goal: Patient's chronic conditions and co-morbidity symptoms are monitored and maintained or improved  Outcome: Progressing     Problem: Nutrition  Goal: Nutrient intake appropriate for maintaining nutritional needs  Outcome: Progressing     Problem: Skin  Goal: Decreased wound size/increased tissue granulation at next dressing change  Outcome: Progressing  Goal: Participates in plan/prevention/treatment measures  Outcome: Progressing  Goal: Prevent/manage excess moisture  Outcome: Progressing  Flowsheets (Taken 2/25/2025 1031)  Prevent/manage excess moisture: Moisturize dry skin  Goal: Prevent/minimize sheer/friction injuries  Outcome: Progressing  Goal: Promote/optimize nutrition  Outcome: Progressing  Goal: Promote skin healing  Outcome: Progressing     Problem: Fall/Injury  Goal: Not fall by end of shift  Outcome: Progressing  Goal: Be free from injury by end of the shift  Outcome: Progressing  Goal: Verbalize understanding of personal risk factors for fall in the hospital  Outcome: Progressing  Goal: Verbalize understanding of risk factor reduction measures to prevent injury from fall in the home  Outcome: Progressing  Goal: Use assistive devices by end of the shift  Outcome: Progressing  Goal: Pace activities to prevent fatigue by end of the shift  Outcome: Progressing     Problem: Respiratory  Goal: Clear secretions with interventions this shift  Outcome: Progressing  Goal: Minimize anxiety/maximize coping throughout shift  Outcome: Progressing  Goal: Minimal/no exertional discomfort or dyspnea this shift  Outcome: Progressing  Goal: No signs of respiratory distress (eg. Use of accessory muscles. Peds grunting)  Outcome: Progressing  Goal: Patent airway  maintained this shift  Outcome: Progressing  Goal: Tolerate mechanical ventilation evidenced by VS/agitation level this shift  Outcome: Progressing  Goal: Tolerate pulmonary toileting this shift  Outcome: Progressing  Goal: Verbalize decreased shortness of breath this shift  Outcome: Progressing  Goal: Wean oxygen to maintain O2 saturation per order/standard this shift  Outcome: Progressing  Goal: Increase self care and/or family involvement in next 24 hours  Outcome: Progressing     Problem: Diabetes  Goal: Achieve decreasing blood glucose levels by end of shift  Outcome: Progressing  Goal: Increase stability of blood glucose readings by end of shift  Outcome: Progressing  Goal: Decrease in ketones present in urine by end of shift  Outcome: Progressing  Goal: Maintain electrolyte levels within acceptable range throughout shift  Outcome: Progressing  Goal: Maintain glucose levels >70mg/dl to <250mg/dl throughout shift  Outcome: Progressing  Goal: No changes in neurological exam by end of shift  Outcome: Progressing  Goal: Learn about and adhere to nutrition recommendations by end of shift  Outcome: Progressing  Goal: Vital signs within normal range for age by end of shift  Outcome: Progressing  Goal: Increase self care and/or family involovement by end of shift  Outcome: Progressing  Goal: Receive DSME education by end of shift  Outcome: Progressing     Problem: Pain  Goal: Takes deep breaths with improved pain control throughout the shift  Outcome: Progressing  Goal: Turns in bed with improved pain control throughout the shift  Outcome: Progressing  Goal: Walks with improved pain control throughout the shift  Outcome: Progressing  Goal: Performs ADL's with improved pain control throughout shift  Outcome: Progressing  Goal: Participates in PT with improved pain control throughout the shift  Outcome: Progressing  Goal: Free from opioid side effects throughout the shift  Outcome: Progressing  Goal: Free from acute  confusion related to pain meds throughout the shift  Outcome: Progressing

## 2025-02-25 NOTE — TELEPHONE ENCOUNTER
Conchis called from [facility] health stating patient is being discharged from [facility] tomorrow and wondering if doctor will follow for home care services.     Called Conchis back at [phone] to ask if she meant [facility]? But to also let her know that doctor will follow for home care orders.    Patient does have an appointment for 3/6

## 2025-02-25 NOTE — PROGRESS NOTES
02/25/25 1052   Discharge Planning   Living Arrangements Spouse/significant other   Support Systems Spouse/significant other   Type of Residence Private residence   Type of Home Care Services Home nursing visits;Home PT   Expected Discharge Disposition Home H  ([facility])   Does the patient need discharge transport arranged? No     Met with patient to confirm home care choice.  Patient has confirmed [facility] as her agency of choice.  Referral updates generated.  ADOD 2/26.  Obtained IMM letter and placed in the chart.

## 2025-02-25 NOTE — PROGRESS NOTES
Physical Therapy    Physical Therapy Treatment    Patient Name: Barbie Milan  MRN: [id number]  Department: [place] [id number]  Room: [address] [place] [address]  Today's Date: 2/25/2025  Time Calculation  Start Time: 1035  Stop Time: 1100  Time Calculation (min): 25 min         Assessment/Plan   PT Assessment  PT Assessment Results: Decreased strength, Decreased endurance, Decreased mobility  Evaluation/Treatment Tolerance: Patient tolerated treatment well  End of Session Communication: Bedside nurse  Assessment Comment: Pt. tolerated session well - Pt. excited that she will be going home tomorrow but worried about scheduling a ride. Due to pt. not following recommendation for high intensive therapy , recommending low intensive therapy post acute.  End of Session Patient Position: Up in chair, Alarm off, not on at start of session  PT Plan  Inpatient/Swing Bed or Outpatient: Inpatient  PT Plan  Treatment/Interventions: Transfer training, Gait training, Stair training  PT Plan: Ongoing PT  PT Frequency: 5 times per week  PT Discharge Recommendations: High intensity level of continued care  Equipment Recommended upon Discharge: Wheeled walker  PT Recommended Transfer Status: Assist x1, Assistive device  PT - OK to Discharge: Yes      General Visit Information:   PT  Visit  PT Received On: 02/25/25  General  Reason for Referral: 64 y/o female now CABG x 3 (LIMA to LAD, SVG to LCx, SVG to PDA), posterior pericardial window secondary to CAD with Dr. Gallo Canchola on 2/18/2025  Past Medical History Relevant to Rehab: CAD s/p stent x 4 RCA, MI 2007, HLD, HTN, PAD, T2DM, overactive bladder, a recurrent UTI, polycythemia, fibromyalgia, osteoarthritis, gout, hard of hearing, smoking  Family/Caregiver Present: No  Prior to Session Communication: Bedside nurse  Patient Position Received: Up in chair  General Comment: Pt. is pleasant and admittedly anxious regarding getting a ride tomorrow. Pt. has a tele - and has MITT precautions. Pt. willing to  participate although has been very busy this morning.    Subjective   Precautions:  Precautions  Hearing/Visual Limitations: Pala  Medical Precautions: Fall precautions, Cardiac precautions, Oxygen therapy device and L/min  Post-Surgical Precautions: Move in the Tube     Date/Time Vitals Session Patient Position Pulse Resp SpO2 BP MAP (mmHg)    02/25/25 0938 --  --  75  17  95 %  111/81  91     02/25/25 1035 During PT  --  79  --  97 %  --  --                 Objective   Pain:  Pain Assessment  Pain Assessment: 0-10  0-10 (Numeric) Pain Score:  (Discomfort at incision sites but tolerable.)  Cognition:  Cognition  Overall Cognitive Status: Within Functional Limits  Orientation Level: Oriented X4  Coordination:  Movements are Fluid and Coordinated: Yes  Postural Control:  Postural Control  Postural Control: Within Functional Limits  Static Sitting Balance  Static Sitting-Balance Support: Bilateral upper extremity supported, Feet supported  Static Sitting-Level of Assistance: Close supervision  Static Standing Balance  Static Standing-Balance Support: Bilateral upper extremity supported  Static Standing-Level of Assistance:  (Supervision)  Extremity/Trunk Assessments:                      Activity Tolerance:  Activity Tolerance  Endurance: Decreased tolerance for upright activites  Treatments:  Ambulation/Gait Training  Ambulation/Gait Training Performed: Yes  Ambulation/Gait Training 1  Surface 1:  (Pt. amb. 50' x 2 and 100' x 2 initially without device however pt. reaching out for support therefore recommended pt. use a wh. walker for now to avoid furniture walking. Discussed risks.)  Comments/Distance (ft) 1: Pt. reports feeling SOB requiring rest breaks and recovers with rest. Pt. clarifies that she is getting SOB due to stress/anxiety regaring getting a ride home.  Transfers  Transfer: Yes  Transfer 1  Transfer From 1: Sit to  Transfer to 1: Stand  Technique 1: Stand to sit  Transfer Level of Assistance 1: Close  supervision    Stairs  Stairs: Yes  Stairs  Rails 1:  (Pt. ascended and descended 4 steps with non reciprocal as instructed with close sba.)    Outcome Measures:  [facility] Basic Mobility  Turning from your back to your side while in a flat bed without using bedrails: A little  Moving from lying on your back to sitting on the side of a flat bed without using bedrails: A lot  Moving to and from bed to chair (including a wheelchair): A little  Standing up from a chair using your arms (e.g. wheelchair or bedside chair): A little  To walk in hospital room: A little  Climbing 3-5 steps with railing: A little  Basic Mobility - Total Score: 17    Education Documentation  Precautions, taught by Zoey Ashby PTA at 2/25/2025 11:20 AM.  Learner: Patient  Readiness: Acceptance  Method: Explanation, Demonstration  Response: Needs Reinforcement    Body Mechanics, taught by Zoey Ashby PTA at 2/25/2025 11:20 AM.  Learner: Patient  Readiness: Acceptance  Method: Explanation, Demonstration  Response: Needs Reinforcement    Mobility Training, taught by Zoey Ashby PTA at 2/25/2025 11:20 AM.  Learner: Patient  Readiness: Acceptance  Method: Explanation, Demonstration  Response: Needs Reinforcement    Education Comments  No comments found.        OP EDUCATION:       Encounter Problems       Encounter Problems (Active)       Mobility       LTG - Patient will ambulate household distance ~150 feet with SBA and LRAD. (Progressing)       Start:  02/19/25    Expected End:  03/05/25            LTG - Patient will navigate 4-6 steps with rails/device with SBA (Progressing)       Start:  02/19/25    Expected End:  03/05/25            Pt will ambulate >/= 150 ft with LRAD and SBA and no signs of fatigue or SOB.   (Progressing)       Start:  02/19/25    Expected End:  03/05/25               PT Transfers       LTG - Patient will transfer from one surface to another independently with LRAD (Progressing)       Start:  02/19/25    Expected End:   03/05/25            STG - Patient will perform bed mobility independently.  (Progressing)       Start:  02/19/25    Expected End:  03/05/25

## 2025-02-26 VITALS
HEART RATE: 76 BPM | RESPIRATION RATE: 18 BRPM | OXYGEN SATURATION: 94 % | DIASTOLIC BLOOD PRESSURE: 74 MMHG | SYSTOLIC BLOOD PRESSURE: 118 MMHG | TEMPERATURE: 96.4 F | WEIGHT: 201.3 LBS | BODY MASS INDEX: 33.5 KG/M2

## 2025-02-26 PROBLEM — J95.89 ACUTE POSTOPERATIVE RESPIRATORY INSUFFICIENCY: Status: RESOLVED | Noted: 2025-02-26 | Resolved: 2025-02-26

## 2025-02-26 PROBLEM — Z95.1 S/P CABG X 3: Status: ACTIVE | Noted: 2025-02-26

## 2025-02-26 PROBLEM — J95.89 ACUTE POSTOPERATIVE RESPIRATORY INSUFFICIENCY: Status: ACTIVE | Noted: 2025-02-26

## 2025-02-26 LAB
ALBUMIN SERPL BCP-MCNC: 3.9 G/DL (ref 3.4–5)
ANION GAP SERPL CALC-SCNC: 13 MMOL/L (ref 10–20)
BUN SERPL-MCNC: 16 MG/DL (ref 6–23)
CALCIUM SERPL-MCNC: 8.6 MG/DL (ref 8.6–10.6)
CHLORIDE SERPL-SCNC: 102 MMOL/L (ref 98–107)
CO2 SERPL-SCNC: 23 MMOL/L (ref 21–32)
CREAT SERPL-MCNC: 0.68 MG/DL (ref 0.5–1.05)
EGFRCR SERPLBLD CKD-EPI 2021: >90 ML/MIN/1.73M*2
ERYTHROCYTE [DISTWIDTH] IN BLOOD BY AUTOMATED COUNT: 13.1 % (ref 11.5–14.5)
GLUCOSE SERPL-MCNC: 106 MG/DL (ref 74–99)
HCT VFR BLD AUTO: 24.7 % (ref 36–46)
HGB BLD-MCNC: 8.9 G/DL (ref 12–16)
INR PPP: 1.1 (ref 0.9–1.1)
MAGNESIUM SERPL-MCNC: 2.02 MG/DL (ref 1.6–2.4)
MCH RBC QN AUTO: 34 PG (ref 26–34)
MCHC RBC AUTO-ENTMCNC: 36 G/DL (ref 32–36)
MCV RBC AUTO: 94 FL (ref 80–100)
NRBC BLD-RTO: 0 /100 WBCS (ref 0–0)
PHOSPHATE SERPL-MCNC: 3.6 MG/DL (ref 2.5–4.9)
PLATELET # BLD AUTO: 232 X10*3/UL (ref 150–450)
POTASSIUM SERPL-SCNC: 3.4 MMOL/L (ref 3.5–5.3)
PROTHROMBIN TIME: 12.7 SECONDS (ref 9.8–12.4)
RBC # BLD AUTO: 2.62 X10*6/UL (ref 4–5.2)
SODIUM SERPL-SCNC: 135 MMOL/L (ref 136–145)
WBC # BLD AUTO: 8 X10*3/UL (ref 4.4–11.3)

## 2025-02-26 PROCEDURE — 2500000001 HC RX 250 WO HCPCS SELF ADMINISTERED DRUGS (ALT 637 FOR MEDICARE OP)

## 2025-02-26 PROCEDURE — 83735 ASSAY OF MAGNESIUM: CPT

## 2025-02-26 PROCEDURE — 2500000001 HC RX 250 WO HCPCS SELF ADMINISTERED DRUGS (ALT 637 FOR MEDICARE OP): Performed by: NURSE PRACTITIONER

## 2025-02-26 PROCEDURE — 2500000004 HC RX 250 GENERAL PHARMACY W/ HCPCS (ALT 636 FOR OP/ED)

## 2025-02-26 PROCEDURE — 85027 COMPLETE CBC AUTOMATED: CPT

## 2025-02-26 PROCEDURE — 2500000002 HC RX 250 W HCPCS SELF ADMINISTERED DRUGS (ALT 637 FOR MEDICARE OP, ALT 636 FOR OP/ED): Performed by: NURSE PRACTITIONER

## 2025-02-26 PROCEDURE — 99239 HOSP IP/OBS DSCHRG MGMT >30: CPT | Performed by: NURSE PRACTITIONER

## 2025-02-26 PROCEDURE — 80069 RENAL FUNCTION PANEL: CPT

## 2025-02-26 PROCEDURE — 85610 PROTHROMBIN TIME: CPT

## 2025-02-26 PROCEDURE — 2500000005 HC RX 250 GENERAL PHARMACY W/O HCPCS

## 2025-02-26 PROCEDURE — 36415 COLL VENOUS BLD VENIPUNCTURE: CPT

## 2025-02-26 RX ORDER — FUROSEMIDE 40 MG/1
40 TABLET ORAL DAILY
Qty: 14 TABLET | Refills: 0 | Status: SHIPPED | OUTPATIENT
Start: 2025-02-26 | End: 2025-03-12

## 2025-02-26 RX ORDER — EZETIMIBE 10 MG/1
10 TABLET ORAL NIGHTLY
Qty: 30 TABLET | Refills: 0 | Status: SHIPPED | OUTPATIENT
Start: 2025-02-26 | End: 2026-02-26

## 2025-02-26 RX ORDER — POLYETHYLENE GLYCOL 3350 17 G/17G
17 POWDER, FOR SOLUTION ORAL DAILY PRN
COMMUNITY
Start: 2025-02-26

## 2025-02-26 RX ORDER — ALBUTEROL SULFATE 90 UG/1
2 INHALANT RESPIRATORY (INHALATION) EVERY 6 HOURS PRN
Qty: 18 G | Refills: 0 | Status: SHIPPED | OUTPATIENT
Start: 2025-02-26

## 2025-02-26 RX ORDER — POTASSIUM CHLORIDE 750 MG/1
10 TABLET, FILM COATED, EXTENDED RELEASE ORAL DAILY
Qty: 14 TABLET | Refills: 0 | Status: SHIPPED | OUTPATIENT
Start: 2025-02-26 | End: 2025-03-12

## 2025-02-26 RX ORDER — ASPIRIN 81 MG/1
81 TABLET ORAL DAILY
Status: DISCONTINUED | OUTPATIENT
Start: 2025-02-26 | End: 2025-02-26 | Stop reason: HOSPADM

## 2025-02-26 RX ORDER — DOCUSATE SODIUM 100 MG/1
100 CAPSULE, LIQUID FILLED ORAL 2 TIMES DAILY
Status: DISCONTINUED | OUTPATIENT
Start: 2025-02-26 | End: 2025-02-26 | Stop reason: HOSPADM

## 2025-02-26 RX ORDER — FLUTICASONE FUROATE 100 UG/1
1 POWDER RESPIRATORY (INHALATION) DAILY
Qty: 1 EACH | Refills: 0 | Status: SHIPPED | OUTPATIENT
Start: 2025-02-26

## 2025-02-26 RX ORDER — POLYETHYLENE GLYCOL 3350 17 G/17G
17 POWDER, FOR SOLUTION ORAL DAILY PRN
Status: DISCONTINUED | OUTPATIENT
Start: 2025-02-26 | End: 2025-02-26 | Stop reason: HOSPADM

## 2025-02-26 RX ORDER — POTASSIUM CHLORIDE 20 MEQ/1
40 TABLET, EXTENDED RELEASE ORAL ONCE
Status: COMPLETED | OUTPATIENT
Start: 2025-02-26 | End: 2025-02-26

## 2025-02-26 RX ORDER — MULTIVIT-MIN/IRON FUM/FOLIC AC 7.5 MG-4
1 TABLET ORAL DAILY
COMMUNITY
Start: 2025-02-27 | End: 2025-04-28

## 2025-02-26 RX ORDER — CLOPIDOGREL BISULFATE 75 MG/1
75 TABLET ORAL DAILY
Qty: 30 TABLET | Refills: 0 | Status: SHIPPED | OUTPATIENT
Start: 2025-02-27 | End: 2026-02-27

## 2025-02-26 RX ORDER — DOCUSATE SODIUM 100 MG/1
100 CAPSULE, LIQUID FILLED ORAL 2 TIMES DAILY PRN
COMMUNITY
Start: 2025-02-26

## 2025-02-26 RX ORDER — IRON POLYSACCHARIDE COMPLEX 150 MG
150 CAPSULE ORAL DAILY
Qty: 28 CAPSULE | Refills: 0 | Status: SHIPPED | OUTPATIENT
Start: 2025-02-27 | End: 2025-03-27

## 2025-02-26 RX ORDER — LISINOPRIL 5 MG/1
5 TABLET ORAL DAILY
Qty: 30 TABLET | Refills: 0 | Status: SHIPPED | OUTPATIENT
Start: 2025-02-27

## 2025-02-26 RX ADMIN — HEPARIN SODIUM 5000 UNITS: 5000 INJECTION, SOLUTION INTRAVENOUS; SUBCUTANEOUS at 04:25

## 2025-02-26 RX ADMIN — POTASSIUM CHLORIDE 40 MEQ: 1500 TABLET, EXTENDED RELEASE ORAL at 13:19

## 2025-02-26 RX ADMIN — Medication 1 TABLET: at 09:02

## 2025-02-26 RX ADMIN — ACETAMINOPHEN 975 MG: 325 TABLET ORAL at 12:18

## 2025-02-26 RX ADMIN — CLOPIDOGREL BISULFATE 75 MG: 75 TABLET ORAL at 09:03

## 2025-02-26 RX ADMIN — POLYSACCHARIDE-IRON COMPLEX 150 MG: 150 CAPSULE ORAL at 09:03

## 2025-02-26 RX ADMIN — OXYCODONE 5 MG: 5 TABLET ORAL at 09:03

## 2025-02-26 RX ADMIN — Medication 1 L/MIN: at 09:04

## 2025-02-26 RX ADMIN — OXYCODONE 5 MG: 5 TABLET ORAL at 01:56

## 2025-02-26 RX ADMIN — PAROXETINE 10 MG: 10 TABLET, FILM COATED ORAL at 09:03

## 2025-02-26 RX ADMIN — ASPIRIN 81 MG: 81 TABLET, COATED ORAL at 09:02

## 2025-02-26 RX ADMIN — HEPARIN SODIUM 5000 UNITS: 5000 INJECTION, SOLUTION INTRAVENOUS; SUBCUTANEOUS at 12:18

## 2025-02-26 RX ADMIN — METOPROLOL TARTRATE 25 MG: 25 TABLET, FILM COATED ORAL at 09:03

## 2025-02-26 RX ADMIN — FUROSEMIDE 40 MG: 40 TABLET ORAL at 09:02

## 2025-02-26 RX ADMIN — LISINOPRIL 5 MG: 5 TABLET ORAL at 09:03

## 2025-02-26 ASSESSMENT — COGNITIVE AND FUNCTIONAL STATUS - GENERAL
MOBILITY SCORE: 24
DAILY ACTIVITIY SCORE: 24

## 2025-02-26 ASSESSMENT — PAIN - FUNCTIONAL ASSESSMENT
PAIN_FUNCTIONAL_ASSESSMENT: 0-10

## 2025-02-26 ASSESSMENT — PAIN SCALES - GENERAL
PAINLEVEL_OUTOF10: 6
PAINLEVEL_OUTOF10: 1
PAINLEVEL_OUTOF10: 0 - NO PAIN
PAINLEVEL_OUTOF10: 5 - MODERATE PAIN
PAINLEVEL_OUTOF10: 6

## 2025-02-26 ASSESSMENT — PAIN DESCRIPTION - LOCATION: LOCATION: INCISION

## 2025-02-26 ASSESSMENT — PAIN DESCRIPTION - DESCRIPTORS: DESCRIPTORS: DISCOMFORT;ACHING

## 2025-02-26 NOTE — DISCHARGE SUMMARY
Discharge Diagnosis  Atherosclerotic heart disease of native coronary artery without angina pectoris  S/p CABGx3    Issues Requiring Follow-Up  Needs outpatient CT chest to f/up LUCIA opacity on CXR  Pt to f/up with PCP, cardiology, cardiac surgery    Test Results Pending At Discharge  Pending Labs       No current pending labs.            Hospital Course  Barbie Milan is a 65-year-old female s/p scheduled CABG x 3 [LIMA to LAD, SVG to LCx, SVG to PDA], posterior pericardial window secondary to CAD with Dr. Gallo Canchola on 2/18/2025.  Past medical history remarkable for CAD s/p stent x 4 RCA, MI 2007, HLD, HTN, PAD, T2DM, overactive bladder, a recurrent UTI, polycythemia, fibromyalgia, osteoarthritis, gout, hard of hearing, smoking.    2/18/25 OPERATION: by Gallo Canchola  CABG x 3, LIMA-LAD, SVG-CX, SVG-PDA  POSTERIOR PERICARDIAL WINDOW    Echo Pre/Post: PRE LVEF 25-30% [Sebastián]/ POST LVEF 30-35%, inferior wall hypokinesis, LA dilation, moderate MR, normal AV, normal RV, NSR post op   Chest Tubes/Drains: Left pleural chest tube, mediastinal chest tube x 2   Temporary wires location/setting: VVI    CTICU: Respiratory insufficiency with increased oxygenation requirements after self extubation    Transferred to the floor 2/23    Floor Course:  - Patient was diuresed for fluid volume overload post cardiac surgery; Preop weight: 89.8kg, discharge wt: 91.3kg; discharging on 2 weeks of lasix (and K)  - On ASA, Plavix, Zetia (allergic to statin), BB, ace by discharge  - Epicardial wires CUT on 2/26  - Telemetry at discharge SR 70s-80s  - 2v CXR done 2/25  - Incidental lung LUCIA opacity found on CXR -> needs outpt CT chest  - Postop echo done 2/21: EF 40-45%  - Cardiac rehab referral was placed  - PT recs home and low intensity therapy  - Anticipate discharge to home with homecare    On day of discharge, vital signs were stable and no acute distress was noted. All questions were answered. After VS and labs were reviewed it  was determined the patient was stable for discharge.     Discharged with home care RN and PT on Wednesday, February 26, 2025; POD#8        =============================================  Hospital day of discharge management- spent >30 minutes coordinating the discharge and counseling/educating patient and family regarding discharge instructions.   =============================================  XR CHEST 2 VIEWS;  2/25/2025  IMPRESSION:  1.  Findings of central pulmonary vascular congestion with small left  pleural effusion, similar to prior exam.  2. Stable focal opacity in the left upper lobe may represent focal  infection or scarring though it is stable from the exam on  02/14/2025. Further evaluation with chest CT is recommended.    ==========================================  HISTORY:  Medical HistoryExpand by Default  Past Medical History:  Diagnosis Date  · Abnormal gait 04/19/2021  · Acute upper respiratory infection 01/13/2025  · Allergic rhinitis with postnasal drip 10/03/2024  · Anxiety    · Arthralgia of right knee 10/06/2021  · Bilateral adrenal adenomas 12/17/2022  · Bladder diverticulum 12/17/2022  · CAD (coronary artery disease)      (MI 2007 stentx4 RCA),  · Carpal tunnel syndrome 04/19/2021  · Chronic otitis media 01/13/2025  · Chronic sinusitis 01/13/2025  · Coronary artery disease    · Cyst of ovary, unspecified laterality 06/08/2023  · Degeneration of lumbosacral intervertebral disc 01/13/2025  · Disease due to severe acute respiratory syndrome coronavirus 2 (SARS-CoV-2) 01/13/2025  · Disturbance in speech 04/19/2021  · Dizziness and giddiness 01/13/2025  · DM (diabetes mellitus) (Multi)    · Fatigue    · Fatty liver 12/17/2022  · Fibromyalgia 01/13/2025  · Hearing aid worn    · HL (hearing loss)      20% in Left ear, none in right ear- reads lips  · Hyperlipidemia    · Hypertension    · Low back pain 01/13/2025  · Lumbosacral spondylosis without myelopathy 01/13/2025  · Muscle  spasticity 03/24/2022  · Myocardial infarction (Multi) 2007    2007 x7, 6 months ago  · Obesity with body mass index 30 or greater 12/15/2015    Body mass index 30+ - obesity    · Otosclerosis of both ears 03/24/2022  · Overactive bladder 09/10/2024  · Perforation of tympanic membrane 01/13/2025  · Peripheral vascular disease ([facility]) 01/13/2025    4 stents in abdomin, patient reports vasculature in abdomin  · Polycythemia 04/02/2022  · Primary fibromyalgia syndrome 06/29/2017  · Psychophysiological insomnia 10/28/2022  · 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) 03/24/2022  · Recurrent otitis media, right 03/24/2022  · Sciatica 04/19/2021  · Shortness of breath      FLETCHER  · Spinal stenosis of lumbar region 08/29/2018  · Sprain of knee 01/13/2025  · Tear of lateral meniscus of knee 06/08/2022  · Tobacco use    · Total perforation of tympanic membrane, right 06/19/2023  · Type 2 diabetes mellitus    · Urinary tract infection with hematuria 09/10/2024  · Uterine fibroid 2003  · Vertigo    · Wears glasses 04/19/2021     Surgical History  Past Surgical History:  Procedure Laterality Date  · CARDIAC CATHETERIZATION   10/29/2007  · CHOLECYSTECTOMY      · CORONARY ANGIOPLASTY WITH STENT PLACEMENT   2007    x4 stents  · DESCENDING AORTIC ANEURYSM REPAIR W/ STENT      · HYSTERECTOMY   05/23/2023  · ILIAC ARTERY STENT Bilateral 02/24/2023  · KNEE SURGERY        KNEE ARTHROSCOPY- meniscal repair  · OOPHORECTOMY      · TUBAL LIGATION   1981  · VULVA SURGERY Right 2021    for dysplasia     Prescriptions Prior to Admission  Medications Prior to Admission  Medication Sig Dispense Refill Last Dose/Taking  · aspirin 81 mg EC tablet Take 1 tablet (81 mg) by mouth once daily.     2/18/2025 Morning  · chlorhexidine (Hibiclens) 4 % external liquid Apply topically once daily as needed for wound care. Use for 5 days prior to surgery as body wash, do not use on face or genital region 473 mL 0 2/18/2025  Morning  · furosemide (Lasix) 40 mg tablet Take 1 tablet (40 mg) by mouth once daily.     2025 Morning  · metoprolol succinate XL (Toprol-XL) 50 mg 24 hr tablet Take 1 tablet (50 mg) by mouth once daily. Do not crush or chew.     2025 Morning  · PARoxetine (Paxil) 10 mg tablet Take 1 tablet (10 mg) by mouth once daily in the morning.     2025 Morning  · [] chlorhexidine (Peridex) 0.12 % solution Use 15 mL in the mouth or throat if needed for wound care for up to 2 days. Use as mouth wash for night before and morning of surgery 120 mL 0    · HYDROcodone-acetaminophen (Norco) 5-325 mg tablet Take 1 tablet by mouth 3 times a day as needed.        · ibuprofen 200 mg tablet Take 1 tablet (200 mg) by mouth if needed for mild pain (1 - 3).        · lisinopril 10 mg tablet Take 1 tablet (10 mg) by mouth once daily.        · nitroglycerin (Nitrostat) 0.4 mg SL tablet Place 1 tablet (0.4 mg) under the tongue every 5 minutes if needed.        · traZODone (Desyrel) 50 mg tablet Take 1 tablet (50 mg) by mouth once daily at bedtime. (Patient not taking: Reported on 2025)           Allergies:  Celecoxib, Macrobid [nitrofurantoin monohyd/m-cryst], Niacin, Sulfa (sulfonamide antibiotics), Inositol, Metoprolol, Sulfur, Tobramycin, Topiramate, Nitrofurantoin, and Statins-hmg-coa reductase inhibitors    Social History   Tobacco Use  · Smoking status: Every Day      Current packs/day: 0.25      Average packs/day: 0.3 packs/day for 50.0 years (12.5 ttl pk-yrs)      Types: Cigarettes  · Smokeless tobacco: Never  Vaping Use  · Vaping status: Every Day  Substance Use Topics  · Alcohol use: Not Currently  · Drug use: Never  ===================================================             Pertinent Physical Exam At Time of Discharge  Physical Exam  Please see progress note of 2025 for physical exam      Home Medications     Medication List      START taking these medications     albuterol 90 mcg/actuation  inhaler; Inhale 2 puffs every 6 hours if   needed for wheezing or shortness of breath.   Arnuity Ellipta 100 mcg/actuation inhaler; Generic drug: fluticasone   furoate; Inhale 1 puff once daily. Rinse mouth with water after use to   reduce aftertaste and incidence of candidiasis. Do not swallow.   clopidogrel 75 mg tablet; Commonly known as: Plavix; Take 1 tablet (75   mg) by mouth once daily.; Start taking on: February 27, 2025   docusate sodium 100 mg capsule; Commonly known as: Colace; Take 1   capsule (100 mg) by mouth 2 times a day as needed for constipation (hard   stools).   ezetimibe 10 mg tablet; Commonly known as: Zetia; Take 1 tablet (10 mg)   by mouth once daily at bedtime.   iron polysaccharides 150 mg iron capsule; Commonly known as:   Nu-Iron,Niferex; Take 1 capsule (150 mg) by mouth once daily for 28 days.;   Start taking on: February 27, 2025   multivitamin with minerals tablet; Take 1 tablet by mouth once daily.;   Start taking on: February 27, 2025   polyethylene glycol 17 gram packet; Commonly known as: Glycolax,   Miralax; Take 17 g by mouth once daily as needed (constipation).   potassium chloride CR 10 mEq ER tablet; Commonly known as: Klor-Con;   Take 1 tablet (10 mEq) by mouth once daily for 14 days. Do not crush,   chew, or split.     CHANGE how you take these medications     furosemide 40 mg tablet; Commonly known as: Lasix; Take 1 tablet (40 mg)   by mouth once daily for 14 days.   lisinopril 5 mg tablet; Take 1 tablet (5 mg) by mouth once daily.; Start   taking on: February 27, 2025; What changed: medication strength, how much   to take     CONTINUE taking these medications     aspirin 81 mg EC tablet   HYDROcodone-acetaminophen 5-325 mg tablet; Commonly known as: Norco   metoprolol succinate XL 50 mg 24 hr tablet; Commonly known as: Toprol-XL   PARoxetine 10 mg tablet; Commonly known as: Paxil     STOP taking these medications     chlorhexidine 0.12 % solution; Commonly known as:  Peridex   chlorhexidine 4 % external liquid; Commonly known as: Hibiclens   ibuprofen 200 mg tablet   nitroglycerin 0.4 mg SL tablet; Commonly known as: Nitrostat   traZODone 50 mg tablet; Commonly known as: Desyrel       Outpatient Follow-Up  Future Appointments   Date Time Provider Department Center   3/10/2025  1:40 PM CARDSURG [id number] NURSE [id number] [facility]   4/11/2025 10:45 AM Gallo Canchola MD [id number] [facility]       Monique Godoy, APRN-CNP  [facility] Cardiac surgery  Team phone [phone]

## 2025-02-26 NOTE — PROGRESS NOTES
CARDIAC SURGERY DAILY PROGRESS NOTE    Barbie Milan is a 65 y.o. female, with a PMH of CAD s/p stent x 4 RCA, MI 2007, HLD, HTN, PAD, T2DM, overactive bladder, a recurrent UTI, polycythemia, fibromyalgia, osteoarthritis, gout, hard of hearing, and smoking who presented to [facility] on 2/18/2025 for CABGx3 (LIMA-LAD, SVG-CX, SVG-PDA).     OPERATION/PROCEDURE: 2/18: CABG x 3, LIMA-LAD, SVG-CX, SVG-PDA, POSTERIOR PERICARDIAL WINDOW  [id number] - AR CORONARY ARTERY BYPASS 4 CORONARY VENOUS GRAFTS with Gallo Canchola MD    CTICU Course: Respiratory insufficiency with increased oxygenation requirements after self extubation.    Transferred to  on 2/23    Interval History:   No acute events overnight.    SUBJECTIVE:  No complaints at this time.    Objective   /74   Pulse 76   Temp 35.8 °C (96.4 °F)   Resp 18   Wt 91.3 kg (201 lb 4.8 oz)   LMP  (LMP Unknown)   SpO2 94%   BMI 33.50 kg/m²   0-10 (Numeric) Pain Score: 5 - Moderate pain   3 Day Weight Change: -0.397 kg (-14 oz) per day    Intake and Output    Intake/Output Summary (Last 24 hours) at 2/26/2025 1227  Last data filed at 2/26/2025 0400  Gross per 24 hour   Intake 820 ml   Output --   Net 820 ml       Physical Exam  Physical Exam  Vitals and nursing note reviewed.   Constitutional:       Appearance: Normal appearance.   HENT:      Head: Normocephalic and atraumatic.      Ears:      Comments: Hard of hearing     Mouth/Throat:      Mouth: Mucous membranes are moist.   Eyes:      Conjunctiva/sclera: Conjunctivae normal.   Cardiovascular:      Rate and Rhythm: Normal rate and regular rhythm.      Pulses: Normal pulses.      Heart sounds: Normal heart sounds.      Comments: NSR 70s-80s  Epicardial pacing wires present  Pulmonary:      Effort: Pulmonary effort is normal.      Breath sounds: Normal breath sounds.   Abdominal:      General: Bowel sounds are normal.      Palpations: Abdomen is soft.   Musculoskeletal:      Cervical back:  Neck supple.      Right lower leg: Edema (trace) present.      Left lower leg: Edema (trace) present.      Comments: SOTO   Skin:     General: Skin is warm and dry.      Capillary Refill: Capillary refill takes less than 2 seconds.   Neurological:      General: No focal deficit present.      Mental Status: She is alert and oriented to person, place, and time.   Psychiatric:         Mood and Affect: Mood normal.         Behavior: Behavior normal.       Medications  Scheduled medications  aspirin, 81 mg, oral, Daily  budesonide, 0.5 mg, nebulization, BID  clopidogrel, 75 mg, oral, Daily  docusate sodium, 100 mg, oral, BID  ezetimibe, 10 mg, oral, Nightly  furosemide, 40 mg, oral, Daily  heparin, 5,000 Units, subcutaneous, q8h  ipratropium-albuteroL, 3 mL, nebulization, TID  iron polysaccharides, 150 mg, oral, Daily  lisinopril, 5 mg, oral, Daily  metoprolol tartrate, 25 mg, oral, BID  multivitamin with minerals, 1 tablet, oral, Daily  oxygen, , inhalation, Continuous - Inhalation  PARoxetine, 10 mg, oral, q AM  potassium chloride CR, 40 mEq, oral, Once    Continuous medications   PRN medications  PRN medications: acetaminophen, albuterol, ipratropium-albuteroL, naloxone, ondansetron **OR** ondansetron, oxyCODONE, oxygen, polyethylene glycol    Labs  Results for orders placed or performed during the hospital encounter of 02/18/25 (from the past 24 hours)   Magnesium   Result Value Ref Range    Magnesium 2.02 1.60 - 2.40 mg/dL   CBC   Result Value Ref Range    WBC 8.0 4.4 - 11.3 x10*3/uL    nRBC 0.0 0.0 - 0.0 /100 WBCs    RBC 2.62 (L) 4.00 - 5.20 x10*6/uL    Hemoglobin 8.9 (L) 12.0 - 16.0 g/dL    Hematocrit 24.7 (L) 36.0 - 46.0 %    MCV 94 80 - 100 fL    MCH 34.0 26.0 - 34.0 pg    MCHC 36.0 32.0 - 36.0 g/dL    RDW 13.1 11.5 - 14.5 %    Platelets 232 150 - 450 x10*3/uL   Renal Function Panel   Result Value Ref Range    Glucose 106 (H) 74 - 99 mg/dL    Sodium 135 (L) 136 - 145 mmol/L    Potassium 3.4 (L) 3.5 - 5.3 mmol/L     Chloride 102 98 - 107 mmol/L    Bicarbonate 23 21 - 32 mmol/L    Anion Gap 13 10 - 20 mmol/L    Urea Nitrogen 16 6 - 23 mg/dL    Creatinine 0.68 0.50 - 1.05 mg/dL    eGFR >90 >60 mL/min/1.73m*2    Calcium 8.6 8.6 - 10.6 mg/dL    Phosphorus 3.6 2.5 - 4.9 mg/dL    Albumin 3.9 3.4 - 5.0 g/dL   Protime-INR   Result Value Ref Range    Protime 12.7 (H) 9.8 - 12.4 seconds    INR 1.1 0.9 - 1.1         IMAGING/ DIAGNOSTIC TESTING:  I have personally reviewed the following test result(s):      XR 2 view 2/25  IMPRESSION:  1.  Findings of central pulmonary vascular congestion with small left  pleural effusion, similar to prior exam.  2. Stable focal opacity in the left upper lobe may represent focal  infection or scarring though it is stable from the exam on  02/14/2025. Further evaluation with chest CT is recommended.    TTE 2/21  CONCLUSIONS:   1. Poorly visualized anatomical structures due to suboptimal image quality.   2. Left ventricular ejection fraction is mildly decreased, by visual estimate at 40-45%.   3. Abnormal left venticular wall motion.   4. No left ventricular thrombus visualized.   5. There is inferior and basal inferoseptal hypo- to akinesia.   6. There is reduced right ventricular systolic function.   7. Mildly enlarged right ventricle.   8. The left atrium is enlarged.    =================  IMPRESSION & PLAN:  ==================  POD # 8 s/p CABGx3 (LIMA-LAD, SVG-CX, SVG-PDA)  - Increase activity/ ambulation; PT/OT  - Encourage IS, C/DB; respiratory therapy; wean O2 as bong -> on RA  - Cardiac rehab referral   - Continue cardiac meds: ASA, BB, ezetimibe (statin allergy with myalgias)  - Continue plavix x1 year for NSTEMI   - Pain and anticonstipation meds  - 2v CXR ordered for 2/25  - Postop echo not indicated  - Cut epicardial wires prior to discharge -> cut 2/26  - Tele until discharge  - Optimize nutrition and electrolytes    Rhythm  - Tele: NSR 70-80s  - Continue BB  - Adjust medications as  tolerated    Acute Blood Loss Anemia   Recent Labs     25  0635 25  0610 25  0556 25  0139 25  1355 25  0012 25  1311   HGB 8.9* 9.0* 9.3* 8.9* 9.6* 8.9* 9.7*   HCT 24.7* 28.4* 28.0* 26.0* 27.7* 26.9* 29.2*   - MV, PO Iron x1mo  - Daily labs, transfuse as indicated    Thrombocytopenia - resolved  Recent Labs     25  0635 25  0610 25  0556 25  0139 25  1355 25  0012 25  1311    196 147* 114* 132* 117* 118*   - Etiology likely postop/CPB related  - Continue to trend with daily CBCs    Volume/Electrolyte Status: Preop wt Weight: 95 kg (209 lb 7 oz)   Vitals:    25 0426   Weight: 91.3 kg (201 lb 4.8 oz)   - Weight: 91.3, 90.9, 89.8kg  - Adjust diuresis as needed for postop cardiac surgery hypervolemia  - Start home PO lasix 40mg daily  - Replete electrolytes for hypokalemia/hypomagnesemia/hypophosphatemia as needed - replaced K  - Daily weights and strict I&Os  - Daily RFP while admitted    Leukocytosis - likely atelectasis/ postoperative in etiology  Recent Labs     25  0635 25  0610 25  0556 25  0139 25  1355 25  0012 25  1311   WBC 8.0 8.9 8.2 7.4 7.3 8.7 10.3   Temp (36hrs), Av.2 °C (97.1 °F), Min:35.8 °C (96.4 °F), Max:36.6 °C (97.9 °F)  - aggressive pulmonary hygiene  - monitor for s/s infection  - daily CBC to follow    Fibromyalgia/osteoarthritis: Home meds - Paroxetine and norco  - Hold home norco  - Continue Paroxetine 10mg daily    OARRS reviewed on   Rx summary: Hydrocone-acet  Recent scripts:   Date: 2025, Medication: hydrocodone-acet, Qty: 90, Days supply: 30, prescribed by Maliha Shannon NP     Hypertension: Home meds - furosemide 40mg, lisinopril 10mg  - Continue lisinopril 5mg daily    Acute respiratory insufficiency: Resolved. Currently oxygenating well on room air.    VTE Prophylaxis: SCDs/TEDs, ambulation, SQ heparin  Code Status: Full  Code      Dispo  - PT/OT recs low intensity  - Would benefit from homecare RN for cardiac surgery carepath  - Discharge today  - Will continue to assess discharge needs  - Will need further follow up as outpatient for incidental lung nodule findings on 2v CXR 2/25    TREY Butler-CNP  Cardiac Surgery [facility]  [facility]  Team Phone [phone]

## 2025-02-26 NOTE — DISCHARGE INSTRUCTIONS
Don't forget to “Keep Your Move in the Tube!!”     Please refer to the “Move in the Tube” handout.  -- Load bearing activities can be completed if you are “staying in the tube.” If you are attempting load bearing activities, let pain be your guide with when trying an activity “out of the tube”. If an activity hurts or is uncomfortable go back to doing it while you stay “in the tube”. There is no time limit to “stay in the tube”.     -- Non-load bearing activities, which are your activities of daily living, can be completed with your arms “out of the tube” as long as you remain pain free. Some activities of daily living examples are dressing, personal care, showering, washing hair, and toilet hygiene.     Don't forget to KEEP YOUR MOVE IN THE TUBE and think of a T. Mikey dinosaur!    ==============================================================    Additional Post-Operative Instructions:  - Remember to use your Incentive spirometer 10x/hr while awake. Remember to cough and deep breath.  - No NSAIDs (common over-the-counter NSAIDs are ibuprofen/Motrin/Advil, naproxen/Naprosyn/Aleve) for 3 months after cardiac surgery; if NSAIDs needed after 3 months, clear use with cardiologist before starting.       Your home medications may have changed after surgery. Carefully compare this list with your prescription bottles at home and set aside any medications you are told to not take so you do not confuse them. Do not dispose of any medications until your follow-up, since your doctors may restart some at your follow-up appointments. It is important to bring a complete, current list of your medications to any medical appointments or hospitalizations.    For any questions about your discharge, please call the cardiac surgery office at [phone]

## 2025-02-26 NOTE — HOSPITAL COURSE
Barbie Milan is a 65-year-old female s/p scheduled CABG x 3 [LIMA to LAD, SVG to LCx, SVG to PDA], posterior pericardial window secondary to CAD with Dr. Gallo Canchola on 2/18/2025.  Past medical history remarkable for CAD s/p stent x 4 RCA, MI 2007, HLD, HTN, PAD, T2DM, overactive bladder, a recurrent UTI, polycythemia, fibromyalgia, osteoarthritis, gout, hard of hearing, smoking.    2/18/25 OPERATION: by Gallo Canchola  CABG x 3, LIMA-LAD, SVG-CX, SVG-PDA  POSTERIOR PERICARDIAL WINDOW    Echo Pre/Post: PRE LVEF 25-30% [Sebastián]/ POST LVEF 30-35%, inferior wall hypokinesis, LA dilation, moderate MR, normal AV, normal RV, NSR post op   Chest Tubes/Drains: Left pleural chest tube, mediastinal chest tube x 2   Temporary wires location/setting: VVI    CTICU: Respiratory insufficiency with increased oxygenation requirements after self extubation    Transferred to the floor 2/23    Floor Course:  - Patient was diuresed for fluid volume overload post cardiac surgery; Preop weight: 89.8kg, discharge wt: 91.3kg; discharging on 2 weeks of lasix (and K)  - On ASA, Plavix, Zetia (allergic to statin), BB, ace by discharge  - Epicardial wires CUT on 2/26  - Telemetry at discharge SR 70s-80s  - 2v CXR done 2/25  - Incidental lung LUCIA opacity found on CXR -> needs outpt CT chest  - Postop echo done 2/21: EF 40-45%  - Cardiac rehab referral was placed  - PT recs home and low intensity therapy  - Anticipate discharge to home with homecare    On day of discharge, vital signs were stable and no acute distress was noted. All questions were answered. After VS and labs were reviewed it was determined the patient was stable for discharge.     Discharged with home care RN and PT on Wednesday, February 26, 2025; POD#8        =============================================  Hospital day of discharge management- spent >30 minutes coordinating the discharge and counseling/educating patient and family regarding discharge instructions.    =============================================  XR CHEST 2 VIEWS;  2/25/2025  IMPRESSION:  1.  Findings of central pulmonary vascular congestion with small left  pleural effusion, similar to prior exam.  2. Stable focal opacity in the left upper lobe may represent focal  infection or scarring though it is stable from the exam on  02/14/2025. Further evaluation with chest CT is recommended.    ==========================================  HISTORY:  Medical HistoryExpand by Default  Past Medical History:  Diagnosis Date  · Abnormal gait 04/19/2021  · Acute upper respiratory infection 01/13/2025  · Allergic rhinitis with postnasal drip 10/03/2024  · Anxiety    · Arthralgia of right knee 10/06/2021  · Bilateral adrenal adenomas 12/17/2022  · Bladder diverticulum 12/17/2022  · CAD (coronary artery disease)      (MI 2007 stentx4 RCA),  · Carpal tunnel syndrome 04/19/2021  · Chronic otitis media 01/13/2025  · Chronic sinusitis 01/13/2025  · Coronary artery disease    · Cyst of ovary, unspecified laterality 06/08/2023  · Degeneration of lumbosacral intervertebral disc 01/13/2025  · Disease due to severe acute respiratory syndrome coronavirus 2 (SARS-CoV-2) 01/13/2025  · Disturbance in speech 04/19/2021  · Dizziness and giddiness 01/13/2025  · DM (diabetes mellitus) (Multi)    · Fatigue    · Fatty liver 12/17/2022  · Fibromyalgia 01/13/2025  · Hearing aid worn    · HL (hearing loss)      20% in Left ear, none in right ear- reads lips  · Hyperlipidemia    · Hypertension    · Low back pain 01/13/2025  · Lumbosacral spondylosis without myelopathy 01/13/2025  · Muscle spasticity 03/24/2022  · Myocardial infarction (Multi) 2007    2007 x7, 6 months ago  · Obesity with body mass index 30 or greater 12/15/2015    Body mass index 30+ - obesity    · Otosclerosis of both ears 03/24/2022  · Overactive bladder 09/10/2024  · Perforation of tympanic membrane 01/13/2025  · Peripheral vascular disease (CMS-HCC) 01/13/2025    4 stents in  abdomin, patient reports vasculature in abdomin  · Polycythemia 04/02/2022  · Primary fibromyalgia syndrome 06/29/2017  · Psychophysiological insomnia 10/28/2022  · 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) 03/24/2022  · Recurrent otitis media, right 03/24/2022  · Sciatica 04/19/2021  · Shortness of breath      FLETCHER  · Spinal stenosis of lumbar region 08/29/2018  · Sprain of knee 01/13/2025  · Tear of lateral meniscus of knee 06/08/2022  · Tobacco use    · Total perforation of tympanic membrane, right 06/19/2023  · Type 2 diabetes mellitus    · Urinary tract infection with hematuria 09/10/2024  · Uterine fibroid 2003  · Vertigo    · Wears glasses 04/19/2021     Surgical History  Past Surgical History:  Procedure Laterality Date  · CARDIAC CATHETERIZATION   10/29/2007  · CHOLECYSTECTOMY      · CORONARY ANGIOPLASTY WITH STENT PLACEMENT   2007    x4 stents  · DESCENDING AORTIC ANEURYSM REPAIR W/ STENT      · HYSTERECTOMY   05/23/2023  · ILIAC ARTERY STENT Bilateral 02/24/2023  · KNEE SURGERY        KNEE ARTHROSCOPY- meniscal repair  · OOPHORECTOMY      · TUBAL LIGATION   1981  · VULVA SURGERY Right 2021    for dysplasia     Prescriptions Prior to Admission  Medications Prior to Admission  Medication Sig Dispense Refill Last Dose/Taking  · aspirin 81 mg EC tablet Take 1 tablet (81 mg) by mouth once daily.     2/18/2025 Morning  · chlorhexidine (Hibiclens) 4 % external liquid Apply topically once daily as needed for wound care. Use for 5 days prior to surgery as body wash, do not use on face or genital region 473 mL 0 2/18/2025 Morning  · furosemide (Lasix) 40 mg tablet Take 1 tablet (40 mg) by mouth once daily.     2/18/2025 Morning  · metoprolol succinate XL (Toprol-XL) 50 mg 24 hr tablet Take 1 tablet (50 mg) by mouth once daily. Do not crush or chew.     2/18/2025 Morning  · PARoxetine (Paxil) 10 mg tablet Take 1 tablet (10 mg) by mouth once daily in the morning.     2/18/2025  Morning  · [] chlorhexidine (Peridex) 0.12 % solution Use 15 mL in the mouth or throat if needed for wound care for up to 2 days. Use as mouth wash for night before and morning of surgery 120 mL 0    · HYDROcodone-acetaminophen (Norco) 5-325 mg tablet Take 1 tablet by mouth 3 times a day as needed.        · ibuprofen 200 mg tablet Take 1 tablet (200 mg) by mouth if needed for mild pain (1 - 3).        · lisinopril 10 mg tablet Take 1 tablet (10 mg) by mouth once daily.        · nitroglycerin (Nitrostat) 0.4 mg SL tablet Place 1 tablet (0.4 mg) under the tongue every 5 minutes if needed.        · traZODone (Desyrel) 50 mg tablet Take 1 tablet (50 mg) by mouth once daily at bedtime. (Patient not taking: Reported on 2025)           Allergies:  Celecoxib, Macrobid [nitrofurantoin monohyd/m-cryst], Niacin, Sulfa (sulfonamide antibiotics), Inositol, Metoprolol, Sulfur, Tobramycin, Topiramate, Nitrofurantoin, and Statins-hmg-coa reductase inhibitors    Social History   Tobacco Use  · Smoking status: Every Day      Current packs/day: 0.25      Average packs/day: 0.3 packs/day for 50.0 years (12.5 ttl pk-yrs)      Types: Cigarettes  · Smokeless tobacco: Never  Vaping Use  · Vaping status: Every Day  Substance Use Topics  · Alcohol use: Not Currently  · Drug use: Never  ===================================================

## 2025-02-26 NOTE — SIGNIFICANT EVENT
Ventricular wires cut at skin level at 1215 due to surgeon preference.  Patient instructed to notify radiology of retained epicardial wires prior to any MRI procedure, and to notify Dr Canchola of any visible wires or s/s infection.     TREY Butler-CNP   [facility] Cardiac surgery  Team phone [phone]

## 2025-02-26 NOTE — CARE PLAN
The patient's goals for the shift include  to have adequate amount of rest     The clinical goals for the shift include Pt will remian HDS through out shift        Problem: Safety - Adult  Goal: Free from fall injury  Outcome: Progressing     Problem: Discharge Planning  Goal: Discharge to home or other facility with appropriate resources  Outcome: Progressing     Problem: Chronic Conditions and Co-morbidities  Goal: Patient's chronic conditions and co-morbidity symptoms are monitored and maintained or improved  Outcome: Progressing     Problem: Nutrition  Goal: Nutrient intake appropriate for maintaining nutritional needs  Outcome: Progressing     Problem: Skin  Goal: Decreased wound size/increased tissue granulation at next dressing change  Outcome: Progressing  Goal: Participates in plan/prevention/treatment measures  Outcome: Progressing  Goal: Prevent/manage excess moisture  Outcome: Progressing  Goal: Prevent/minimize sheer/friction injuries  Outcome: Progressing  Goal: Promote/optimize nutrition  Outcome: Progressing  Goal: Promote skin healing  Outcome: Progressing     Problem: Fall/Injury  Goal: Not fall by end of shift  Outcome: Progressing  Goal: Be free from injury by end of the shift  Outcome: Progressing  Goal: Verbalize understanding of personal risk factors for fall in the hospital  Outcome: Progressing  Goal: Verbalize understanding of risk factor reduction measures to prevent injury from fall in the home  Outcome: Progressing  Goal: Use assistive devices by end of the shift  Outcome: Progressing  Goal: Pace activities to prevent fatigue by end of the shift  Outcome: Progressing     Problem: Respiratory  Goal: Clear secretions with interventions this shift  Outcome: Progressing  Goal: Minimize anxiety/maximize coping throughout shift  Outcome: Progressing  Goal: Minimal/no exertional discomfort or dyspnea this shift  Outcome: Progressing  Goal: No signs of respiratory distress (eg. Use of accessory  muscles. Peds grunting)  Outcome: Progressing  Goal: Patent airway maintained this shift  Outcome: Progressing  Goal: Tolerate mechanical ventilation evidenced by VS/agitation level this shift  Outcome: Progressing  Goal: Tolerate pulmonary toileting this shift  Outcome: Progressing  Goal: Verbalize decreased shortness of breath this shift  Outcome: Progressing  Goal: Wean oxygen to maintain O2 saturation per order/standard this shift  Outcome: Progressing  Goal: Increase self care and/or family involvement in next 24 hours  Outcome: Progressing     Problem: Diabetes  Goal: Achieve decreasing blood glucose levels by end of shift  Outcome: Progressing  Goal: Increase stability of blood glucose readings by end of shift  Outcome: Progressing  Goal: Decrease in ketones present in urine by end of shift  Outcome: Progressing  Goal: Maintain electrolyte levels within acceptable range throughout shift  Outcome: Progressing  Goal: Maintain glucose levels >70mg/dl to <250mg/dl throughout shift  Outcome: Progressing  Goal: No changes in neurological exam by end of shift  Outcome: Progressing  Goal: Learn about and adhere to nutrition recommendations by end of shift  Outcome: Progressing  Goal: Vital signs within normal range for age by end of shift  Outcome: Progressing  Goal: Increase self care and/or family involovement by end of shift  Outcome: Progressing  Goal: Receive DSME education by end of shift  Outcome: Progressing     Problem: Pain  Goal: Takes deep breaths with improved pain control throughout the shift  Outcome: Progressing  Goal: Turns in bed with improved pain control throughout the shift  Outcome: Progressing  Goal: Walks with improved pain control throughout the shift  Outcome: Progressing  Goal: Performs ADL's with improved pain control throughout shift  Outcome: Progressing  Goal: Participates in PT with improved pain control throughout the shift  Outcome: Progressing  Goal: Free from opioid side effects  throughout the shift  Outcome: Progressing  Goal: Free from acute confusion related to pain meds throughout the shift  Outcome: Progressing

## 2025-02-27 DIAGNOSIS — Z95.1 STATUS POST CORONARY ARTERY BYPASS GRAFTING: ICD-10-CM

## 2025-02-27 NOTE — TELEPHONE ENCOUNTER
Can see discharge from  2/26. Needs ENDY encounter even though appt already scheduled. OK to follow home care.

## 2025-02-28 NOTE — DOCUMENTATION CLARIFICATION NOTE
"    PATIENT:               DEON CHAUDHARI  ACCT #:                  [id number]  MRN:                       [id number]  :                       1960  ADMIT DATE:       2025 5:57 AM  DISCH DATE:        2025 2:03 PM  RESPONDING PROVIDER #:        [id number]          PROVIDER RESPONSE TEXT:    Non Cardiogenic acute pulmonary edema    CDI QUERY TEXT:    Clarification    Instruction:    Based on your assessment of the patient and the clinical information, please provide the requested documentation by clicking on the appropriate radio button and enter any additional information if prompted.    Question: Is there a diagnosis indicative of the clinical information    When answering this query, please exercise your independent professional judgment. The fact that a question is being asked, does not imply that any particular answer is desired or expected.    The patient's clinical indicators include:  Clinical Information:  PN: 65 YOF w/extensive smoking hx, chronic HFrEF, HTN, MI . Underwent CABG x3 on .    Clinical Indicators:  PN \"Responded to diureses. -1.2L in last 24 hours. On 5L NC breathing comfortably\", \"Crackles, Wet cough\",  \"Mild interstitial pulmonary edema appears improving in comparison previous study\",  \"PRE LVEF 25-30% (Sebastián)/ POST LVEF 30-35% .  TTE showed LVEF of 40%, reduced RV\"     CXR: \"Similar findings of pulmonary edema with small left pleural effusion\"   CXR: \"Stable findings suggestive of pulmonary edema with small left pleural effusion\"    Treatment: Diuresed: Lasix 20 mg IV 2/19 x1, 2/20 x2,  Lasix 40 mg IV 2/21 x3,  Lasix 40 mg IV ,  Lasix 40 mg IV .  Monitor daily CXR.  Supplemental O2.  Bronchial hygiene.    Risk Factors:  CABG surgery. Hx MI, HTN and HF. Heavy smoking history.  Options provided:  -- Non Cardiogenic acute pulmonary edema  -- Cardiogenic acute pulmonary edema, with cardiogenic component type/acuity specified as:, Please add " specification as:  -- Other - I will add my own diagnosis  -- Refer to Clinical Documentation Reviewer    Query created by: Zulema Begum on 2/24/2025 11:01 AM      Electronically signed by:  ANDREA HERNANDEZ MD 2/27/2025 10:16 PM

## 2025-03-05 DIAGNOSIS — Z95.1 S/P CABG X 3: ICD-10-CM

## 2025-03-05 DIAGNOSIS — D64.9 POSTOPERATIVE ANEMIA: ICD-10-CM

## 2025-03-06 ENCOUNTER — OFFICE VISIT (OUTPATIENT)
Dept: PRIMARY CARE CLINIC | Age: 65
End: 2025-03-06

## 2025-03-06 VITALS
DIASTOLIC BLOOD PRESSURE: 62 MMHG | WEIGHT: 201.6 LBS | HEART RATE: 69 BPM | HEIGHT: 65 IN | OXYGEN SATURATION: 95 % | TEMPERATURE: 97.3 F | SYSTOLIC BLOOD PRESSURE: 118 MMHG | BODY MASS INDEX: 33.59 KG/M2

## 2025-03-06 DIAGNOSIS — Z91.048 ALLERGY TO ADHESIVE: ICD-10-CM

## 2025-03-06 DIAGNOSIS — E11.42 TYPE 2 DIABETES MELLITUS WITH DIABETIC POLYNEUROPATHY, WITHOUT LONG-TERM CURRENT USE OF INSULIN (HCC): ICD-10-CM

## 2025-03-06 DIAGNOSIS — Z95.1 S/P CABG (CORONARY ARTERY BYPASS GRAFT): ICD-10-CM

## 2025-03-06 DIAGNOSIS — R09.82 ALLERGIC RHINITIS WITH POSTNASAL DRIP: ICD-10-CM

## 2025-03-06 DIAGNOSIS — I25.10 ATHEROSCLEROSIS OF NATIVE CORONARY ARTERY OF NATIVE HEART WITHOUT ANGINA PECTORIS: ICD-10-CM

## 2025-03-06 DIAGNOSIS — R06.09 DYSPNEA ON EXERTION: ICD-10-CM

## 2025-03-06 DIAGNOSIS — D64.9 POSTOPERATIVE ANEMIA: ICD-10-CM

## 2025-03-06 DIAGNOSIS — Z09 HOSPITAL DISCHARGE FOLLOW-UP: Primary | ICD-10-CM

## 2025-03-06 DIAGNOSIS — T81.49XA WOUND INFECTION AFTER SURGERY: ICD-10-CM

## 2025-03-06 DIAGNOSIS — J18.1 LEFT UPPER LOBE CONSOLIDATION: ICD-10-CM

## 2025-03-06 DIAGNOSIS — J30.9 ALLERGIC RHINITIS WITH POSTNASAL DRIP: ICD-10-CM

## 2025-03-06 DIAGNOSIS — E87.6 HYPOKALEMIA: ICD-10-CM

## 2025-03-06 RX ORDER — GUAIFENESIN 600 MG/1
600 TABLET, EXTENDED RELEASE ORAL 2 TIMES DAILY
Qty: 30 TABLET | Refills: 0 | Status: SHIPPED | OUTPATIENT
Start: 2025-03-06 | End: 2025-03-21

## 2025-03-06 RX ORDER — EZETIMIBE 10 MG/1
TABLET ORAL
COMMUNITY
Start: 2025-02-26

## 2025-03-06 RX ORDER — FLUTICASONE FUROATE 100 UG/1
1 POWDER RESPIRATORY (INHALATION) DAILY
COMMUNITY
Start: 2025-02-26

## 2025-03-06 RX ORDER — CLOPIDOGREL BISULFATE 75 MG/1
75 TABLET ORAL DAILY
COMMUNITY
Start: 2025-02-27 | End: 2026-02-27

## 2025-03-06 RX ORDER — POTASSIUM CHLORIDE 750 MG/1
10 TABLET, EXTENDED RELEASE ORAL DAILY
COMMUNITY
Start: 2025-02-26 | End: 2025-03-12

## 2025-03-06 RX ORDER — CEPHALEXIN 500 MG/1
500 CAPSULE ORAL 4 TIMES DAILY
Qty: 28 CAPSULE | Refills: 0 | Status: SHIPPED | OUTPATIENT
Start: 2025-03-06 | End: 2025-03-13

## 2025-03-06 RX ORDER — LISINOPRIL 5 MG/1
TABLET ORAL
COMMUNITY
Start: 2025-02-26

## 2025-03-06 RX ORDER — IRON POLYSACCHARIDE COMPLEX 150 MG
150 CAPSULE ORAL DAILY
COMMUNITY
Start: 2025-02-27 | End: 2025-03-27

## 2025-03-06 RX ORDER — POLYETHYLENE GLYCOL 3350 17 G/17G
17 POWDER, FOR SOLUTION ORAL DAILY PRN
COMMUNITY
Start: 2025-02-26

## 2025-03-06 RX ORDER — TRIAMCINOLONE ACETONIDE 1 MG/G
OINTMENT TOPICAL 2 TIMES DAILY
Qty: 15 G | Refills: 0 | Status: SHIPPED | OUTPATIENT
Start: 2025-03-06 | End: 2025-03-13

## 2025-03-06 SDOH — ECONOMIC STABILITY: FOOD INSECURITY: WITHIN THE PAST 12 MONTHS, YOU WORRIED THAT YOUR FOOD WOULD RUN OUT BEFORE YOU GOT MONEY TO BUY MORE.: NEVER TRUE

## 2025-03-06 SDOH — ECONOMIC STABILITY: FOOD INSECURITY: WITHIN THE PAST 12 MONTHS, THE FOOD YOU BOUGHT JUST DIDN'T LAST AND YOU DIDN'T HAVE MONEY TO GET MORE.: NEVER TRUE

## 2025-03-06 ASSESSMENT — PATIENT HEALTH QUESTIONNAIRE - PHQ9
SUM OF ALL RESPONSES TO PHQ QUESTIONS 1-9: 0
SUM OF ALL RESPONSES TO PHQ QUESTIONS 1-9: 0
9. THOUGHTS THAT YOU WOULD BE BETTER OFF DEAD, OR OF HURTING YOURSELF: NOT AT ALL
1. LITTLE INTEREST OR PLEASURE IN DOING THINGS: NOT AT ALL
6. FEELING BAD ABOUT YOURSELF - OR THAT YOU ARE A FAILURE OR HAVE LET YOURSELF OR YOUR FAMILY DOWN: NOT AT ALL
4. FEELING TIRED OR HAVING LITTLE ENERGY: NOT AT ALL
2. FEELING DOWN, DEPRESSED OR HOPELESS: NOT AT ALL
3. TROUBLE FALLING OR STAYING ASLEEP: NOT AT ALL
8. MOVING OR SPEAKING SO SLOWLY THAT OTHER PEOPLE COULD HAVE NOTICED. OR THE OPPOSITE, BEING SO FIGETY OR RESTLESS THAT YOU HAVE BEEN MOVING AROUND A LOT MORE THAN USUAL: NOT AT ALL
SUM OF ALL RESPONSES TO PHQ QUESTIONS 1-9: 0
10. IF YOU CHECKED OFF ANY PROBLEMS, HOW DIFFICULT HAVE THESE PROBLEMS MADE IT FOR YOU TO DO YOUR WORK, TAKE CARE OF THINGS AT HOME, OR GET ALONG WITH OTHER PEOPLE: NOT DIFFICULT AT ALL
7. TROUBLE CONCENTRATING ON THINGS, SUCH AS READING THE NEWSPAPER OR WATCHING TELEVISION: NOT AT ALL
5. POOR APPETITE OR OVEREATING: NOT AT ALL
SUM OF ALL RESPONSES TO PHQ QUESTIONS 1-9: 0

## 2025-03-06 NOTE — PROGRESS NOTES
(KEFLEX) 500 MG capsule; Take 1 capsule by mouth 4 times daily for 7 days, Disp-28 capsule, R-0Normal  Allergy to adhesive  Comments:  Irritation above lip, possibly related to tape used for ET tube, not consistent with HSV  Trial topical steroid  Orders:  -     triamcinolone (KENALOG) 0.1 % ointment; Apply topically 2 times daily for 7 days, Topical, 2 TIMES DAILY Starting Thu 3/6/2025, Until Thu 3/13/2025, For 7 days, Disp-15 g, R-0, Normal  Hypokalemia  Comments:  Continue supplement  Recheck  Orders:  -     Basic Metabolic Panel; Future  Postoperative anemia  Comments:  Continue iron supplement  Recheck  Orders:  -     CBC; Future  Type 2 diabetes mellitus with diabetic polyneuropathy, without long-term current use of insulin ([facility])  Assessment & Plan:  Chronic, controlled with A1c 6.4  Remain off metformin  Diabetic wellness when able  Allergic rhinitis with postnasal drip  -     guaiFENesin (MUCINEX) 600 MG extended release tablet; Take 1 tablet by mouth 2 times daily for 15 days, Disp-30 tablet, R-0Normal      Medical Decision Making: moderate complexity    Return in about 2 months (around 5/6/2025).         Subjective:   HPI:  Follow up of Hospital problems/diagnosis(es): CABG, lung abnormality  2/18/25 OPERATION: by Tristen Sofia   CABG x 3, LIMA-LAD, SVG-CX, SVG-PDA  POSTERIOR PERICARDIAL WINDOW    Inpatient course: Discharge summary reviewed- see chart.    Interval history/Current status:   Patient doing well since discharge  Gets short of breath when she is in pain or upset or exerts herself too hard  Pain controlled  One of her incisions not healing well  Also some irritation above lip, thinks from tube from surgery, sometimes breaks out from tape  Was told she needed a CT scan of her chest  Scheduled for surgery follow-up next week    Patient Active Problem List   Diagnosis    Atherosclerotic heart disease of native coronary artery without angina pectoris    Type 2 diabetes mellitus with diabetic

## 2025-03-07 ENCOUNTER — TELEPHONE (OUTPATIENT)
Dept: PRIMARY CARE CLINIC | Age: 65
End: 2025-03-07

## 2025-03-07 NOTE — TELEPHONE ENCOUNTER
Patient's home health nurse Darlene called in stating Marleen was supposed to have labs drawn today. They had stuck her 3 times and was unable to get blood. They will try again on Monday to draw her. Any questions call Darlene at [phone].

## 2025-03-07 NOTE — TELEPHONE ENCOUNTER
I called home health back and spoke to Tammy. I asked if she just needed the form and she said to go ahead and send that. I asked if patient has oxygen and she looked into the chart and said that she does not. I asked if she knew what her O2 has been and she said on 02/28 when they saw her, it was at 95%. She said she is not sure why they were requesting this specifically, but asked me to go ahead and send it.

## 2025-03-07 NOTE — TELEPHONE ENCOUNTER
----- Message from Dr. Mic Lewis MD sent at 3/6/2025  8:22 PM EST -----  Please fax 6 minute walking test order to home health- need to call Vj at [facility] health [phone] for fax number    used

## 2025-03-07 NOTE — PROGRESS NOTES
Contacting Barbie status post 2/18/25 cabg x 3 surgery. Doing well at home. Weight yesterday 201 lbs. Stating mild leg edema when leaving her legs in dependant position. PCP prescribed keflex 3/6/25 for oozing chest tube site. Stating other incisions intact. Denies fever. Bp funning 125-130/70. Has been followed by visiting nursing. Sleeping in recliner for comfort. Appetite good, normal bowel movements. Confirmed follow up Dr. Canchola appointment with cxr 4/11/25. Florinda Jay RN

## 2025-03-10 ENCOUNTER — TELEMEDICINE CLINICAL SUPPORT (OUTPATIENT)
Dept: CARDIAC SURGERY | Facility: HOSPITAL | Age: 65
End: 2025-03-10
Payer: MEDICARE

## 2025-03-10 DIAGNOSIS — Z09 POSTOP CHECK: ICD-10-CM

## 2025-03-10 RX ORDER — CEPHALEXIN 500 MG/1
1 CAPSULE ORAL EVERY 6 HOURS
COMMUNITY
Start: 2025-03-06

## 2025-03-10 RX ORDER — IRON PS COMPLEX/B12/FOLIC ACID 150-25-1
1 CAPSULE ORAL DAILY
COMMUNITY
Start: 2025-02-27 | End: 2025-03-10 | Stop reason: SDUPTHER

## 2025-03-14 PROBLEM — J18.1 LEFT UPPER LOBE CONSOLIDATION: Status: ACTIVE | Noted: 2025-03-14

## 2025-03-15 LAB
ALBUMIN SERPL-MCNC: 4.1 G/DL (ref 3.6–5.1)
BUN SERPL-MCNC: 9 MG/DL (ref 7–25)
BUN/CREAT SERPL: ABNORMAL (CALC) (ref 6–22)
CALCIUM SERPL-MCNC: 8.4 MG/DL (ref 8.6–10.4)
CHLORIDE SERPL-SCNC: 104 MMOL/L (ref 98–110)
CO2 SERPL-SCNC: 20 MMOL/L (ref 20–32)
CREAT SERPL-MCNC: 0.74 MG/DL (ref 0.5–1.05)
EGFRCR SERPLBLD CKD-EPI 2021: 89 ML/MIN/1.73M2
ERYTHROCYTE [DISTWIDTH] IN BLOOD BY AUTOMATED COUNT: 13.5 % (ref 11–15)
GLUCOSE SERPL-MCNC: 82 MG/DL (ref 65–99)
HCT VFR BLD AUTO: 33.5 % (ref 35–45)
HGB BLD-MCNC: 11 G/DL (ref 11.7–15.5)
MAGNESIUM SERPL-MCNC: 2 MG/DL (ref 1.5–2.5)
MCH RBC QN AUTO: 31.9 PG (ref 27–33)
MCHC RBC AUTO-ENTMCNC: 32.8 G/DL (ref 32–36)
MCV RBC AUTO: 97.1 FL (ref 80–100)
PHOSPHATE SERPL-MCNC: 4.3 MG/DL (ref 2.1–4.3)
PLATELET # BLD AUTO: 263 THOUSAND/UL (ref 140–400)
PMV BLD REES-ECKER: 9.7 FL (ref 7.5–12.5)
POTASSIUM SERPL-SCNC: 4 MMOL/L (ref 3.5–5.3)
RBC # BLD AUTO: 3.45 MILLION/UL (ref 3.8–5.1)
SODIUM SERPL-SCNC: 138 MMOL/L (ref 135–146)
WBC # BLD AUTO: 8.4 THOUSAND/UL (ref 3.8–10.8)

## 2025-03-17 ENCOUNTER — TELEPHONE (OUTPATIENT)
Dept: PRIMARY CARE CLINIC | Age: 65
End: 2025-03-17

## 2025-03-17 NOTE — TELEPHONE ENCOUNTER
----- Message from Dr. Mic Lewis MD sent at 3/14/2025  4:43 PM EDT -----  Appears patient has not yet had CT chest, unclear why. I was under impression she was scheduled on 3/11. Please look into this.

## 2025-03-17 NOTE — TELEPHONE ENCOUNTER
Spoke with patient and she states she scheduled it for today but does not have a ride so she is canceling it but she is going to hold off on this because she wants to get more healed before she does anything else.

## 2025-03-18 ENCOUNTER — TELEPHONE (OUTPATIENT)
Dept: CARDIAC SURGERY | Facility: HOSPITAL | Age: 65
End: 2025-03-18
Payer: MEDICARE

## 2025-03-31 ENCOUNTER — TELEPHONE (OUTPATIENT)
Dept: PRIMARY CARE CLINIC | Age: 65
End: 2025-03-31

## 2025-03-31 NOTE — TELEPHONE ENCOUNTER
Name of Medication(s) Requested:  Requested Prescriptions     Pending Prescriptions Disp Refills    albuterol sulfate HFA (VENTOLIN HFA) 108 (90 Base) MCG/ACT inhaler 18 g 0     Sig: Inhale 2 puffs into the lungs 4 times daily as needed for Wheezing       Medication is on current medication list Yes    Dosage and directions were verified? Yes    Quantity verified: 90 day supply     Pharmacy Verified?  Yes    Last Appointment:  3/6/2025    Future appts:  Future Appointments   Date Time Provider Department Center   4/1/2025  9:00 AM SCHEDULE, [facility]   5/1/2025 10:00 AM Mic Lewis MD [facility]        (If no appt send self scheduling link. .REFILLAPPT)  Scheduling request sent?     [] Yes  [x] No    Does patient need updated?  [] Yes  [x] No

## 2025-03-31 NOTE — TELEPHONE ENCOUNTER
Pt states she is coming in tomorrow for blood work and she is recovering from open heart surgery and would like to be tested for \"everything health related possible\" . She wanted to be sure the orders are in .   Please advise.

## 2025-04-01 ENCOUNTER — LAB (OUTPATIENT)
Dept: PRIMARY CARE CLINIC | Age: 65
End: 2025-04-01
Payer: MEDICARE

## 2025-04-01 DIAGNOSIS — E78.2 MIXED HYPERLIPIDEMIA: ICD-10-CM

## 2025-04-01 DIAGNOSIS — D64.9 POSTOPERATIVE ANEMIA: ICD-10-CM

## 2025-04-01 DIAGNOSIS — E87.6 HYPOKALEMIA: ICD-10-CM

## 2025-04-01 DIAGNOSIS — E11.42 TYPE 2 DIABETES MELLITUS WITH DIABETIC POLYNEUROPATHY, WITHOUT LONG-TERM CURRENT USE OF INSULIN (HCC): ICD-10-CM

## 2025-04-01 DIAGNOSIS — E53.8 B12 DEFICIENCY: ICD-10-CM

## 2025-04-01 DIAGNOSIS — M81.0 AGE-RELATED OSTEOPOROSIS WITHOUT CURRENT PATHOLOGICAL FRACTURE: ICD-10-CM

## 2025-04-01 DIAGNOSIS — Z83.49 FAMILY HISTORY OF HYPOTHYROIDISM: ICD-10-CM

## 2025-04-01 LAB
ALBUMIN: 4 G/DL (ref 3.5–5.2)
ALP BLD-CCNC: 125 U/L (ref 35–104)
ALT SERPL-CCNC: 19 U/L (ref 0–32)
ANION GAP SERPL CALCULATED.3IONS-SCNC: 21 MMOL/L (ref 7–16)
AST SERPL-CCNC: 36 U/L (ref 0–31)
BILIRUB SERPL-MCNC: 0.2 MG/DL (ref 0–1.2)
BUN BLDV-MCNC: 10 MG/DL (ref 6–23)
CALCIUM SERPL-MCNC: 8.8 MG/DL (ref 8.6–10.2)
CHLORIDE BLD-SCNC: 103 MMOL/L (ref 98–107)
CHOLESTEROL, TOTAL: 133 MG/DL
CO2: 15 MMOL/L (ref 22–29)
CREAT SERPL-MCNC: 0.8 MG/DL (ref 0.5–1)
GFR, ESTIMATED: 88 ML/MIN/1.73M2
GLUCOSE FASTING: 139 MG/DL (ref 74–99)
HBA1C MFR BLD: 5.3 % (ref 4–5.6)
HCT VFR BLD CALC: 41.8 % (ref 34–48)
HDLC SERPL-MCNC: 38 MG/DL
HEMOGLOBIN: 13.3 G/DL (ref 11.5–15.5)
LDL CHOLESTEROL: 62 MG/DL
MCH RBC QN AUTO: 31.5 PG (ref 26–35)
MCHC RBC AUTO-ENTMCNC: 31.8 G/DL (ref 32–34.5)
MCV RBC AUTO: 99.1 FL (ref 80–99.9)
PDW BLD-RTO: 14.1 % (ref 11.5–15)
PLATELET # BLD: 329 K/UL (ref 130–450)
PMV BLD AUTO: 9.7 FL (ref 7–12)
POTASSIUM SERPL-SCNC: 4.8 MMOL/L (ref 3.5–5)
RBC # BLD: 4.22 M/UL (ref 3.5–5.5)
SODIUM BLD-SCNC: 139 MMOL/L (ref 132–146)
TOTAL PROTEIN: 6.9 G/DL (ref 6.4–8.3)
TRIGL SERPL-MCNC: 167 MG/DL
TSH SERPL DL<=0.05 MIU/L-ACNC: 2.29 UIU/ML (ref 0.27–4.2)
VITAMIN B-12: 491 PG/ML (ref 211–946)
VITAMIN D 25-HYDROXY: 17.9 NG/ML (ref 30–100)
VLDLC SERPL CALC-MCNC: 33 MG/DL
WBC # BLD: 8.4 K/UL (ref 4.5–11.5)

## 2025-04-01 PROCEDURE — 36415 COLL VENOUS BLD VENIPUNCTURE: CPT | Performed by: STUDENT IN AN ORGANIZED HEALTH CARE EDUCATION/TRAINING PROGRAM

## 2025-04-02 ENCOUNTER — TELEPHONE (OUTPATIENT)
Dept: PRIMARY CARE CLINIC | Age: 65
End: 2025-04-02

## 2025-04-02 DIAGNOSIS — E11.42 TYPE 2 DIABETES MELLITUS WITH DIABETIC POLYNEUROPATHY, WITHOUT LONG-TERM CURRENT USE OF INSULIN (HCC): Primary | ICD-10-CM

## 2025-04-02 RX ORDER — ALBUTEROL SULFATE 90 UG/1
2 INHALANT RESPIRATORY (INHALATION) 4 TIMES DAILY PRN
Qty: 18 G | Refills: 12 | Status: SHIPPED | OUTPATIENT
Start: 2025-04-02

## 2025-04-02 NOTE — TELEPHONE ENCOUNTER
eBrlin garcia from lab called because no urine specimen was received. Was patient able to leave one yesterday or was she coming back?

## 2025-04-11 ENCOUNTER — RESULTS FOLLOW-UP (OUTPATIENT)
Dept: PRIMARY CARE CLINIC | Age: 65
End: 2025-04-11

## 2025-04-11 ENCOUNTER — APPOINTMENT (OUTPATIENT)
Dept: CARDIAC SURGERY | Facility: HOSPITAL | Age: 65
End: 2025-04-11
Payer: MEDICARE

## 2025-04-11 DIAGNOSIS — E55.9 VITAMIN D DEFICIENCY: Primary | ICD-10-CM

## 2025-04-11 DIAGNOSIS — J18.1 LEFT UPPER LOBE CONSOLIDATION: Primary | ICD-10-CM

## 2025-05-01 ENCOUNTER — OFFICE VISIT (OUTPATIENT)
Dept: PRIMARY CARE CLINIC | Age: 65
End: 2025-05-01
Payer: MEDICARE

## 2025-05-01 ENCOUNTER — TELEPHONE (OUTPATIENT)
Dept: PRIMARY CARE CLINIC | Age: 65
End: 2025-05-01

## 2025-05-01 VITALS
BODY MASS INDEX: 33.22 KG/M2 | SYSTOLIC BLOOD PRESSURE: 118 MMHG | WEIGHT: 199.4 LBS | HEART RATE: 78 BPM | TEMPERATURE: 97.6 F | DIASTOLIC BLOOD PRESSURE: 70 MMHG | HEIGHT: 65 IN | OXYGEN SATURATION: 98 %

## 2025-05-01 DIAGNOSIS — J18.1 LEFT UPPER LOBE CONSOLIDATION: Primary | ICD-10-CM

## 2025-05-01 DIAGNOSIS — I25.5 ISCHEMIC CARDIOMYOPATHY: ICD-10-CM

## 2025-05-01 DIAGNOSIS — E55.9 VITAMIN D DEFICIENCY: ICD-10-CM

## 2025-05-01 DIAGNOSIS — I25.10 ATHEROSCLEROSIS OF NATIVE CORONARY ARTERY OF NATIVE HEART WITHOUT ANGINA PECTORIS: ICD-10-CM

## 2025-05-01 DIAGNOSIS — E11.42 TYPE 2 DIABETES MELLITUS WITH DIABETIC POLYNEUROPATHY, WITHOUT LONG-TERM CURRENT USE OF INSULIN (HCC): ICD-10-CM

## 2025-05-01 DIAGNOSIS — E87.6 HYPOKALEMIA: ICD-10-CM

## 2025-05-01 DIAGNOSIS — D50.9 IRON DEFICIENCY ANEMIA, UNSPECIFIED IRON DEFICIENCY ANEMIA TYPE: ICD-10-CM

## 2025-05-01 DIAGNOSIS — I10 ESSENTIAL HYPERTENSION: ICD-10-CM

## 2025-05-01 DIAGNOSIS — Z95.1 S/P CABG (CORONARY ARTERY BYPASS GRAFT): ICD-10-CM

## 2025-05-01 DIAGNOSIS — R22.41 LOCALIZED SWELLING, MASS, OR LUMP OF LOWER EXTREMITY, RIGHT: ICD-10-CM

## 2025-05-01 DIAGNOSIS — F33.1 MODERATE EPISODE OF RECURRENT MAJOR DEPRESSIVE DISORDER (HCC): ICD-10-CM

## 2025-05-01 DIAGNOSIS — I50.20 HEART FAILURE WITH REDUCED EJECTION FRACTION (HCC): ICD-10-CM

## 2025-05-01 PROCEDURE — 1159F MED LIST DOCD IN RCRD: CPT | Performed by: STUDENT IN AN ORGANIZED HEALTH CARE EDUCATION/TRAINING PROGRAM

## 2025-05-01 PROCEDURE — G2211 COMPLEX E/M VISIT ADD ON: HCPCS | Performed by: STUDENT IN AN ORGANIZED HEALTH CARE EDUCATION/TRAINING PROGRAM

## 2025-05-01 PROCEDURE — 1160F RVW MEDS BY RX/DR IN RCRD: CPT | Performed by: STUDENT IN AN ORGANIZED HEALTH CARE EDUCATION/TRAINING PROGRAM

## 2025-05-01 PROCEDURE — 3044F HG A1C LEVEL LT 7.0%: CPT | Performed by: STUDENT IN AN ORGANIZED HEALTH CARE EDUCATION/TRAINING PROGRAM

## 2025-05-01 PROCEDURE — 4004F PT TOBACCO SCREEN RCVD TLK: CPT | Performed by: STUDENT IN AN ORGANIZED HEALTH CARE EDUCATION/TRAINING PROGRAM

## 2025-05-01 PROCEDURE — 3017F COLORECTAL CA SCREEN DOC REV: CPT | Performed by: STUDENT IN AN ORGANIZED HEALTH CARE EDUCATION/TRAINING PROGRAM

## 2025-05-01 PROCEDURE — 3074F SYST BP LT 130 MM HG: CPT | Performed by: STUDENT IN AN ORGANIZED HEALTH CARE EDUCATION/TRAINING PROGRAM

## 2025-05-01 PROCEDURE — G8417 CALC BMI ABV UP PARAM F/U: HCPCS | Performed by: STUDENT IN AN ORGANIZED HEALTH CARE EDUCATION/TRAINING PROGRAM

## 2025-05-01 PROCEDURE — 3078F DIAST BP <80 MM HG: CPT | Performed by: STUDENT IN AN ORGANIZED HEALTH CARE EDUCATION/TRAINING PROGRAM

## 2025-05-01 PROCEDURE — 1090F PRES/ABSN URINE INCON ASSESS: CPT | Performed by: STUDENT IN AN ORGANIZED HEALTH CARE EDUCATION/TRAINING PROGRAM

## 2025-05-01 PROCEDURE — G8427 DOCREV CUR MEDS BY ELIG CLIN: HCPCS | Performed by: STUDENT IN AN ORGANIZED HEALTH CARE EDUCATION/TRAINING PROGRAM

## 2025-05-01 PROCEDURE — 1123F ACP DISCUSS/DSCN MKR DOCD: CPT | Performed by: STUDENT IN AN ORGANIZED HEALTH CARE EDUCATION/TRAINING PROGRAM

## 2025-05-01 PROCEDURE — 99214 OFFICE O/P EST MOD 30 MIN: CPT | Performed by: STUDENT IN AN ORGANIZED HEALTH CARE EDUCATION/TRAINING PROGRAM

## 2025-05-01 PROCEDURE — 2022F DILAT RTA XM EVC RTNOPTHY: CPT | Performed by: STUDENT IN AN ORGANIZED HEALTH CARE EDUCATION/TRAINING PROGRAM

## 2025-05-01 PROCEDURE — G8400 PT W/DXA NO RESULTS DOC: HCPCS | Performed by: STUDENT IN AN ORGANIZED HEALTH CARE EDUCATION/TRAINING PROGRAM

## 2025-05-01 RX ORDER — METOPROLOL TARTRATE 50 MG
50 TABLET ORAL DAILY
Qty: 90 TABLET | Refills: 1 | Status: SHIPPED | OUTPATIENT
Start: 2025-05-01

## 2025-05-01 RX ORDER — FUROSEMIDE 40 MG/1
40 TABLET ORAL DAILY
Qty: 90 TABLET | Refills: 1 | Status: SHIPPED | OUTPATIENT
Start: 2025-05-01

## 2025-05-01 RX ORDER — METOPROLOL TARTRATE 50 MG
50 TABLET ORAL DAILY
COMMUNITY
End: 2025-05-01 | Stop reason: SDUPTHER

## 2025-05-01 ASSESSMENT — ENCOUNTER SYMPTOMS
BLOOD IN STOOL: 0
DIARRHEA: 0
WHEEZING: 0
ABDOMINAL PAIN: 0
SHORTNESS OF BREATH: 1
CONSTIPATION: 0
COUGH: 0

## 2025-05-01 NOTE — ASSESSMENT & PLAN NOTE
Chronic, stable  S/p 3-vessel CABG and posterior pericardial window 2/18/25  Also history MI with stents 2007  Unable to tolerate statin, Zetia  Stopped Plavix- placed call to cardiology to see if patient misunderstood given DAPT usually continued 12 months post-op, awaiting return call  Continue aspirin, metoprolol, lisinopril  Following with cardiology, cardiac surgery

## 2025-05-01 NOTE — TELEPHONE ENCOUNTER
Dr Joshua Mason from Dr smyth's office called and states she ahs a message out to him for clarification because she did not see where it was stopped either. She is just waiting on a response.

## 2025-05-01 NOTE — PROGRESS NOTES
ESTABLISHED PRIMARY CARE VISIT    25  Name: Marleen Walker   : 1960   Age: 65 y.o.  Sex: female        Assessment & Plan:     Problem List Items Addressed This Visit       Atherosclerotic heart disease of native coronary artery without angina pectoris    Chronic, stable  S/p 3-vessel CABG and posterior pericardial window 25  Also history MI with stents 2007  Unable to tolerate statin, Zetia  Stopped Plavix- placed call to cardiology to see if patient misunderstood given DAPT usually continued 12 months post-op, awaiting return call  Continue aspirin 81 mg daily, lisinopril 10 mg daily, metoprolol 50 mg daily (unable to tolerate BID dosing)  Following with cardiology, cardiac surgery         Relevant Medications    metoprolol tartrate (LOPRESSOR) 50 MG tablet    furosemide (LASIX) 40 MG tablet    Essential hypertension    Chronic, controlled  Continue furosemide 40 mg daily, lisinopril 10 mg daily, metoprolol 50 mg daily (unable to tolerate BID dosing)         Relevant Medications    metoprolol tartrate (LOPRESSOR) 50 MG tablet    furosemide (LASIX) 40 MG tablet    Other Relevant Orders    Comprehensive Metabolic Panel    Heart failure with reduced ejection fraction (HCC)    Chronic, improved  EF 30-35% post-op s/p CABG  Continue Lasix 40 mg daily, lisinopril 10 mg daily, metoprolol 50 mg daily (unable to tolerate BID dosing)         Relevant Medications    metoprolol tartrate (LOPRESSOR) 50 MG tablet    furosemide (LASIX) 40 MG tablet    Ischemic cardiomyopathy    Relevant Medications    metoprolol tartrate (LOPRESSOR) 50 MG tablet    furosemide (LASIX) 40 MG tablet    Left upper lobe consolidation - Primary    Nodular consolidation posterior DOUGLAS seen on CT and previous CXR  PET-CT pending         Moderate episode of recurrent major depressive disorder (HCC)    Chronic, improved  Not currently treated         Type 2 diabetes mellitus with diabetic polyneuropathy, without long-term current

## 2025-05-01 NOTE — ASSESSMENT & PLAN NOTE
Chronic, improved  EF 30-35% post-op s/p CABG  Continue Lasix 40 mg daily, lisinopril 10 mg daily, metoprolol 50 mg daily (unable to tolerate BID dosing)

## 2025-05-01 NOTE — ASSESSMENT & PLAN NOTE
Chronic, controlled  Continue furosemide 40 mg daily, lisinopril 10 mg daily, metoprolol 50 mg daily (unable to tolerate BID dosing)

## 2025-05-19 ENCOUNTER — TELEPHONE (OUTPATIENT)
Dept: CARDIAC SURGERY | Facility: HOSPITAL | Age: 65
End: 2025-05-19
Payer: MEDICARE

## 2025-05-19 NOTE — TELEPHONE ENCOUNTER
Spoke to Barbie who is status post 2/18/25 cabg surgery. She is describing having swelling at the top of her incision with soar and itching feeling. Went to ED yesterday and was prescribed Doxycycline. Stating no open wound, no drainage. She mentioned she has had similar stitch reactions from prior surgeries. Wondering if she is having a stitch reaction. She will send me a picture and follow up with her cardiologist Dr. Lerma. I asked her to call us back with an update. Florinda Jay RN

## 2025-05-21 ENCOUNTER — TELEPHONE (OUTPATIENT)
Dept: PRIMARY CARE CLINIC | Age: 65
End: 2025-05-21

## 2025-05-21 DIAGNOSIS — J18.1 LEFT UPPER LOBE CONSOLIDATION: Primary | ICD-10-CM

## 2025-05-21 DIAGNOSIS — R91.1 LEFT UPPER LOBE PULMONARY NODULE: ICD-10-CM

## 2025-05-21 DIAGNOSIS — R91.8 ABNORMAL COMPUTED TOMOGRAPHY OF LUNG: ICD-10-CM

## 2025-05-21 NOTE — TELEPHONE ENCOUNTER
Rosalind from pre-cert department at [facility] called to see if you had any other additional diagnoses that you could use to go with PET scan because the lung consolidation does not pass for medical necessity.    She is scheduled for 5/27  Call Rosalind back at   [phone]

## 2025-05-21 NOTE — TELEPHONE ENCOUNTER
Left vm for Rosalind at [facility] pre cert informing to try these diagnoses codes and to call back if she needs to talk with me directly.

## 2025-05-22 NOTE — TELEPHONE ENCOUNTER
Milla states you need to do a new order with these diagnoses.     Fax to her [phone]  Fax to [facility] depart [phone]

## 2025-05-28 ENCOUNTER — RESULTS FOLLOW-UP (OUTPATIENT)
Dept: PRIMARY CARE CLINIC | Age: 65
End: 2025-05-28

## 2025-05-28 DIAGNOSIS — J18.1 LEFT UPPER LOBE CONSOLIDATION: Primary | ICD-10-CM

## 2025-05-28 DIAGNOSIS — R91.1 LEFT UPPER LOBE PULMONARY NODULE: ICD-10-CM

## 2025-05-28 DIAGNOSIS — R91.8 ABNORMAL COMPUTED TOMOGRAPHY OF LUNG: ICD-10-CM

## 2025-06-02 ENCOUNTER — TELEPHONE (OUTPATIENT)
Dept: PRIMARY CARE CLINIC | Age: 65
End: 2025-06-02

## 2025-06-02 NOTE — TELEPHONE ENCOUNTER
Alexandru Coffman called to get the ct needle biopsy order sent to Alexandru Maxwell.   [phone] fax.  Sending to Keli for a prior auth.

## 2025-06-03 ENCOUNTER — TELEPHONE (OUTPATIENT)
Dept: PRIMARY CARE CLINIC | Age: 65
End: 2025-06-03

## 2025-06-03 NOTE — TELEPHONE ENCOUNTER
Pt called to see if the prior auth was done yet for [place]. I told her Michelle is working on it and will call her to have the pt schedule an appointment for the CT  at [place].

## 2025-06-04 NOTE — TELEPHONE ENCOUNTER
Left  for patient informing that this should not need a Prior auth and zoë faxed this order to [facility] on 5/30 so she just needs to call and schedule.

## 2025-06-13 ENCOUNTER — TELEPHONE (OUTPATIENT)
Dept: PRIMARY CARE CLINIC | Age: 65
End: 2025-06-13

## 2025-06-13 NOTE — TELEPHONE ENCOUNTER
Cristy from [facility] called for a medical clearance for Luis to hold her Plavix for  5 days before her lung biopsy .  They did not specify who to send it to.

## 2025-06-18 NOTE — TELEPHONE ENCOUNTER
Spoke with Cristy and she states that we would need to do this. This needs to come from the ordering physician.

## 2025-06-19 NOTE — TELEPHONE ENCOUNTER
We will need to place a call to Dr. Sofia. I cannot do so without his permission/recommendation given her recent cardiac surgery. Please let them know this is for lung biopsy for suspected malignancy otherwise would delay until 1 year post-op.

## 2025-06-24 NOTE — TELEPHONE ENCOUNTER
Sheron called back and states patients Plavix was discontinued back in May so she should not be taking this if we need the cardiac clearance still just let them know.   Ph. [phone]

## 2025-06-24 NOTE — TELEPHONE ENCOUNTER
Spoke with erica and they already had this information someone called them already. Patient scheduled for Monday.

## 2025-06-24 NOTE — TELEPHONE ENCOUNTER
Received call back from Janis and she states they are going to defer to dr Driscoll's office. I called Dr Driscoll's office and had to leave a vm with savannah. Waiting on return call

## 2025-07-02 ENCOUNTER — TELEPHONE (OUTPATIENT)
Dept: PRIMARY CARE CLINIC | Age: 65
End: 2025-07-02

## 2025-07-02 ENCOUNTER — LAB (OUTPATIENT)
Dept: PRIMARY CARE CLINIC | Age: 65
End: 2025-07-02
Payer: MEDICARE

## 2025-07-02 DIAGNOSIS — D50.9 IRON DEFICIENCY ANEMIA, UNSPECIFIED IRON DEFICIENCY ANEMIA TYPE: ICD-10-CM

## 2025-07-02 DIAGNOSIS — I10 ESSENTIAL HYPERTENSION: ICD-10-CM

## 2025-07-02 DIAGNOSIS — E11.42 TYPE 2 DIABETES MELLITUS WITH DIABETIC POLYNEUROPATHY, WITHOUT LONG-TERM CURRENT USE OF INSULIN (HCC): ICD-10-CM

## 2025-07-02 DIAGNOSIS — E55.9 VITAMIN D DEFICIENCY: ICD-10-CM

## 2025-07-02 DIAGNOSIS — E87.6 HYPOKALEMIA: ICD-10-CM

## 2025-07-02 LAB
ALBUMIN: 4 G/DL (ref 3.5–5.2)
ALP BLD-CCNC: 109 U/L (ref 35–104)
ALT SERPL-CCNC: 37 U/L (ref 0–35)
ANION GAP SERPL CALCULATED.3IONS-SCNC: 14 MMOL/L (ref 7–16)
AST SERPL-CCNC: 47 U/L (ref 0–35)
BILIRUB SERPL-MCNC: 0.3 MG/DL (ref 0–1.2)
BUN BLDV-MCNC: 8 MG/DL (ref 8–23)
CALCIUM SERPL-MCNC: 9.3 MG/DL (ref 8.8–10.2)
CHLORIDE BLD-SCNC: 102 MMOL/L (ref 98–107)
CO2: 25 MMOL/L (ref 22–29)
CREAT SERPL-MCNC: 0.8 MG/DL (ref 0.5–1)
FERRITIN: 64 NG/ML
GFR, ESTIMATED: 83 ML/MIN/1.73M2
GLUCOSE BLD-MCNC: 118 MG/DL (ref 74–99)
HCT VFR BLD CALC: 45.2 % (ref 34–48)
HEMOGLOBIN: 14.8 G/DL (ref 11.5–15.5)
IRON % SATURATION: 12 % (ref 15–50)
IRON: 45 UG/DL (ref 37–145)
MCH RBC QN AUTO: 31.3 PG (ref 26–35)
MCHC RBC AUTO-ENTMCNC: 32.7 G/DL (ref 32–34.5)
MCV RBC AUTO: 95.6 FL (ref 80–99.9)
PDW BLD-RTO: 14.8 % (ref 11.5–15)
PLATELET # BLD: 237 K/UL (ref 130–450)
PMV BLD AUTO: 10 FL (ref 7–12)
POTASSIUM SERPL-SCNC: 4.6 MMOL/L (ref 3.5–5.1)
RBC # BLD: 4.73 M/UL (ref 3.5–5.5)
SODIUM BLD-SCNC: 140 MMOL/L (ref 136–145)
TOTAL IRON BINDING CAPACITY: 372 UG/DL (ref 250–450)
TOTAL PROTEIN: 6.7 G/DL (ref 6.4–8.3)
VITAMIN D 25-HYDROXY: 107 NG/ML (ref 30–100)
WBC # BLD: 6.9 K/UL (ref 4.5–11.5)

## 2025-07-02 PROCEDURE — 36415 COLL VENOUS BLD VENIPUNCTURE: CPT | Performed by: STUDENT IN AN ORGANIZED HEALTH CARE EDUCATION/TRAINING PROGRAM

## 2025-07-02 NOTE — TELEPHONE ENCOUNTER
Patient here for lab visit today and at check out she requested a soon appointment to see doctor. Relayed to her that provider had no soon openings available that I could offer, but then before I could finish relaying to her that I would kindly send doctor a message to advise for a soon visit and was needing to list a reason to document, patient was seemingly upset, had interrupted me stating that provider already knew why, that she has Lung CA, confirmed she felt I was being rude and that I didn't like her, and that I had interrupted her. Tried to relay to patient that I felt she had interrupted me while trying to finish my explanation of needing to send doctor a message to advise where to place her since nothing was available for me to offer, that I did like her and never said I hadn't, was not trying to be rude by any means, but patient confirmed she did not want to talk to me, that she would call in, then she left. Patient initially had to wait checking in and out today as we were busy in office, and then I had also mentioned something to her about her regular follow up needing to be scheduled for Aug as that was not yet scheduled. Relayed to patient that I was not trying to cause her frustration or upset her, that was not my intent, but patient still left upset.

## 2025-07-03 ENCOUNTER — OFFICE VISIT (OUTPATIENT)
Dept: PRIMARY CARE CLINIC | Age: 65
End: 2025-07-03
Payer: MEDICARE

## 2025-07-03 ENCOUNTER — RESULTS FOLLOW-UP (OUTPATIENT)
Dept: PRIMARY CARE CLINIC | Age: 65
End: 2025-07-03

## 2025-07-03 VITALS
BODY MASS INDEX: 33.49 KG/M2 | DIASTOLIC BLOOD PRESSURE: 70 MMHG | HEIGHT: 65 IN | SYSTOLIC BLOOD PRESSURE: 120 MMHG | OXYGEN SATURATION: 91 % | TEMPERATURE: 97 F | HEART RATE: 63 BPM | WEIGHT: 201 LBS

## 2025-07-03 DIAGNOSIS — C34.92 ADENOSQUAMOUS CARCINOMA OF LEFT LUNG (HCC): Primary | ICD-10-CM

## 2025-07-03 DIAGNOSIS — F06.4 ANXIETY DISORDER DUE TO MEDICAL CONDITION: ICD-10-CM

## 2025-07-03 DIAGNOSIS — F41.0 GENERALIZED ANXIETY DISORDER WITH PANIC ATTACKS: ICD-10-CM

## 2025-07-03 DIAGNOSIS — E55.9 VITAMIN D DEFICIENCY: ICD-10-CM

## 2025-07-03 DIAGNOSIS — H80.93 OTOSCLEROSIS OF BOTH EARS: ICD-10-CM

## 2025-07-03 DIAGNOSIS — F41.1 GENERALIZED ANXIETY DISORDER WITH PANIC ATTACKS: ICD-10-CM

## 2025-07-03 DIAGNOSIS — L20.9 ATOPIC DERMATITIS, UNSPECIFIED TYPE: ICD-10-CM

## 2025-07-03 LAB
BILIRUBIN, URINE: NEGATIVE
COLOR, UA: YELLOW
COMMENT: NORMAL
GLUCOSE URINE: NEGATIVE MG/DL
KETONES, URINE: NEGATIVE MG/DL
LEUKOCYTE ESTERASE, URINE: NEGATIVE
NITRITE, URINE: NEGATIVE
PH, URINE: 7 (ref 5–8)
PROTEIN UA: NEGATIVE MG/DL
SPECIFIC GRAVITY UA: 1.01 (ref 1–1.03)
TURBIDITY: CLEAR
URINE HGB: NEGATIVE
UROBILINOGEN, URINE: 1 EU/DL (ref 0–1)

## 2025-07-03 PROCEDURE — 3074F SYST BP LT 130 MM HG: CPT | Performed by: STUDENT IN AN ORGANIZED HEALTH CARE EDUCATION/TRAINING PROGRAM

## 2025-07-03 PROCEDURE — 1159F MED LIST DOCD IN RCRD: CPT | Performed by: STUDENT IN AN ORGANIZED HEALTH CARE EDUCATION/TRAINING PROGRAM

## 2025-07-03 PROCEDURE — G8427 DOCREV CUR MEDS BY ELIG CLIN: HCPCS | Performed by: STUDENT IN AN ORGANIZED HEALTH CARE EDUCATION/TRAINING PROGRAM

## 2025-07-03 PROCEDURE — 1090F PRES/ABSN URINE INCON ASSESS: CPT | Performed by: STUDENT IN AN ORGANIZED HEALTH CARE EDUCATION/TRAINING PROGRAM

## 2025-07-03 PROCEDURE — 3017F COLORECTAL CA SCREEN DOC REV: CPT | Performed by: STUDENT IN AN ORGANIZED HEALTH CARE EDUCATION/TRAINING PROGRAM

## 2025-07-03 PROCEDURE — G8400 PT W/DXA NO RESULTS DOC: HCPCS | Performed by: STUDENT IN AN ORGANIZED HEALTH CARE EDUCATION/TRAINING PROGRAM

## 2025-07-03 PROCEDURE — 3078F DIAST BP <80 MM HG: CPT | Performed by: STUDENT IN AN ORGANIZED HEALTH CARE EDUCATION/TRAINING PROGRAM

## 2025-07-03 PROCEDURE — G8417 CALC BMI ABV UP PARAM F/U: HCPCS | Performed by: STUDENT IN AN ORGANIZED HEALTH CARE EDUCATION/TRAINING PROGRAM

## 2025-07-03 PROCEDURE — 1160F RVW MEDS BY RX/DR IN RCRD: CPT | Performed by: STUDENT IN AN ORGANIZED HEALTH CARE EDUCATION/TRAINING PROGRAM

## 2025-07-03 PROCEDURE — 1123F ACP DISCUSS/DSCN MKR DOCD: CPT | Performed by: STUDENT IN AN ORGANIZED HEALTH CARE EDUCATION/TRAINING PROGRAM

## 2025-07-03 PROCEDURE — G2211 COMPLEX E/M VISIT ADD ON: HCPCS | Performed by: STUDENT IN AN ORGANIZED HEALTH CARE EDUCATION/TRAINING PROGRAM

## 2025-07-03 PROCEDURE — 4004F PT TOBACCO SCREEN RCVD TLK: CPT | Performed by: STUDENT IN AN ORGANIZED HEALTH CARE EDUCATION/TRAINING PROGRAM

## 2025-07-03 PROCEDURE — 99214 OFFICE O/P EST MOD 30 MIN: CPT | Performed by: STUDENT IN AN ORGANIZED HEALTH CARE EDUCATION/TRAINING PROGRAM

## 2025-07-03 RX ORDER — IRON POLYSACCHARIDE COMPLEX 150 MG
150 CAPSULE ORAL
COMMUNITY
Start: 2025-03-26 | End: 2025-07-03

## 2025-07-03 RX ORDER — OFLOXACIN 3 MG/ML
10 SOLUTION AURICULAR (OTIC) DAILY
Qty: 10 ML | Refills: 0 | Status: SHIPPED | OUTPATIENT
Start: 2025-07-03 | End: 2025-07-17

## 2025-07-03 RX ORDER — PAROXETINE 10 MG/1
5 TABLET, FILM COATED ORAL EVERY MORNING
COMMUNITY

## 2025-07-03 RX ORDER — LORAZEPAM 0.5 MG/1
0.5 TABLET ORAL EVERY 8 HOURS PRN
Qty: 21 TABLET | Refills: 0 | Status: SHIPPED | OUTPATIENT
Start: 2025-07-03 | End: 2025-07-10

## 2025-07-03 RX ORDER — TRIAMCINOLONE ACETONIDE 1 MG/G
CREAM TOPICAL
Qty: 80 G | Refills: 0 | Status: SHIPPED | OUTPATIENT
Start: 2025-07-03

## 2025-07-03 NOTE — TELEPHONE ENCOUNTER
Voicemail and [employer] message sent to patient offering her an appointment for today (7/3/25) at 2:30pm per Dr. Lewis previous message.

## 2025-07-03 NOTE — PROGRESS NOTES
ESTABLISHED PRIMARY CARE VISIT    7/3/25  Name: Marleen Walker   : 1960   Age: 65 y.o.  Sex: female        Assessment & Plan:       ICD-10-CM    1. Adenosquamous carcinoma of left lung (HCC)  C34.92 External Referral To Oncology      2. Generalized anxiety disorder with panic attacks  F41.1     F41.0       3. Anxiety disorder due to medical condition  F06.4 LORazepam (ATIVAN) 0.5 MG tablet      4. Otosclerosis of both ears  H80.93 ofloxacin (FLOXIN) 0.3 % otic solution      5. Atopic dermatitis, unspecified type  L20.9 triamcinolone (KENALOG) 0.1 % cream      6. Vitamin D deficiency  E55.9 vitamin D (CHOLECALCIFEROL) 71665 UNIT CAPS        Discussed biopsy results with new diagnosis adenosquamous carcinoma left lung. Referral to oncology. May need to see pulmonology and thoracic surgery as well.    Reports increased anxiety due to above. Can restart Paxil 5 mg daily if desires, will think about it. Provided Ativan 0.5 mg as needed for panic attacks.    [place] ears as per ENT as needed.    Rash on neck appears to be atopic vs xeroderma. Trial topical steroid. Encouraged unscented soap (avoid scrubbing), moisturization unscented lotion.    Reviewed labs. Reduce vitamin D dose Q2w for toxicity, previously deficient. Other labs stable.    PDMP Monitoring:  Last PDMP [place] as Reviewed:  Review User Review Instant Review Result   DHRUV BUTLER 7/3/2025  3:23 PM Reviewed PDMP [1]     Counseled patient regarding above diagnosis, including possible risks and complications, especially if left uncontrolled.  Counseled patient as appropriate and relevant regarding any possible side effects, risks, and alternatives to treatment; the patient verbalizes understanding, and is in agreement with the plan as detailed above.   All educational materials and instructions were discussed and included on the After Visit Summary.  All questions answered to the patient's satisfaction.  The patient was advised to call or

## 2025-07-07 ENCOUNTER — RESULTS FOLLOW-UP (OUTPATIENT)
Dept: PRIMARY CARE CLINIC | Age: 65
End: 2025-07-07

## 2025-07-09 ENCOUNTER — TELEPHONE (OUTPATIENT)
Dept: PRIMARY CARE CLINIC | Age: 65
End: 2025-07-09

## 2025-07-09 NOTE — TELEPHONE ENCOUNTER
Patient called in because you told her to all if she hadn't heard back from the oncologist by today to call you to check in.

## 2025-07-10 PROBLEM — C34.92 ADENOSQUAMOUS CARCINOMA OF LEFT LUNG (HCC): Status: ACTIVE | Noted: 2025-07-10

## 2025-07-10 ASSESSMENT — ENCOUNTER SYMPTOMS
BLOOD IN STOOL: 0
WHEEZING: 0
SHORTNESS OF BREATH: 1
COUGH: 0
ABDOMINAL PAIN: 0
DIARRHEA: 0
CONSTIPATION: 0

## 2025-07-24 ENCOUNTER — OFFICE VISIT (OUTPATIENT)
Dept: PRIMARY CARE CLINIC | Age: 65
End: 2025-07-24
Payer: MEDICARE

## 2025-07-24 VITALS
OXYGEN SATURATION: 95 % | WEIGHT: 204.8 LBS | HEART RATE: 83 BPM | DIASTOLIC BLOOD PRESSURE: 70 MMHG | BODY MASS INDEX: 34.12 KG/M2 | TEMPERATURE: 97.3 F | HEIGHT: 65 IN | SYSTOLIC BLOOD PRESSURE: 110 MMHG

## 2025-07-24 DIAGNOSIS — F51.04 PSYCHOPHYSIOLOGICAL INSOMNIA: ICD-10-CM

## 2025-07-24 DIAGNOSIS — F41.0 GENERALIZED ANXIETY DISORDER WITH PANIC ATTACKS: ICD-10-CM

## 2025-07-24 DIAGNOSIS — C34.92 ADENOSQUAMOUS CARCINOMA OF LEFT LUNG (HCC): Primary | ICD-10-CM

## 2025-07-24 DIAGNOSIS — F06.4 ANXIETY DISORDER DUE TO MEDICAL CONDITION: ICD-10-CM

## 2025-07-24 DIAGNOSIS — N95.2 POST-MENOPAUSAL ATROPHIC VAGINITIS: ICD-10-CM

## 2025-07-24 DIAGNOSIS — F41.1 GENERALIZED ANXIETY DISORDER WITH PANIC ATTACKS: ICD-10-CM

## 2025-07-24 PROCEDURE — 1159F MED LIST DOCD IN RCRD: CPT | Performed by: STUDENT IN AN ORGANIZED HEALTH CARE EDUCATION/TRAINING PROGRAM

## 2025-07-24 PROCEDURE — 3078F DIAST BP <80 MM HG: CPT | Performed by: STUDENT IN AN ORGANIZED HEALTH CARE EDUCATION/TRAINING PROGRAM

## 2025-07-24 PROCEDURE — 1160F RVW MEDS BY RX/DR IN RCRD: CPT | Performed by: STUDENT IN AN ORGANIZED HEALTH CARE EDUCATION/TRAINING PROGRAM

## 2025-07-24 PROCEDURE — 4004F PT TOBACCO SCREEN RCVD TLK: CPT | Performed by: STUDENT IN AN ORGANIZED HEALTH CARE EDUCATION/TRAINING PROGRAM

## 2025-07-24 PROCEDURE — 1090F PRES/ABSN URINE INCON ASSESS: CPT | Performed by: STUDENT IN AN ORGANIZED HEALTH CARE EDUCATION/TRAINING PROGRAM

## 2025-07-24 PROCEDURE — 3074F SYST BP LT 130 MM HG: CPT | Performed by: STUDENT IN AN ORGANIZED HEALTH CARE EDUCATION/TRAINING PROGRAM

## 2025-07-24 PROCEDURE — 99214 OFFICE O/P EST MOD 30 MIN: CPT | Performed by: STUDENT IN AN ORGANIZED HEALTH CARE EDUCATION/TRAINING PROGRAM

## 2025-07-24 PROCEDURE — G8400 PT W/DXA NO RESULTS DOC: HCPCS | Performed by: STUDENT IN AN ORGANIZED HEALTH CARE EDUCATION/TRAINING PROGRAM

## 2025-07-24 PROCEDURE — G2211 COMPLEX E/M VISIT ADD ON: HCPCS | Performed by: STUDENT IN AN ORGANIZED HEALTH CARE EDUCATION/TRAINING PROGRAM

## 2025-07-24 PROCEDURE — G8427 DOCREV CUR MEDS BY ELIG CLIN: HCPCS | Performed by: STUDENT IN AN ORGANIZED HEALTH CARE EDUCATION/TRAINING PROGRAM

## 2025-07-24 PROCEDURE — 1123F ACP DISCUSS/DSCN MKR DOCD: CPT | Performed by: STUDENT IN AN ORGANIZED HEALTH CARE EDUCATION/TRAINING PROGRAM

## 2025-07-24 PROCEDURE — 3017F COLORECTAL CA SCREEN DOC REV: CPT | Performed by: STUDENT IN AN ORGANIZED HEALTH CARE EDUCATION/TRAINING PROGRAM

## 2025-07-24 PROCEDURE — G8417 CALC BMI ABV UP PARAM F/U: HCPCS | Performed by: STUDENT IN AN ORGANIZED HEALTH CARE EDUCATION/TRAINING PROGRAM

## 2025-07-24 RX ORDER — ESTRADIOL 0.1 MG/G
1 CREAM VAGINAL
COMMUNITY

## 2025-07-24 RX ORDER — LORAZEPAM 0.5 MG/1
0.5 TABLET ORAL EVERY 8 HOURS PRN
COMMUNITY

## 2025-08-05 PROBLEM — N95.2 POST-MENOPAUSAL ATROPHIC VAGINITIS: Status: ACTIVE | Noted: 2025-08-05

## 2025-08-06 ENCOUNTER — APPOINTMENT (OUTPATIENT)
Dept: SURGERY | Facility: HOSPITAL | Age: 65
End: 2025-08-06
Payer: MEDICARE

## 2025-08-06 DIAGNOSIS — C34.12 MALIGNANT NEOPLASM OF UPPER LOBE OF LEFT LUNG (MULTI): ICD-10-CM

## 2025-08-19 ENCOUNTER — TELEPHONE (OUTPATIENT)
Dept: PRIMARY CARE CLINIC | Age: 65
End: 2025-08-19

## 2025-08-19 RX ORDER — CEFDINIR 300 MG/1
300 CAPSULE ORAL 2 TIMES DAILY
Qty: 14 CAPSULE | Refills: 0 | Status: SHIPPED | OUTPATIENT
Start: 2025-08-19 | End: 2025-08-26

## 2025-08-22 ENCOUNTER — TELEPHONE (OUTPATIENT)
Dept: PRIMARY CARE CLINIC | Age: 65
End: 2025-08-22

## 2025-09-02 ENCOUNTER — TELEPHONE (OUTPATIENT)
Dept: PRIMARY CARE CLINIC | Age: 65
End: 2025-09-02

## 2025-09-04 ENCOUNTER — OFFICE VISIT (OUTPATIENT)
Dept: PRIMARY CARE CLINIC | Age: 65
End: 2025-09-04
Payer: MEDICARE

## 2025-09-04 VITALS
HEIGHT: 65 IN | WEIGHT: 204 LBS | OXYGEN SATURATION: 95 % | DIASTOLIC BLOOD PRESSURE: 60 MMHG | TEMPERATURE: 97.5 F | HEART RATE: 72 BPM | SYSTOLIC BLOOD PRESSURE: 120 MMHG | BODY MASS INDEX: 33.99 KG/M2

## 2025-09-04 VITALS
OXYGEN SATURATION: 95 % | WEIGHT: 204 LBS | DIASTOLIC BLOOD PRESSURE: 60 MMHG | HEIGHT: 65 IN | TEMPERATURE: 97.5 F | BODY MASS INDEX: 33.99 KG/M2 | SYSTOLIC BLOOD PRESSURE: 120 MMHG | HEART RATE: 72 BPM

## 2025-09-04 DIAGNOSIS — Z53.20 BREAST CANCER SCREENING DECLINED: ICD-10-CM

## 2025-09-04 DIAGNOSIS — C34.92 ADENOSQUAMOUS CARCINOMA OF LEFT LUNG (HCC): Primary | ICD-10-CM

## 2025-09-04 DIAGNOSIS — Z53.20 COLON CANCER SCREENING DECLINED: ICD-10-CM

## 2025-09-04 DIAGNOSIS — Z00.00 MEDICARE ANNUAL WELLNESS VISIT, SUBSEQUENT: Primary | ICD-10-CM

## 2025-09-04 DIAGNOSIS — T88.8XXA REACTION OF SKIN DUE TO SUTURE MATERIAL: ICD-10-CM

## 2025-09-04 DIAGNOSIS — R23.9 REACTION OF SKIN DUE TO SUTURE MATERIAL: ICD-10-CM

## 2025-09-04 DIAGNOSIS — J43.9 PULMONARY EMPHYSEMA, UNSPECIFIED EMPHYSEMA TYPE (HCC): ICD-10-CM

## 2025-09-04 PROCEDURE — 1160F RVW MEDS BY RX/DR IN RCRD: CPT | Performed by: STUDENT IN AN ORGANIZED HEALTH CARE EDUCATION/TRAINING PROGRAM

## 2025-09-04 PROCEDURE — 3078F DIAST BP <80 MM HG: CPT | Performed by: STUDENT IN AN ORGANIZED HEALTH CARE EDUCATION/TRAINING PROGRAM

## 2025-09-04 PROCEDURE — 3074F SYST BP LT 130 MM HG: CPT | Performed by: STUDENT IN AN ORGANIZED HEALTH CARE EDUCATION/TRAINING PROGRAM

## 2025-09-04 PROCEDURE — G8427 DOCREV CUR MEDS BY ELIG CLIN: HCPCS | Performed by: STUDENT IN AN ORGANIZED HEALTH CARE EDUCATION/TRAINING PROGRAM

## 2025-09-04 PROCEDURE — 4004F PT TOBACCO SCREEN RCVD TLK: CPT | Performed by: STUDENT IN AN ORGANIZED HEALTH CARE EDUCATION/TRAINING PROGRAM

## 2025-09-04 PROCEDURE — 1090F PRES/ABSN URINE INCON ASSESS: CPT | Performed by: STUDENT IN AN ORGANIZED HEALTH CARE EDUCATION/TRAINING PROGRAM

## 2025-09-04 PROCEDURE — 3017F COLORECTAL CA SCREEN DOC REV: CPT | Performed by: STUDENT IN AN ORGANIZED HEALTH CARE EDUCATION/TRAINING PROGRAM

## 2025-09-04 PROCEDURE — 3023F SPIROM DOC REV: CPT | Performed by: STUDENT IN AN ORGANIZED HEALTH CARE EDUCATION/TRAINING PROGRAM

## 2025-09-04 PROCEDURE — 1159F MED LIST DOCD IN RCRD: CPT | Performed by: STUDENT IN AN ORGANIZED HEALTH CARE EDUCATION/TRAINING PROGRAM

## 2025-09-04 PROCEDURE — G8400 PT W/DXA NO RESULTS DOC: HCPCS | Performed by: STUDENT IN AN ORGANIZED HEALTH CARE EDUCATION/TRAINING PROGRAM

## 2025-09-04 PROCEDURE — G8417 CALC BMI ABV UP PARAM F/U: HCPCS | Performed by: STUDENT IN AN ORGANIZED HEALTH CARE EDUCATION/TRAINING PROGRAM

## 2025-09-04 PROCEDURE — 1123F ACP DISCUSS/DSCN MKR DOCD: CPT | Performed by: STUDENT IN AN ORGANIZED HEALTH CARE EDUCATION/TRAINING PROGRAM

## 2025-09-04 PROCEDURE — G2211 COMPLEX E/M VISIT ADD ON: HCPCS | Performed by: STUDENT IN AN ORGANIZED HEALTH CARE EDUCATION/TRAINING PROGRAM

## 2025-09-04 PROCEDURE — G0439 PPPS, SUBSEQ VISIT: HCPCS | Performed by: STUDENT IN AN ORGANIZED HEALTH CARE EDUCATION/TRAINING PROGRAM

## 2025-09-04 PROCEDURE — 99214 OFFICE O/P EST MOD 30 MIN: CPT | Performed by: STUDENT IN AN ORGANIZED HEALTH CARE EDUCATION/TRAINING PROGRAM

## 2025-09-04 RX ORDER — AZITHROMYCIN 250 MG/1
TABLET, FILM COATED ORAL
Qty: 6 TABLET | Refills: 0 | Status: SHIPPED | OUTPATIENT
Start: 2025-09-04 | End: 2025-09-14

## 2025-09-04 SDOH — ECONOMIC STABILITY: FOOD INSECURITY: WITHIN THE PAST 12 MONTHS, THE FOOD YOU BOUGHT JUST DIDN'T LAST AND YOU DIDN'T HAVE MONEY TO GET MORE.: NEVER TRUE

## 2025-09-04 SDOH — ECONOMIC STABILITY: FOOD INSECURITY: WITHIN THE PAST 12 MONTHS, YOU WORRIED THAT YOUR FOOD WOULD RUN OUT BEFORE YOU GOT MONEY TO BUY MORE.: NEVER TRUE

## 2025-09-04 ASSESSMENT — PATIENT HEALTH QUESTIONNAIRE - PHQ9
5. POOR APPETITE OR OVEREATING: NOT AT ALL
9. THOUGHTS THAT YOU WOULD BE BETTER OFF DEAD, OR OF HURTING YOURSELF: NOT AT ALL
8. MOVING OR SPEAKING SO SLOWLY THAT OTHER PEOPLE COULD HAVE NOTICED. OR THE OPPOSITE, BEING SO FIGETY OR RESTLESS THAT YOU HAVE BEEN MOVING AROUND A LOT MORE THAN USUAL: NOT AT ALL
3. TROUBLE FALLING OR STAYING ASLEEP: NOT AT ALL
7. TROUBLE CONCENTRATING ON THINGS, SUCH AS READING THE NEWSPAPER OR WATCHING TELEVISION: NOT AT ALL
SUM OF ALL RESPONSES TO PHQ QUESTIONS 1-9: 0
SUM OF ALL RESPONSES TO PHQ QUESTIONS 1-9: 0
1. LITTLE INTEREST OR PLEASURE IN DOING THINGS: NOT AT ALL
6. FEELING BAD ABOUT YOURSELF - OR THAT YOU ARE A FAILURE OR HAVE LET YOURSELF OR YOUR FAMILY DOWN: NOT AT ALL
2. FEELING DOWN, DEPRESSED OR HOPELESS: NOT AT ALL
10. IF YOU CHECKED OFF ANY PROBLEMS, HOW DIFFICULT HAVE THESE PROBLEMS MADE IT FOR YOU TO DO YOUR WORK, TAKE CARE OF THINGS AT HOME, OR GET ALONG WITH OTHER PEOPLE: NOT DIFFICULT AT ALL
SUM OF ALL RESPONSES TO PHQ QUESTIONS 1-9: 0
4. FEELING TIRED OR HAVING LITTLE ENERGY: NOT AT ALL
SUM OF ALL RESPONSES TO PHQ QUESTIONS 1-9: 0

## 2025-09-04 ASSESSMENT — ENCOUNTER SYMPTOMS
WHEEZING: 0
BLOOD IN STOOL: 0
CONSTIPATION: 0
SHORTNESS OF BREATH: 1
COUGH: 0
DIARRHEA: 0
ABDOMINAL PAIN: 0

## 2025-09-04 ASSESSMENT — LIFESTYLE VARIABLES
HOW MANY STANDARD DRINKS CONTAINING ALCOHOL DO YOU HAVE ON A TYPICAL DAY: PATIENT DOES NOT DRINK
HOW OFTEN DO YOU HAVE A DRINK CONTAINING ALCOHOL: NEVER

## (undated) DEVICE — CONNECTOR, STRAIGHT, 0.5 X 0.5 IN

## (undated) DEVICE — SUTURE, ETHIBOND, XTRA, 30 IN, 0, CT-1, GREEN

## (undated) DEVICE — KIT, CELL SAVER, W/COLLECTION SET, 225ML WASH SET

## (undated) DEVICE — CASSETTE, BLOOD, PLEGIC SET

## (undated) DEVICE — Device

## (undated) DEVICE — SUTURE, SILK, 4-0, 18 IN, LABYRINTH, BLACK

## (undated) DEVICE — DRAPE, FLUID WARMER

## (undated) DEVICE — DRESSING, MEPILEX, BORDER, HEEL, 8.7 X 9.1 IN

## (undated) DEVICE — MICROCOAGULATION TEST, ACT+ TEST CUVETTE

## (undated) DEVICE — SUTURE, VICRYL 0, TAPER POINT, CT-1 VIOLET 27 INCH

## (undated) DEVICE — MONITORING KIT, TRANSDUCER, RETROGRADE, MPS, W/EXTENSION, LF

## (undated) DEVICE — SUTURE, MONOCRYL, 3-0, 18 IN, PS2, UNDYED

## (undated) DEVICE — MANIFOLD, 4 PORT NEPTUNE STANDARD

## (undated) DEVICE — PACING CABLE, EXTENSION, 12 FT BEIGE, DISPOSABLE

## (undated) DEVICE — SUTURE, ETHIBOND, XTRA, 30 IN, 0, CTX, GREEN

## (undated) DEVICE — MYOWIRE, SW P7, 2-PK

## (undated) DEVICE — TRAY, SURESTEP, URINE METER, 14FR, SILICONE

## (undated) DEVICE — SHUNT, SENSOR

## (undated) DEVICE — SUTURE, PROLENE, 3-0, 36 IN, SH, DA, BLUE

## (undated) DEVICE — DRAPE, INSTRUMENT, W/POUCH, STERI DRAPE, 7 X 11 IN, DISPOSABLE, STERILE

## (undated) DEVICE — FILTER, IV, BLOOD, MICROAGGREGATE, 40 MIC, RBC TRANSFUSION

## (undated) DEVICE — DRAPE, SHEET, CARDIOVASCULAR, ANTIMICROBIAL, W/ANESTHESIA SCREEN, IOBAN 2, STERI DRAPE, 107 X 133 IN, DISPOSABLE, FABRIC, BLUE, STERILE

## (undated) DEVICE — DEVICE, ENDOSCOPIC VESSEL HARVESTING, SAPHENA VENAPAX

## (undated) DEVICE — DRESSING, ADHESIVE, ISLAND, TELFA, 4 X 14 IN

## (undated) DEVICE — DRESSING, MEPILEX, BORDER, SACRUM, 8.7 X 9.8 IN

## (undated) DEVICE — BAG, DECANTER

## (undated) DEVICE — CLIP, SPRING, BULLDOG, FOGARTY, SOFT JAW, 6 MM, LF

## (undated) DEVICE — GEL, ULTRASOUND, AQUASONIC 100, 20 GM, STERILE

## (undated) DEVICE — SUTURE, PROLENE, 4-0, 36 IN, BB, BLUE

## (undated) DEVICE — BLADE, SAW STERNUM, STERILE

## (undated) DEVICE — APPLIER, LIGACLIP, MULTIPLE, SMALL 9-3/8IN

## (undated) DEVICE — MARKER, SKIN, DUAL TIP INK W/9 LABEL AND REMOVABLE TIME OUT SLEEVE

## (undated) DEVICE — PERFUSION PACK, HEMOCONCENTRATOR, MINNTECH, W/TUBING

## (undated) DEVICE — TUBING PACK, OXYGENATOR, ADULT

## (undated) DEVICE — DRAPE, CARDIOVASCULAR, SPLIT, 115 X 147 IN

## (undated) DEVICE — CATHETER, DRAINAGE, NASOGASTRIC, DOUBLE LUMEN, FUNNEL END, SUMP, SALEM, 18 FR, 48 IN, PVC, STERILE

## (undated) DEVICE — COVER, CART, 45 X 27 X 48 IN, CLEAR

## (undated) DEVICE — PACING WIRE, 1/2 CIRCLE, 26MM NEEDLE, WHITE

## (undated) DEVICE — APPLICATOR, CHLORAPREP, W/ORANGE TINT, 26ML

## (undated) DEVICE — TUBING, SUCTION, CARDIAC, 6 FR

## (undated) DEVICE — MYOWIRE, SW P7, 4-PK

## (undated) DEVICE — FOOT SWITCH, PENCIL, W/HOLSTER, LONGER CORD

## (undated) DEVICE — KIT, TOURNIQUET, 7"

## (undated) DEVICE — CLIPPER, SURGICAL BLADE ASSEMBLY, GENERAL PURPOSE, SINGLE USE

## (undated) DEVICE — SUTURE, SILK, 2-0, 30 IN, SH, CONTROL RELEASE, MULTIPACK, BLACK

## (undated) DEVICE — COLLECTION UNIT, DRAINAGE, THORACIC, SINGLE TUBE, DRY SUCTION, ATS COMPATIBLE, OASIS 3600, LF

## (undated) DEVICE — TUBING, SUCTION, CONNECTING, STERILE 0.25 X 120 IN., LF

## (undated) DEVICE — SUTURE, VICRYL, 2-0, 27 IN, CT-1, VIOLET

## (undated) DEVICE — OXYGENATOR FX 25, W/HR, ARTERIAL FILTER

## (undated) DEVICE — DRESSING, ISLAND, TELFA, 4 X 5 IN

## (undated) DEVICE — CLEANER, ELECTROSURGICAL, TIP, 5 X 5 CM, LF

## (undated) DEVICE — COVER, TABLE, UHC

## (undated) DEVICE — DRESSING, ADHESIVE, ISLAND, TELFA, 2 X 3.75 IN, LF

## (undated) DEVICE — SUTURE, PROLENE, 6-0, 30 IN, C-1, CV-11, BLUE

## (undated) DEVICE — SUTURE, PROLENE, 7-0, 30 IN, BV1, DA, BLUE